# Patient Record
Sex: MALE | Race: WHITE | NOT HISPANIC OR LATINO
[De-identification: names, ages, dates, MRNs, and addresses within clinical notes are randomized per-mention and may not be internally consistent; named-entity substitution may affect disease eponyms.]

---

## 2017-08-10 PROBLEM — Z00.00 ENCOUNTER FOR PREVENTIVE HEALTH EXAMINATION: Status: ACTIVE | Noted: 2017-08-10

## 2017-08-14 ENCOUNTER — APPOINTMENT (OUTPATIENT)
Dept: CT IMAGING | Facility: CLINIC | Age: 82
End: 2017-08-14

## 2017-08-23 ENCOUNTER — APPOINTMENT (OUTPATIENT)
Dept: SURGICAL ONCOLOGY | Facility: CLINIC | Age: 82
End: 2017-08-23
Payer: MEDICARE

## 2017-08-23 VITALS
BODY MASS INDEX: 21.92 KG/M2 | SYSTOLIC BLOOD PRESSURE: 139 MMHG | DIASTOLIC BLOOD PRESSURE: 85 MMHG | HEART RATE: 98 BPM | TEMPERATURE: 98 F | WEIGHT: 180 LBS | RESPIRATION RATE: 13 BRPM | OXYGEN SATURATION: 97 % | HEIGHT: 76 IN

## 2017-08-23 DIAGNOSIS — Z78.9 OTHER SPECIFIED HEALTH STATUS: ICD-10-CM

## 2017-08-23 DIAGNOSIS — Z80.7 FAMILY HISTORY OF OTHER MALIGNANT NEOPLASMS OF LYMPHOID, HEMATOPOIETIC AND RELATED TISSUES: ICD-10-CM

## 2017-08-23 DIAGNOSIS — K76.9 LIVER DISEASE, UNSPECIFIED: ICD-10-CM

## 2017-08-23 DIAGNOSIS — Z86.79 PERSONAL HISTORY OF OTHER DISEASES OF THE CIRCULATORY SYSTEM: ICD-10-CM

## 2017-08-23 DIAGNOSIS — R59.0 LOCALIZED ENLARGED LYMPH NODES: ICD-10-CM

## 2017-08-23 DIAGNOSIS — Z87.19 PERSONAL HISTORY OF OTHER DISEASES OF THE DIGESTIVE SYSTEM: ICD-10-CM

## 2017-08-23 DIAGNOSIS — R16.1 SPLENOMEGALY, NOT ELSEWHERE CLASSIFIED: ICD-10-CM

## 2017-08-23 PROCEDURE — 99205 OFFICE O/P NEW HI 60 MIN: CPT

## 2017-08-23 RX ORDER — PANTOPRAZOLE SODIUM 40 MG/1
40 GRANULE, DELAYED RELEASE ORAL
Refills: 0 | Status: ACTIVE | COMMUNITY

## 2017-08-23 RX ORDER — ASCORBIC ACID 500 MG
500-400 TABLET,CHEWABLE ORAL
Refills: 0 | Status: ACTIVE | COMMUNITY

## 2017-08-23 RX ORDER — PNV NO.95/FERROUS FUM/FOLIC AC 28MG-0.8MG
TABLET ORAL
Refills: 0 | Status: ACTIVE | COMMUNITY

## 2017-08-30 ENCOUNTER — OUTPATIENT (OUTPATIENT)
Dept: OUTPATIENT SERVICES | Facility: HOSPITAL | Age: 82
LOS: 1 days | End: 2017-08-30
Payer: MEDICARE

## 2017-08-30 VITALS
HEIGHT: 76.5 IN | RESPIRATION RATE: 16 BRPM | SYSTOLIC BLOOD PRESSURE: 110 MMHG | HEART RATE: 80 BPM | TEMPERATURE: 98 F | WEIGHT: 177.03 LBS | DIASTOLIC BLOOD PRESSURE: 70 MMHG

## 2017-08-30 DIAGNOSIS — Z98.890 OTHER SPECIFIED POSTPROCEDURAL STATES: Chronic | ICD-10-CM

## 2017-08-30 DIAGNOSIS — Z90.79 ACQUIRED ABSENCE OF OTHER GENITAL ORGAN(S): Chronic | ICD-10-CM

## 2017-08-30 DIAGNOSIS — D41.4 NEOPLASM OF UNCERTAIN BEHAVIOR OF BLADDER: Chronic | ICD-10-CM

## 2017-08-30 DIAGNOSIS — R59.0 LOCALIZED ENLARGED LYMPH NODES: ICD-10-CM

## 2017-08-30 DIAGNOSIS — Z90.49 ACQUIRED ABSENCE OF OTHER SPECIFIED PARTS OF DIGESTIVE TRACT: Chronic | ICD-10-CM

## 2017-08-30 LAB
ALBUMIN SERPL ELPH-MCNC: 3.5 G/DL — SIGNIFICANT CHANGE UP (ref 3.3–5)
ALP SERPL-CCNC: 350 U/L — HIGH (ref 40–120)
ALT FLD-CCNC: 129 U/L — HIGH (ref 4–41)
APTT BLD: 27.8 SEC — SIGNIFICANT CHANGE UP (ref 27.5–37.4)
AST SERPL-CCNC: 118 U/L — HIGH (ref 4–40)
BILIRUB SERPL-MCNC: 5.6 MG/DL — HIGH (ref 0.2–1.2)
BLD GP AB SCN SERPL QL: NEGATIVE — SIGNIFICANT CHANGE UP
BUN SERPL-MCNC: 25 MG/DL — HIGH (ref 7–23)
CALCIUM SERPL-MCNC: 10.6 MG/DL — HIGH (ref 8.4–10.5)
CHLORIDE SERPL-SCNC: 97 MMOL/L — LOW (ref 98–107)
CO2 SERPL-SCNC: 25 MMOL/L — SIGNIFICANT CHANGE UP (ref 22–31)
CREAT SERPL-MCNC: 1.28 MG/DL — SIGNIFICANT CHANGE UP (ref 0.5–1.3)
GLUCOSE SERPL-MCNC: 103 MG/DL — HIGH (ref 70–99)
INR BLD: 1.21 — HIGH (ref 0.88–1.17)
POTASSIUM SERPL-MCNC: 4.6 MMOL/L — SIGNIFICANT CHANGE UP (ref 3.5–5.3)
POTASSIUM SERPL-SCNC: 4.6 MMOL/L — SIGNIFICANT CHANGE UP (ref 3.5–5.3)
PROT SERPL-MCNC: 6.2 G/DL — SIGNIFICANT CHANGE UP (ref 6–8.3)
PROTHROM AB SERPL-ACNC: 13.6 SEC — HIGH (ref 9.8–13.1)
RH IG SCN BLD-IMP: POSITIVE — SIGNIFICANT CHANGE UP
SODIUM SERPL-SCNC: 136 MMOL/L — SIGNIFICANT CHANGE UP (ref 135–145)

## 2017-08-30 PROCEDURE — 93010 ELECTROCARDIOGRAM REPORT: CPT

## 2017-08-30 NOTE — H&P PST ADULT - OTHER CARE PROVIDERS
Cardio - Dr. Jay Ochoa [phone] - Dr. Summer Little [phone], Oncologist - Avery Miller [phone] Cardio - Dr. Jay Ochoa [phone] GI - Dr. Summer Little [phone], Oncologist - Avery Miller [phone]

## 2017-08-30 NOTE — H&P PST ADULT - HISTORY OF PRESENT ILLNESS
84 year old male with c/o epigastric pain, had endoscopy which was negative, had CT scan which showed abnormality. Pt had biopsy which is suspicious for malignancy. Pt presents today for presurgical evaluation for ... 84 year old male with c/o epigastric pain, had endoscopy which was negative, had CT scan which revealed mass and enlarged lymph nodes. Pt had biopsy which is suspicious for malignancy. Pt presents today for presurgical evaluation for Robotic Resection of Periportal Mass scheduled on 9/1/17.

## 2017-08-30 NOTE — H&P PST ADULT - CARDIOVASCULAR COMMENTS
pt states his abdominal pain is worse when laying flat so pt has been sleeping in the chair causing increasing LE swelling

## 2017-08-30 NOTE — H&P PST ADULT - PSH
Bladder polyp  s/p TURBT  H/O inguinal hernia repair    History of cholecystectomy  1987  S/P TURP (transurethral resection of prostate)

## 2017-08-30 NOTE — H&P PST ADULT - NEGATIVE ENMT SYMPTOMS
no hearing difficulty/no tinnitus/no sinus symptoms/no ear pain/no vertigo/no dysphagia/no throat pain

## 2017-08-30 NOTE — H&P PST ADULT - PROBLEM SELECTOR PLAN 1
Robotic Resection of Periportal Mass scheduled on 9/1/17.  Pre-op instructions provided. Pt verbalized understanding.   Pt to take own medication for GI ppx.  Chlorhexidine wash provided and instructions given.   Pt states he already went for cardiac clearance. Requested copy.

## 2017-08-30 NOTE — H&P PST ADULT - NSANTHOSAYNRD_GEN_A_CORE
No. ANUSHA screening performed.  STOP BANG Legend: 0-2 = LOW Risk; 3-4 = INTERMEDIATE Risk; 5-8 = HIGH Risk

## 2017-08-31 ENCOUNTER — INPATIENT (INPATIENT)
Facility: HOSPITAL | Age: 82
LOS: 8 days | Discharge: HOME | DRG: 840 | End: 2017-09-09
Attending: INTERNAL MEDICINE | Admitting: INTERNAL MEDICINE
Payer: MEDICARE

## 2017-08-31 VITALS
WEIGHT: 175.05 LBS | RESPIRATION RATE: 976 BRPM | HEIGHT: 76 IN | DIASTOLIC BLOOD PRESSURE: 67 MMHG | SYSTOLIC BLOOD PRESSURE: 117 MMHG | OXYGEN SATURATION: 96 % | HEART RATE: 109 BPM

## 2017-08-31 DIAGNOSIS — Z90.79 ACQUIRED ABSENCE OF OTHER GENITAL ORGAN(S): Chronic | ICD-10-CM

## 2017-08-31 DIAGNOSIS — D41.4 NEOPLASM OF UNCERTAIN BEHAVIOR OF BLADDER: Chronic | ICD-10-CM

## 2017-08-31 DIAGNOSIS — G89.29 OTHER CHRONIC PAIN: ICD-10-CM

## 2017-08-31 DIAGNOSIS — R60.0 LOCALIZED EDEMA: ICD-10-CM

## 2017-08-31 DIAGNOSIS — K59.00 CONSTIPATION, UNSPECIFIED: ICD-10-CM

## 2017-08-31 DIAGNOSIS — Z98.890 OTHER SPECIFIED POSTPROCEDURAL STATES: Chronic | ICD-10-CM

## 2017-08-31 DIAGNOSIS — Z90.49 ACQUIRED ABSENCE OF OTHER SPECIFIED PARTS OF DIGESTIVE TRACT: Chronic | ICD-10-CM

## 2017-08-31 DIAGNOSIS — K22.70 BARRETT'S ESOPHAGUS WITHOUT DYSPLASIA: ICD-10-CM

## 2017-08-31 DIAGNOSIS — C85.90 NON-HODGKIN LYMPHOMA, UNSPECIFIED, UNSPECIFIED SITE: ICD-10-CM

## 2017-08-31 DIAGNOSIS — K83.1 OBSTRUCTION OF BILE DUCT: ICD-10-CM

## 2017-08-31 DIAGNOSIS — R79.89 OTHER SPECIFIED ABNORMAL FINDINGS OF BLOOD CHEMISTRY: ICD-10-CM

## 2017-08-31 LAB
ALBUMIN SERPL ELPH-MCNC: 3.3 G/DL — SIGNIFICANT CHANGE UP (ref 3.3–5)
ALP SERPL-CCNC: 387 U/L — HIGH (ref 40–120)
ALT FLD-CCNC: 150 U/L RC — HIGH (ref 10–45)
ANION GAP SERPL CALC-SCNC: 13 MMOL/L — SIGNIFICANT CHANGE UP (ref 5–17)
APTT BLD: 27.3 SEC — LOW (ref 27.5–37.4)
AST SERPL-CCNC: 122 U/L — HIGH (ref 10–40)
BASOPHILS # BLD AUTO: 0.1 K/UL — SIGNIFICANT CHANGE UP (ref 0–0.2)
BASOPHILS NFR BLD AUTO: 0.7 % — SIGNIFICANT CHANGE UP (ref 0–2)
BILIRUB SERPL-MCNC: 5.8 MG/DL — HIGH (ref 0.2–1.2)
BLD GP AB SCN SERPL QL: NEGATIVE — SIGNIFICANT CHANGE UP
BUN SERPL-MCNC: 29 MG/DL — HIGH (ref 7–23)
CALCIUM SERPL-MCNC: 10.4 MG/DL — SIGNIFICANT CHANGE UP (ref 8.4–10.5)
CHLORIDE SERPL-SCNC: 98 MMOL/L — SIGNIFICANT CHANGE UP (ref 96–108)
CO2 SERPL-SCNC: 25 MMOL/L — SIGNIFICANT CHANGE UP (ref 22–31)
CREAT SERPL-MCNC: 1.4 MG/DL — HIGH (ref 0.5–1.3)
EOSINOPHIL # BLD AUTO: 0.2 K/UL — SIGNIFICANT CHANGE UP (ref 0–0.5)
EOSINOPHIL NFR BLD AUTO: 2 % — SIGNIFICANT CHANGE UP (ref 0–6)
GLUCOSE SERPL-MCNC: 103 MG/DL — HIGH (ref 70–99)
HCT VFR BLD CALC: 39.6 % — SIGNIFICANT CHANGE UP (ref 39–50)
HGB BLD-MCNC: 13.2 G/DL — SIGNIFICANT CHANGE UP (ref 13–17)
INR BLD: 1.25 RATIO — HIGH (ref 0.88–1.16)
LYMPHOCYTES # BLD AUTO: 1.7 K/UL — SIGNIFICANT CHANGE UP (ref 1–3.3)
LYMPHOCYTES # BLD AUTO: 19.4 % — SIGNIFICANT CHANGE UP (ref 13–44)
MCHC RBC-ENTMCNC: 33.3 GM/DL — SIGNIFICANT CHANGE UP (ref 32–36)
MCHC RBC-ENTMCNC: 33.9 PG — SIGNIFICANT CHANGE UP (ref 27–34)
MCV RBC AUTO: 102 FL — HIGH (ref 80–100)
MONOCYTES # BLD AUTO: 2 K/UL — HIGH (ref 0–0.9)
MONOCYTES NFR BLD AUTO: 22.4 % — HIGH (ref 2–14)
NEUTROPHILS # BLD AUTO: 5 K/UL — SIGNIFICANT CHANGE UP (ref 1.8–7.4)
NEUTROPHILS NFR BLD AUTO: 55.4 % — SIGNIFICANT CHANGE UP (ref 43–77)
PLATELET # BLD AUTO: 145 K/UL — LOW (ref 150–400)
POTASSIUM SERPL-MCNC: 4.8 MMOL/L — SIGNIFICANT CHANGE UP (ref 3.5–5.3)
POTASSIUM SERPL-SCNC: 4.8 MMOL/L — SIGNIFICANT CHANGE UP (ref 3.5–5.3)
PROT SERPL-MCNC: 5.9 G/DL — LOW (ref 6–8.3)
PROTHROM AB SERPL-ACNC: 13.6 SEC — HIGH (ref 9.8–12.7)
RBC # BLD: 3.89 M/UL — LOW (ref 4.2–5.8)
RBC # FLD: 12.3 % — SIGNIFICANT CHANGE UP (ref 10.3–14.5)
RH IG SCN BLD-IMP: POSITIVE — SIGNIFICANT CHANGE UP
SODIUM SERPL-SCNC: 136 MMOL/L — SIGNIFICANT CHANGE UP (ref 135–145)
WBC # BLD: 9 K/UL — SIGNIFICANT CHANGE UP (ref 3.8–10.5)
WBC # FLD AUTO: 9 K/UL — SIGNIFICANT CHANGE UP (ref 3.8–10.5)

## 2017-08-31 PROCEDURE — 99285 EMERGENCY DEPT VISIT HI MDM: CPT | Mod: 25,GC

## 2017-08-31 PROCEDURE — 71010: CPT | Mod: 26

## 2017-08-31 PROCEDURE — 93010 ELECTROCARDIOGRAM REPORT: CPT

## 2017-08-31 RX ORDER — TEMAZEPAM 15 MG/1
1 CAPSULE ORAL
Qty: 0 | Refills: 0 | COMMUNITY

## 2017-08-31 RX ORDER — PANTOPRAZOLE SODIUM 20 MG/1
40 TABLET, DELAYED RELEASE ORAL
Qty: 0 | Refills: 0 | Status: DISCONTINUED | OUTPATIENT
Start: 2017-08-31 | End: 2017-09-09

## 2017-08-31 RX ORDER — HEPARIN SODIUM 5000 [USP'U]/ML
5000 INJECTION INTRAVENOUS; SUBCUTANEOUS EVERY 8 HOURS
Qty: 0 | Refills: 0 | Status: DISCONTINUED | OUTPATIENT
Start: 2017-08-31 | End: 2017-09-09

## 2017-08-31 RX ORDER — PREGABALIN 225 MG/1
1 CAPSULE ORAL
Qty: 0 | Refills: 0 | COMMUNITY

## 2017-08-31 RX ORDER — GLUCOSAMINE HCL/CHONDROITIN SU 500-400 MG
1 CAPSULE ORAL
Qty: 0 | Refills: 0 | COMMUNITY

## 2017-08-31 RX ORDER — DIAZEPAM 5 MG
2 TABLET ORAL THREE TIMES A DAY
Qty: 0 | Refills: 0 | Status: DISCONTINUED | OUTPATIENT
Start: 2017-08-31 | End: 2017-09-05

## 2017-08-31 RX ORDER — SODIUM CHLORIDE 9 MG/ML
1000 INJECTION INTRAMUSCULAR; INTRAVENOUS; SUBCUTANEOUS
Qty: 0 | Refills: 0 | Status: DISCONTINUED | OUTPATIENT
Start: 2017-08-31 | End: 2017-09-02

## 2017-08-31 RX ADMIN — HEPARIN SODIUM 5000 UNIT(S): 5000 INJECTION INTRAVENOUS; SUBCUTANEOUS at 22:28

## 2017-08-31 RX ADMIN — SODIUM CHLORIDE 150 MILLILITER(S): 9 INJECTION INTRAMUSCULAR; INTRAVENOUS; SUBCUTANEOUS at 16:45

## 2017-08-31 RX ADMIN — Medication 2 MILLIGRAM(S): at 22:28

## 2017-08-31 NOTE — ED PROVIDER NOTE - PMH
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

## 2017-08-31 NOTE — H&P ADULT - ASSESSMENT
84m hx as above presenting with outpatient labs demonstrating lab evidence of cholestatic and hepatocellular liver injury

## 2017-08-31 NOTE — H&P ADULT - NSHPLABSRESULTS_GEN_ALL_CORE
.  LABS:                         13.2   9.0   )-----------( 145      ( 31 Aug 2017 16:33 )             39.6     08-31    136  |  98  |  29<H>  ----------------------------<  103<H>  4.8   |  25  |  1.40<H>    Ca    10.4      31 Aug 2017 16:33    TPro  5.9<L>  /  Alb  3.3  /  TBili  5.8<H>  /  DBili  x   /  AST  122<H>  /  ALT  150<H>  /  AlkPhos  387<H>  08-31    PT/INR - ( 31 Aug 2017 16:33 )   PT: 13.6 sec;   INR: 1.25 ratio         PTT - ( 31 Aug 2017 16:33 )  PTT:27.3 sec      CXR personally reviewed: clear lung fields with bilateral flattened, elevated hemidiaphragms, L > R  EKG personally reviewed: NSR with frequent APCs

## 2017-08-31 NOTE — H&P ADULT - PROBLEM SELECTOR PLAN 5
Likely multifactorial 2/2 lymphedema from lymphoma and likely direct organ compression of venous drainage/IVC  - no role for diuretics in this clinical scenario- recommend leg elevation as tolerated  - treatment of underlying disease process

## 2017-08-31 NOTE — H&P ADULT - HISTORY OF PRESENT ILLNESS
84m presenting for admission following preop labs disclosing elevated transaminases and hyperbilirubinemia. 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 Found today to have abnormal labs on preop evaluation, so sent to ED for evaluation. No jaundice/icterus noted. Pt states he has had epigastric pain, discomfort and heaviness for months, not alleviated by any particular position, poor sleep due to abdominal discomfort, chronic back pain, and worsening bilateral leg edema. Reports no fevers, chills, cough, SOB, chest pain, nausea or vomiting. Does have constipation, last BM 8/22/17. Reports he takes mainly ibuprofen for pain, has tried opioids in the past but those have worsened his constipation, and has had muscle relaxants many years ago.    In ED: afebrile, 109, 117/67, 18, 98RA. Started on NS @ 150cc/hr, Dr. Wray consulted. 84m presenting for admission following preop labs disclosing elevated transaminases and hyperbilirubinemia. 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 Found today to have abnormal labs on preop evaluation, so sent to ED for evaluation. No jaundice/icterus noted. Pt states he has had epigastric pain, discomfort and heaviness for months, not alleviated by any particular position, poor sleep due to abdominal discomfort, chronic back pain, and worsening bilateral leg edema. +Nausea without vomiting, has poor PO intake. Reports no fevers, chills, cough, SOB, chest pain, nausea or vomiting. Does have constipation, last BM 8/22/17. Reports he takes mainly ibuprofen for pain, has tried opioids in the past but those have worsened his constipation, and has had muscle relaxants many years ago with some success.    In ED: afebrile, 109, 117/67, 18, 98RA. Started on NS @ 150cc/hr, Dr. Wray consulted.

## 2017-08-31 NOTE — ED PROVIDER NOTE - ATTENDING CONTRIBUTION TO CARE
Dr. FAUSTO Matt:  I have independently evaluated the patient and have documented in the appropriate sections above.  I agree with the exam and plan as noted above.

## 2017-08-31 NOTE — H&P ADULT - PROBLEM SELECTOR PLAN 2
Likely B cell lymphoma, however excisional biopsy needed for further cytogenetic and chemosensitivity testing  - f/u with OP H/O Dr. Miller in AM. Call in house H/O in AM for planning of possible LN biopsy

## 2017-08-31 NOTE — ED PROVIDER NOTE - PHYSICAL EXAMINATION
Gen: well appearing, of stated age, no acute distress; Head: NC, AT; ENT: MMM, no uvular deviation; Neck: supple with full ROM; Chest: CTAB, no retractions, rate normal, appears to breath comfortable; Heart: tachy regular S1S2 No JVD No peripheral edema b/l pulses 2+ in arms and legs; Abd: Soft non-tender, no rebound or guarding, no masses, no patel sign, no mcburney tenderness, no CVAT; Back: No spinal deformity; Ext: Moving all 4 limbs without obvious impairment to ROM, no obvious weakness; Neuro: fluid speech, no focal deficits, oriented to person, place, situation; Psych: No anxiety, depression or pressured speech noted; Skin: no utricaria, no diffuse rash. -ncohen

## 2017-08-31 NOTE — ED ADULT NURSE NOTE - OBJECTIVE STATEMENT
Male84 years old been having epigastric pain since July came in today for abnormal labs. Pt is s/p Endoscopy July 14 and Ct scan of abdomen Aug 11. He is schedule for biopsy of lymph nodes tomorrow and yesterday he had blood works done. Today he was called to go  ED for high bilirubin .With nausea and vomiting associated with decrease appetite to eat. With constipation for a month and swelling of both lower legs. Abdomen distended but non tender on palpation. Denies fever, chills, chest pain and sob. denies urinary symptoms. Labs obtained. Comfort care rendered. Wife at bedside. Male84 years old been having epigastric pain since July came in today for abnormal labs. Pt is s/p Endoscopy July 14 and Ct scan of abdomen Aug 11 which shows mass at the liver, spleen, pancreas and lymp nodes He is schedule for repeat needle biopsy of lymph nodes tomorrow and yesterday he had blood works done. Today he was called to go  ED for high bilirubin .With nausea and vomiting associated with decrease appetite to eat. With constipation for a month and swelling of both lower legs. Abdomen distended but non tender on palpation. Denies fever, chills, chest pain and sob. denies urinary symptoms. Labs obtained. Comfort care rendered. Wife at bedside.

## 2017-08-31 NOTE — ED PROVIDER NOTE - NS ED ROS FT
ROS: As noted in HPI, otherwise below --  Constitutional symptoms: Negative  Eyes: Negative  Ears, Nose, Mouth, Throat: Negative  Cardiovascular: Negative  Respiratory: Negative  Gastrointestinal: +  Genitourinary: Negative  Musculoskeletal: Negative  Integumentary: Negative  Neurological: Negative  Psychiatric: Negative  Endocrine: Negative  Allergic/Immunologic: Negative

## 2017-08-31 NOTE — H&P ADULT - PROBLEM SELECTOR PLAN 3
Likely 2/2 intestinal compression and obstruction by enlarged liver and spleen.   - Treat underlying neoplastic disease process  - avoid bulk forming laxatives, would use stimulant laxatives at this time- senna, colace. May add lactulose and enemas as needed.

## 2017-08-31 NOTE — H&P ADULT - PROBLEM SELECTOR PLAN 1
Predominantly cholestatic picture with hyperbilirubinemia and elevated ALP, with a component of mild hepatocellular injury as well  - continue trending LFTs  - RUQ US  - Dr. Wray to see patient in AM for possible biliary stenting

## 2017-08-31 NOTE — H&P ADULT - PROBLEM SELECTOR PLAN 4
Abdomina discomfort likely due to lymphoma. Musculoskeletal pain likely due to poor posturing as an attempt to alleviate abdominal discomfort. Pt not amenable to opioids at this time, and ibuprofen has not been of much help. Tylenol not wise in setting of liver disease.   - Will start with muscle relaxants. Abdomina discomfort likely due to lymphoma. Musculoskeletal pain likely due to poor posturing as an attempt to alleviate abdominal discomfort. Pt not amenable to opioids at this time, and ibuprofen has not been of much help. Tylenol not wise in setting of liver disease.   - Will start with muscle relaxants. Favor valium over flexeril due to less anticholinergic side effects, and for added benefit of anxiolysis. Given liver injury, however, would start with low dose, with low threshold to discontinue if transaminitis worsens.  - Will start Valium 2mg po TID. Will discontinue patient's home Temazepam.

## 2017-08-31 NOTE — ED PROVIDER NOTE - OBJECTIVE STATEMENT
84 year old male multiple ln eus recently showing likely lymphoma, pt sent in by dr mccall for biliary stent. 84 year old male multiple ln eus recently showing likely lymphoma, pt sent in by dr mccall for biliary stent.    The patient presents with epigastric pain since July. Endoscopy at that time was normal, but CT scan on 8/11/17 was concerning for masses in the liver, pancreas, spleen, and lymph nodes. He underwent needle biopsy which yielded insufficient tissue. He was scheduled for repeat lymph node biopsy on 9/1. However, pre-procedure labs on 8/30 revealed elevated bilirubin (5.6), alk phos (350), and slightly elevated AST (118) and ALT (129). His oncologist referred him to [facility] to be admitted and to undergo biliary stenting. Today he also complains of constant epigastric pain, lack of appetite, weight loss, and constipation for several weeks (last BM 8/2217 after Fleet enema). His GI physician is Dr. Mccall, surgical oncologist is Dr. Harris, and oncologist Dr. Avery Miller.

## 2017-09-01 DIAGNOSIS — Z29.9 ENCOUNTER FOR PROPHYLACTIC MEASURES, UNSPECIFIED: ICD-10-CM

## 2017-09-01 LAB
ALBUMIN SERPL ELPH-MCNC: 3 G/DL — LOW (ref 3.3–5)
ALP SERPL-CCNC: 393 U/L — HIGH (ref 40–120)
ALT FLD-CCNC: 144 U/L — HIGH (ref 10–45)
ANION GAP SERPL CALC-SCNC: 11 MMOL/L — SIGNIFICANT CHANGE UP (ref 5–17)
AST SERPL-CCNC: 123 U/L — HIGH (ref 10–40)
BASOPHILS # BLD AUTO: 0 K/UL — SIGNIFICANT CHANGE UP (ref 0–0.2)
BASOPHILS NFR BLD AUTO: 0 % — SIGNIFICANT CHANGE UP (ref 0–2)
BILIRUB SERPL-MCNC: 8.9 MG/DL — HIGH (ref 0.2–1.2)
BUN SERPL-MCNC: 29 MG/DL — HIGH (ref 7–23)
CALCIUM SERPL-MCNC: 10.6 MG/DL — HIGH (ref 8.4–10.5)
CHLORIDE SERPL-SCNC: 102 MMOL/L — SIGNIFICANT CHANGE UP (ref 96–108)
CO2 SERPL-SCNC: 23 MMOL/L — SIGNIFICANT CHANGE UP (ref 22–31)
CREAT SERPL-MCNC: 1.28 MG/DL — SIGNIFICANT CHANGE UP (ref 0.5–1.3)
EOSINOPHIL # BLD AUTO: 0.07 K/UL — SIGNIFICANT CHANGE UP (ref 0–0.5)
EOSINOPHIL NFR BLD AUTO: 0.9 % — SIGNIFICANT CHANGE UP (ref 0–6)
GLUCOSE SERPL-MCNC: 95 MG/DL — SIGNIFICANT CHANGE UP (ref 70–99)
HCT VFR BLD CALC: 37.6 % — LOW (ref 39–50)
HGB BLD-MCNC: 12.6 G/DL — LOW (ref 13–17)
INR BLD: 1.26 RATIO — HIGH (ref 0.88–1.16)
LYMPHOCYTES # BLD AUTO: 0.29 K/UL — LOW (ref 1–3.3)
LYMPHOCYTES # BLD AUTO: 3.5 % — LOW (ref 13–44)
MCHC RBC-ENTMCNC: 32.6 PG — SIGNIFICANT CHANGE UP (ref 27–34)
MCHC RBC-ENTMCNC: 33.5 GM/DL — SIGNIFICANT CHANGE UP (ref 32–36)
MCV RBC AUTO: 97.2 FL — SIGNIFICANT CHANGE UP (ref 80–100)
MONOCYTES # BLD AUTO: 1.67 K/UL — HIGH (ref 0–0.9)
MONOCYTES NFR BLD AUTO: 20 % — HIGH (ref 2–14)
NEUTROPHILS # BLD AUTO: 6.3 K/UL — SIGNIFICANT CHANGE UP (ref 1.8–7.4)
NEUTROPHILS NFR BLD AUTO: 74.7 % — SIGNIFICANT CHANGE UP (ref 43–77)
PLATELET # BLD AUTO: 146 K/UL — LOW (ref 150–400)
POTASSIUM SERPL-MCNC: 5.2 MMOL/L — SIGNIFICANT CHANGE UP (ref 3.5–5.3)
POTASSIUM SERPL-MCNC: 5.5 MMOL/L — HIGH (ref 3.5–5.3)
POTASSIUM SERPL-SCNC: 5.2 MMOL/L — SIGNIFICANT CHANGE UP (ref 3.5–5.3)
POTASSIUM SERPL-SCNC: 5.5 MMOL/L — HIGH (ref 3.5–5.3)
PROT SERPL-MCNC: 5.5 G/DL — LOW (ref 6–8.3)
PROTHROM AB SERPL-ACNC: 14.3 SEC — HIGH (ref 10–13.1)
RBC # BLD: 3.87 M/UL — LOW (ref 4.2–5.8)
RBC # FLD: 14.5 % — SIGNIFICANT CHANGE UP (ref 10.3–14.5)
SODIUM SERPL-SCNC: 136 MMOL/L — SIGNIFICANT CHANGE UP (ref 135–145)
WBC # BLD: 8.33 K/UL — SIGNIFICANT CHANGE UP (ref 3.8–10.5)
WBC # FLD AUTO: 8.33 K/UL — SIGNIFICANT CHANGE UP (ref 3.8–10.5)

## 2017-09-01 PROCEDURE — 76705 ECHO EXAM OF ABDOMEN: CPT | Mod: 26

## 2017-09-01 RX ORDER — TEMAZEPAM 15 MG/1
15 CAPSULE ORAL AT BEDTIME
Qty: 0 | Refills: 0 | Status: DISCONTINUED | OUTPATIENT
Start: 2017-09-01 | End: 2017-09-02

## 2017-09-01 RX ORDER — METHOCARBAMOL 500 MG/1
1500 TABLET, FILM COATED ORAL EVERY 6 HOURS
Qty: 0 | Refills: 0 | Status: DISCONTINUED | OUTPATIENT
Start: 2017-09-01 | End: 2017-09-01

## 2017-09-01 RX ADMIN — TEMAZEPAM 15 MILLIGRAM(S): 15 CAPSULE ORAL at 23:59

## 2017-09-01 RX ADMIN — PANTOPRAZOLE SODIUM 40 MILLIGRAM(S): 20 TABLET, DELAYED RELEASE ORAL at 06:01

## 2017-09-01 RX ADMIN — HEPARIN SODIUM 5000 UNIT(S): 5000 INJECTION INTRAVENOUS; SUBCUTANEOUS at 06:01

## 2017-09-01 NOTE — CONSULT NOTE ADULT - ASSESSMENT
Pt is 84M scheduled for robotic excision of kalyan-portal mass today at [facility] but admitted yesterday to [facility] for biliary obstruction likely secondary to periportal mass.  Undergoing ERCP with GI for stenting.  Pt otherwise stable per primary team and GI.    -Dr. Michele Harris aware, Dr. Wray of GI will update Dr. Harris with results of ERCP  -will follow hospital course and update Dr. Harris for rescheduling of case when pt cleared  -case discussed with Dr. Harris  [id number]

## 2017-09-01 NOTE — CONSULT NOTE ADULT - PROBLEM SELECTOR RECOMMENDATION 9
Elevated LFTs secondary to lymphoma  - GI evaluation to determine if there is any obstruction which is amenable to stenting

## 2017-09-01 NOTE — PROGRESS NOTE ADULT - SUBJECTIVE AND OBJECTIVE BOX
Pre-Endoscopy Evaluation      Referring Physician:  Ej Quiroz MD                               Procedure: ERCP    Indication for Procedure: Biliary obstruction    Pertinent History:  84 year old male PMH below  presenting with Transaminitis and hyperbilirubinemia. 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 1000 milliLiter(s) (150 mL/Hr) IV Continuous <Continuous>  pantoprazole    Tablet 40 milliGRAM(s) Oral before breakfast  heparin  Injectable 5000 Unit(s) SubCutaneous every 8 hours    MEDICATIONS  (PRN):  diazepam    Tablet 2 milliGRAM(s) Oral three times a day PRN muscle spasm pain  temazepam 15 milliGRAM(s) Oral at bedtime PRN Insomnia      Smoking: [ ] yes  [x] no    AICD/PPM: [ ] yes   [x] no    Pertinent lab data:                        12.6   8.33  )-----------( 146      ( 01 Sep 2017 09:21 )             37.6     09-01    x   |  x   |  x   ----------------------------<  x   5.2   |  x   |  x     Ca    10.6<H>      01 Sep 2017 09:04    TPro  5.5<L>  /  Alb  3.0<L>  /  TBili  8.9<H>  /  DBili  x   /  AST  123<H>  /  ALT  144<H>  /  AlkPhos  393<H>  09-01    PT/INR - ( 01 Sep 2017 09:21 )   PT: 14.3 sec;   INR: 1.26 ratio         PTT - ( 31 Aug 2017 16:33 )  PTT:27.3 sec        Physical Examination:  Daily Height in cm: 193.04 (31 Aug 2017 15:30)    Daily   Vital Signs Last 24 Hrs  T(C): 36.4 (01 Sep 2017 11:19), Max: 37.1 (31 Aug 2017 18:37)  T(F): 97.6 (01 Sep 2017 11:19), Max: 98.8 (31 Aug 2017 18:37)  HR: 95 (01 Sep 2017 11:19) (93 - 109)  BP: 127/68 (01 Sep 2017 11:19) (117/67 - 154/82)  BP(mean): 92 (31 Aug 2017 18:37) (92 - 92)  RR: 18 (01 Sep 2017 11:19) (16 - 976)  SpO2: 93% (01 Sep 2017 11:19) (93% - 100%)        Constitutional: NAD    HEENT: PERRLA, EOMI,       Neck:  No JVD    Respiratory: CTAB/L    Cardiovascular: S1 and S2    Gastrointestinal: BS+, soft, NT/ND    Extremities: No peripheral edema    Comments:    ASA Class: I [ ]  II [ ]  III [ ]  IV [x]    The patient is a suitable candidate for the planned procedure unless box checked [ ]  No, explain: Pre-Endoscopy Evaluation      Referring Physician:  Ej Quiroz MD                               Procedure: ERCP    Indication for Procedure: Biliary obstruction    Pertinent History:  84 year old male PMH below  presenting with Transaminitis and hyperbilirubinemia. 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 1000 milliLiter(s) (150 mL/Hr) IV Continuous <Continuous>  pantoprazole    Tablet 40 milliGRAM(s) Oral before breakfast  heparin  Injectable 5000 Unit(s) SubCutaneous every 8 hours    MEDICATIONS  (PRN):  diazepam    Tablet 2 milliGRAM(s) Oral three times a day PRN muscle spasm pain  temazepam 15 milliGRAM(s) Oral at bedtime PRN Insomnia      Smoking: [ ] yes  [x] no    AICD/PPM: [ ] yes   [x] no    Pertinent lab data:                        12.6   8.33  )-----------( 146      ( 01 Sep 2017 09:21 )             37.6     09-01    x   |  x   |  x   ----------------------------<  x   5.2   |  x   |  x     Ca    10.6<H>      01 Sep 2017 09:04    TPro  5.5<L>  /  Alb  3.0<L>  /  TBili  8.9<H>  /  DBili  x   /  AST  123<H>  /  ALT  144<H>  /  AlkPhos  393<H>  09-01    PT/INR - ( 01 Sep 2017 09:21 )   PT: 14.3 sec;   INR: 1.26 ratio         PTT - ( 31 Aug 2017 16:33 )  PTT:27.3 sec        Physical Examination:  Daily Height in cm: 193.04 (31 Aug 2017 15:30)    Daily   Vital Signs Last 24 Hrs  T(C): 36.4 (01 Sep 2017 11:19), Max: 37.1 (31 Aug 2017 18:37)  T(F): 97.6 (01 Sep 2017 11:19), Max: 98.8 (31 Aug 2017 18:37)  HR: 95 (01 Sep 2017 11:19) (93 - 109)  BP: 127/68 (01 Sep 2017 11:19) (117/67 - 154/82)  BP(mean): 92 (31 Aug 2017 18:37) (92 - 92)  RR: 18 (01 Sep 2017 11:19) (16 - 976)  SpO2: 93% (01 Sep 2017 11:19) (93% - 100%)        Constitutional: NAD    HEENT: PERRLA, EOMI,       Neck:  No JVD    Respiratory: CTAB/L    Cardiovascular: S1 and S2    Gastrointestinal: BS+, soft, NT/ND    Extremities: No peripheral edema    Comments:    ASA Class: I [ ]  II [ ]  III [ ]  IV [x]    The patient is a suitable candidate for the planned procedure unless box checked [ ]  No, explain:

## 2017-09-01 NOTE — CONSULT NOTE ADULT - SUBJECTIVE AND OBJECTIVE BOX
[facility] Hematology/Oncology - Tahmina Miller / Dandre / González - [phone]  Primary Outpatient Hematologist/Oncologist: Dr. Noah Miller    Chief Complaint:  Elevated Bilirubin    HPI:  84 year old man with history of b-cell lymphoma admitted with hyperbilirubinemia. Patient was in his normal state ofh health when he experienced pain in his back whenever her laid down and he has persistent epigastric pain. Patient was seen GI who performed a EGD which was negative. Imaging scans were performed which showed dominant periportal and peripancreatis mass with splenomegaly and hepatic/splenic lesions. Retroperitoneal lesions were also present. EUS was performed on 8/17/2017 which confirmed diagnosis of b-cell lymphoma. More tissue was needed for further testing. PET/CT on 8/29/2017 showed markedly hypermetabolic patricia masses involving the periportal region and contiguous retroperitoneum bilaterally. Patient had a BM biopsy scheduled for 8/31/2017. Found to have a Bilirubin of 5.3 and sent to the hospital for evaluation.     ROS:  General:  (-)Pain, (+)Decrease appeite, (-)Fevers, (-)Chills, (+Weight Loss  Eyes: (-)Blurry Vision, (-)Double Vision, (-)Vision Loss  ENT: (-)Sinus Congestion, (-)Decreased Hearing, (-)Nosebleeds, (-)Sore Throat  Cardiac: (-)Chest Pain, (-)Palpitations, (-)Shortness of breathe on exertion  Respiratory: (-)Cough, (-)hemoptysis, (-)Shortness of breathe  GI: (-)Nausea, (-)Vomiting, (-)Diarrhea, (-)Constipation, (-)Melena, (-)BRBPR  : (-)Hematuria, (-)Dysuria, Polyuria  MSK: (-)Back pain, (-)Joint pain, (-)Stiffness  Dermatology: (-)Rash, (-)Itching  Neurology:  (-)Numbness, (-)Tingling, (-)Difficulty Walking, (-)Tremors, (+)Weakness  Psych: (-)Anxiety, (-)Depression, (-)Memory Loss  Hematology:  (-)Easy Bruising, (-)Easy Bleeding, (-)Night Sweats.     PAST MEDICAL & SURGICAL HISTORY:  Romero esophagus  Intestinal infection: 2012  Bladder polyp  H/O inguinal hernia repair  S/P TURP (transurethral resection of prostate)  Bladder polyp: s/p TURBT  History of cholecystectomy: 1987    Social History  Tobacco: Denies current use  Alcohol: Denies current use  Drugs:  Denies current use    FAMILY HISTORY:  Non-contibutory    MEDICATIONS  (STANDING):  sodium chloride 0.9%. 1000 milliLiter(s) (150 mL/Hr) IV Continuous <Continuous>  pantoprazole    Tablet 40 milliGRAM(s) Oral before breakfast  heparin  Injectable 5000 Unit(s) SubCutaneous every 8 hours    MEDICATIONS  (PRN):  diazepam    Tablet 2 milliGRAM(s) Oral three times a day PRN muscle spasm pain    Allergies  latex (Rash; Blisters)  penicillins (Swelling)  sulfa drugs (Unknown)    Vital Signs Last 24 Hrs  T(C): 36.4 (01 Sep 2017 05:56), Max: 37.1 (31 Aug 2017 18:37)  T(F): 97.5 (01 Sep 2017 05:56), Max: 98.8 (31 Aug 2017 18:37)  HR: 102 (01 Sep 2017 05:56) (93 - 109)  BP: 147/83 (01 Sep 2017 05:56) (117/67 - 154/82)  BP(mean): 92 (31 Aug 2017 18:37) (92 - 92)  RR: 18 (01 Sep 2017 05:56) (16 - 976)  SpO2: 94% (01 Sep 2017 05:56) (94% - 100%)    Physical Exam:  General: AAO x 3. NAD. Reclining in chair.  HEENT: clear oropharynx, anicteric sclera, pink conjunctivae, EOMi. PERRLA  Cardiac: S1, S2 present. No audible murmurs. RRR  Lungs: CTA B/L.   Abdomen: Soft, Non-Tender, Non-Distended. Palpable splenomegaly  Extremities: 2+ pulses. No edema  Skin: No Rashes. No petechiae  Neuro: No focal motor or sensory deficits.     Labs:  CBC Full  -  ( 31 Aug 2017 16:33 )  WBC Count : 9.0 K/uL  Hemoglobin : 13.2 g/dL  Hematocrit : 39.6 %  Platelet Count - Automated : 145 K/uL  Mean Cell Volume : 102.0 fl  Mean Cell Hemoglobin : 33.9 pg  Mean Cell Hemoglobin Concentration : 33.3 gm/dL  Auto Neutrophil # : 5.0 K/uL  Auto Lymphocyte # : 1.7 K/uL  Auto Monocyte # : 2.0 K/uL  Auto Eosinophil # : 0.2 K/uL  Auto Basophil # : 0.1 K/uL  Auto Neutrophil % : 55.4 %  Auto Lymphocyte % : 19.4 %  Auto Monocyte % : 22.4 %  Auto Eosinophil % : 2.0 %  Auto Basophil % : 0.7 %    08-31    136  |  98  |  29<H>  ----------------------------<  103<H>  4.8   |  25  |  1.40<H>  Ca    10.4      31 Aug 2017 16:33  TPro  5.9<L>  /  Alb  3.3  /  TBili  5.8<H>  /  DBili  x   /  AST  122<H>  /  ALT  150<H>  /  AlkPhos  387<H>  08-31    PT/INR - ( 31 Aug 2017 16:33 )   PT: 13.6 sec;   INR: 1.25 ratio    PTT - ( 31 Aug 2017 16:33 )  PTT:27.3 sec

## 2017-09-01 NOTE — CONSULT NOTE ADULT - SUBJECTIVE AND OBJECTIVE BOX
Pt currently in endoscopy.  Information gathered from inpatient and outpatient charts, speaking to primary team and GI.  Pt is 84M with newly diagnosed B-cell lymphoma.  He was scheduled to have a robotic resection of a periportal mass for a more definitive tissue diagnosis with Dr. Harris today at [facility].  However, yesterday, his pre-op labs showed elevated Tbili and LFTs.  He was sent to [facility] for further work-up.  RUQ ultrasound showed CBD dilation.  GI was consulted and pt currently in endoscopy for ERCP and possible stenting.      PMHx: Romero esophagus  Intestinal infection  Bladder polyp    PSHx: H/O inguinal hernia repair  S/P TURP (transurethral resection of prostate)  Bladder polyp  History of cholecystectomy    Medications (inpatient): sodium chloride 0.9%. 1000 milliLiter(s) IV Continuous <Continuous>  pantoprazole    Tablet 40 milliGRAM(s) Oral before breakfast  heparin  Injectable 5000 Unit(s) SubCutaneous every 8 hours    Medications (PRN):diazepam    Tablet 2 milliGRAM(s) Oral three times a day PRN  temazepam 15 milliGRAM(s) Oral at bedtime PRN    Allergies: latex (Rash; Blisters)  penicillins (Swelling)  sulfa drugs (Unknown)  (Intolerances: )  Social Hx:     Physical Exam  T(C): 36.6 (09-01-17 @ 14:05)  HR: 99 (09-01-17 @ 14:05) (93 - 109)  BP: 132/77 (09-01-17 @ 14:05) (117/67 - 154/82)  RR: 18 (09-01-17 @ 14:05) (16 - 976)  SpO2: 95% (09-01-17 @ 14:05) (93% - 100%)  Wt(kg): --  Tmax: T(C): , Max: 37.1 (08-31-17 @ 18:37)  Wt(kg): --    08-31-17  -  09-01-17  --------------------------------------------------------  IN:    sodium chloride 0.9%.: 1800 mL  Total IN: 1800 mL    OUT:    Voided: 250 mL  Total OUT: 250 mL    Total NET: 1550 mL      09-01-17  -  09-01-17  --------------------------------------------------------  IN:  Total IN: 0 mL    OUT:    Voided: 200 mL  Total OUT: 200 mL    Total NET: -200 mL        Physical Exam deferred at the moment due to pt undergoing ERCP.     Labs:                        12.6   8.33  )-----------( 146      ( 01 Sep 2017 09:21 )             37.6     PT/INR - ( 01 Sep 2017 09:21 )   PT: 14.3 sec;   INR: 1.26 ratio         PTT - ( 31 Aug 2017 16:33 )  PTT:27.3 sec  09-01    x   |  x   |  x   ----------------------------<  x   5.2   |  x   |  x     Ca    10.6<H>      01 Sep 2017 09:04    TPro  5.5<L>  /  Alb  3.0<L>  /  TBili  8.9<H>  /  DBili  x   /  AST  123<H>  /  ALT  144<H>  /  AlkPhos  393<H>  09-01            Imaging and other studies:

## 2017-09-01 NOTE — CONSULT NOTE ADULT - PROBLEM SELECTOR RECOMMENDATION 2
B-Cell Lymphoma. Inadequate tissue for subtyping and for cytogenetics  - Needs repeat biopsy to obtain more tissue so that treatment can be targeted  - Was to get biopsy by Dr. Harris today before hospitalization. Please have Dr. Harris see the patient. Consider biopsy after resolution of hyperbilirubinemia while patient is hospitalized.   - Will follow. Dr. Miller to cover the weekend    Mike Claudio MD  [facility] - A Division of [facility]   [address] [place] [address] [address]  [place] [address]  (T) [phone]  (F) [phone]

## 2017-09-01 NOTE — PROGRESS NOTE ADULT - SUBJECTIVE AND OBJECTIVE BOX
NPO for ERCP this AM    Vital Signs Last 24 Hrs  T(C): 36.4 (01 Sep 2017 11:19), Max: 37.1 (31 Aug 2017 18:37)  T(F): 97.6 (01 Sep 2017 11:19), Max: 98.8 (31 Aug 2017 18:37)  HR: 95 (01 Sep 2017 11:19) (93 - 109)  BP: 127/68 (01 Sep 2017 11:19) (117/67 - 154/82)  BP(mean): 92 (31 Aug 2017 18:37) (92 - 92)  RR: 18 (01 Sep 2017 11:19) (16 - 976)  SpO2: 93% (01 Sep 2017 11:19) (93% - 100%)    GENERAL: NAD, AAOx3 HEAD:  Atraumatic, Normocephalic EYES: EOMI MOUTH/THROAT: no swelling, no erythema, no lesions, no exudates NECK: Supple, No JVD, No LAD CHEST/LUNG: CTA b/l HEART: Regular rate and rhythm; No murmurs, rubs, or gallops ABDOMEN: Soft, Nontender, Nondistended; Bowel sounds present, (+)splenomegaly EXTREMITIES:  2+ Peripheral Pulses; No clubbing, cyanosis, or edema SKIN: No rashes or lesions; No jaundice NEURO: nonfocal CN/motor/sensory/reflexes  LABS:                      12.6  8.33  )-----------( 146      ( 01 Sep 2017 09:21 )            37.6   09-01  136  |  102  |  29<H> ----------------------------<  95 5.5<H>   |  23  |  1.28  Ca    10.6<H>      01 Sep 2017 09:04  TPro  5.5<L>  /  Alb  3.0<L>  /  TBili  8.9<H>  /  DBili  x   /  AST  123<H>  /  ALT  144<H>  /  AlkPhos  393<H>  09-01  PT/INR - ( 01 Sep 2017 09:21 )   PT: 14.3 sec;   INR: 1.26 ratio      PTT - ( 31 Aug 2017 16:33 )  PTT:27.3 sec CAPILLARY BLOOD GLUCOSE NPO for ERCP this AM    Vital Signs Last 24 Hrs  T(C): 36.4 (01 Sep 2017 11:19), Max: 37.1 (31 Aug 2017 18:37)  T(F): 97.6 (01 Sep 2017 11:19), Max: 98.8 (31 Aug 2017 18:37)  HR: 95 (01 Sep 2017 11:19) (93 - 109)  BP: 127/68 (01 Sep 2017 11:19) (117/67 - 154/82)  BP(mean): 92 (31 Aug 2017 18:37) (92 - 92)  RR: 18 (01 Sep 2017 11:19) (16 - 976)  SpO2: 93% (01 Sep 2017 11:19) (93% - 100%)    GENERAL: NAD, AAOx3 HEAD:  Atraumatic, Normocephalic EYES: EOMI, (+)icterus MOUTH/THROAT: no swelling, no erythema, no lesions, no exudates NECK: Supple, No JVD, No LAD CHEST/LUNG: CTA b/l HEART: Regular rate and rhythm; No murmurs, rubs, or gallops ABDOMEN: Soft, Nontender, Nondistended; Bowel sounds present, (+)splenomegaly EXTREMITIES:  2+ Peripheral Pulses; No clubbing, cyanosis, or edema SKIN: No rashes or lesions; (+) jaundice NEURO: nonfocal CN/motor/sensory/reflexes  LABS:                      12.6  8.33  )-----------( 146      ( 01 Sep 2017 09:21 )            37.6   09-01  136  |  102  |  29<H> ----------------------------<  95 5.5<H>   |  23  |  1.28  Ca    10.6<H>      01 Sep 2017 09:04  TPro  5.5<L>  /  Alb  3.0<L>  /  TBili  8.9<H>  /  DBili  x   /  AST  123<H>  /  ALT  144<H>  /  AlkPhos  393<H>  09-01  PT/INR - ( 01 Sep 2017 09:21 )   PT: 14.3 sec;   INR: 1.26 ratio      PTT - ( 31 Aug 2017 16:33 )  PTT:27.3 sec CAPILLARY BLOOD GLUCOSE

## 2017-09-02 DIAGNOSIS — K83.1 OBSTRUCTION OF BILE DUCT: ICD-10-CM

## 2017-09-02 DIAGNOSIS — C85.93 NON-HODGKIN LYMPHOMA, UNSPECIFIED, INTRA-ABDOMINAL LYMPH NODES: ICD-10-CM

## 2017-09-02 LAB
ALBUMIN SERPL ELPH-MCNC: 2.7 G/DL — LOW (ref 3.3–5)
ALP SERPL-CCNC: 350 U/L — HIGH (ref 40–120)
ALT FLD-CCNC: 127 U/L — HIGH (ref 10–45)
AMYLASE P1 CFR SERPL: 58 U/L — SIGNIFICANT CHANGE UP (ref 25–125)
ANION GAP SERPL CALC-SCNC: 9 MMOL/L — SIGNIFICANT CHANGE UP (ref 5–17)
AST SERPL-CCNC: 91 U/L — HIGH (ref 10–40)
BASOPHILS # BLD AUTO: 0.15 K/UL — SIGNIFICANT CHANGE UP (ref 0–0.2)
BASOPHILS NFR BLD AUTO: 1.8 % — SIGNIFICANT CHANGE UP (ref 0–2)
BILIRUB DIRECT SERPL-MCNC: 1.8 MG/DL — HIGH (ref 0–0.2)
BILIRUB INDIRECT FLD-MCNC: 2.6 MG/DL — HIGH (ref 0.2–1)
BILIRUB SERPL-MCNC: 4.4 MG/DL — HIGH (ref 0.2–1.2)
BUN SERPL-MCNC: 26 MG/DL — HIGH (ref 7–23)
CALCIUM SERPL-MCNC: 10.2 MG/DL — SIGNIFICANT CHANGE UP (ref 8.4–10.5)
CHLORIDE SERPL-SCNC: 104 MMOL/L — SIGNIFICANT CHANGE UP (ref 96–108)
CO2 SERPL-SCNC: 22 MMOL/L — SIGNIFICANT CHANGE UP (ref 22–31)
CREAT SERPL-MCNC: 1.18 MG/DL — SIGNIFICANT CHANGE UP (ref 0.5–1.3)
EOSINOPHIL # BLD AUTO: 0 K/UL — SIGNIFICANT CHANGE UP (ref 0–0.5)
EOSINOPHIL NFR BLD AUTO: 0 % — SIGNIFICANT CHANGE UP (ref 0–6)
GLUCOSE SERPL-MCNC: 88 MG/DL — SIGNIFICANT CHANGE UP (ref 70–99)
HCT VFR BLD CALC: 34.8 % — LOW (ref 39–50)
HGB BLD-MCNC: 11.3 G/DL — LOW (ref 13–17)
LIDOCAIN IGE QN: 32 U/L — SIGNIFICANT CHANGE UP (ref 7–60)
LYMPHOCYTES # BLD AUTO: 0.29 K/UL — LOW (ref 1–3.3)
LYMPHOCYTES # BLD AUTO: 3.5 % — LOW (ref 13–44)
MCHC RBC-ENTMCNC: 31.3 PG — SIGNIFICANT CHANGE UP (ref 27–34)
MCHC RBC-ENTMCNC: 32.5 GM/DL — SIGNIFICANT CHANGE UP (ref 32–36)
MCV RBC AUTO: 96.4 FL — SIGNIFICANT CHANGE UP (ref 80–100)
MONOCYTES # BLD AUTO: 1.1 K/UL — HIGH (ref 0–0.9)
MONOCYTES NFR BLD AUTO: 13.2 % — SIGNIFICANT CHANGE UP (ref 2–14)
NEUTROPHILS # BLD AUTO: 6.62 K/UL — SIGNIFICANT CHANGE UP (ref 1.8–7.4)
NEUTROPHILS NFR BLD AUTO: 77.9 % — HIGH (ref 43–77)
PLATELET # BLD AUTO: 145 K/UL — LOW (ref 150–400)
POTASSIUM SERPL-MCNC: 5.8 MMOL/L — HIGH (ref 3.5–5.3)
POTASSIUM SERPL-SCNC: 5.8 MMOL/L — HIGH (ref 3.5–5.3)
PROT SERPL-MCNC: 5.4 G/DL — LOW (ref 6–8.3)
RBC # BLD: 3.61 M/UL — LOW (ref 4.2–5.8)
RBC # FLD: 14.5 % — SIGNIFICANT CHANGE UP (ref 10.3–14.5)
SODIUM SERPL-SCNC: 135 MMOL/L — SIGNIFICANT CHANGE UP (ref 135–145)
WBC # BLD: 8.3 K/UL — SIGNIFICANT CHANGE UP (ref 3.8–10.5)
WBC # FLD AUTO: 8.3 K/UL — SIGNIFICANT CHANGE UP (ref 3.8–10.5)

## 2017-09-02 PROCEDURE — 99232 SBSQ HOSP IP/OBS MODERATE 35: CPT | Mod: 24

## 2017-09-02 RX ORDER — TEMAZEPAM 15 MG/1
15 CAPSULE ORAL
Qty: 0 | Refills: 0 | Status: DISCONTINUED | OUTPATIENT
Start: 2017-09-02 | End: 2017-09-04

## 2017-09-02 RX ORDER — SODIUM CHLORIDE 9 MG/ML
1000 INJECTION INTRAMUSCULAR; INTRAVENOUS; SUBCUTANEOUS
Qty: 0 | Refills: 0 | Status: COMPLETED | OUTPATIENT
Start: 2017-09-02 | End: 2017-09-04

## 2017-09-02 RX ORDER — POLYETHYLENE GLYCOL 3350 17 G/17G
17 POWDER, FOR SOLUTION ORAL DAILY
Qty: 0 | Refills: 0 | Status: DISCONTINUED | OUTPATIENT
Start: 2017-09-02 | End: 2017-09-08

## 2017-09-02 RX ADMIN — HEPARIN SODIUM 5000 UNIT(S): 5000 INJECTION INTRAVENOUS; SUBCUTANEOUS at 14:12

## 2017-09-02 RX ADMIN — HEPARIN SODIUM 5000 UNIT(S): 5000 INJECTION INTRAVENOUS; SUBCUTANEOUS at 22:30

## 2017-09-02 RX ADMIN — SODIUM CHLORIDE 75 MILLILITER(S): 9 INJECTION INTRAMUSCULAR; INTRAVENOUS; SUBCUTANEOUS at 12:28

## 2017-09-02 RX ADMIN — HEPARIN SODIUM 5000 UNIT(S): 5000 INJECTION INTRAVENOUS; SUBCUTANEOUS at 00:00

## 2017-09-02 RX ADMIN — PANTOPRAZOLE SODIUM 40 MILLIGRAM(S): 20 TABLET, DELAYED RELEASE ORAL at 05:12

## 2017-09-02 RX ADMIN — HEPARIN SODIUM 5000 UNIT(S): 5000 INJECTION INTRAVENOUS; SUBCUTANEOUS at 05:12

## 2017-09-02 RX ADMIN — TEMAZEPAM 15 MILLIGRAM(S): 15 CAPSULE ORAL at 22:31

## 2017-09-02 NOTE — PROGRESS NOTE ADULT - SUBJECTIVE AND OBJECTIVE BOX
s/p biliary stent for distal CBD stricture with nl cholangiogram results  feels well    Vital Signs Last 24 Hrs  T(C): 36.8 (02 Sep 2017 10:47), Max: 36.8 (02 Sep 2017 10:47)  T(F): 98.2 (02 Sep 2017 10:47), Max: 98.2 (02 Sep 2017 10:47)  HR: 92 (02 Sep 2017 10:47) (92 - 99)  BP: 127/74 (02 Sep 2017 10:47) (123/71 - 151/73)  BP(mean): --  RR: 18 (02 Sep 2017 10:47) (18 - 18)  SpO2: 97% (02 Sep 2017 10:47) (95% - 97%)    GENERAL: NAD, AAOx3  HEAD:  Atraumatic, Normocephalic  EYES: EOMI (+)icterus  MOUTH/THROAT: dry buccal mucosa  NECK: Supple, No JVD, No LAD  CHEST/LUNG: CTA b/l  HEART: Regular rate and rhythm; No murmurs, rubs, or gallops  ABDOMEN: Soft, Nontender, Nondistended; Bowel sounds present, (+)splenomegaly  EXTREMITIES:  2+ Peripheral Pulses; No edema  SKIN: No rashes or lesions; (+) jaundice  NEURO: nonfocal CN/motor/sensory/reflexes    LABS:                        11.3   8.30  )-----------( 145      ( 02 Sep 2017 09:32 )             34.8     09-01    x   |  x   |  x   ----------------------------<  x   5.2   |  x   |  x     Ca    10.6<H>      01 Sep 2017 09:04    TPro  5.5<L>  /  Alb  3.0<L>  /  TBili  8.9<H>  /  DBili  x   /  AST  123<H>  /  ALT  144<H>  /  AlkPhos  393<H>  09-01    PT/INR - ( 01 Sep 2017 09:21 )   PT: 14.3 sec;   INR: 1.26 ratio         PTT - ( 31 Aug 2017 16:33 )  PTT:27.3 sec  CAPILLARY BLOOD GLUCOSE

## 2017-09-02 NOTE — PROGRESS NOTE ADULT - SUBJECTIVE AND OBJECTIVE BOX
INTERVAL HPI/OVERNIGHT EVENTS:    SUBJECTIVE: The patient has no new complaints. Denies abdominal pain, nausea, vomiting. Pt had large bowel movement today. Tolerating liquid diet. Afebrile.     MEDICATIONS  (STANDING):  pantoprazole    Tablet 40 milliGRAM(s) Oral before breakfast  heparin  Injectable 5000 Unit(s) SubCutaneous every 8 hours  temazepam 15 milliGRAM(s) Oral <User Schedule>  sodium chloride 0.9%. 1000 milliLiter(s) (75 mL/Hr) IV Continuous <Continuous>    MEDICATIONS  (PRN):  diazepam    Tablet 2 milliGRAM(s) Oral three times a day PRN muscle spasm pain      OBJECTIVE:  Vital Signs Last 24 Hrs  T(C): 36.9 (02 Sep 2017 13:50), Max: 36.9 (02 Sep 2017 13:50)  T(F): 98.4 (02 Sep 2017 13:50), Max: 98.4 (02 Sep 2017 13:50)  HR: 98 (02 Sep 2017 13:50) (92 - 99)  BP: 122/74 (02 Sep 2017 13:50) (122/74 - 151/73)  BP(mean): --  RR: 18 (02 Sep 2017 13:50) (18 - 18)  SpO2: 99% (02 Sep 2017 13:50) (95% - 99%)    PHYSICAL EXAM:  Constitutional: NAD, well-developed  Respiratory: CTA and P  Cardiovascular: S1 and S2, RRR, no M  Gastrointestinal: BS+, soft, NT/ND, neg HSM,  Extremities: No peripheral edema, neg clubing, cyanosis    LABS:                        11.3   8.30  )-----------( 145      ( 02 Sep 2017 09:32 )             34.8     09-01    x   |  x   |  x   ----------------------------<  x   5.2   |  x   |  x     Ca    10.6<H>      01 Sep 2017 09:04    TPro  5.5<L>  /  Alb  3.0<L>  /  TBili  8.9<H>  /  DBili  x   /  AST  123<H>  /  ALT  144<H>  /  AlkPhos  393<H>  09-01    PT/INR - ( 01 Sep 2017 09:21 )   PT: 14.3 sec;   INR: 1.26 ratio         PTT - ( 31 Aug 2017 16:33 )  PTT:27.3 sec    LIVER FUNCTIONS - ( 01 Sep 2017 09:04 )  Alb: 3.0 g/dL / Pro: 5.5 g/dL / ALK PHOS: 393 U/L / ALT: 144 U/L / AST: 123 U/L / GGT: x             RADIOLOGY & ADDITIONAL TESTS:

## 2017-09-02 NOTE — PROGRESS NOTE ADULT - SUBJECTIVE AND OBJECTIVE BOX
[facility] Hematology/Oncology - Tahmina Miller / Dandre / González - [phone]  Primary Outpatient Hematologist/Oncologist: Dr. Noah Miller    Chief Complaint:  Elevated Bilirubin    HPI:  84 year old man with history of b-cell lymphoma admitted with hyperbilirubinemia. Patient was in his normal state ofh health when he experienced pain in his back whenever her laid down and he has persistent epigastric pain. Patient was seen GI who performed a EGD which was negative. Imaging scans were performed which showed dominant periportal and peripancreatis mass with splenomegaly and hepatic/splenic lesions. Retroperitoneal lesions were also present. EUS was performed on 8/17/2017 which confirmed diagnosis of b-cell lymphoma. More tissue was needed for further testing. PET/CT on 8/29/2017 showed markedly hypermetabolic patricia masses involving the periportal region and contiguous retroperitoneum bilaterally. Patient had a BM biopsy scheduled for 8/31/2017. Found to have a Bilirubin of 5.3 and sent to the hospital for evaluation. S/P stent (9/1/2017).    ROS:  General:  (-)Pain, (+)Decrease appeite, (-)Fevers, (-)Chills, (+Weight Loss  Eyes: (-)Blurry Vision, (-)Double Vision, (-)Vision Loss  ENT: (-)Sinus Congestion, (-)Decreased Hearing, (-)Nosebleeds, (-)Sore Throat  Cardiac: (-)Chest Pain, (-)Palpitations, (-)Shortness of breathe on exertion  Respiratory: (-)Cough, (-)hemoptysis, (-)Shortness of breathe  GI: (-)Nausea, (-)Vomiting, (-)Diarrhea, (-)Constipation, (-)Melena, (-)BRBPR  : (-)Hematuria, (-)Dysuria, Polyuria  MSK: (-)Back pain, (-)Joint pain, (-)Stiffness  Dermatology: (-)Rash, (-)Itching  Neurology:  (-)Numbness, (-)Tingling, (-)Difficulty Walking, (-)Tremors, (+)Weakness  Psych: (-)Anxiety, (-)Depression, (-)Memory Loss  Hematology:  (-)Easy Bruising, (-)Easy Bleeding, (-)Night Sweats.     PAST MEDICAL & SURGICAL HISTORY:  Romero esophagus  Intestinal infection: 2012  Bladder polyp  H/O inguinal hernia repair  S/P TURP (transurethral resection of prostate)  Bladder polyp: s/p TURBT  History of cholecystectomy: 1987    Social History  Tobacco: Denies current use  Alcohol: Denies current use  Drugs:  Denies current use    FAMILY HISTORY:  Non-contibutory    MEDICATIONS  (STANDING):  sodium chloride 0.9%. 1000 milliLiter(s) (150 mL/Hr) IV Continuous <Continuous>  pantoprazole    Tablet 40 milliGRAM(s) Oral before breakfast  heparin  Injectable 5000 Unit(s) SubCutaneous every 8 hours    MEDICATIONS  (PRN):  diazepam    Tablet 2 milliGRAM(s) Oral three times a day PRN muscle spasm pain    Allergies  latex (Rash; Blisters)  penicillins (Swelling)  sulfa drugs (Unknown)    Vital Signs Last 24 Hrs  T(C): 36.3 (02 Sep 2017 06:27), Max: 36.7 (01 Sep 2017 20:53)  T(F): 97.4 (02 Sep 2017 06:27), Max: 98 (01 Sep 2017 20:53)  HR: 94 (02 Sep 2017 06:27) (94 - 99)  BP: 151/73 (02 Sep 2017 06:27) (123/71 - 151/73)  BP(mean): --  RR: 18 (02 Sep 2017 06:27) (18 - 18)  SpO2: 95% (02 Sep 2017 06:27) (93% - 97%)    Physical Exam:  General: AAO x 3. NAD. Reclining in chair.  HEENT: clear oropharynx, anicteric sclera, pink conjunctivae, EOMi. PERRLA  Cardiac: S1, S2 present. No audible murmurs. RRR  Lungs: CTA B/L.   Abdomen: Soft, Non-Tender, Non-Distended. Palpable splenomegaly  Extremities: 2+ pulses. No edema  Skin: No Rashes. No petechiae  Neuro: No focal motor or sensory deficits.                               12.6   8.33  )-----------( 146      ( 01 Sep 2017 09:21 )             37.6       09-01    x   |  x   |  x   ----------------------------<  x   5.2   |  x   |  x     Ca    10.6<H>      01 Sep 2017 09:04    TPro  5.5<L>  /  Alb  3.0<L>  /  TBili  8.9<H>  /  DBili  x   /  AST  123<H>  /  ALT  144<H>  /  AlkPhos  393<H>  09-01

## 2017-09-03 LAB
ALBUMIN SERPL ELPH-MCNC: 2.9 G/DL — LOW (ref 3.3–5)
ALP SERPL-CCNC: 319 U/L — HIGH (ref 40–120)
ALT FLD-CCNC: 100 U/L — HIGH (ref 10–45)
ANION GAP SERPL CALC-SCNC: 11 MMOL/L — SIGNIFICANT CHANGE UP (ref 5–17)
AST SERPL-CCNC: 58 U/L — HIGH (ref 10–40)
BILIRUB SERPL-MCNC: 3.4 MG/DL — HIGH (ref 0.2–1.2)
BUN SERPL-MCNC: 25 MG/DL — HIGH (ref 7–23)
CALCIUM SERPL-MCNC: 10.2 MG/DL — SIGNIFICANT CHANGE UP (ref 8.4–10.5)
CHLORIDE SERPL-SCNC: 102 MMOL/L — SIGNIFICANT CHANGE UP (ref 96–108)
CO2 SERPL-SCNC: 23 MMOL/L — SIGNIFICANT CHANGE UP (ref 22–31)
CREAT SERPL-MCNC: 1.11 MG/DL — SIGNIFICANT CHANGE UP (ref 0.5–1.3)
GLUCOSE SERPL-MCNC: 120 MG/DL — HIGH (ref 70–99)
HCT VFR BLD CALC: 38.2 % — LOW (ref 39–50)
HGB BLD-MCNC: 12.6 G/DL — LOW (ref 13–17)
MCHC RBC-ENTMCNC: 31.9 PG — SIGNIFICANT CHANGE UP (ref 27–34)
MCHC RBC-ENTMCNC: 33 GM/DL — SIGNIFICANT CHANGE UP (ref 32–36)
MCV RBC AUTO: 96.7 FL — SIGNIFICANT CHANGE UP (ref 80–100)
PLATELET # BLD AUTO: 136 K/UL — LOW (ref 150–400)
POTASSIUM SERPL-MCNC: 5.1 MMOL/L — SIGNIFICANT CHANGE UP (ref 3.5–5.3)
POTASSIUM SERPL-SCNC: 5.1 MMOL/L — SIGNIFICANT CHANGE UP (ref 3.5–5.3)
PROT SERPL-MCNC: 5.6 G/DL — LOW (ref 6–8.3)
RBC # BLD: 3.95 M/UL — LOW (ref 4.2–5.8)
RBC # FLD: 14.2 % — SIGNIFICANT CHANGE UP (ref 10.3–14.5)
SODIUM SERPL-SCNC: 136 MMOL/L — SIGNIFICANT CHANGE UP (ref 135–145)
WBC # BLD: 8.7 K/UL — SIGNIFICANT CHANGE UP (ref 3.8–10.5)
WBC # FLD AUTO: 8.7 K/UL — SIGNIFICANT CHANGE UP (ref 3.8–10.5)

## 2017-09-03 RX ADMIN — PANTOPRAZOLE SODIUM 40 MILLIGRAM(S): 20 TABLET, DELAYED RELEASE ORAL at 06:19

## 2017-09-03 RX ADMIN — HEPARIN SODIUM 5000 UNIT(S): 5000 INJECTION INTRAVENOUS; SUBCUTANEOUS at 23:46

## 2017-09-03 RX ADMIN — HEPARIN SODIUM 5000 UNIT(S): 5000 INJECTION INTRAVENOUS; SUBCUTANEOUS at 06:19

## 2017-09-03 RX ADMIN — SODIUM CHLORIDE 75 MILLILITER(S): 9 INJECTION INTRAMUSCULAR; INTRAVENOUS; SUBCUTANEOUS at 23:46

## 2017-09-03 RX ADMIN — HEPARIN SODIUM 5000 UNIT(S): 5000 INJECTION INTRAVENOUS; SUBCUTANEOUS at 13:30

## 2017-09-03 RX ADMIN — TEMAZEPAM 15 MILLIGRAM(S): 15 CAPSULE ORAL at 23:45

## 2017-09-03 NOTE — PROGRESS NOTE ADULT - SUBJECTIVE AND OBJECTIVE BOX
Other than not sleeping last night, feels about the same    Vital Signs Last 24 Hrs  T(C): 36.6 (03 Sep 2017 10:27), Max: 36.9 (02 Sep 2017 13:50)  T(F): 97.8 (03 Sep 2017 10:27), Max: 98.5 (03 Sep 2017 06:31)  HR: 60 (03 Sep 2017 10:27) (60 - 99)  BP: 104/73 (03 Sep 2017 10:27) (104/73 - 147/79)  BP(mean): --  RR: 18 (03 Sep 2017 10:27) (18 - 18)  SpO2: 93% (03 Sep 2017 10:27) (93% - 99%)    GENERAL: NAD, AAOx3  HEAD:  Atraumatic, Normocephalic  EYES: EOMI (-)icterus  MOUTH/THROAT: dry buccal mucosa  NECK: Supple, No JVD, No LAD  CHEST/LUNG: CTA b/l  HEART: Regular rate and rhythm; No murmurs, rubs, or gallops  ABDOMEN: Soft, Nontender, Nondistended; Bowel sounds present, (+)splenomegaly  EXTREMITIES:  2+ Peripheral Pulses; No edema  SKIN: No rashes or lesions; (-) jaundice  NEURO: nonfocal CN/motor/sensory/reflexes    LABS:                        12.6   8.70  )-----------( 136      ( 03 Sep 2017 09:03 )             38.2     09-03    136  |  102  |  25<H>  ----------------------------<  120<H>  5.1   |  23  |  1.11    Ca    10.2      03 Sep 2017 08:59    TPro  5.6<L>  /  Alb  2.9<L>  /  TBili  3.4<H>  /  DBili  x   /  AST  58<H>  /  ALT  100<H>  /  AlkPhos  319<H>  09-03      CAPILLARY BLOOD GLUCOSE

## 2017-09-03 NOTE — PROGRESS NOTE ADULT - SUBJECTIVE AND OBJECTIVE BOX
INTERVAL HPI/OVERNIGHT EVENTS:    SUBJECTIVE: The patient has no new complaints. Tolerating full liquid diet. Abdominal pain has resolved. No nausea or vomiting. Afebrile.     MEDICATIONS  (STANDING):  pantoprazole    Tablet 40 milliGRAM(s) Oral before breakfast  heparin  Injectable 5000 Unit(s) SubCutaneous every 8 hours  temazepam 15 milliGRAM(s) Oral <User Schedule>  sodium chloride 0.9%. 1000 milliLiter(s) (75 mL/Hr) IV Continuous <Continuous>  polyethylene glycol 3350 17 Gram(s) Oral daily    MEDICATIONS  (PRN):  diazepam    Tablet 2 milliGRAM(s) Oral three times a day PRN muscle spasm pain        OBJECTIVE:  Vital Signs Last 24 Hrs  T(C): 36.7 (03 Sep 2017 14:34), Max: 36.9 (03 Sep 2017 06:31)  T(F): 98 (03 Sep 2017 14:34), Max: 98.5 (03 Sep 2017 06:31)  HR: 71 (03 Sep 2017 14:34) (60 - 99)  BP: 114/72 (03 Sep 2017 14:34) (104/73 - 147/79)  BP(mean): --  RR: 18 (03 Sep 2017 14:34) (18 - 18)  SpO2: 94% (03 Sep 2017 14:34) (93% - 94%)    PHYSICAL EXAM:  Constitutional: NAD, well-developed  Respiratory: CTA and P  Cardiovascular: S1 and S2, RRR, no M  Gastrointestinal: BS+, soft, NT/ND, neg HSM,  Extremities: No peripheral edema, neg clubbing, cyanosis    LABS:                        12.6   8.70  )-----------( 136      ( 03 Sep 2017 09:03 )             38.2     09-03    136  |  102  |  25<H>  ----------------------------<  120<H>  5.1   |  23  |  1.11    Ca    10.2      03 Sep 2017 08:59    TPro  5.6<L>  /  Alb  2.9<L>  /  TBili  3.4<H>  /  DBili  x   /  AST  58<H>  /  ALT  100<H>  /  AlkPhos  319<H>  09-03        LIVER FUNCTIONS - ( 03 Sep 2017 08:59 )  Alb: 2.9 g/dL / Pro: 5.6 g/dL / ALK PHOS: 319 U/L / ALT: 100 U/L / AST: 58 U/L / GGT: x             RADIOLOGY & ADDITIONAL TESTS:

## 2017-09-04 LAB
ALBUMIN SERPL ELPH-MCNC: 2.6 G/DL — LOW (ref 3.3–5)
ALP SERPL-CCNC: 264 U/L — HIGH (ref 40–120)
ALT FLD-CCNC: 89 U/L — HIGH (ref 10–45)
ANION GAP SERPL CALC-SCNC: 11 MMOL/L — SIGNIFICANT CHANGE UP (ref 5–17)
APPEARANCE UR: ABNORMAL
AST SERPL-CCNC: 65 U/L — HIGH (ref 10–40)
BILIRUB SERPL-MCNC: 2.5 MG/DL — HIGH (ref 0.2–1.2)
BILIRUB UR-MCNC: ABNORMAL
BUN SERPL-MCNC: 23 MG/DL — SIGNIFICANT CHANGE UP (ref 7–23)
CALCIUM SERPL-MCNC: 10 MG/DL — SIGNIFICANT CHANGE UP (ref 8.4–10.5)
CHLORIDE SERPL-SCNC: 104 MMOL/L — SIGNIFICANT CHANGE UP (ref 96–108)
CO2 SERPL-SCNC: 21 MMOL/L — LOW (ref 22–31)
COLOR SPEC: ABNORMAL
CREAT SERPL-MCNC: 1.08 MG/DL — SIGNIFICANT CHANGE UP (ref 0.5–1.3)
DIFF PNL FLD: NEGATIVE — SIGNIFICANT CHANGE UP
GLUCOSE SERPL-MCNC: 120 MG/DL — HIGH (ref 70–99)
GLUCOSE UR QL: NEGATIVE MG/DL — SIGNIFICANT CHANGE UP
HCT VFR BLD CALC: 36.5 % — LOW (ref 39–50)
HGB BLD-MCNC: 12.1 G/DL — LOW (ref 13–17)
KETONES UR-MCNC: NEGATIVE — SIGNIFICANT CHANGE UP
LEUKOCYTE ESTERASE UR-ACNC: NEGATIVE — SIGNIFICANT CHANGE UP
MCHC RBC-ENTMCNC: 31.9 PG — SIGNIFICANT CHANGE UP (ref 27–34)
MCHC RBC-ENTMCNC: 33.2 GM/DL — SIGNIFICANT CHANGE UP (ref 32–36)
MCV RBC AUTO: 96.3 FL — SIGNIFICANT CHANGE UP (ref 80–100)
NITRITE UR-MCNC: NEGATIVE — SIGNIFICANT CHANGE UP
PH UR: 5.5 — SIGNIFICANT CHANGE UP (ref 5–8)
PLATELET # BLD AUTO: 148 K/UL — LOW (ref 150–400)
POTASSIUM SERPL-MCNC: 4.6 MMOL/L — SIGNIFICANT CHANGE UP (ref 3.5–5.3)
POTASSIUM SERPL-SCNC: 4.6 MMOL/L — SIGNIFICANT CHANGE UP (ref 3.5–5.3)
PROT SERPL-MCNC: 5.3 G/DL — LOW (ref 6–8.3)
PROT UR-MCNC: 30 MG/DL
RBC # BLD: 3.79 M/UL — LOW (ref 4.2–5.8)
RBC # FLD: 14.2 % — SIGNIFICANT CHANGE UP (ref 10.3–14.5)
SODIUM SERPL-SCNC: 136 MMOL/L — SIGNIFICANT CHANGE UP (ref 135–145)
SP GR SPEC: 1.03 — HIGH (ref 1.01–1.02)
UROBILINOGEN FLD QL: 1 MG/DL — SIGNIFICANT CHANGE UP
WBC # BLD: 7.49 K/UL — SIGNIFICANT CHANGE UP (ref 3.8–10.5)
WBC # FLD AUTO: 7.49 K/UL — SIGNIFICANT CHANGE UP (ref 3.8–10.5)

## 2017-09-04 PROCEDURE — 99232 SBSQ HOSP IP/OBS MODERATE 35: CPT | Mod: 24

## 2017-09-04 RX ADMIN — HEPARIN SODIUM 5000 UNIT(S): 5000 INJECTION INTRAVENOUS; SUBCUTANEOUS at 06:08

## 2017-09-04 RX ADMIN — PANTOPRAZOLE SODIUM 40 MILLIGRAM(S): 20 TABLET, DELAYED RELEASE ORAL at 06:07

## 2017-09-04 RX ADMIN — HEPARIN SODIUM 5000 UNIT(S): 5000 INJECTION INTRAVENOUS; SUBCUTANEOUS at 12:47

## 2017-09-04 RX ADMIN — HEPARIN SODIUM 5000 UNIT(S): 5000 INJECTION INTRAVENOUS; SUBCUTANEOUS at 22:41

## 2017-09-04 NOTE — PROGRESS NOTE ADULT - SUBJECTIVE AND OBJECTIVE BOX
Surg Onc    Jody PO  TB decreased to 2.5    abd soft    A/P     S/P ERCP-stent  Will speak w Dr. Harris re re-scheduling LN bx  Mgmt as per GI and medicine

## 2017-09-04 NOTE — PROGRESS NOTE ADULT - SUBJECTIVE AND OBJECTIVE BOX
Reports some visual hallucations from temazepam, resolved    Vital Signs Last 24 Hrs  T(C): 36.4 (04 Sep 2017 09:33), Max: 36.8 (03 Sep 2017 20:48)  T(F): 97.5 (04 Sep 2017 09:33), Max: 98.3 (03 Sep 2017 23:51)  HR: 94 (04 Sep 2017 09:33) (70 - 95)  BP: 116/71 (04 Sep 2017 09:33) (114/72 - 146/72)  BP(mean): --  RR: 18 (04 Sep 2017 09:33) (18 - 18)  SpO2: 94% (04 Sep 2017 09:33) (94% - 96%)    GENERAL: NAD, AAOx3  HEAD:  Atraumatic, Normocephalic  EYES: EOMI (-)icterus  MOUTH/THROAT: dry buccal mucosa  NECK: Supple, No JVD, No LAD  CHEST/LUNG: CTA b/l  HEART: Regular rate and rhythm; No murmurs, rubs, or gallops  ABDOMEN: Soft, Nontender, Nondistended; Bowel sounds present, (+)splenomegaly  EXTREMITIES:  2+ Peripheral Pulses; No edema  SKIN: No rashes or lesions; (-) jaundice  NEURO: nonfocal CN/motor/sensory/reflexes      LABS:                        12.1   7.49  )-----------( 148      ( 04 Sep 2017 08:36 )             36.5     09-03    136  |  102  |  25<H>  ----------------------------<  120<H>  5.1   |  23  |  1.11    Ca    10.2      03 Sep 2017 08:59    TPro  5.6<L>  /  Alb  2.9<L>  /  TBili  3.4<H>  /  DBili  x   /  AST  58<H>  /  ALT  100<H>  /  AlkPhos  319<H>  09-03      CAPILLARY BLOOD GLUCOSE

## 2017-09-05 DIAGNOSIS — R33.9 RETENTION OF URINE, UNSPECIFIED: ICD-10-CM

## 2017-09-05 LAB
ANION GAP SERPL CALC-SCNC: 12 MMOL/L — SIGNIFICANT CHANGE UP (ref 5–17)
BUN SERPL-MCNC: 22 MG/DL — SIGNIFICANT CHANGE UP (ref 7–23)
CALCIUM SERPL-MCNC: 10.4 MG/DL — SIGNIFICANT CHANGE UP (ref 8.4–10.5)
CHLORIDE SERPL-SCNC: 101 MMOL/L — SIGNIFICANT CHANGE UP (ref 96–108)
CO2 SERPL-SCNC: 21 MMOL/L — LOW (ref 22–31)
CREAT SERPL-MCNC: 1.01 MG/DL — SIGNIFICANT CHANGE UP (ref 0.5–1.3)
GLUCOSE SERPL-MCNC: 110 MG/DL — HIGH (ref 70–99)
HCT VFR BLD CALC: 35.3 % — LOW (ref 39–50)
HGB BLD-MCNC: 11.5 G/DL — LOW (ref 13–17)
INR BLD: 1.23 RATIO — HIGH (ref 0.88–1.16)
MCHC RBC-ENTMCNC: 31.1 PG — SIGNIFICANT CHANGE UP (ref 27–34)
MCHC RBC-ENTMCNC: 32.6 GM/DL — SIGNIFICANT CHANGE UP (ref 32–36)
MCV RBC AUTO: 95.4 FL — SIGNIFICANT CHANGE UP (ref 80–100)
PLATELET # BLD AUTO: 150 K/UL — SIGNIFICANT CHANGE UP (ref 150–400)
POTASSIUM SERPL-MCNC: 4.9 MMOL/L — SIGNIFICANT CHANGE UP (ref 3.5–5.3)
POTASSIUM SERPL-SCNC: 4.9 MMOL/L — SIGNIFICANT CHANGE UP (ref 3.5–5.3)
PROTHROM AB SERPL-ACNC: 13.9 SEC — HIGH (ref 10–13.1)
RBC # BLD: 3.7 M/UL — LOW (ref 4.2–5.8)
RBC # FLD: 14.2 % — SIGNIFICANT CHANGE UP (ref 10.3–14.5)
SODIUM SERPL-SCNC: 134 MMOL/L — LOW (ref 135–145)
WBC # BLD: 8.36 K/UL — SIGNIFICANT CHANGE UP (ref 3.8–10.5)
WBC # FLD AUTO: 8.36 K/UL — SIGNIFICANT CHANGE UP (ref 3.8–10.5)

## 2017-09-05 RX ADMIN — PANTOPRAZOLE SODIUM 40 MILLIGRAM(S): 20 TABLET, DELAYED RELEASE ORAL at 05:03

## 2017-09-05 RX ADMIN — HEPARIN SODIUM 5000 UNIT(S): 5000 INJECTION INTRAVENOUS; SUBCUTANEOUS at 15:50

## 2017-09-05 RX ADMIN — HEPARIN SODIUM 5000 UNIT(S): 5000 INJECTION INTRAVENOUS; SUBCUTANEOUS at 21:13

## 2017-09-05 RX ADMIN — SODIUM CHLORIDE 75 MILLILITER(S): 9 INJECTION INTRAMUSCULAR; INTRAVENOUS; SUBCUTANEOUS at 05:02

## 2017-09-05 RX ADMIN — Medication 2 MILLIGRAM(S): at 02:44

## 2017-09-05 RX ADMIN — HEPARIN SODIUM 5000 UNIT(S): 5000 INJECTION INTRAVENOUS; SUBCUTANEOUS at 05:03

## 2017-09-05 NOTE — PROVIDER CONTACT NOTE (OTHER) - ACTION/TREATMENT ORDERED:
Med NP aware. Order for indwelling Askew to be placed, urology consult to be requested. Will continue to monitor

## 2017-09-05 NOTE — PROVIDER CONTACT NOTE (OTHER) - ASSESSMENT
Pt has issues w/ urinating later in day yesterday and overnight. Straight cathed twice, last straight cath approx 07:00, reports urge to urine but unable to urinate, bladder scan reveals >500, >600mLs. Pt has issues w/ urinating later in day yesterday and overnight. Straight cathed twice, last straight cath approx 07:00, reports urge to urinate but unable, bladder scan reveals >500, >600mLs

## 2017-09-05 NOTE — PROGRESS NOTE ADULT - SUBJECTIVE AND OBJECTIVE BOX
[facility] Hematology/Oncology - Tahmina Miller / Dandre / González - [phone]  Primary Outpatient Hematologist/Oncologist: Dr. Noah Miller    Subjective  Patient seen this morning. Feeling well. No complaints.    HPI:  84 year old man with history of b-cell lymphoma admitted with hyperbilirubinemia. Patient was in his normal state ofh health when he experienced pain in his back whenever her laid down and he has persistent epigastric pain. Patient was seen GI who performed a EGD which was negative. Imaging scans were performed which showed dominant periportal and peripancreatis mass with splenomegaly and hepatic/splenic lesions. Retroperitoneal lesions were also present. EUS was performed on 8/17/2017 which confirmed diagnosis of b-cell lymphoma. More tissue was needed for further testing. PET/CT on 8/29/2017 showed markedly hypermetabolic patricia masses involving the periportal region and contiguous retroperitoneum bilaterally. Patient had a BM biopsy scheduled for 8/31/2017. Found to have a Bilirubin of 5.3 and sent to the hospital for evaluation.     ROS:  General:  (-)Pain, (+)Decrease appetite, (-)Fevers, (-)Chills, (+)Weight Loss  Eyes: (-)Blurry Vision, (-)Double Vision, (-)Vision Loss  ENT: (-)Sinus Congestion, (-)Decreased Hearing, (-)Nosebleeds, (-)Sore Throat  Cardiac: (-)Chest Pain, (-)Palpitations, (-)Shortness of breathe on exertion  Respiratory: (-)Cough, (-)hemoptysis, (-)Shortness of breathe  GI: (-)Nausea, (-)Vomiting, (-)Diarrhea, (-)Constipation, (-)Melena, (-)BRBPR  : (-)Hematuria, (-)Dysuria, Polyuria  MSK: (-)Back pain, (-)Joint pain, (-)Stiffness  Dermatology: (-)Rash, (-)Itching  Neurology:  (-)Numbness, (-)Tingling, (-)Difficulty Walking, (-)Tremors, (+)Weakness  Psych: (-)Anxiety, (-)Depression, (-)Memory Loss  Hematology:  (-)Easy Bruising, (-)Easy Bleeding, (-)Night Sweats.     Objective  Vital Signs Last 24 Hrs  T(C): 36.8 (05 Sep 2017 06:22), Max: 36.8 (04 Sep 2017 21:34)  T(F): 98.3 (05 Sep 2017 06:22), Max: 98.3 (05 Sep 2017 06:22)  HR: 105 (05 Sep 2017 06:22) (90 - 105)  BP: 145/68 (05 Sep 2017 06:22) (103/65 - 153/84)  RR: 18 (05 Sep 2017 06:22) (18 - 18)  SpO2: 95% (05 Sep 2017 06:22) (93% - 100%)    Physical Exam:  General: AAO x 3. NAD. Sitting at bedside. Comfortable.  HEENT: clear oropharynx, anicteric sclera, pink conjunctivae, EOMi. PERRLA  Cardiac: S1, S2 present. No audible murmurs. RRR  Lungs: CTA B/L.   Abdomen: Soft, Non-Tender, Non-Distended. Palpable splenomegaly  Extremities: 2+ pulses. No edema  Skin: No Rashes. No petechiae  Neuro: No focal motor or sensory deficits.     Labs:                        12.1   7.49  )-----------( 148      ( 04 Sep 2017 08:36 )             36.5     09-04    136  |  104  |  23  ----------------------------<  120<H>  4.6   |  21<L>  |  1.08    Ca    10.0      04 Sep 2017 09:49  TPro  5.3<L>  /  Alb  2.6<L>  /  TBili  2.5<H>  /  DBili  x   /  AST  65<H>  /  ALT  89<H>  /  AlkPhos  264<H>  09-04

## 2017-09-05 NOTE — PHYSICAL THERAPY INITIAL EVALUATION ADULT - PERTINENT HX OF CURRENT PROBLEM, REHAB EVAL
PAtient with hx B-cell lymphoma adm with abnl LFT's Hosp Course: s/p ERCP with biliary stenting for obstruction; awaiting RTOR for intra-abdominal LN biopsy;

## 2017-09-05 NOTE — PROGRESS NOTE ADULT - SUBJECTIVE AND OBJECTIVE BOX
retaining urine this am; was straight cathed last night for same issue    Vital Signs Last 24 Hrs  T(C): 36.4 (05 Sep 2017 09:19), Max: 36.8 (04 Sep 2017 21:34)  T(F): 97.6 (05 Sep 2017 09:19), Max: 98.3 (05 Sep 2017 06:22)  HR: 90 (05 Sep 2017 09:19) (90 - 105)  BP: 140/77 (05 Sep 2017 09:19) (103/65 - 153/84)  BP(mean): --  RR: 18 (05 Sep 2017 09:19) (18 - 18)  SpO2: 95% (05 Sep 2017 09:19) (93% - 100%)    GENERAL: NAD, AAOx3  HEAD:  Atraumatic, Normocephalic  EYES: EOMI (-)icterus  MOUTH/THROAT: dry buccal mucosa  NECK: Supple, No JVD, No LAD  CHEST/LUNG: CTA b/l  HEART: Regular rate and rhythm; No murmurs, rubs, or gallops  ABDOMEN: Soft, Nontender, Nondistended; Bowel sounds present, (+)splenomegaly  EXTREMITIES:  2+ Peripheral Pulses; No edema  SKIN: on underside of proximal shaft, has small nonerythematous nontender area of swelling; scrotum appears nl  NEURO: nonfocal CN/motor/sensory/reflexes    LABS:                        11.5   8.36  )-----------( 150      ( 05 Sep 2017 08:43 )             35.3     09-05    134<L>  |  101  |  22  ----------------------------<  110<H>  4.9   |  21<L>  |  1.01    Ca    10.4      05 Sep 2017 08:38    TPro  5.3<L>  /  Alb  2.6<L>  /  TBili  2.5<H>  /  DBili  x   /  AST  65<H>  /  ALT  89<H>  /  AlkPhos  264<H>  09-04    PT/INR - ( 05 Sep 2017 08:43 )   PT: 13.9 sec;   INR: 1.23 ratio           CAPILLARY BLOOD GLUCOSE            Urinalysis Basic - ( 04 Sep 2017 21:04 )    Color: Bell / Appearance: x / S.030 / pH: x  Gluc: x / Ketone: Negative  / Bili: Small / Urobili: 1.0 mg/dL   Blood: x / Protein: 30 mg/dL / Nitrite: Negative   Leuk Esterase: Negative / RBC: 12 /HPF / WBC 2 /HPF   Sq Epi: x / Non Sq Epi: 1 /HPF / Bacteria: Negative

## 2017-09-05 NOTE — PROVIDER CONTACT NOTE (OTHER) - BACKGROUND
8/31- elevated LFTs, epigatric pain, nausea (outpt CT scan revealed liver mass)  9/1- abd US reveals liver lesion, mass, dilated CBD / ERCP for bile duct stricture, balloon sweep w/ stent placement.

## 2017-09-05 NOTE — PROVIDER CONTACT NOTE (OTHER) - REASON
Pt reports urge to urine but unable to urinate, bladder scan reveals >500, >600mLs Pt reports urge to urinate but unable, bladder scan reveals >500, >600mLs

## 2017-09-05 NOTE — PROVIDER CONTACT NOTE (OTHER) - RECOMMENDATIONS
Med NP notified. Indwelling tavarez placement? Urology consult (pt requesting urologist Shayne Kilgore)?

## 2017-09-05 NOTE — PROVIDER CONTACT NOTE (OTHER) - SITUATION
Pt has issues w/ urinating later in day yesterday and overnight. Straight cathed twice, last straight cath approx 07:00, reports urge to urine but unable to urinate, bladder scan reveals >500, >600mLs Pt has issues w/ urinating later in day yesterday and overnight. Straight cathed twice, last straight cath approx 07:00, reports urge to urinate but unable, bladder scan reveals >500, >600mLs

## 2017-09-06 LAB
ALBUMIN SERPL ELPH-MCNC: 2.6 G/DL — LOW (ref 3.3–5)
ALP SERPL-CCNC: 213 U/L — HIGH (ref 40–120)
ALT FLD-CCNC: 72 U/L — HIGH (ref 10–45)
ANION GAP SERPL CALC-SCNC: 11 MMOL/L — SIGNIFICANT CHANGE UP (ref 5–17)
AST SERPL-CCNC: 56 U/L — HIGH (ref 10–40)
BASOPHILS # BLD AUTO: 0.01 K/UL — SIGNIFICANT CHANGE UP (ref 0–0.2)
BASOPHILS NFR BLD AUTO: 0.1 % — SIGNIFICANT CHANGE UP (ref 0–2)
BILIRUB DIRECT SERPL-MCNC: 0.6 MG/DL — HIGH (ref 0–0.2)
BILIRUB INDIRECT FLD-MCNC: 1.2 MG/DL — HIGH (ref 0.2–1)
BILIRUB SERPL-MCNC: 1.8 MG/DL — HIGH (ref 0.2–1.2)
BUN SERPL-MCNC: 21 MG/DL — SIGNIFICANT CHANGE UP (ref 7–23)
CALCIUM SERPL-MCNC: 10 MG/DL — SIGNIFICANT CHANGE UP (ref 8.4–10.5)
CHLORIDE SERPL-SCNC: 100 MMOL/L — SIGNIFICANT CHANGE UP (ref 96–108)
CO2 SERPL-SCNC: 23 MMOL/L — SIGNIFICANT CHANGE UP (ref 22–31)
CREAT SERPL-MCNC: 1.12 MG/DL — SIGNIFICANT CHANGE UP (ref 0.5–1.3)
EOSINOPHIL # BLD AUTO: 0.13 K/UL — SIGNIFICANT CHANGE UP (ref 0–0.5)
EOSINOPHIL NFR BLD AUTO: 1.4 % — SIGNIFICANT CHANGE UP (ref 0–6)
GLUCOSE SERPL-MCNC: 106 MG/DL — HIGH (ref 70–99)
HCT VFR BLD CALC: 36.2 % — LOW (ref 39–50)
HGB BLD-MCNC: 12.1 G/DL — LOW (ref 13–17)
IMM GRANULOCYTES NFR BLD AUTO: 0.3 % — SIGNIFICANT CHANGE UP (ref 0–1.5)
INR BLD: 1.27 RATIO — HIGH (ref 0.88–1.16)
LYMPHOCYTES # BLD AUTO: 1.24 K/UL — SIGNIFICANT CHANGE UP (ref 1–3.3)
LYMPHOCYTES # BLD AUTO: 13.7 % — SIGNIFICANT CHANGE UP (ref 13–44)
MAGNESIUM SERPL-MCNC: 1.7 MG/DL — SIGNIFICANT CHANGE UP (ref 1.6–2.6)
MCHC RBC-ENTMCNC: 32.2 PG — SIGNIFICANT CHANGE UP (ref 27–34)
MCHC RBC-ENTMCNC: 33.4 GM/DL — SIGNIFICANT CHANGE UP (ref 32–36)
MCV RBC AUTO: 96.3 FL — SIGNIFICANT CHANGE UP (ref 80–100)
MONOCYTES # BLD AUTO: 2.3 K/UL — HIGH (ref 0–0.9)
MONOCYTES NFR BLD AUTO: 25.4 % — HIGH (ref 2–14)
NEUTROPHILS # BLD AUTO: 5.36 K/UL — SIGNIFICANT CHANGE UP (ref 1.8–7.4)
NEUTROPHILS NFR BLD AUTO: 59.1 % — SIGNIFICANT CHANGE UP (ref 43–77)
PHOSPHATE SERPL-MCNC: 2.6 MG/DL — SIGNIFICANT CHANGE UP (ref 2.5–4.5)
PLATELET # BLD AUTO: 152 K/UL — SIGNIFICANT CHANGE UP (ref 150–400)
POTASSIUM SERPL-MCNC: 4.9 MMOL/L — SIGNIFICANT CHANGE UP (ref 3.5–5.3)
POTASSIUM SERPL-SCNC: 4.9 MMOL/L — SIGNIFICANT CHANGE UP (ref 3.5–5.3)
PROT SERPL-MCNC: 5.7 G/DL — LOW (ref 6–8.3)
PROTHROM AB SERPL-ACNC: 14.4 SEC — HIGH (ref 10–13.1)
RBC # BLD: 3.76 M/UL — LOW (ref 4.2–5.8)
RBC # FLD: 14.6 % — HIGH (ref 10.3–14.5)
SODIUM SERPL-SCNC: 134 MMOL/L — LOW (ref 135–145)
WBC # BLD: 9.07 K/UL — SIGNIFICANT CHANGE UP (ref 3.8–10.5)
WBC # FLD AUTO: 9.07 K/UL — SIGNIFICANT CHANGE UP (ref 3.8–10.5)

## 2017-09-06 RX ADMIN — HEPARIN SODIUM 5000 UNIT(S): 5000 INJECTION INTRAVENOUS; SUBCUTANEOUS at 13:19

## 2017-09-06 RX ADMIN — HEPARIN SODIUM 5000 UNIT(S): 5000 INJECTION INTRAVENOUS; SUBCUTANEOUS at 21:58

## 2017-09-06 RX ADMIN — PANTOPRAZOLE SODIUM 40 MILLIGRAM(S): 20 TABLET, DELAYED RELEASE ORAL at 05:02

## 2017-09-06 RX ADMIN — HEPARIN SODIUM 5000 UNIT(S): 5000 INJECTION INTRAVENOUS; SUBCUTANEOUS at 05:02

## 2017-09-06 RX ADMIN — POLYETHYLENE GLYCOL 3350 17 GRAM(S): 17 POWDER, FOR SOLUTION ORAL at 13:19

## 2017-09-06 NOTE — PROGRESS NOTE ADULT - SUBJECTIVE AND OBJECTIVE BOX
No acute events; now has tavarez catheter for urinary retention    Vital Signs Last 24 Hrs  T(C): 36.7 (06 Sep 2017 13:25), Max: 36.9 (06 Sep 2017 01:02)  T(F): 98 (06 Sep 2017 13:25), Max: 98.5 (06 Sep 2017 01:02)  HR: 99 (06 Sep 2017 13:25) (91 - 100)  BP: 113/74 (06 Sep 2017 13:25) (113/74 - 129/74)  BP(mean): --  RR: 18 (06 Sep 2017 13:25) (18 - 18)  SpO2: 94% (06 Sep 2017 13:25) (93% - 96%)    GENERAL: NAD, AAOx3  HEAD:  Atraumatic, Normocephalic  EYES: EOMI (-)icterus  MOUTH/THROAT: dry buccal mucosa  NECK: Supple, No JVD, No LAD  CHEST/LUNG: CTA b/l  HEART: Regular rate and rhythm; No murmurs, rubs, or gallops  ABDOMEN: Soft, Nontender, Nondistended; Bowel sounds present, (+)splenomegaly  EXTREMITIES:  2+ Peripheral Pulses; No edema; (+)tavarez  SKIN: on underside of proximal shaft, has small nonerythematous nontender area of swelling; scrotum appears nl  NEURO: nonfocal CN/motor/sensory/reflexes    LABS:                        12.1   9.07  )-----------( 152      ( 06 Sep 2017 08:41 )             36.2     09-06    134<L>  |  100  |  21  ----------------------------<  106<H>  4.9   |  23  |  1.12    Ca    10.0      06 Sep 2017 09:02  Phos  2.6     09-06  Mg     1.7     09-06    TPro  5.7<L>  /  Alb  2.6<L>  /  TBili  1.8<H>  /  DBili  0.6<H>  /  AST  56<H>  /  ALT  72<H>  /  AlkPhos  213<H>  09-06    PT/INR - ( 06 Sep 2017 08:41 )   PT: 14.4 sec;   INR: 1.27 ratio           CAPILLARY BLOOD GLUCOSE            Urinalysis Basic - ( 04 Sep 2017 21:04 )    Color: Bell / Appearance: x / S.030 / pH: x  Gluc: x / Ketone: Negative  / Bili: Small / Urobili: 1.0 mg/dL   Blood: x / Protein: 30 mg/dL / Nitrite: Negative   Leuk Esterase: Negative / RBC: 12 /HPF / WBC 2 /HPF   Sq Epi: x / Non Sq Epi: 1 /HPF / Bacteria: Negative

## 2017-09-07 RX ADMIN — PANTOPRAZOLE SODIUM 40 MILLIGRAM(S): 20 TABLET, DELAYED RELEASE ORAL at 05:17

## 2017-09-07 RX ADMIN — HEPARIN SODIUM 5000 UNIT(S): 5000 INJECTION INTRAVENOUS; SUBCUTANEOUS at 21:30

## 2017-09-07 RX ADMIN — POLYETHYLENE GLYCOL 3350 17 GRAM(S): 17 POWDER, FOR SOLUTION ORAL at 12:23

## 2017-09-07 RX ADMIN — HEPARIN SODIUM 5000 UNIT(S): 5000 INJECTION INTRAVENOUS; SUBCUTANEOUS at 12:23

## 2017-09-07 RX ADMIN — HEPARIN SODIUM 5000 UNIT(S): 5000 INJECTION INTRAVENOUS; SUBCUTANEOUS at 05:17

## 2017-09-07 NOTE — CONSULT NOTE ADULT - SUBJECTIVE AND OBJECTIVE BOX
Patient is a 84y old  Male who presents with a chief complaint of Abnormal preop labs (31 Aug 2017 18:37)      HPI: tavarez for retention bladder scan 600cc after valium, no prior gu histroy,is for LN bx, no bother with tavarez, no hematuria or dysuria, cr nl, abdominal sono unremarkable kidneys  84m presenting for admission following preop labs disclosing elevated transaminases and hyperbilirubinemia. 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 Found today to have abnormal labs on preop evaluation, so sent to ED for evaluation. No jaundice/icterus noted. Pt states he has had epigastric pain, discomfort and heaviness for months, not alleviated by any particular position, poor sleep due to abdominal discomfort, chronic back pain, and worsening bilateral leg edema. +Nausea without vomiting, has poor PO intake. Reports no fevers, chills, cough, SOB, chest pain, nausea or vomiting. Does have constipation, last BM 8/22/17. Reports he takes mainly ibuprofen for pain, has tried opioids in the past but those have worsened his constipation, and has had muscle relaxants many years ago with some success.    In ED: afebrile, 109, 117/67, 18, 98RA. Started on NS @ 150cc/hr, Dr. Wray consulted. (31 Aug 2017 18:37)      PAST MEDICAL & SURGICAL HISTORY:  Romero esophagus  Intestinal infection: 2012  Bladder polyp  H/O inguinal hernia repair  S/P TURP (transurethral resection of prostate)  Bladder polyp: s/p TURBT  History of cholecystectomy: 1987      REVIEW OF SYSTEMS:    CONSTITUTIONAL: No weakness, fevers or chills  HEENT: No visual changes;  No vertigo or throat pain   ENDO: No sweating, no palpitations  NECK: No pain or stiffness  MUSCULOSKELETAL: No back pain, no joint pain  RESPIRATORY: No cough, wheezing, hemoptysis; No shortness of breath  CARDIOVASCULAR: No chest pain or palpitations  GASTROINTESTINAL: No abdominal or epigastric pain. No nausea, vomiting, or hematemesis; No diarrhea or constipation. No melena or hematochezia.  NEUROLOGICAL: No numbness or weakness  PSYCH: No depression, no mood changes  SKIN: No itching, burning, rashes, or lesions   All other review of systems is negative unless indicated above.    MEDICATIONS  (STANDING):  pantoprazole    Tablet 40 milliGRAM(s) Oral before breakfast  heparin  Injectable 5000 Unit(s) SubCutaneous every 8 hours  polyethylene glycol 3350 17 Gram(s) Oral daily    MEDICATIONS  (PRN):  diazepam    Tablet 2 milliGRAM(s) Oral three times a day PRN muscle spasm pain      Allergies    latex (Rash; Blisters)  penicillins (Swelling)  sulfa drugs (Unknown)    Intolerances        SOCIAL HISTORY: No illicit drug use    FAMILY HISTORY:      Vital Signs Last 24 Hrs  T(C): 36.8 (07 Sep 2017 05:09), Max: 36.8 (07 Sep 2017 05:09)  T(F): 98.2 (07 Sep 2017 05:09), Max: 98.2 (07 Sep 2017 05:09)  HR: 100 (07 Sep 2017 05:09) (94 - 101)  BP: 116/68 (07 Sep 2017 05:09) (109/66 - 126/73)  BP(mean): --  RR: 18 (07 Sep 2017 05:09) (18 - 18)  SpO2: 95% (07 Sep 2017 05:09) (92% - 95%)    PHYSICAL EXAM:    Constitutional: NAD, well-developed  HEENT: ARIANA, EOMI, Normal Hearing, MMM  Neck: No JVD  Back: No CVA tenderness  Respiratory: No accessory respiratory muscle use  Abd: Soft, NT/ND  : tavarez clear:   Extremities: No calf tenderness  Neurological: A/O x 3, no focal deficits  Psychiatric: Normal mood, normal affect  Skin: No rashes    I&O's Summary    06 Sep 2017 07:01  -  07 Sep 2017 07:00  --------------------------------------------------------  IN: 580 mL / OUT: 600 mL / NET: -20 mL        LABS:                        12.1   9.07  )-----------( 152      ( 06 Sep 2017 08:41 )             36.2     09-06    134<L>  |  100  |  21  ----------------------------<  106<H>  4.9   |  23  |  1.12    Ca    10.0      06 Sep 2017 09:02  Phos  2.6     09-06  Mg     1.7     09-06    TPro  5.7<L>  /  Alb  2.6<L>  /  TBili  1.8<H>  /  DBili  0.6<H>  /  AST  56<H>  /  ALT  72<H>  /  AlkPhos  213<H>  09-06    PT/INR - ( 06 Sep 2017 08:41 )   PT: 14.4 sec;   INR: 1.27 ratio             Urine Culture:     RADIOLOGY & ADDITIONAL STUDIES:

## 2017-09-07 NOTE — CONSULT NOTE ADULT - ASSESSMENT
a:  tavarez for retention bladder scan 600cc after valium, no prior gu histroy,is for LN bx, no bother with tavarez, no hematuria or dysuria, cr nl, abdominal sono unremarkable kidneys    P keep tavarez  add flomax  trend cr  medical management   for LN biopsy  outpatient  management Dr SUSAN Anderson

## 2017-09-08 VITALS
DIASTOLIC BLOOD PRESSURE: 64 MMHG | RESPIRATION RATE: 18 BRPM | SYSTOLIC BLOOD PRESSURE: 131 MMHG | HEART RATE: 97 BPM | TEMPERATURE: 97 F | OXYGEN SATURATION: 95 %

## 2017-09-08 LAB
ALBUMIN SERPL ELPH-MCNC: 2.9 G/DL — LOW (ref 3.3–5)
ALP SERPL-CCNC: 189 U/L — HIGH (ref 40–120)
ALT FLD-CCNC: 61 U/L — HIGH (ref 10–45)
ANION GAP SERPL CALC-SCNC: 14 MMOL/L — SIGNIFICANT CHANGE UP (ref 5–17)
APTT BLD: 27.6 SEC — SIGNIFICANT CHANGE UP (ref 27.5–37.4)
AST SERPL-CCNC: 59 U/L — HIGH (ref 10–40)
BASOPHILS # BLD AUTO: 0.1 K/UL — SIGNIFICANT CHANGE UP (ref 0–0.2)
BASOPHILS NFR BLD AUTO: 0.9 % — SIGNIFICANT CHANGE UP (ref 0–2)
BILIRUB DIRECT SERPL-MCNC: 0.6 MG/DL — HIGH (ref 0–0.2)
BILIRUB INDIRECT FLD-MCNC: 1.2 MG/DL — HIGH (ref 0.2–1)
BILIRUB SERPL-MCNC: 1.8 MG/DL — HIGH (ref 0.2–1.2)
BUN SERPL-MCNC: 23 MG/DL — SIGNIFICANT CHANGE UP (ref 7–23)
CALCIUM SERPL-MCNC: 10.5 MG/DL — SIGNIFICANT CHANGE UP (ref 8.4–10.5)
CHLORIDE SERPL-SCNC: 99 MMOL/L — SIGNIFICANT CHANGE UP (ref 96–108)
CO2 SERPL-SCNC: 22 MMOL/L — SIGNIFICANT CHANGE UP (ref 22–31)
CREAT SERPL-MCNC: 1.34 MG/DL — HIGH (ref 0.5–1.3)
EOSINOPHIL # BLD AUTO: 0 K/UL — SIGNIFICANT CHANGE UP (ref 0–0.5)
EOSINOPHIL NFR BLD AUTO: 0 % — SIGNIFICANT CHANGE UP (ref 0–6)
GLUCOSE SERPL-MCNC: 112 MG/DL — HIGH (ref 70–99)
HCT VFR BLD CALC: 37.9 % — LOW (ref 39–50)
HGB BLD-MCNC: 12.7 G/DL — LOW (ref 13–17)
INR BLD: 1.26 RATIO — HIGH (ref 0.88–1.16)
LYMPHOCYTES # BLD AUTO: 0.48 K/UL — LOW (ref 1–3.3)
LYMPHOCYTES # BLD AUTO: 4.4 % — LOW (ref 13–44)
MCHC RBC-ENTMCNC: 32.6 PG — SIGNIFICANT CHANGE UP (ref 27–34)
MCHC RBC-ENTMCNC: 33.5 GM/DL — SIGNIFICANT CHANGE UP (ref 32–36)
MCV RBC AUTO: 97.2 FL — SIGNIFICANT CHANGE UP (ref 80–100)
MONOCYTES # BLD AUTO: 1.52 K/UL — HIGH (ref 0–0.9)
MONOCYTES NFR BLD AUTO: 14 % — SIGNIFICANT CHANGE UP (ref 2–14)
NEUTROPHILS # BLD AUTO: 8.77 K/UL — HIGH (ref 1.8–7.4)
NEUTROPHILS NFR BLD AUTO: 80.7 % — HIGH (ref 43–77)
PLATELET # BLD AUTO: 170 K/UL — SIGNIFICANT CHANGE UP (ref 150–400)
POTASSIUM SERPL-MCNC: 5.7 MMOL/L — HIGH (ref 3.5–5.3)
POTASSIUM SERPL-SCNC: 5.7 MMOL/L — HIGH (ref 3.5–5.3)
PROT SERPL-MCNC: 5.9 G/DL — LOW (ref 6–8.3)
PROTHROM AB SERPL-ACNC: 14.3 SEC — HIGH (ref 10–13.1)
RBC # BLD: 3.9 M/UL — LOW (ref 4.2–5.8)
RBC # FLD: 15.2 % — HIGH (ref 10.3–14.5)
SODIUM SERPL-SCNC: 135 MMOL/L — SIGNIFICANT CHANGE UP (ref 135–145)
WBC # BLD: 10.87 K/UL — HIGH (ref 3.8–10.5)
WBC # FLD AUTO: 10.87 K/UL — HIGH (ref 3.8–10.5)

## 2017-09-08 RX ORDER — POLYETHYLENE GLYCOL 3350 17 G/17G
17 POWDER, FOR SOLUTION ORAL
Qty: 0 | Refills: 0 | Status: DISCONTINUED | OUTPATIENT
Start: 2017-09-08 | End: 2017-09-09

## 2017-09-08 RX ORDER — SODIUM CHLORIDE 9 MG/ML
1000 INJECTION INTRAMUSCULAR; INTRAVENOUS; SUBCUTANEOUS
Qty: 0 | Refills: 0 | Status: DISCONTINUED | OUTPATIENT
Start: 2017-09-08 | End: 2017-09-09

## 2017-09-08 RX ADMIN — HEPARIN SODIUM 5000 UNIT(S): 5000 INJECTION INTRAVENOUS; SUBCUTANEOUS at 14:18

## 2017-09-08 RX ADMIN — HEPARIN SODIUM 5000 UNIT(S): 5000 INJECTION INTRAVENOUS; SUBCUTANEOUS at 05:17

## 2017-09-08 RX ADMIN — SODIUM CHLORIDE 50 MILLILITER(S): 9 INJECTION INTRAMUSCULAR; INTRAVENOUS; SUBCUTANEOUS at 12:29

## 2017-09-08 RX ADMIN — POLYETHYLENE GLYCOL 3350 17 GRAM(S): 17 POWDER, FOR SOLUTION ORAL at 17:13

## 2017-09-08 RX ADMIN — HEPARIN SODIUM 5000 UNIT(S): 5000 INJECTION INTRAVENOUS; SUBCUTANEOUS at 21:00

## 2017-09-08 NOTE — PROGRESS NOTE ADULT - ASSESSMENT
84m hx as above presenting with outpatient labs demonstrating lab evidence of cholestatic and hepatocellular liver injury
84-yo male hx as above presenting with outpatient labs demonstrating lab evidence of cholestatic and hepatocellular liver injury, s/p stent for CBD stricture.
84-yo male hx as above presenting with outpatient labs demonstrating lab evidence of cholestatic and hepatocellular liver injury, s/p stent for CBD stricture.
84m hx as above presenting with outpatient labs demonstrating lab evidence of cholestatic and hepatocellular liver injury
83 y/o M with B-cell lymphoma who presents with jaundice and evidence of biliary obstruction for peripancreatic lymphadenopathy. Pt underwent ERCP with biliary stent placement on 9/1/17 by Dr. Medina. No evidence of post ERCP pancreatitis.   - Awaiting LFTs from today.   - Continue to monitor LFTs  - Full liquid diet today. May advance to low fat diet tomorrow.   - miralax 17 grams PO daily for constipation   - Plan for excisional lymph node biopsy with Dr. Michele Harris potentially on Tuesday.
84 year old man with B-Cell Lymphoma admitted with worsening hyperbilirubinemia. Patient s/p biliary stent with improving bilirubinemia.
84 year old man with B-Cell Lymphoma admitted with worsening hyperbilirubinemia. Patient s/p biliary stent with improving bilirubinemia. BMBx shows involvement with low grade lymphoma. Awaiting LN biopsy
84m hx as above presenting with outpatient labs demonstrating lab evidence of cholestatic and hepatocellular liver injury
85 y/o M with B-cell lymphoma who presents with jaundice and evidence of biliary obstruction for peripancreatic lymphadenopathy. Pt underwent ERCP with biliary stent placement on 9/1/17 by Dr. Medina. No evidence of post ERCP pancreatitis. LFTs are improving.     - Continue to monitor LFTs  - May advance to low fat diet today  - miralax 17 grams PO daily for constipation   - Plan for excisional lymph node biopsy with Dr. Michele Harris tentatively on Tuesday.

## 2017-09-08 NOTE — PROGRESS NOTE ADULT - PROBLEM SELECTOR PROBLEM 3
Constipation
Urinary retention with incomplete bladder emptying
Urinary retention with incomplete bladder emptying
Constipation

## 2017-09-08 NOTE — PROGRESS NOTE ADULT - PROBLEM SELECTOR PROBLEM 4
Chronic pain
Constipation
Constipation
Chronic pain

## 2017-09-08 NOTE — PROGRESS NOTE ADULT - PROBLEM SELECTOR PROBLEM 2
Lymphoma
Biliary obstruction
Non-Hodgkin lymphoma of intra-abdominal lymph nodes, unspecified non-Hodgkin lymphoma type

## 2017-09-08 NOTE — PROGRESS NOTE ADULT - PROBLEM SELECTOR PLAN 5
Continue PPI.
Cont Valium 2mg po TID.
Cont Valium 2mg po TID.   Holding off robaxin 2' biliary obstruction.  Holding off flexeril 2' LFTs and anticholinergic side effects
Continue PPI.
Likely multifactorial 2/2 lymphedema from lymphoma and likely direct organ compression of venous drainage/IVC. no role for diuretics in this clinical scenario - recommend leg elevation as tolerated
No active issues at present. Continue PPI.
No active issues at present. Continue PPI.
Likely multifactorial 2/2 lymphedema from lymphoma and likely direct organ compression of venous drainage/IVC. no role for diuretics in this clinical scenario - recommend leg elevation as tolerated

## 2017-09-08 NOTE — PROGRESS NOTE ADULT - PROBLEM SELECTOR PLAN 2
Dr. Harris will attempt to scheduled inpt surgical bx this week  f/u output BMBx results
Dr. Harris will attempt to scheduled inpt surgical bx next week
OP fu Dr. Miller in AM  pending LN biopsy, plan for transfer to [facility]
Will contact Dr. Harris's office to reschedule intrabd LN biopsy as inpt next week
await date for LN biopsy  f/u output BMBx results  appreciate surgery
await date for LN biopsy; possibly it may be done at [facility] Monday if no OR slots available at [facility] this week  f/u output BMBx results  appreciate surgery
tentative Tuesday for LN biopsy  f/u output BMBx results
Monitor Bili. LN biopsy for definitive diagnosis when Bili drops. Await bone marrow result as well.
OP fu Dr. Miller in AM  pending LN biopsy, f/u surgery
Total Bili dropping. Discussed with patient. Reassured.
s/p BMBx in the office. Inadequate tissue on original biopsy  - BMBx shows involvement with low grade lymphoma.  - Awaiting repeat LN biopsy by Dr. Harris. Scheduled to be on Monday at [facility]  - Pending transfer today. Can follow up in the office 1 week after biopsy    Mike Claudio MD  [facility] - [facility] Division of [facility]   [address] [place] [id number]  (T) [phone]  (F) [phone]
s/p BMBx in the office. Inadequate tissue on original biopsy  - Awaiting results of BMBx  - Awaiting repeat LN biopsy by Dr. Harris  - Will follow    Mike Claudio MD  [facility] - A Division of [facility]   [address] [place] [address]  (T) [phone]  (F) [phone]

## 2017-09-08 NOTE — PROGRESS NOTE ADULT - PROBLEM SELECTOR PROBLEM 1
Abnormal LFTs
Biliary obstruction
Non-Hodgkin lymphoma of intra-abdominal lymph nodes, unspecified non-Hodgkin lymphoma type

## 2017-09-08 NOTE — PROGRESS NOTE ADULT - PROVIDER SPECIALTY LIST ADULT
Gastroenterology
Heme/Onc
Internal Medicine
Surgery
Internal Medicine

## 2017-09-08 NOTE — PROGRESS NOTE ADULT - PROBLEM SELECTOR PROBLEM 6
Need for prophylactic measure
Romero esophagus

## 2017-09-08 NOTE — PROGRESS NOTE ADULT - PROBLEM SELECTOR PLAN 3
Likely 2/2 intestinal compression and obstruction by enlarged liver and spleen.   - Treat underlying neoplastic disease process  - avoid bulk forming laxatives, would use stimulant laxatives at this time- senna, colace. May add lactulose and enemas as needed.
Likely 2/2 intestinal compression and obstruction by enlarged liver and spleen  avoid bulk forming laxatives
Likely 2/2 intestinal compression and obstruction by enlarged liver and spleen.   - Treat underlying neoplastic disease process  - avoid bulk forming laxatives, would use stimulant laxatives at this time- senna, colace. May add lactulose and enemas as needed.
appreciate urology  watchful monitoring of undershaft blister-like swelling  voiding trial before discharge
appreciate urology  watchful monitoring of undershaft blister-like swelling  will need tavarez hopefully for just a few days
Likely 2/2 intestinal compression and obstruction by enlarged liver and spleen  avoid bulk forming laxatives, would use stimulant laxatives at this time - senna, colace. May add lactulose and enemas as needed.

## 2017-09-08 NOTE — PROGRESS NOTE ADULT - SUBJECTIVE AND OBJECTIVE BOX
[facility]/On[facility] - Tahmina Miller / Dandre / González - [phone]  Primary Outpatient Hematologist/Oncologist: Dr. Noah Miller    Subjective  Patient seen this morning. Feeling well. Reports having urinary urgency at night. No dysuria.    HPI:  84 year old man with history of b-cell lymphoma admitted with hyperbilirubinemia. Patient was in his normal state ofh health when he experienced pain in his back whenever her laid down and he has persistent epigastric pain. Patient was seen GI who performed a EGD which was negative. Imaging scans were performed which showed dominant periportal and peripancreatis mass with splenomegaly and hepatic/splenic lesions. Retroperitoneal lesions were also present. EUS was performed on 8/17/2017 which confirmed diagnosis of b-cell lymphoma. More tissue was needed for further testing. PET/CT on 8/29/2017 showed markedly hypermetabolic patricia masses involving the periportal region and contiguous retroperitoneum bilaterally. Patient had a BM biopsy scheduled for 8/31/2017. Found to have a Bilirubin of 5.3 and sent to the hospital for evaluation.     ROS:  General:  (-)Pain, (+)Decrease appetite, (-)Fevers, (-)Chills, (+)Weight Loss  Eyes: (-)Blurry Vision, (-)Double Vision, (-)Vision Loss  ENT: (-)Sinus Congestion, (-)Decreased Hearing, (-)Nosebleeds, (-)Sore Throat  Cardiac: (-)Chest Pain, (-)Palpitations, (-)Shortness of breathe on exertion  Respiratory: (-)Cough, (-)hemoptysis, (-)Shortness of breathe  GI: (-)Nausea, (-)Vomiting, (-)Diarrhea, (-)Constipation, (-)Melena, (-)BRBPR  : (-)Hematuria, (-)Dysuria, Polyuria  MSK: (-)Back pain, (-)Joint pain, (-)Stiffness  Dermatology: (-)Rash, (-)Itching  Neurology:  (-)Numbness, (-)Tingling, (-)Difficulty Walking, (-)Tremors, (+)Weakness  Psych: (-)Anxiety, (-)Depression, (-)Memory Loss  Hematology:  (-)Easy Bruising, (-)Easy Bleeding, (-)Night Sweats.     Objective  ICU Vital Signs Last 24 Hrs  T(C): 36.7 (08 Sep 2017 09:31), Max: 36.7 (07 Sep 2017 21:40)  T(F): 98 (08 Sep 2017 09:31), Max: 98.1 (07 Sep 2017 21:40)  HR: 83 (08 Sep 2017 09:31) (64 - 115)  BP: 129/69 (08 Sep 2017 09:31) (107/66 - 134/63)  RR: 18 (08 Sep 2017 09:31) (18 - 18)  SpO2: 92% (08 Sep 2017 09:31) (92% - 95%)      Physical Exam:  General: AAO x 3. NAD. Sitting in bed. Comfortable.  HEENT: clear oropharynx, anicteric sclera, pink conjunctivae, EOMi. PERRLA  Cardiac: S1, S2 present. No audible murmurs. RRR  Lungs: CTA B/L.   Abdomen: Soft, Non-Tender, Non-Distended. Palpable splenomegaly  Extremities: 2+ pulses. No edema  Skin: No Rashes. No petechiae  Neuro: No focal motor or sensory deficits.     Labs:  Complete Blood Count + Automated Diff (09.06.17 @ 08:41)    WBC Count: 9.07 K/uL    RBC Count: 3.76 M/uL    Hemoglobin: 12.1 g/dL    Hematocrit: 36.2 %    Mean Cell Volume: 96.3 fl    Mean Cell Hemoglobin: 32.2 pg    Mean Cell Hemoglobin Conc: 33.4 gm/dL    Red Cell Distrib Width: 14.6 %    Platelet Count - Automated: 152 K/uL    Auto Neutrophil #: 5.36 K/uL    Auto Lymphocyte #: 1.24 K/uL    Auto Monocyte #: 2.30 K/uL    Auto Eosinophil #: 0.13 K/uL    Auto Basophil #: 0.01 K/uL

## 2017-09-08 NOTE — PROGRESS NOTE ADULT - PROBLEM SELECTOR PLAN 4
Cont Valium 2mg po TID.   Holding off robaxin 2' biliary obstruction.  Holding off flexeril 2' LFTs and anticholinergic side effects
Abdominal discomfort likely due to lymphoma. Valium 2mg po TID. Will discontinue patient's home Temazepam.
Cont Valium 2mg po TID.   Holding off robaxin 2' biliary obstruction.  Holding off flexeril 2' LFTs and anticholinergic side effects
Likely 2/2 intestinal compression and obstruction by enlarged liver and spleen.   - Treat underlying neoplastic disease process  - avoid bulk forming laxatives, would use stimulant laxatives at this time- senna, colace. May add lactulose and enemas as needed.
Likely 2/2 intestinal compression and obstruction by enlarged liver and spleen.   - Treat underlying neoplastic disease process  - avoid bulk forming laxatives, would use stimulant laxatives at this time- senna, colace. May add lactulose and enemas as needed.
Abdominal discomfort likely due to lymphoma. Valium 2mg po TID. Will discontinue patient's home Temazepam.

## 2017-09-08 NOTE — PROGRESS NOTE ADULT - SUBJECTIVE AND OBJECTIVE BOX
Patient is a 84y old  Male who presents with a chief complaint of Abnormal preop labs (31 Aug 2017 18:37)      SUBJECTIVE / OVERNIGHT EVENTS:    Patient seen and examined. co constipation. co fatigue. denies cp sob. co feeling he has to stand while urinating.      Vital Signs Last 24 Hrs  T(C): 36.7 (08 Sep 2017 09:31), Max: 36.7 (07 Sep 2017 21:40)  T(F): 98 (08 Sep 2017 09:31), Max: 98.1 (07 Sep 2017 21:40)  HR: 83 (08 Sep 2017 09:31) (64 - 115)  BP: 129/69 (08 Sep 2017 09:31) (107/66 - 134/63)  BP(mean): --  RR: 18 (08 Sep 2017 09:31) (18 - 18)  SpO2: 92% (08 Sep 2017 09:31) (92% - 95%)  I&O's Summary    07 Sep 2017 07:01  -  08 Sep 2017 07:00  --------------------------------------------------------  IN: 620 mL / OUT: 650 mL / NET: -30 mL    08 Sep 2017 07:01  -  08 Sep 2017 11:46  --------------------------------------------------------  IN: 240 mL / OUT: 125 mL / NET: 115 mL        PE:  GENERAL: NAD, AAOx3, frail  HEAD:  Atraumatic, Normocephalic  EYES: EOMI, PERRLA, conjunctiva and sclera clear  NECK: Supple, No JVD  CHEST/LUNG: CTABL, No wheeze  HEART: Regular rate and rhythm; + murmur  ABDOMEN: Soft, Nontender, Nondistended; Bowel sounds present  EXTREMITIES:  2+ LE edema  SKIN: No rashes or lesions  NEURO: No focal deficits    LABS:                        12.7   10.87 )-----------( 170      ( 08 Sep 2017 08:49 )             37.9     09-08    135  |  99  |  23  ----------------------------<  112<H>  5.7<H>   |  22  |  1.34<H>    Ca    10.5      08 Sep 2017 08:51    TPro  5.9<L>  /  Alb  2.9<L>  /  TBili  1.8<H>  /  DBili  0.6<H>  /  AST  59<H>  /  ALT  61<H>  /  AlkPhos  189<H>  09-08    PT/INR - ( 08 Sep 2017 08:51 )   PT: 14.3 sec;   INR: 1.26 ratio         PTT - ( 08 Sep 2017 08:51 )  PTT:27.6 sec  CAPILLARY BLOOD GLUCOSE                RADIOLOGY & ADDITIONAL TESTS:    Imaging Personally Reviewed:  [x] YES  [ ] NO    Consultant(s) Notes Reviewed:  [x] YES  [ ] NO    MEDICATIONS  (STANDING):  pantoprazole    Tablet 40 milliGRAM(s) Oral before breakfast  heparin  Injectable 5000 Unit(s) SubCutaneous every 8 hours  polyethylene glycol 3350 17 Gram(s) Oral daily    MEDICATIONS  (PRN):      Care Discussed with Consultants/Other Providers [x] YES  [ ] NO    HEALTH ISSUES - PROBLEM Dx:  Urinary retention with incomplete bladder emptying: Urinary retention with incomplete bladder emptying  Non-Hodgkin lymphoma of intra-abdominal lymph nodes, unspecified non-Hodgkin lymphoma type: Non-Hodgkin lymphoma of intra-abdominal lymph nodes, unspecified non-Hodgkin lymphoma type  Biliary obstruction: Biliary obstruction  Need for prophylactic measure: Need for prophylactic measure  Romero esophagus: Romero esophagus  Pedal edema: Pedal edema  Chronic pain: Chronic pain  Constipation: Constipation  Lymphoma: Lymphoma  Abnormal LFTs: Abnormal LFTs

## 2017-09-08 NOTE — PROGRESS NOTE ADULT - PROBLEM SELECTOR PROBLEM 5
Romero esophagus
Chronic pain
Chronic pain
Pedal edema
Romero esophagus
Pedal edema

## 2017-09-08 NOTE — PROGRESS NOTE ADULT - PROBLEM SELECTOR PLAN 6
SC heparin
Continue PPI.
Continue PPI.
No active issues at present. Continue PPI.    plan of care dw patient and wife and NP.
SC heparin
SC heparin
SC heparin      d/julián temazepam for now
No active issues at present. Continue PPI.

## 2017-09-08 NOTE — PROGRESS NOTE ADULT - PROBLEM SELECTOR PLAN 1
s/p ERCP 9/1 with plastic stent for distal CBD stricture with nl cholangiogram afterwards  appreciate GI  monitor daily hepatic functional panel
LFTs downtrending  K hemolyzed, repeat  start IVF NS 50cc/hr for 6 hrs for MARIA C
NPO for ERCP today with possible biliary stenting
s/p ERCP 9/1 with plastic stent for distal CBD stricture with nl cholangiogram afterwards  appreciate GI  T bili trending downward
s/p ERCP 9/1 with plastic stent for distal CBD stricture with nl cholangiogram afterwards  appreciate GI  monitor daily hepatic functional panel
s/p ERCP 9/1 with plastic stent for distal CBD stricture with nl cholangiogram afterwards  appreciate GI  monitor hepatic function panel
s/p ERCP 9/1 with plastic stent for distal CBD stricture with nl cholangiogram afterwards  appreciate GI  t bili trending downward
Await further drop in Bili for biopsy.
Stent in place. Monitor Bili.
continue trending LFTs  RUQ US
s/p biliary stent on 9/1/2017 by Dr. eMdina  - Improvement in LFTs with drain  - Continue to monitor
s/p biliary stent on 9/1/2017 by Dr. Medina  - Improvement in LFTs with drain  - Continue to monitor

## 2017-09-09 ENCOUNTER — INPATIENT (INPATIENT)
Facility: HOSPITAL | Age: 82
LOS: 5 days | Discharge: NURSING, REHAB OR LONG-TERM CARE | DRG: 823 | End: 2017-09-15
Attending: SPECIALIST | Admitting: SPECIALIST
Payer: MEDICARE

## 2017-09-09 VITALS
DIASTOLIC BLOOD PRESSURE: 67 MMHG | HEART RATE: 107 BPM | TEMPERATURE: 98 F | SYSTOLIC BLOOD PRESSURE: 120 MMHG | OXYGEN SATURATION: 97 % | RESPIRATION RATE: 19 BRPM

## 2017-09-09 DIAGNOSIS — Z90.49 ACQUIRED ABSENCE OF OTHER SPECIFIED PARTS OF DIGESTIVE TRACT: Chronic | ICD-10-CM

## 2017-09-09 DIAGNOSIS — Z98.890 OTHER SPECIFIED POSTPROCEDURAL STATES: Chronic | ICD-10-CM

## 2017-09-09 DIAGNOSIS — Z90.79 ACQUIRED ABSENCE OF OTHER GENITAL ORGAN(S): Chronic | ICD-10-CM

## 2017-09-09 DIAGNOSIS — D41.4 NEOPLASM OF UNCERTAIN BEHAVIOR OF BLADDER: Chronic | ICD-10-CM

## 2017-09-09 DIAGNOSIS — H05.9 UNSPECIFIED DISORDER OF ORBIT: ICD-10-CM

## 2017-09-09 LAB
ANION GAP SERPL CALC-SCNC: 12 MMOL/L — SIGNIFICANT CHANGE UP (ref 5–17)
BUN SERPL-MCNC: 28 MG/DL — HIGH (ref 7–23)
CALCIUM SERPL-MCNC: 10.9 MG/DL — HIGH (ref 8.4–10.5)
CHLORIDE SERPL-SCNC: 99 MMOL/L — SIGNIFICANT CHANGE UP (ref 96–108)
CO2 SERPL-SCNC: 23 MMOL/L — SIGNIFICANT CHANGE UP (ref 22–31)
CREAT SERPL-MCNC: 1.27 MG/DL — SIGNIFICANT CHANGE UP (ref 0.5–1.3)
GLUCOSE SERPL-MCNC: 106 MG/DL — HIGH (ref 70–99)
HCT VFR BLD CALC: 35.5 % — LOW (ref 39–50)
HGB BLD-MCNC: 11.9 G/DL — LOW (ref 13–17)
MCHC RBC-ENTMCNC: 32.6 PG — SIGNIFICANT CHANGE UP (ref 27–34)
MCHC RBC-ENTMCNC: 33.5 GM/DL — SIGNIFICANT CHANGE UP (ref 32–36)
MCV RBC AUTO: 97.3 FL — SIGNIFICANT CHANGE UP (ref 80–100)
PLATELET # BLD AUTO: 180 K/UL — SIGNIFICANT CHANGE UP (ref 150–400)
POTASSIUM SERPL-MCNC: 4.9 MMOL/L — SIGNIFICANT CHANGE UP (ref 3.5–5.3)
POTASSIUM SERPL-SCNC: 4.9 MMOL/L — SIGNIFICANT CHANGE UP (ref 3.5–5.3)
RBC # BLD: 3.65 M/UL — LOW (ref 4.2–5.8)
RBC # FLD: 15.3 % — HIGH (ref 10.3–14.5)
SODIUM SERPL-SCNC: 134 MMOL/L — LOW (ref 135–145)
WBC # BLD: 11.66 K/UL — HIGH (ref 3.8–10.5)
WBC # FLD AUTO: 11.66 K/UL — HIGH (ref 3.8–10.5)

## 2017-09-09 PROCEDURE — 99232 SBSQ HOSP IP/OBS MODERATE 35: CPT | Mod: 24

## 2017-09-09 RX ORDER — DOCUSATE SODIUM 100 MG
100 CAPSULE ORAL DAILY
Qty: 0 | Refills: 0 | Status: DISCONTINUED | OUTPATIENT
Start: 2017-09-09 | End: 2017-09-11

## 2017-09-09 RX ORDER — INFLUENZA VIRUS VACCINE 15; 15; 15; 15 UG/.5ML; UG/.5ML; UG/.5ML; UG/.5ML
0.5 SUSPENSION INTRAMUSCULAR ONCE
Qty: 0 | Refills: 0 | Status: DISCONTINUED | OUTPATIENT
Start: 2017-09-09 | End: 2017-09-15

## 2017-09-09 RX ORDER — SENNA PLUS 8.6 MG/1
1 TABLET ORAL DAILY
Qty: 0 | Refills: 0 | Status: DISCONTINUED | OUTPATIENT
Start: 2017-09-09 | End: 2017-09-11

## 2017-09-09 RX ORDER — POLYETHYLENE GLYCOL 3350 17 G/17G
17 POWDER, FOR SOLUTION ORAL
Qty: 0 | Refills: 0 | COMMUNITY
Start: 2017-09-09

## 2017-09-09 RX ORDER — PANTOPRAZOLE SODIUM 20 MG/1
40 TABLET, DELAYED RELEASE ORAL DAILY
Qty: 0 | Refills: 0 | Status: DISCONTINUED | OUTPATIENT
Start: 2017-09-09 | End: 2017-09-11

## 2017-09-09 RX ORDER — HEPARIN SODIUM 5000 [USP'U]/ML
5000 INJECTION INTRAVENOUS; SUBCUTANEOUS EVERY 8 HOURS
Qty: 0 | Refills: 0 | Status: DISCONTINUED | OUTPATIENT
Start: 2017-09-09 | End: 2017-09-11

## 2017-09-09 RX ADMIN — Medication 100 MILLIGRAM(S): at 18:38

## 2017-09-09 RX ADMIN — SENNA PLUS 1 TABLET(S): 8.6 TABLET ORAL at 18:38

## 2017-09-09 RX ADMIN — PANTOPRAZOLE SODIUM 40 MILLIGRAM(S): 20 TABLET, DELAYED RELEASE ORAL at 18:38

## 2017-09-09 RX ADMIN — HEPARIN SODIUM 5000 UNIT(S): 5000 INJECTION INTRAVENOUS; SUBCUTANEOUS at 21:21

## 2017-09-09 NOTE — DISCHARGE NOTE ADULT - MEDICATION SUMMARY - MEDICATIONS TO STOP TAKING
I will STOP taking the medications listed below when I get home from the hospital:    Vitamin B12 2500 mcg sublingual tablet  -- 1 tab(s) under tongue once a day    Chondroitin-Glucosamine oral tablet  -- 1 tab(s) by mouth once a day - last dose 8/30/17    red yeast rice 600 mg oral capsule  -- 1000 microgram(s) by mouth once a day - last dose 8/30/17    temazepam 15 mg oral capsule  -- 1 cap(s) by mouth once a day (at bedtime), As Needed

## 2017-09-09 NOTE — DISCHARGE NOTE ADULT - CARE PROVIDER_API CALL
Edu Wray), Gastroenterology; Internal Medicine  [address]  [address]  [place] [address]  Phone: [phone]  Fax: [phone]    Michele Harris), ColonRectal Surgery; Surgery  [address] [place] [address]  Phone: [phone]  Fax: [phone]    Mike Claudio), Internal Medicine  [address] [facility]  [address]  [place] [address]  Phone: [phone]  Fax: [phone]

## 2017-09-09 NOTE — H&P ADULT - ASSESSMENT
84yM with periportal mass, transferred from [facility] to [facility] for lymph node biopsy 9/11/17.   -monitor labs  -pre-op renny 9/10 for OR 9/11  -regular diet  -senna/colace for constipation  -dvt ppx  -encourage oob and ISS  -will discuss plan with attending

## 2017-09-09 NOTE — DISCHARGE NOTE ADULT - MEDICATION SUMMARY - MEDICATIONS TO TAKE
I will START or STAY ON the medications listed below when I get home from the hospital:    polyethylene glycol 3350 oral powder for reconstitution  -- 17 gram(s) by mouth 2 times a day  -- Indication: For Constipation    Protonix 40 mg oral delayed release tablet  -- 1 tab(s) by mouth once a day  -- Indication: For Romero esophagus

## 2017-09-09 NOTE — DISCHARGE NOTE ADULT - PATIENT PORTAL LINK FT
“You can access the [facility] Patient Portal, offered by [facility], by registering with the following website: [email or web address]”

## 2017-09-09 NOTE — H&P ADULT - NSHPPHYSICALEXAM_GEN_ALL_CORE
general- thin male, laying comfortably in bed, NAD, no jaundice.  Neurological: AAOx3, no loss of sensation.   Neck: supple  Pulmonary: CTAB/L  Cardiovascular: s1/s2, RRR  Gastrointestinal: soft, NTND, no tympany, +bowel sounds,   Genitourinary: +tavarez with linda urine  Extremities: 3+ pitting edema b/l LE, no calf TTP or erythema b/l, +palpable DP pulses.   Skin: skin is warm and dry, no rashes.  Musculoskeletal: Moving all extremities.

## 2017-09-09 NOTE — DISCHARGE NOTE ADULT - CARE PROVIDERS DIRECT ADDRESSES
,DirectAddress_Unknown,[email or web address],DirectAddress_Unknown

## 2017-09-09 NOTE — DOWNTIME INTERRUPTION NOTE - WHICH MANUAL FORMS INITIATED?
See paper records for clinical information entered during the [facility] downtime. Discharge prior to relaunch of [facility].

## 2017-09-09 NOTE — CHART NOTE - NSCHARTNOTEFT_GEN_A_CORE
Patient is a 84y old  Male who presents with a chief complaint of Abnormal preop labs (09 Sep 2017 12:05)    Called to facilitate transfer to [facility] by medical attending Dr Alfredo.  Patient seen and examined. No complaints at this time.  Spoke to Attending Dr Alfredo and patient deemed medically cleared and stable for Transfer to [facility] today for planned LN biopsy with Dr Michele Harris.      Vital Signs Last 24 Hrs  T(C): 36.3 (08 Sep 2017 21:27), Max: 36.6 (08 Sep 2017 16:44)  T(F): 97.3 (08 Sep 2017 21:27), Max: 97.9 (08 Sep 2017 16:44)  HR: 97 (08 Sep 2017 21:27) (74 - 97)  BP: 131/64 (08 Sep 2017 21:27) (115/78 - 131/64)  BP(mean): --  RR: 18 (08 Sep 2017 21:27) (18 - 18)  SpO2: 95% (08 Sep 2017 21:27) (94% - 95%)      Labs:                          11.9   11.66 )-----------( 180      ( 09 Sep 2017 10:07 )             35.5     09-09    134<L>  |  99  |  28<H>  ----------------------------<  106<H>  4.9   |  23  |  1.27    Ca    10.9<H>      09 Sep 2017 10:07    TPro  5.9<L>  /  Alb  2.9<L>  /  TBili  1.8<H>  /  DBili  0.6<H>  /  AST  59<H>  /  ALT  61<H>  /  AlkPhos  189<H>  09-08            Radiology:    Physical Exam:  General: WN/WD NAD  Neurology: A&Ox3, nonfocal, AREVALO x 4  Head:  Normocephalic, atraumatic  Respiratory: CTA B/L  CV: RRR, S1S2, no murmur  Abdominal: Soft, NT, ND no palpable mass  MSK: No edema, + peripheral pulses, FROM all 4 extremity    Discharge Note and Plan:  1. Transfer to [facility] for LN Biopsy on Monday   2. Follow up with gastroenterology  in 1 week   3. Follow up with Oncologist Dr Miller in 1 week  4, Follow up with your primary Medical Doctor and call office immediately upon discharge to schedule an appointment.   5. You may return for any worsening or return of your symptoms.     Spoke to Attending Dr Alfredo and patient deemed medically cleared and stable for Transfer to [facility] today for planned LN biopsy with Dr Michele Harris.  Medications, reconciliation, labs, and test reviewed and revised with attending and patient cleared to discharge and transfer today.     Jacobo Melton [facility]   Dept of Medicine   [id number] Patient is a 84y old  Male who presents with a chief complaint of Abnormal preop labs (09 Sep 2017 12:05)    Called to facilitate transfer to [facility] by medical attending Dr Alfredo.  Patient seen and examined. No complaints at this time.  Spoke to Attending Dr Alfredo and patient deemed medically cleared and stable for Transfer to [facility] today for planned LN biopsy with Dr Michele Harris.      Vital Signs Last 24 Hrs  T(C): 36.3 (08 Sep 2017 21:27), Max: 36.6 (08 Sep 2017 16:44)  T(F): 97.3 (08 Sep 2017 21:27), Max: 97.9 (08 Sep 2017 16:44)  HR: 97 (08 Sep 2017 21:27) (74 - 97)  BP: 131/64 (08 Sep 2017 21:27) (115/78 - 131/64)  BP(mean): --  RR: 18 (08 Sep 2017 21:27) (18 - 18)  SpO2: 95% (08 Sep 2017 21:27) (94% - 95%)      Labs:                          11.9   11.66 )-----------( 180      ( 09 Sep 2017 10:07 )             35.5     09-09    134<L>  |  99  |  28<H>  ----------------------------<  106<H>  4.9   |  23  |  1.27    Ca    10.9<H>      09 Sep 2017 10:07    TPro  5.9<L>  /  Alb  2.9<L>  /  TBili  1.8<H>  /  DBili  0.6<H>  /  AST  59<H>  /  ALT  61<H>  /  AlkPhos  189<H>  09-08            Radiology:    Physical Exam:  General: WN/WD NAD  Neurology: A&Ox3, nonfocal, AREVALO x 4  Head:  Normocephalic, atraumatic  Respiratory: CTA B/L  CV: RRR, S1S2, murmur  Abdominal: Soft, NT, ND no palpable mass  MSK: + edema, + peripheral pulses, FROM all 4 extremity    Discharge Note and Plan:  1. Transfer to [facility] for LN Biopsy on Monday   2. Follow up with gastroenterology  in 1 week   3. Follow up with Oncologist Dr Miller in 1 week  4, Follow up with your primary Medical Doctor and call office immediately upon discharge to schedule an appointment.   5. You may return for any worsening or return of your symptoms.     Spoke to Attending Dr Alfredo and patient deemed medically cleared and stable for Transfer to [facility] today for planned LN biopsy with Dr Michele Harris.  Medications, reconciliation, labs, and test reviewed and revised with attending and patient cleared to discharge and transfer today.     Jacobo Melton [facility]   Dept of Medicine   [id number]

## 2017-09-09 NOTE — DISCHARGE NOTE ADULT - ADDITIONAL INSTRUCTIONS
1. Transfer to [facility] for LN Biopsy on Monday   2. Follow up with gastroenterology  in 1 week   3. Follow up with Oncologist Dr Bai in 1 week  4, Follow up with your primary Medical Doctor and call office immediately upon discharge to schedule an appointment.   5. You may return for any worsening or return of your symptoms. 1. Transfer to [facility] for LN Biopsy on Monday   2. Follow up with gastroenterology  in 1 week   3. Follow up with Oncologist Dr Miller in 1 week  4, Follow up with your primary Medical Doctor and call office immediately upon discharge to schedule an appointment.   5. You may return for any worsening or return of your symptoms.

## 2017-09-09 NOTE — H&P ADULT - HISTORY OF PRESENT ILLNESS
84yM with no significant PMHx, presents to [facility] as a direct admit from [facility]. Per pt, he started to have abdominal epigastric pain in July. He had an endoscopy in August which was negative. Further studies included a CT of the abdomen which showed lesions in his spleen, pancreas, liver. 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  Pt was scheduled to have a robotic resection of a periportal mass for a more definitive tissue diagnosis and lymph node biopsy on 9/1/17, however his outpatient pre-surgical testing results showed an elevated tbili and LFT's operation was pushed back. He went for ERCP and stenting on 9/1. Tbili and LFT's have been downtrending, pt was transferred to [facility] for lymph node biopsy on 9/11/17. Pt currently admits he has not had an appetite, with constipation (last BM 9/2), also has LE edema that started in July. He currently denies n/v/d, fevers/chills, abd pain, chest pain, sob.

## 2017-09-09 NOTE — DISCHARGE NOTE ADULT - HOSPITAL COURSE
MD to complete 84yom PMHx Romero’s esophagus, masses in liver, pancreas, sleen and lymph nodes, admitted for jaundice. outpatient labs demonstrating lab evidence of cholestatic and hepatocellular liver injury. s/p ERCP 9/1 with plastic stent for distal CBD stricture with nl cholangiogram afterwards and downtrending T bili. sp tavarez for urinary retention.  Transfer to [place] for lymph node biopsy. Patient and wife in agreement with plan of care.

## 2017-09-09 NOTE — DISCHARGE NOTE ADULT - CARE PLAN
Principal Discharge DX:	Abnormal LFTs  Goal:	improvement and stabilization of your Liver Function and to have LN biospy at [facility]  Instructions for follow-up, activity and diet:	Patient is being Transferred to [facility] for LN node Biopsy today per patients request.  Secondary Diagnosis:	Lymphoma  Instructions for follow-up, activity and diet:	Follow up with Oncologist  Secondary Diagnosis:	Constipation, unspecified constipation type  Instructions for follow-up, activity and diet:	Continue miralax as needed and follow up with gastroent  Secondary Diagnosis:	Chronic pain due to neoplasm

## 2017-09-10 PROBLEM — K22.70 BARRETT'S ESOPHAGUS WITHOUT DYSPLASIA: Chronic | Status: ACTIVE | Noted: 2017-08-31

## 2017-09-10 LAB
ALBUMIN SERPL ELPH-MCNC: 2.9 G/DL — LOW (ref 3.3–5.2)
ALP SERPL-CCNC: 162 U/L — HIGH (ref 40–120)
ALT FLD-CCNC: 45 U/L — HIGH
ANION GAP SERPL CALC-SCNC: 12 MMOL/L — SIGNIFICANT CHANGE UP (ref 5–17)
APPEARANCE UR: ABNORMAL
AST SERPL-CCNC: 39 U/L — SIGNIFICANT CHANGE UP
BACTERIA # UR AUTO: ABNORMAL
BILIRUB SERPL-MCNC: 1.9 MG/DL — SIGNIFICANT CHANGE UP (ref 0.4–2)
BILIRUB UR-MCNC: ABNORMAL
BLD GP AB SCN SERPL QL: SIGNIFICANT CHANGE UP
BUN SERPL-MCNC: 31 MG/DL — HIGH (ref 8–20)
CALCIUM SERPL-MCNC: 10.6 MG/DL — HIGH (ref 8.6–10.2)
CHLORIDE SERPL-SCNC: 100 MMOL/L — SIGNIFICANT CHANGE UP (ref 98–107)
CO2 SERPL-SCNC: 24 MMOL/L — SIGNIFICANT CHANGE UP (ref 22–29)
COLOR SPEC: YELLOW — SIGNIFICANT CHANGE UP
CREAT SERPL-MCNC: 1.23 MG/DL — SIGNIFICANT CHANGE UP (ref 0.5–1.3)
DIFF PNL FLD: ABNORMAL
GLUCOSE SERPL-MCNC: 110 MG/DL — SIGNIFICANT CHANGE UP (ref 70–115)
GLUCOSE UR QL: NEGATIVE MG/DL — SIGNIFICANT CHANGE UP
HCT VFR BLD CALC: 36.4 % — LOW (ref 42–52)
HGB BLD-MCNC: 12.3 G/DL — LOW (ref 14–18)
KETONES UR-MCNC: NEGATIVE — SIGNIFICANT CHANGE UP
LEUKOCYTE ESTERASE UR-ACNC: ABNORMAL
LIDOCAIN IGE QN: 21 U/L — LOW (ref 22–51)
MAGNESIUM SERPL-MCNC: 1.9 MG/DL — SIGNIFICANT CHANGE UP (ref 1.6–2.6)
MCHC RBC-ENTMCNC: 32.7 PG — HIGH (ref 27–31)
MCHC RBC-ENTMCNC: 33.8 G/DL — SIGNIFICANT CHANGE UP (ref 32–36)
MCV RBC AUTO: 96.8 FL — HIGH (ref 80–94)
NITRITE UR-MCNC: POSITIVE
PH UR: 5 — SIGNIFICANT CHANGE UP (ref 5–8)
PHOSPHATE SERPL-MCNC: 3.1 MG/DL — SIGNIFICANT CHANGE UP (ref 2.4–4.7)
PLATELET # BLD AUTO: 185 K/UL — SIGNIFICANT CHANGE UP (ref 150–400)
POTASSIUM SERPL-MCNC: 4.8 MMOL/L — SIGNIFICANT CHANGE UP (ref 3.5–5.3)
POTASSIUM SERPL-SCNC: 4.8 MMOL/L — SIGNIFICANT CHANGE UP (ref 3.5–5.3)
PROT SERPL-MCNC: 5.3 G/DL — LOW (ref 6.6–8.7)
PROT UR-MCNC: 100 MG/DL
RBC # BLD: 3.76 M/UL — LOW (ref 4.6–6.2)
RBC # FLD: 15.4 % — SIGNIFICANT CHANGE UP (ref 11–15.6)
RBC CASTS # UR COMP ASSIST: >50 /HPF (ref 0–4)
SODIUM SERPL-SCNC: 136 MMOL/L — SIGNIFICANT CHANGE UP (ref 135–145)
SP GR SPEC: 1.02 — SIGNIFICANT CHANGE UP (ref 1.01–1.02)
TYPE + AB SCN PNL BLD: SIGNIFICANT CHANGE UP
UROBILINOGEN FLD QL: 1 MG/DL
WBC # BLD: 10.5 K/UL — SIGNIFICANT CHANGE UP (ref 4.8–10.8)
WBC # FLD AUTO: 10.5 K/UL — SIGNIFICANT CHANGE UP (ref 4.8–10.8)
WBC UR QL: ABNORMAL

## 2017-09-10 PROCEDURE — 93010 ELECTROCARDIOGRAM REPORT: CPT

## 2017-09-10 PROCEDURE — 71010: CPT | Mod: 26

## 2017-09-10 RX ORDER — CIPROFLOXACIN LACTATE 400MG/40ML
VIAL (ML) INTRAVENOUS
Qty: 0 | Refills: 0 | Status: DISCONTINUED | OUTPATIENT
Start: 2017-09-10 | End: 2017-09-11

## 2017-09-10 RX ORDER — CIPROFLOXACIN LACTATE 400MG/40ML
400 VIAL (ML) INTRAVENOUS ONCE
Qty: 0 | Refills: 0 | Status: COMPLETED | OUTPATIENT
Start: 2017-09-10 | End: 2017-09-10

## 2017-09-10 RX ORDER — SODIUM CHLORIDE 9 MG/ML
1000 INJECTION, SOLUTION INTRAVENOUS
Qty: 0 | Refills: 0 | Status: DISCONTINUED | OUTPATIENT
Start: 2017-09-10 | End: 2017-09-11

## 2017-09-10 RX ORDER — CIPROFLOXACIN LACTATE 400MG/40ML
400 VIAL (ML) INTRAVENOUS EVERY 12 HOURS
Qty: 0 | Refills: 0 | Status: DISCONTINUED | OUTPATIENT
Start: 2017-09-11 | End: 2017-09-11

## 2017-09-10 RX ORDER — SALIVA SUBSTITUTE COMB NO.11 351 MG
1 POWDER IN PACKET (EA) MUCOUS MEMBRANE THREE TIMES A DAY
Qty: 0 | Refills: 0 | Status: DISCONTINUED | OUTPATIENT
Start: 2017-09-10 | End: 2017-09-11

## 2017-09-10 RX ADMIN — HEPARIN SODIUM 5000 UNIT(S): 5000 INJECTION INTRAVENOUS; SUBCUTANEOUS at 16:02

## 2017-09-10 RX ADMIN — Medication 200 MILLIGRAM(S): at 22:04

## 2017-09-10 RX ADMIN — HEPARIN SODIUM 5000 UNIT(S): 5000 INJECTION INTRAVENOUS; SUBCUTANEOUS at 05:39

## 2017-09-10 RX ADMIN — HEPARIN SODIUM 5000 UNIT(S): 5000 INJECTION INTRAVENOUS; SUBCUTANEOUS at 22:35

## 2017-09-10 RX ADMIN — Medication 100 MILLIGRAM(S): at 16:02

## 2017-09-10 RX ADMIN — SODIUM CHLORIDE 75 MILLILITER(S): 9 INJECTION, SOLUTION INTRAVENOUS at 09:04

## 2017-09-10 RX ADMIN — SENNA PLUS 1 TABLET(S): 8.6 TABLET ORAL at 16:02

## 2017-09-10 NOTE — PROGRESS NOTE ADULT - SUBJECTIVE AND OBJECTIVE BOX
INTERVAL HPI/OVERNIGHT EVENTS: No events over night.  Patient comfortable and aware of his planned surgery for tomorrow     SUBJECTIVE:  Flatus: [x ] YES [ ] NO             Bowel Movement: [x ] YES [ ] NO  Pain Control Adequate: [x ] YES [ ] NO  Nausea: [ ] YES [x ] NO            Vomiting: [ ] YES [x ] NO  Diarrhea: [ ] YES [x ] NO         Constipation: [ ] YES [x ] NO     Chest Pain: [ ] YES [x ] NO    SOB:  [ ] YES [x ] NO    MEDICATIONS  (STANDING):  docusate sodium 100 milliGRAM(s) Oral daily  senna 1 Tablet(s) Oral daily  heparin  Injectable 5000 Unit(s) SubCutaneous every 8 hours  pantoprazole   Suspension 40 milliGRAM(s) Oral daily  influenza   Vaccine 0.5 milliLiter(s) IntraMuscular once  lactated ringers. 1000 milliLiter(s) (75 mL/Hr) IV Continuous <Continuous>    MEDICATIONS  (PRN):  saliva substitute (BIOTENE MOISTURIZING MOUTH SPRAY) 1 [place](s) Topical three times a day PRN Mouth Care      Vital Signs Last 24 Hrs  T(C): 36.4 (10 Sep 2017 07:46), Max: 36.6 (09 Sep 2017 15:48)  T(F): 97.6 (10 Sep 2017 07:46), Max: 97.9 (09 Sep 2017 15:48)  HR: 84 (10 Sep 2017 07:46) (83 - 107)  BP: 135/75 (10 Sep 2017 07:46) (120/67 - 135/75)  BP(mean): --  RR: 18 (10 Sep 2017 07:46) (18 - 19)  SpO2: 96% (10 Sep 2017 07:46) (94% - 97%)    PHYSICAL EXAM:  general- thin male, laying comfortably in bed, NAD, no jaundice.  	Neurological: AAOx3, no loss of sensation.   	Neck: supple  	Pulmonary: CTAB/L  	Cardiovascular: s1/s2, RRR  	Gastrointestinal: soft, NTND, no tympany, +bowel sounds,   	Genitourinary: +tavarez with linda urine  	Extremities: 3+ pitting edema b/l LE, no calf TTP or erythema b/l, +palpable DP pulses.   	Skin: skin is warm and dry, no rashes.  Musculoskeletal: Moving all extremities.    I&O's Detail    09 Sep 2017 07:01  -  10 Sep 2017 07:00  --------------------------------------------------------  IN:  Total IN: 0 mL    OUT:    Indwelling Catheter - Urethral: 450 mL  Total OUT: 450 mL    Total NET: -450 mL          LABS:                        12.3   10.5  )-----------( 185      ( 10 Sep 2017 06:16 )             36.4     09-10    136  |  100  |  31.0<H>  ----------------------------<  110  4.8   |  24.0  |  1.23    Ca    10.6<H>      10 Sep 2017 06:16  Phos  3.1     09-10  Mg     1.9     09-10    TPro  5.3<L>  /  Alb  2.9<L>  /  TBili  1.9  /  DBili  x   /  AST  39  /  ALT  45<H>  /  AlkPhos  162<H>  09-10          RADIOLOGY & ADDITIONAL STUDIES:

## 2017-09-10 NOTE — PROGRESS NOTE ADULT - SUBJECTIVE AND OBJECTIVE BOX
84 year old male transferred from [facility] to [facility] for planned robotic resection of periportal mass scheduled for 9/11/17.   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        Vital Signs Last 24 Hrs  T(C): 36.4 (10 Sep 2017 07:46), Max: 36.6 (09 Sep 2017 15:48)  T(F): 97.6 (10 Sep 2017 07:46), Max: 97.9 (09 Sep 2017 15:48)  HR: 84 (10 Sep 2017 07:46) (83 - 107)  BP: 135/75 (10 Sep 2017 07:46) (120/67 - 135/75)  BP(mean): --  RR: 18 (10 Sep 2017 07:46) (18 - 19)  SpO2: 96% (10 Sep 2017 07:46) (94% - 97%)      SUBJECTIVE: Pt seen  SOB:  [ ] YES [x ] NO  Dyspnea: [ ]YES [x ]NO  Chest Discomfort: [ ] YES [x ] NO    Nausea: [ ] YES [x ] NO           Vomiting: [ ] YES [x ] NO  Flatus: [x ] YES [ ] NO             Bowel Movement: [ ] YES [x ] NO  Diarrhea: [ ] YES [x ] NO         Void: [x ]YES [ ]No tavarez catheter, c/o fullness/urgency  Constipation: [ ] YES [x ] NO     Pain (0-10):              Pain Control Adequate: [x ] YES [ ] NO  Tavarez: yes  NGT: none    PHYSICAL EXAM:  Patient resting comfortably in bed.  Wife at bedside.    Eyes:  PERRL, EOMI, Conjunctiva clear.  ENMT:  WNL  Neck:  Supple.  Respiratory:  crackles at bases, no wheezing, no rhonchi. room air  Cardiovascular:  S1, S2, irregular rhythm  Gastrointestinal: Abdomen soft, distended, + BS, non tenderness  Wound: none  Genitourinary:  tavarez  Rectal:  n/a  Extremities:  No edema, no calf tenderrness,  Neurological: AxAxO3      I&O's Summary    09 Sep 2017 07:01  -  10 Sep 2017 07:00  --------------------------------------------------------  IN: 0 mL / OUT: 450 mL / NET: -450 mL    10 Sep 2017 07:01  -  10 Sep 2017 12:37  --------------------------------------------------------  IN: 450 mL / OUT: 0 mL / NET: 450 mL      I&O's Detail    09 Sep 2017 07:01  -  10 Sep 2017 07:00  --------------------------------------------------------  IN:  Total IN: 0 mL    OUT:    Indwelling Catheter - Urethral: 450 mL  Total OUT: 450 mL    Total NET: -450 mL      10 Sep 2017 07:01  -  10 Sep 2017 12:37  --------------------------------------------------------  IN:    lactated ringers.: 150 mL    Oral Fluid: 300 mL  Total IN: 450 mL    OUT:  Total OUT: 0 mL    Total NET: 450 mL      MEDICATIONS  (STANDING):  docusate sodium 100 milliGRAM(s) Oral daily  senna 1 Tablet(s) Oral daily  heparin  Injectable 5000 Unit(s) SubCutaneous every 8 hours  pantoprazole   Suspension 40 milliGRAM(s) Oral daily  influenza   Vaccine 0.5 milliLiter(s) IntraMuscular once  lactated ringers. 1000 milliLiter(s) (75 mL/Hr) IV Continuous <Continuous>    MEDICATIONS  (PRN):  saliva substitute (BIOTENE MOISTURIZING MOUTH SPRAY) 1 [place](s) Topical three times a day PRN Mouth Care      LABS:                        12.3   10.5  )-----------( 185      ( 10 Sep 2017 06:16 )             36.4     09-10    136  |  100  |  31.0<H>  ----------------------------<  110  4.8   |  24.0  |  1.23    Ca    10.6<H>      10 Sep 2017 06:16  Phos  3.1     09-10  Mg     1.9     09-10    TPro  5.3<L>  /  Alb  2.9<L>  /  TBili  1.9  /  DBili  x   /  AST  39  /  ALT  45<H>  /  AlkPhos  162<H>  09-10      84 year old male scheduled for Robotic resection of periportal mass on 9/11/17.      Plan:  - Incentive spirometry  - OOB--> ambulate   - regular diet then NPO after midnight  - IVF   - UA/U Cx.    - Pre-op EKG  - Medical Clearance

## 2017-09-10 NOTE — CONSULT NOTE ADULT - SUBJECTIVE AND OBJECTIVE BOX
HPI:  84yM with no significant PMHx, presents to [facility] as a direct admit from [facility]. Per pt, he started to have abdominal epigastric pain in July. He had an endoscopy in August which was negative. Further studies included a CT of the abdomen which showed lesions in his spleen, pancreas, liver. 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  Pt was scheduled to have a robotic resection of a periportal mass for a more definitive tissue diagnosis and lymph node biopsy on 9/1/17, however his outpatient pre-surgical testing results showed an elevated tbili and LFT's operation was pushed back. He went for ERCP and stenting on 9/1. Tbili and LFT's have been downtrending, pt was transferred to [facility] for lymph node biopsy on 9/11/17. Pt currently admits he has not had an appetite, with constipation (last BM 9/2), also has LE edema that started in July. He currently denies n/v/d, fevers/chills, abd pain, chest pain, sob. (09 Sep 2017 15:27)    Meds    Protonix 40 mg oral delayed release tablet: Last Dose Taken:  , 1 tab(s) orally once a day  · 	Vitamin B12 2500 mcg sublingual tablet: Last Dose Taken:  , 1 tab(s) sublingual once a day  · 	Chondroitin-Glucosamine oral tablet: Last Dose Taken:  , 1 tab(s) orally once a day - last dose 8/30/17  · 	red yeast rice 600 mg oral capsule: Last Dose Taken:  , 1000 microgram(s) orally once a day - last dose 8/30/17  · 	temazepam 15 mg oral capsule: Last Dose Taken:  , 1 cap(s) orally once a day (at bedtime), As Needed    PAST MEDICAL & SURGICAL HISTORY:  Romero esophagus  Intestinal infection: 2012  Bladder polyp  H/O inguinal hernia repair  S/P TURP (transurethral resection of prostate)  Bladder polyp: s/p TURBT  History of cholecystectomy: 1987  CBD Stent      docusate sodium 100 milliGRAM(s) Oral daily  senna 1 Tablet(s) Oral daily  heparin  Injectable 5000 Unit(s) SubCutaneous every 8 hours  pantoprazole   Suspension 40 milliGRAM(s) Oral daily  influenza   Vaccine 0.5 milliLiter(s) IntraMuscular once  lactated ringers. 1000 milliLiter(s) IV Continuous <Continuous>  saliva substitute (BIOTENE MOISTURIZING MOUTH SPRAY) 1 [place](s) Topical three times a day PRN    MEDICATIONS  (STANDING):  docusate sodium 100 milliGRAM(s) Oral daily  senna 1 Tablet(s) Oral daily  heparin  Injectable 5000 Unit(s) SubCutaneous every 8 hours  pantoprazole   Suspension 40 milliGRAM(s) Oral daily  influenza   Vaccine 0.5 milliLiter(s) IntraMuscular once  lactated ringers. 1000 milliLiter(s) (75 mL/Hr) IV Continuous <Continuous>    MEDICATIONS  (PRN):  saliva substitute (BIOTENE MOISTURIZING MOUTH SPRAY) 1 [place](s) Topical three times a day PRN Mouth Care      Allergies    latex (Rash; Blisters)  penicillins (Swelling)  sulfa drugs (Unknown)    SOCIAL HISTORY:  No S/D/IVDU    FAMILY HISTORY:  Myleoma  Kidney failure      LABS:                        12.3   10.5  )-----------( 185      ( 10 Sep 2017 06:16 )             36.4     09-10    136  |  100  |  31.0<H>  ----------------------------<  110  4.8   |  24.0  |  1.23    Ca    10.6<H>      10 Sep 2017 06:16  Phos  3.1     09-10  Mg     1.9     09-10    TPro  5.3<L>  /  Alb  2.9<L>  /  TBili  1.9  /  DBili  x   /  AST  39  /  ALT  45<H>  /  AlkPhos  162<H>  09-10    ROS  - Headache  - Neck Stiffness  - Chest Pain  - SOB  + Mild Abd pain  - Pelvic Pain  - Leg Pain  + Bowel changes  + Urinary Retention  + Anorexia    Vital Signs Last 24 Hrs  T(C): 36.4 (10 Sep 2017 15:53), Max: 36.6 (09 Sep 2017 19:35)  T(F): 97.5 (10 Sep 2017 15:53), Max: 97.8 (09 Sep 2017 19:35)  HR: 115 (10 Sep 2017 15:53) (83 - 115)  BP: 126/76 (10 Sep 2017 15:53) (124/67 - 135/75)  RR: 18 (10 Sep 2017 15:53) (18 - 18)  SpO2: 96% (10 Sep 2017 15:53) (94% - 96%)    EKG - SR Occ PAC RBBB       HEENT: PEARLA  Neck: Supple  Cardio: S1 S2 No SE Murmur Occ Irregular  Pulm: CTA No Rales or Ronchi  Abd: Soft NT Mild Distension BS+  Rectal - refused  Ext: No DCT  Skin: No Rash +1 Edema  Neuro: Awake Pleasant    Lymphoma - Medically cleared for Surgery in am Pt and wife accept increase risks secondary to comorbidities  Obstructive Jaundice - Stent resolved  Anorexia - Marinol trial post op  Obstructive uropathy - Flomax before tavarez removal post op  Depression - will monitor

## 2017-09-11 ENCOUNTER — RESULT REVIEW (OUTPATIENT)
Age: 82
End: 2017-09-11

## 2017-09-11 ENCOUNTER — APPOINTMENT (OUTPATIENT)
Dept: SURGICAL ONCOLOGY | Facility: HOSPITAL | Age: 82
End: 2017-09-11

## 2017-09-11 DIAGNOSIS — R33.9 RETENTION OF URINE, UNSPECIFIED: ICD-10-CM

## 2017-09-11 LAB
ABO RH CONFIRMATION: SIGNIFICANT CHANGE UP
ALBUMIN SERPL ELPH-MCNC: 2.5 G/DL — LOW (ref 3.3–5.2)
ALP SERPL-CCNC: 152 U/L — HIGH (ref 40–120)
ALT FLD-CCNC: 37 U/L — SIGNIFICANT CHANGE UP
ANION GAP SERPL CALC-SCNC: 11 MMOL/L — SIGNIFICANT CHANGE UP (ref 5–17)
APTT BLD: 28.5 SEC — SIGNIFICANT CHANGE UP (ref 27.5–37.4)
APTT BLD: 33.9 SEC — SIGNIFICANT CHANGE UP (ref 27.5–37.4)
AST SERPL-CCNC: 37 U/L — SIGNIFICANT CHANGE UP
BILIRUB SERPL-MCNC: 1.7 MG/DL — SIGNIFICANT CHANGE UP (ref 0.4–2)
BUN SERPL-MCNC: 31 MG/DL — HIGH (ref 8–20)
CALCIUM SERPL-MCNC: 10.3 MG/DL — HIGH (ref 8.6–10.2)
CHLORIDE SERPL-SCNC: 100 MMOL/L — SIGNIFICANT CHANGE UP (ref 98–107)
CO2 SERPL-SCNC: 25 MMOL/L — SIGNIFICANT CHANGE UP (ref 22–29)
CREAT SERPL-MCNC: 1.23 MG/DL — SIGNIFICANT CHANGE UP (ref 0.5–1.3)
GLUCOSE SERPL-MCNC: 112 MG/DL — SIGNIFICANT CHANGE UP (ref 70–115)
HCT VFR BLD CALC: 30.8 % — LOW (ref 42–52)
HCT VFR BLD CALC: 32.7 % — LOW (ref 42–52)
HCT VFR BLD CALC: 36.1 % — LOW (ref 42–52)
HGB BLD-MCNC: 10.2 G/DL — LOW (ref 14–18)
HGB BLD-MCNC: 10.7 G/DL — LOW (ref 14–18)
HGB BLD-MCNC: 12.2 G/DL — LOW (ref 14–18)
INR BLD: 1.3 RATIO — HIGH (ref 0.88–1.16)
INR BLD: 1.53 RATIO — HIGH (ref 0.88–1.16)
MAGNESIUM SERPL-MCNC: 1.8 MG/DL — SIGNIFICANT CHANGE UP (ref 1.6–2.6)
MCHC RBC-ENTMCNC: 32.4 PG — HIGH (ref 27–31)
MCHC RBC-ENTMCNC: 32.6 PG — HIGH (ref 27–31)
MCHC RBC-ENTMCNC: 32.7 G/DL — SIGNIFICANT CHANGE UP (ref 32–36)
MCHC RBC-ENTMCNC: 32.9 PG — HIGH (ref 27–31)
MCHC RBC-ENTMCNC: 33.1 G/DL — SIGNIFICANT CHANGE UP (ref 32–36)
MCHC RBC-ENTMCNC: 33.8 G/DL — SIGNIFICANT CHANGE UP (ref 32–36)
MCV RBC AUTO: 97.3 FL — HIGH (ref 80–94)
MCV RBC AUTO: 98.4 FL — HIGH (ref 80–94)
MCV RBC AUTO: 99.1 FL — HIGH (ref 80–94)
PHOSPHATE SERPL-MCNC: 2.5 MG/DL — SIGNIFICANT CHANGE UP (ref 2.4–4.7)
PLATELET # BLD AUTO: 135 K/UL — LOW (ref 150–400)
PLATELET # BLD AUTO: 146 K/UL — LOW (ref 150–400)
PLATELET # BLD AUTO: 182 K/UL — SIGNIFICANT CHANGE UP (ref 150–400)
POTASSIUM SERPL-MCNC: 4.6 MMOL/L — SIGNIFICANT CHANGE UP (ref 3.5–5.3)
POTASSIUM SERPL-SCNC: 4.6 MMOL/L — SIGNIFICANT CHANGE UP (ref 3.5–5.3)
PROT SERPL-MCNC: 5.2 G/DL — LOW (ref 6.6–8.7)
PROTHROM AB SERPL-ACNC: 14.4 SEC — HIGH (ref 9.8–12.7)
PROTHROM AB SERPL-ACNC: 17 SEC — HIGH (ref 9.8–12.7)
RBC # BLD: 3.13 M/UL — LOW (ref 4.6–6.2)
RBC # BLD: 3.3 M/UL — LOW (ref 4.6–6.2)
RBC # BLD: 3.71 M/UL — LOW (ref 4.6–6.2)
RBC # FLD: 15 % — SIGNIFICANT CHANGE UP (ref 11–15.6)
RBC # FLD: 15.2 % — SIGNIFICANT CHANGE UP (ref 11–15.6)
RBC # FLD: 15.4 % — SIGNIFICANT CHANGE UP (ref 11–15.6)
SODIUM SERPL-SCNC: 136 MMOL/L — SIGNIFICANT CHANGE UP (ref 135–145)
WBC # BLD: 10.1 K/UL — SIGNIFICANT CHANGE UP (ref 4.8–10.8)
WBC # BLD: 12 K/UL — HIGH (ref 4.8–10.8)
WBC # BLD: 8.3 K/UL — SIGNIFICANT CHANGE UP (ref 4.8–10.8)
WBC # FLD AUTO: 10.1 K/UL — SIGNIFICANT CHANGE UP (ref 4.8–10.8)
WBC # FLD AUTO: 12 K/UL — HIGH (ref 4.8–10.8)
WBC # FLD AUTO: 8.3 K/UL — SIGNIFICANT CHANGE UP (ref 4.8–10.8)

## 2017-09-11 PROCEDURE — 88321 CONSLTJ&REPRT SLD PREP ELSWR: CPT

## 2017-09-11 PROCEDURE — 88365 INSITU HYBRIDIZATION (FISH): CPT | Mod: 26,59

## 2017-09-11 PROCEDURE — 88333 PATH CONSLTJ SURG CYTO XM 1: CPT | Mod: 26

## 2017-09-11 PROCEDURE — 49203: CPT

## 2017-09-11 PROCEDURE — 88342 IMHCHEM/IMCYTCHM 1ST ANTB: CPT | Mod: 26,59

## 2017-09-11 PROCEDURE — 88364 INSITU HYBRIDIZATION (FISH): CPT | Mod: 26

## 2017-09-11 PROCEDURE — 88305 TISSUE EXAM BY PATHOLOGIST: CPT | Mod: 26,59

## 2017-09-11 PROCEDURE — 88341 IMHCHEM/IMCYTCHM EA ADD ANTB: CPT | Mod: 26

## 2017-09-11 RX ORDER — PANTOPRAZOLE SODIUM 20 MG/1
40 TABLET, DELAYED RELEASE ORAL
Qty: 0 | Refills: 0 | Status: DISCONTINUED | OUTPATIENT
Start: 2017-09-11 | End: 2017-09-15

## 2017-09-11 RX ORDER — FENTANYL CITRATE 50 UG/ML
50 INJECTION INTRAVENOUS
Qty: 0 | Refills: 0 | Status: DISCONTINUED | OUTPATIENT
Start: 2017-09-11 | End: 2017-09-11

## 2017-09-11 RX ORDER — SODIUM CHLORIDE 9 MG/ML
1000 INJECTION, SOLUTION INTRAVENOUS
Qty: 0 | Refills: 0 | Status: DISCONTINUED | OUTPATIENT
Start: 2017-09-11 | End: 2017-09-13

## 2017-09-11 RX ORDER — ONDANSETRON 8 MG/1
4 TABLET, FILM COATED ORAL ONCE
Qty: 0 | Refills: 0 | Status: DISCONTINUED | OUTPATIENT
Start: 2017-09-11 | End: 2017-09-11

## 2017-09-11 RX ORDER — DOCUSATE SODIUM 100 MG
100 CAPSULE ORAL DAILY
Qty: 0 | Refills: 0 | Status: DISCONTINUED | OUTPATIENT
Start: 2017-09-11 | End: 2017-09-15

## 2017-09-11 RX ORDER — OXYCODONE HYDROCHLORIDE 5 MG/1
5 TABLET ORAL EVERY 4 HOURS
Qty: 0 | Refills: 0 | Status: DISCONTINUED | OUTPATIENT
Start: 2017-09-11 | End: 2017-09-15

## 2017-09-11 RX ORDER — ACETAMINOPHEN 500 MG
650 TABLET ORAL EVERY 6 HOURS
Qty: 0 | Refills: 0 | Status: DISCONTINUED | OUTPATIENT
Start: 2017-09-11 | End: 2017-09-15

## 2017-09-11 RX ORDER — CIPROFLOXACIN LACTATE 400MG/40ML
500 VIAL (ML) INTRAVENOUS EVERY 12 HOURS
Qty: 0 | Refills: 0 | Status: DISCONTINUED | OUTPATIENT
Start: 2017-09-11 | End: 2017-09-15

## 2017-09-11 RX ORDER — SENNA PLUS 8.6 MG/1
1 TABLET ORAL DAILY
Qty: 0 | Refills: 0 | Status: DISCONTINUED | OUTPATIENT
Start: 2017-09-11 | End: 2017-09-15

## 2017-09-11 RX ORDER — HEPARIN SODIUM 5000 [USP'U]/ML
5000 INJECTION INTRAVENOUS; SUBCUTANEOUS EVERY 8 HOURS
Qty: 0 | Refills: 0 | Status: DISCONTINUED | OUTPATIENT
Start: 2017-09-11 | End: 2017-09-15

## 2017-09-11 RX ORDER — SODIUM CHLORIDE 9 MG/ML
1000 INJECTION, SOLUTION INTRAVENOUS
Qty: 0 | Refills: 0 | Status: DISCONTINUED | OUTPATIENT
Start: 2017-09-11 | End: 2017-09-11

## 2017-09-11 RX ORDER — SALIVA SUBSTITUTE COMB NO.11 351 MG
1 POWDER IN PACKET (EA) MUCOUS MEMBRANE THREE TIMES A DAY
Qty: 0 | Refills: 0 | Status: DISCONTINUED | OUTPATIENT
Start: 2017-09-11 | End: 2017-09-15

## 2017-09-11 RX ADMIN — Medication 500 MILLIGRAM(S): at 23:28

## 2017-09-11 RX ADMIN — Medication 200 MILLIGRAM(S): at 11:11

## 2017-09-11 RX ADMIN — HEPARIN SODIUM 5000 UNIT(S): 5000 INJECTION INTRAVENOUS; SUBCUTANEOUS at 22:56

## 2017-09-11 RX ADMIN — HEPARIN SODIUM 5000 UNIT(S): 5000 INJECTION INTRAVENOUS; SUBCUTANEOUS at 05:30

## 2017-09-11 NOTE — BRIEF OPERATIVE NOTE - OPERATION/FINDINGS
upon entry to abdomen, bloody ascites identified (4000 ml); fluid sent for cytopathology and creatinine.

## 2017-09-11 NOTE — CONSULT NOTE ADULT - SUBJECTIVE AND OBJECTIVE BOX
HPI:  84yM with no significant PMHx, presents to [facility] as a direct admit from [facility]. Per pt, he started to have abdominal epigastric pain in July. He had an endoscopy in August which was negative. Further studies included a CT of the abdomen which showed lesions in his spleen, pancreas, liver. 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  Pt was scheduled to have a robotic resection of a periportal mass for a more definitive tissue diagnosis and lymph node biopsy on 17, however his outpatient pre-surgical testing results showed an elevated tbili and LFT's operation was pushed back. He went for ERCP and stenting on . Tbili and LFT's have been downtrending, pt was transferred to [facility] for lymph node biopsy on 17. Pt currently admits he has not had an appetite, with constipation (last BM ), also has LE edema that started in July. He currently denies n/v/d, fevers/chills, abd pain, chest pain, sob. (09 Sep 2017 15:27)      Patient had a tavarez placed at sn outside institution.  It was removed while he was under anesthesia in the OR and it could not be replced.  I was called for consultation.        PAST MEDICAL & SURGICAL HISTORY:  Rmoero esophagus  Intestinal infection:   Bladder polyp  H/O inguinal hernia repair  S/P TURP (transurethral resection of prostate)  Bladder polyp: s/p TURBT  History of cholecystectomy:       REVIEW OF SYSTEMS:    Reviewed h and p ros  MEDICATIONS  (STANDING):  influenza   Vaccine 0.5 milliLiter(s) IntraMuscular once    MEDICATIONS  (PRN):      Allergies    latex (Rash; Blisters)  penicillins (Swelling)  sulfa drugs (Unknown)    Intolerances        SOCIAL HISTORY:    FAMILY HISTORY:      Vital Signs Last 24 Hrs  T(C): 36.7 (11 Sep 2017 08:54), Max: 36.7 (10 Sep 2017 23:25)  T(F): 98 (11 Sep 2017 08:54), Max: 98 (10 Sep 2017 23:25)  HR: 67 (11 Sep 2017 08:54) (67 - 104)  BP: 139/79 (11 Sep 2017 08:54) (114/71 - 139/79)  BP(mean): --  RR: 18 (11 Sep 2017 08:54) (18 - 19)  SpO2: 95% (11 Sep 2017 08:54) (94% - 95%)    PHYSICAL EXAM:     abd- soft, nt, nd, bs+    Mild blood per the urethral meatus   LABS:                        10.2   8.3   )-----------( 135      ( 11 Sep 2017 14:41 )             30.8         136  |  100  |  31.0<H>  ----------------------------<  112  4.6   |  25.0  |  1.23    Ca    10.3<H>      11 Sep 2017 07:41  Phos  2.5       Mg     1.8         TPro  5.2<L>  /  Alb  2.5<L>  /  TBili  1.7  /  DBili  x   /  AST  37  /  ALT  37  /  AlkPhos  152<H>      PT/INR - ( 11 Sep 2017 07:41 )   PT: 14.4 sec;   INR: 1.30 ratio         PTT - ( 11 Sep 2017 07:41 )  PTT:33.9 sec  Urinalysis Basic - ( 10 Sep 2017 18:34 )    Color: Yellow / Appearance: x / S.025 / pH: x  Gluc: x / Ketone: Negative  / Bili: Small / Urobili: 1 mg/dL   Blood: x / Protein: 100 mg/dL / Nitrite: Positive   Leuk Esterase: Moderate / RBC: >50 /HPF / WBC 26-50   Sq Epi: x / Non Sq Epi: x / Bacteria: Few    Did flexible cystoscopy and was able to get a wire into the bladder.    Placed an 18french silicone catheter over a wire        RADIOLOGY & ADDITIONAL STUDIES:

## 2017-09-11 NOTE — BRIEF OPERATIVE NOTE - PROCEDURE
Resection of mass of abdomen  09/11/2017  Resection of periportal mass  Active  ADONNATI  Robotic assisted procedure  09/11/2017    Active  ADONNATI Resection of mass of abdomen  09/11/2017  Resection of periportal mass  Active  Diana Edouard

## 2017-09-11 NOTE — CONSULT NOTE ADULT - PROBLEM SELECTOR RECOMMENDATION 9
Continue tavarez  Expect mild hematuria and blood around the tavarez  Should have the tavarez removed by his primary urologist in the outpatient setting  will sign off tomorrow

## 2017-09-11 NOTE — PROGRESS NOTE ADULT - SUBJECTIVE AND OBJECTIVE BOX
HPI:  84yM with no significant PMHx, presents to [facility] as a direct admit from [facility]. Per pt, he started to have abdominal epigastric pain in July. He had an endoscopy in August which was negative. Further studies included a CT of the abdomen which showed lesions in his spleen, pancreas, liver. 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  Pt was scheduled to have a robotic resection of a periportal mass for a more definitive tissue diagnosis and lymph node biopsy on 17, however his outpatient pre-surgical testing results showed an elevated tbili and LFT's operation was pushed back. He went for ERCP and stenting on . Tbili and LFT's have been downtrending, pt was transferred to [facility] for lymph node biopsy on 17. Pt currently admits he has not had an appetite, with constipation (last BM ), also has LE edema that started in July. He currently denies n/v/d, fevers/chills, abd pain, chest pain, sob. (09 Sep 2017 15:27)     Allergies    latex (Rash; Blisters)  penicillins (Swelling)  sulfa drugs (Unknown)    Intolerances      Romero esophagus  Intestinal infection  Bladder polyp    MEDICATIONS  (STANDING):  influenza   Vaccine 0.5 milliLiter(s) IntraMuscular once  lactated ringers. 1000 milliLiter(s) (125 mL/Hr) IV Continuous <Continuous>  heparin  Injectable 5000 Unit(s) SubCutaneous every 8 hours  ciprofloxacin     Tablet 500 milliGRAM(s) Oral every 12 hours  pantoprazole    Tablet 40 milliGRAM(s) Oral before breakfast  docusate sodium 100 milliGRAM(s) Oral daily  senna 1 Tablet(s) Oral daily    MEDICATIONS  (PRN):  fentaNYL    Injectable 50 MICROGram(s) IV Push every 5 minutes PRN Severe Pain  ondansetron Injectable 4 milliGRAM(s) IV Push once PRN Nausea and/or Vomiting  saliva substitute (BIOTENE MOISTURIZING MOUTH SPRAY) 1 [place](s) Topical three times a day PRN Mouth Care                           10.7   12.0  )-----------( 146      ( 11 Sep 2017 17:55 )             32.7     09-11    136  |  100  |  31.0<H>  ----------------------------<  112  4.6   |  25.0  |  1.23    Ca    10.3<H>      11 Sep 2017 07:41  Phos  2.5       Mg     1.8         TPro  5.2<L>  /  Alb  2.5<L>  /  TBili  1.7  /  DBili  x   /  AST  37  /  ALT  37  /  AlkPhos  152<H>      PT/INR - ( 11 Sep 2017 17:55 )   PT: 17.0 sec;   INR: 1.53 ratio         PTT - ( 11 Sep 2017 17:55 )  PTT:28.5 sec  Urinalysis Basic - ( 10 Sep 2017 18:34 )    Color: Yellow / Appearance: x / S.025 / pH: x  Gluc: x / Ketone: Negative  / Bili: Small / Urobili: 1 mg/dL   Blood: x / Protein: 100 mg/dL / Nitrite: Positive   Leuk Esterase: Moderate / RBC: >50 /HPF / WBC 26-50   Sq Epi: x / Non Sq Epi: x / Bacteria: Few    ;  Vital Signs Last 24 Hrs  T(C): 36.9 (11 Sep 2017 16:45), Max: 36.9 (11 Sep 2017 16:45)  T(F): 98.4 (11 Sep 2017 16:45), Max: 98.4 (11 Sep 2017 16:45)  HR: 96 (11 Sep 2017 18:30) (67 - 104)  BP: 107/58 (11 Sep 2017 18:30) (107/58 - 141/61)  BP(mean): --  RR: 15 (11 Sep 2017 18:30) (12 - 25)  SpO2: 100% (11 Sep 2017 18:30) (94% - 100%)  CAPILLARY BLOOD GLUCOSE      09-10 @ 07:  -   @ 07:00  --------------------------------------------------------  IN: 2525 mL / OUT: 425 mL / NET: 2100 mL    No CP, SOB, N/V waking up from anesthesia    HEENT: PEARLA  Neck: Supple  Cardio: S1 S2 No SE Murmur Occ Irregular  Pulm: CTA No Rales or Ronchi  Abd: Soft NT Mild Distension BS Dec Incisions clean  Rectal - refused  Ext: No DCT  Skin: No Rash +1 Edema  Neuro: Awake Pleasant    Lymphoma - Post Op Pain control  Obstructive Jaundice - Stent resolved  Anorexia - Marinol trial post op  Obstructive uropathy - Flomax before tavarez removal post op, Urology following  Depression - will monitor HPI:  84yM with no significant PMHx, presents to [facility] as a direct admit from [facility]. Per pt, he started to have abdominal epigastric pain in July. He had an endoscopy in August which was negative. Further studies included a CT of the abdomen which showed lesions in his spleen, pancreas, liver. 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  Pt was scheduled to have a robotic resection of a periportal mass for a more definitive tissue diagnosis and lymph node biopsy on 17, however his outpatient pre-surgical testing results showed an elevated tbili and LFT's operation was pushed back. He went for ERCP and stenting on . Tbili and LFT's have been downtrending, pt was transferred to [facility] for lymph node biopsy on 17. Pt currently admits he has not had an appetite, with constipation (last BM ), also has LE edema that started in July. He currently denies n/v/d, fevers/chills, abd pain, chest pain, sob. (09 Sep 2017 15:27)     Allergies    latex (Rash; Blisters)  penicillins (Swelling)  sulfa drugs (Unknown)    Intolerances      Romero esophagus  Intestinal infection  Bladder polyp    MEDICATIONS  (STANDING):  influenza   Vaccine 0.5 milliLiter(s) IntraMuscular once  lactated ringers. 1000 milliLiter(s) (125 mL/Hr) IV Continuous <Continuous>  heparin  Injectable 5000 Unit(s) SubCutaneous every 8 hours  ciprofloxacin     Tablet 500 milliGRAM(s) Oral every 12 hours  pantoprazole    Tablet 40 milliGRAM(s) Oral before breakfast  docusate sodium 100 milliGRAM(s) Oral daily  senna 1 Tablet(s) Oral daily    MEDICATIONS  (PRN):  fentaNYL    Injectable 50 MICROGram(s) IV Push every 5 minutes PRN Severe Pain  ondansetron Injectable 4 milliGRAM(s) IV Push once PRN Nausea and/or Vomiting  saliva substitute (BIOTENE MOISTURIZING MOUTH SPRAY) 1 [place](s) Topical three times a day PRN Mouth Care                           10.7   12.0  )-----------( 146      ( 11 Sep 2017 17:55 )             32.7     09-11    136  |  100  |  31.0<H>  ----------------------------<  112  4.6   |  25.0  |  1.23    Ca    10.3<H>      11 Sep 2017 07:41  Phos  2.5       Mg     1.8         TPro  5.2<L>  /  Alb  2.5<L>  /  TBili  1.7  /  DBili  x   /  AST  37  /  ALT  37  /  AlkPhos  152<H>      PT/INR - ( 11 Sep 2017 17:55 )   PT: 17.0 sec;   INR: 1.53 ratio         PTT - ( 11 Sep 2017 17:55 )  PTT:28.5 sec  Urinalysis Basic - ( 10 Sep 2017 18:34 )    Color: Yellow / Appearance: x / S.025 / pH: x  Gluc: x / Ketone: Negative  / Bili: Small / Urobili: 1 mg/dL   Blood: x / Protein: 100 mg/dL / Nitrite: Positive   Leuk Esterase: Moderate / RBC: >50 /HPF / WBC 26-50   Sq Epi: x / Non Sq Epi: x / Bacteria: Few    ;  Vital Signs Last 24 Hrs  T(C): 36.9 (11 Sep 2017 16:45), Max: 36.9 (11 Sep 2017 16:45)  T(F): 98.4 (11 Sep 2017 16:45), Max: 98.4 (11 Sep 2017 16:45)  HR: 96 (11 Sep 2017 18:30) (67 - 104)  BP: 107/58 (11 Sep 2017 18:30) (107/58 - 141/61)  BP(mean): --  RR: 15 (11 Sep 2017 18:30) (12 - 25)  SpO2: 100% (11 Sep 2017 18:30) (94% - 100%)  CAPILLARY BLOOD GLUCOSE      09-10 @ 07:  -   @ 07:00  --------------------------------------------------------  IN: 2525 mL / OUT: 425 mL / NET: 2100 mL    No CP, SOB, N/V waking up from anesthesia    HEENT: PEARLA  Neck: Supple  Cardio: S1 S2 No SE Murmur Occ Irregular  Pulm: CTA No Rales or Ronchi  Abd: Soft NT Mild Distension BS Dec Incisions clean  Rectal - refused  Ext: No DCT  Skin: No Rash +1 Edema  Neuro: Awake Pleasant    Lymphoma - Post Op Pain control  Obstructive Jaundice - Stent resolved  Anorexia - Marinol trial post op  Obstructive uropathy - Flomax before tavarez removal post op, Urology following  Depression - will monitor  PAC's with hypotension - will add monitior for 24 hours

## 2017-09-11 NOTE — PROGRESS NOTE ADULT - SUBJECTIVE AND OBJECTIVE BOX
Post-op Check    Subjective:  Pt offers no acute complaints at this time. Pain well controlled on current regiment.  Tolerating CLD. Denies chest pain and SOB.     STATUS POST:  RA periportal mass lymph node biopsy    POST OPERATIVE DAY #: 0    MEDICATIONS  (STANDING):  influenza   Vaccine 0.5 milliLiter(s) IntraMuscular once  heparin  Injectable 5000 Unit(s) SubCutaneous every 8 hours  ciprofloxacin     Tablet 500 milliGRAM(s) Oral every 12 hours  pantoprazole    Tablet 40 milliGRAM(s) Oral before breakfast  docusate sodium 100 milliGRAM(s) Oral daily  senna 1 Tablet(s) Oral daily    MEDICATIONS  (PRN):  saliva substitute (BIOTENE MOISTURIZING MOUTH SPRAY) 1 [place](s) Topical three times a day PRN Mouth Care      Vital Signs Last 24 Hrs  T(C): 36.9 (11 Sep 2017 19:47), Max: 36.9 (11 Sep 2017 16:45)  T(F): 98.4 (11 Sep 2017 19:47), Max: 98.4 (11 Sep 2017 16:45)  HR: 98 (11 Sep 2017 19:47) (67 - 100)  BP: 109/53 (11 Sep 2017 19:47) (107/58 - 141/61)  RR: 20 (11 Sep 2017 19:47) (12 - 25)  SpO2: 100% (11 Sep 2017 19:47) (95% - 100%)    Physical Exam:    Constitutional: NAD, somnolent from anesthesia  HEENT: PERRL, EOMI  Neck: No JVD, FROM without pain  Respiratory: no accessory muscle use  Abdomen:  Soft, NT/ND, 5 dressings in place, 1 with serosanguinous staining.   Neurological: A&O x 3; without gross deficit

## 2017-09-12 ENCOUNTER — TRANSCRIPTION ENCOUNTER (OUTPATIENT)
Age: 82
End: 2017-09-12

## 2017-09-12 DIAGNOSIS — R19.00 INTRA-ABDOMINAL AND PELVIC SWELLING, MASS AND LUMP, UNSPECIFIED SITE: ICD-10-CM

## 2017-09-12 LAB
ANION GAP SERPL CALC-SCNC: 11 MMOL/L — SIGNIFICANT CHANGE UP (ref 5–17)
ANION GAP SERPL CALC-SCNC: 12 MMOL/L — SIGNIFICANT CHANGE UP (ref 5–17)
ANISOCYTOSIS BLD QL: SLIGHT — SIGNIFICANT CHANGE UP
BUN SERPL-MCNC: 31 MG/DL — HIGH (ref 8–20)
BUN SERPL-MCNC: 32 MG/DL — HIGH (ref 8–20)
BURR CELLS BLD QL SMEAR: PRESENT — SIGNIFICANT CHANGE UP
CALCIUM SERPL-MCNC: 10 MG/DL — SIGNIFICANT CHANGE UP (ref 8.6–10.2)
CALCIUM SERPL-MCNC: 10 MG/DL — SIGNIFICANT CHANGE UP (ref 8.6–10.2)
CHLORIDE SERPL-SCNC: 101 MMOL/L — SIGNIFICANT CHANGE UP (ref 98–107)
CHLORIDE SERPL-SCNC: 101 MMOL/L — SIGNIFICANT CHANGE UP (ref 98–107)
CO2 SERPL-SCNC: 23 MMOL/L — SIGNIFICANT CHANGE UP (ref 22–29)
CO2 SERPL-SCNC: 23 MMOL/L — SIGNIFICANT CHANGE UP (ref 22–29)
CREAT FLD-MCNC: 0.99 MG/DL — SIGNIFICANT CHANGE UP
CREAT SERPL-MCNC: 1.38 MG/DL — HIGH (ref 0.5–1.3)
CREAT SERPL-MCNC: 1.45 MG/DL — HIGH (ref 0.5–1.3)
GIANT PLATELETS BLD QL SMEAR: PRESENT — SIGNIFICANT CHANGE UP
GLUCOSE SERPL-MCNC: 110 MG/DL — SIGNIFICANT CHANGE UP (ref 70–115)
GLUCOSE SERPL-MCNC: 127 MG/DL — HIGH (ref 70–115)
HCT VFR BLD CALC: 36.1 % — LOW (ref 42–52)
HGB BLD-MCNC: 12.2 G/DL — LOW (ref 14–18)
HYPOCHROMIA BLD QL: SLIGHT — SIGNIFICANT CHANGE UP
LYMPHOCYTES # BLD AUTO: 7 % — LOW (ref 20–55)
MACROCYTES BLD QL: SLIGHT — SIGNIFICANT CHANGE UP
MCHC RBC-ENTMCNC: 33.4 PG — HIGH (ref 27–31)
MCHC RBC-ENTMCNC: 33.8 G/DL — SIGNIFICANT CHANGE UP (ref 32–36)
MCV RBC AUTO: 98.9 FL — HIGH (ref 80–94)
MONOCYTES NFR BLD AUTO: 4 % — SIGNIFICANT CHANGE UP (ref 3–10)
NEUTROPHILS NFR BLD AUTO: 86 % — HIGH (ref 37–73)
NEUTS BAND # BLD: 2 % — SIGNIFICANT CHANGE UP (ref 0–8)
OVALOCYTES BLD QL SMEAR: SLIGHT — SIGNIFICANT CHANGE UP
PLAT MORPH BLD: SIGNIFICANT CHANGE UP
PLATELET # BLD AUTO: 167 K/UL — SIGNIFICANT CHANGE UP (ref 150–400)
POIKILOCYTOSIS BLD QL AUTO: SLIGHT — SIGNIFICANT CHANGE UP
POLYCHROMASIA BLD QL SMEAR: SLIGHT — SIGNIFICANT CHANGE UP
POTASSIUM SERPL-MCNC: 5.3 MMOL/L — SIGNIFICANT CHANGE UP (ref 3.5–5.3)
POTASSIUM SERPL-MCNC: 5.4 MMOL/L — HIGH (ref 3.5–5.3)
POTASSIUM SERPL-SCNC: 5.3 MMOL/L — SIGNIFICANT CHANGE UP (ref 3.5–5.3)
POTASSIUM SERPL-SCNC: 5.4 MMOL/L — HIGH (ref 3.5–5.3)
RBC # BLD: 3.65 M/UL — LOW (ref 4.6–6.2)
RBC # FLD: 15.5 % — SIGNIFICANT CHANGE UP (ref 11–15.6)
RBC BLD AUTO: ABNORMAL
SODIUM SERPL-SCNC: 135 MMOL/L — SIGNIFICANT CHANGE UP (ref 135–145)
SODIUM SERPL-SCNC: 136 MMOL/L — SIGNIFICANT CHANGE UP (ref 135–145)
SPECIMEN SOURCE FLD: SIGNIFICANT CHANGE UP
VARIANT LYMPHS # BLD: 1 % — SIGNIFICANT CHANGE UP (ref 0–6)
WBC # BLD: 12 K/UL — HIGH (ref 4.8–10.8)
WBC # FLD AUTO: 12 K/UL — HIGH (ref 4.8–10.8)

## 2017-09-12 PROCEDURE — 88189 FLOWCYTOMETRY/READ 16 & >: CPT

## 2017-09-12 RX ADMIN — Medication 500 MILLIGRAM(S): at 23:32

## 2017-09-12 RX ADMIN — HEPARIN SODIUM 5000 UNIT(S): 5000 INJECTION INTRAVENOUS; SUBCUTANEOUS at 06:47

## 2017-09-12 RX ADMIN — Medication 100 MILLIGRAM(S): at 12:46

## 2017-09-12 RX ADMIN — Medication 650 MILLIGRAM(S): at 12:46

## 2017-09-12 RX ADMIN — HEPARIN SODIUM 5000 UNIT(S): 5000 INJECTION INTRAVENOUS; SUBCUTANEOUS at 13:32

## 2017-09-12 RX ADMIN — HEPARIN SODIUM 5000 UNIT(S): 5000 INJECTION INTRAVENOUS; SUBCUTANEOUS at 21:53

## 2017-09-12 RX ADMIN — Medication 650 MILLIGRAM(S): at 06:51

## 2017-09-12 RX ADMIN — PANTOPRAZOLE SODIUM 40 MILLIGRAM(S): 20 TABLET, DELAYED RELEASE ORAL at 06:48

## 2017-09-12 RX ADMIN — SENNA PLUS 1 TABLET(S): 8.6 TABLET ORAL at 21:52

## 2017-09-12 RX ADMIN — Medication 500 MILLIGRAM(S): at 12:46

## 2017-09-12 RX ADMIN — Medication 1 SPRAY(S): at 00:39

## 2017-09-12 RX ADMIN — Medication 650 MILLIGRAM(S): at 13:16

## 2017-09-12 RX ADMIN — Medication 1 SPRAY(S): at 21:52

## 2017-09-12 RX ADMIN — Medication 650 MILLIGRAM(S): at 06:49

## 2017-09-12 NOTE — PROGRESS NOTE ADULT - SUBJECTIVE AND OBJECTIVE BOX
INTERVAL HPI/OVERNIGHT EVENTS:    MEDICATIONS  (STANDING):  influenza   Vaccine 0.5 milliLiter(s) IntraMuscular once  heparin  Injectable 5000 Unit(s) SubCutaneous every 8 hours  ciprofloxacin     Tablet 500 milliGRAM(s) Oral every 12 hours  pantoprazole    Tablet 40 milliGRAM(s) Oral before breakfast  docusate sodium 100 milliGRAM(s) Oral daily  senna 1 Tablet(s) Oral daily  lactated ringers. 1000 milliLiter(s) (75 mL/Hr) IV Continuous <Continuous>    MEDICATIONS  (PRN):  saliva substitute (BIOTENE MOISTURIZING MOUTH SPRAY) 1 [place](s) Topical three times a day PRN Mouth Care  acetaminophen    Suspension. 650 milliGRAM(s) Oral every 6 hours PRN Moderate Pain (4 - 6)  oxyCODONE    IR 5 milliGRAM(s) Oral every 4 hours PRN Severe Pain (7 - 10)      Allergies    latex (Rash; Blisters)  penicillins (Swelling)  sulfa drugs (Unknown)    Intolerances        Vital Signs Last 24 Hrs  T(C): 36.8 (12 Sep 2017 09:00), Max: 37 (12 Sep 2017 05:08)  T(F): 98.2 (12 Sep 2017 09:00), Max: 98.6 (12 Sep 2017 05:08)  HR: 104 (12 Sep 2017 09:00) (95 - 104)  BP: 107/69 (12 Sep 2017 09:00) (99/61 - 141/61)  BP(mean): --  RR: 18 (12 Sep 2017 09:00) (12 - 25)  SpO2: 97% (12 Sep 2017 09:00) (95% - 100%)     ON PE:  General: alert and awake  Abdomen: soft ND/NT BS+  : urine clear [place] intact    LABS:                        12.2   12.0  )-----------( 167      ( 12 Sep 2017 06:03 )             36.1     09-12    136  |  101  |  31.0<H>  ----------------------------<  110  5.4<H>   |  23.0  |  1.45<H>    Ca    10.0      12 Sep 2017 06:03  Phos  2.5     09-11  Mg     1.8     09-11    TPro  5.2<L>  /  Alb  2.5<L>  /  TBili  1.7  /  DBili  x   /  AST  37  /  ALT  37  /  AlkPhos  152<H>      PT/INR - ( 11 Sep 2017 17:55 )   PT: 17.0 sec;   INR: 1.53 ratio         PTT - ( 11 Sep 2017 17:55 )  PTT:28.5 sec  Urinalysis Basic - ( 10 Sep 2017 18:34 )    Color: Yellow / Appearance: x / S.025 / pH: x  Gluc: x / Ketone: Negative  / Bili: Small / Urobili: 1 mg/dL   Blood: x / Protein: 100 mg/dL / Nitrite: Positive   Leuk Esterase: Moderate / RBC: >50 /HPF / WBC 26-50   Sq Epi: x / Non Sq Epi: x / Bacteria: Few        RADIOLOGY & ADDITIONAL TESTS:

## 2017-09-12 NOTE — PROGRESS NOTE ADULT - SUBJECTIVE AND OBJECTIVE BOX
SURGICAL PA NOTE:     STATUS POST:  Robotic periportal LN bx    POST OPERATIVE DAY #: 1    Vital Signs Last 24 Hrs  T(C): 37 (12 Sep 2017 05:08), Max: 37 (12 Sep 2017 05:08)  T(F): 98.6 (12 Sep 2017 05:08), Max: 98.6 (12 Sep 2017 05:08)  HR: 104 (12 Sep 2017 05:08) (67 - 104)  BP: 99/61 (12 Sep 2017 05:08) (99/61 - 141/61)  BP(mean): --  RR: 19 (12 Sep 2017 05:08) (12 - 25)  SpO2: 95% (12 Sep 2017 05:08) (95% - 100%)    HPI:  84yM with no significant PMHx, presents to [facility] as a direct admit from [facility]. Per pt, he started to have abdominal epigastric pain in July. He had an endoscopy in August which was negative. Further studies included a CT of the abdomen which showed lesions in his spleen, pancreas, liver. 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  Pt was scheduled to have a robotic resection of a periportal mass for a more definitive tissue diagnosis and lymph node biopsy on 17, however his outpatient pre-surgical testing results showed an elevated tbili and LFT's operation was pushed back. He went for ERCP and stenting on . Tbili and LFT's have been downtrending, pt was transferred to [facility] for lymph node biopsy on 17. Pt currently admits he has not had an appetite, with constipation (last BM ), also has LE edema that started in July. He currently denies n/v/d, fevers/chills, abd pain, chest pain, sob. (09 Sep 2017 15:27)      Disorder of orbit  No h/o HF  Family history of multiple myeloma  Handoff  MEWS Score  Romero esophagus  Intestinal infection  Bladder polyp  Lymphadenopathy  Lymphadenopathy  Urinary retention  Robotic assisted procedure  Resection of mass of abdomen  H/O inguinal hernia repair  S/P TURP (transurethral resection of prostate)  Bladder polyp  History of cholecystectomy  UNSPECIFIED DISORDER OF ORBIT      SUBJECTIVE: Pt seen lying supine with HOB up, pt c/o left eye discomfort - sens to light, feels scratchy    Diet: clears    Activity: OOB    Fevers: [ ]Yes [ x]NO  Chills: [ ] Yes [ x] NO  SOB:  [ ] YES [x ] NO  Dyspnea: [ ]YES [ x]NO  Chest Discomfort: [ ] YES [ x] NO    Nausea: [ ] YES [ x] NO           Vomiting: [ ] YES [x ] NO  Flatus: [ ] YES [x ] NO             Bowel Movement: [ ] YES [x ] NO  Diarrhea: [ ] YES [x ] NO         Void: [ ]YES [ ]No  Constipation: [ ] YES [ ] NO       Pain (0-10):   3           Pain Control Adequate: [ ] YES [ ] NO    [place]: indwelling    NGT:      I&O's Detail    11 Sep 2017 07:  -  12 Sep 2017 07:00  --------------------------------------------------------  IN:  Total IN: 0 mL    OUT:    Indwelling Catheter - Urethral: 300 mL  Total OUT: 300 mL    Total NET: -300 mL        I&O's Summary    11 Sep 2017 07:  -  12 Sep 2017 07:00  --------------------------------------------------------  IN: 0 mL / OUT: 300 mL / NET: -300 mL      I&O's Detail    11 Sep 2017 07:  -  12 Sep 2017 07:00  --------------------------------------------------------  IN:  Total IN: 0 mL    OUT:    Indwelling Catheter - Urethral: 300 mL  Total OUT: 300 mL    Total NET: -300 mL          MEDICATIONS  (STANDING):  influenza   Vaccine 0.5 milliLiter(s) IntraMuscular once  heparin  Injectable 5000 Unit(s) SubCutaneous every 8 hours  ciprofloxacin     Tablet 500 milliGRAM(s) Oral every 12 hours  pantoprazole    Tablet 40 milliGRAM(s) Oral before breakfast  docusate sodium 100 milliGRAM(s) Oral daily  senna 1 Tablet(s) Oral daily  lactated ringers. 1000 milliLiter(s) (75 mL/Hr) IV Continuous <Continuous>    MEDICATIONS  (PRN):  saliva substitute (BIOTENE MOISTURIZING MOUTH SPRAY) 1 [place](s) Topical three times a day PRN Mouth Care  acetaminophen    Suspension. 650 milliGRAM(s) Oral every 6 hours PRN Moderate Pain (4 - 6)  oxyCODONE    IR 5 milliGRAM(s) Oral every 4 hours PRN Severe Pain (7 - 10)      LABS:                        12.2   12.0  )-----------( 167      ( 12 Sep 2017 06:03 )             36.1     09-12    136  |  101  |  31.0<H>  ----------------------------<  110  5.4<H>   |  23.0  |  1.45<H>    Ca    10.0      12 Sep 2017 06:03  Phos  2.5       Mg     1.8         TPro  5.2<L>  /  Alb  2.5<L>  /  TBili  1.7  /  DBili  x   /  AST  37  /  ALT  37  /  AlkPhos  152<H>      PT/INR - ( 11 Sep 2017 17:55 )   PT: 17.0 sec;   INR: 1.53 ratio         PTT - ( 11 Sep 2017 17:55 )  PTT:28.5 sec  Urinalysis Basic - ( 10 Sep 2017 18:34 )    Color: Yellow / Appearance: x / S.025 / pH: x  Gluc: x / Ketone: Negative  / Bili: Small / Urobili: 1 mg/dL   Blood: x / Protein: 100 mg/dL / Nitrite: Positive   Leuk Esterase: Moderate / RBC: >50 /HPF / WBC 26-50   Sq Epi: x / Non Sq Epi: x / Bacteria: Few          RADIOLOGY & ADDITIONAL STUDIES:

## 2017-09-12 NOTE — PROGRESS NOTE ADULT - EYES
detailed exam some exuative crust over left eyelashes, sclear non injected/PERRL/EOMI/conjunctiva clear

## 2017-09-12 NOTE — PROGRESS NOTE ADULT - PROBLEM SELECTOR PLAN 1
warm soaks to left eye, keep tavarez indwelling - difficulty tavarez placement in OR, + prob UTI, cont Cipro po, d/c planning for today, follow up with Urology MD as outpt, plan per Dr Harris

## 2017-09-12 NOTE — PROGRESS NOTE ADULT - SUBJECTIVE AND OBJECTIVE BOX
HPI:  84yM with no significant PMHx, presents to [facility] as a direct admit from [facility]. Per pt, he started to have abdominal epigastric pain in July. He had an endoscopy in August which was negative. Further studies included a CT of the abdomen which showed lesions in his spleen, pancreas, liver. 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  Pt was scheduled to have a robotic resection of a periportal mass for a more definitive tissue diagnosis and lymph node biopsy on 9/1/17, however his outpatient pre-surgical testing results showed an elevated tbili and LFT's operation was pushed back. He went for ERCP and stenting on 9/1. Tbili and LFT's have been downtrending, pt was transferred to [facility] for lymph node biopsy on 9/11/17. Pt currently admits he has not had an appetite, with constipation (last BM 9/2), also has LE edema that started in July. He currently denies n/v/d, fevers/chills, abd pain, chest pain, sob. (09 Sep 2017 15:27)     Allergies    latex (Rash; Blisters)  penicillins (Swelling)  sulfa drugs (Unknown)    Intolerances      Romero esophagus  Intestinal infection  Bladder polyp    MEDICATIONS  (STANDING):  influenza   Vaccine 0.5 milliLiter(s) IntraMuscular once  heparin  Injectable 5000 Unit(s) SubCutaneous every 8 hours  ciprofloxacin     Tablet 500 milliGRAM(s) Oral every 12 hours  pantoprazole    Tablet 40 milliGRAM(s) Oral before breakfast  docusate sodium 100 milliGRAM(s) Oral daily  senna 1 Tablet(s) Oral daily  lactated ringers. 1000 milliLiter(s) (75 mL/Hr) IV Continuous <Continuous>    MEDICATIONS  (PRN):  saliva substitute (BIOTENE MOISTURIZING MOUTH SPRAY) 1 [place](s) Topical three times a day PRN Mouth Care  acetaminophen    Suspension. 650 milliGRAM(s) Oral every 6 hours PRN Moderate Pain (4 - 6)  oxyCODONE    IR 5 milliGRAM(s) Oral every 4 hours PRN Severe Pain (7 - 10)                           12.2   12.0  )-----------( 167      ( 12 Sep 2017 06:03 )             36.1     09-12    135  |  101  |  32.0<H>  ----------------------------<  127<H>  5.3   |  23.0  |  1.38<H>    Ca    10.0      12 Sep 2017 13:50  Phos  2.5     09-11  Mg     1.8     09-11    TPro  5.2<L>  /  Alb  2.5<L>  /  TBili  1.7  /  DBili  x   /  AST  37  /  ALT  37  /  AlkPhos  152<H>  09-11    PT/INR - ( 11 Sep 2017 17:55 )   PT: 17.0 sec;   INR: 1.53 ratio         PTT - ( 11 Sep 2017 17:55 )  PTT:28.5 sec  ;  Vital Signs Last 24 Hrs  T(C): 36.9 (12 Sep 2017 19:15), Max: 37.2 (12 Sep 2017 16:13)  T(F): 98.4 (12 Sep 2017 19:15), Max: 98.9 (12 Sep 2017 16:13)  HR: 98 (12 Sep 2017 19:15) (98 - 104)  BP: 118/67 (12 Sep 2017 19:15) (99/61 - 120/70)  BP(mean): --  RR: 20 (12 Sep 2017 19:15) (18 - 20)  SpO2: 95% (12 Sep 2017 19:15) (94% - 97%)  CAPILLARY BLOOD GLUCOSE      09-11 @ 07:01  -  09-12 @ 07:00  --------------------------------------------------------  IN: 0 mL / OUT: 300 mL / NET: -300 mL    09-12 @ 07:01  -  09-12 @ 20:41  --------------------------------------------------------  IN: 0 mL / OUT: 500 mL / NET: -500 mL      Patient feeling better No CP, No SOB, No N/V mild abd pain  HEENT: PEARLA  Neck: Supple  Cardio: S1 S2 No SE Murmur Occ Irregular  Pulm: CTA No Rales or Ronchi  Abd: Soft NT Mild Distension BS Dec Incisions clean  Rectal - refused  Ext: No DCT  Skin: No Rash +1 Edema  Neuro: Awake Pleasant    Lymphoma - Post Op Pain control  Obstructive Jaundice - Stent resolved  Anorexia - Marinol trial post op  Obstructive uropathy - Flomax before tavarez removal post op, Urology following  Depression - will monitor  PAC's with hypotension - will add monitior for 24 hours

## 2017-09-13 RX ORDER — FINASTERIDE 5 MG/1
5 TABLET, FILM COATED ORAL DAILY
Qty: 0 | Refills: 0 | Status: DISCONTINUED | OUTPATIENT
Start: 2017-09-13 | End: 2017-09-15

## 2017-09-13 RX ORDER — SODIUM CHLORIDE 9 MG/ML
1000 INJECTION INTRAMUSCULAR; INTRAVENOUS; SUBCUTANEOUS
Qty: 0 | Refills: 0 | Status: DISCONTINUED | OUTPATIENT
Start: 2017-09-13 | End: 2017-09-14

## 2017-09-13 RX ORDER — TAMSULOSIN HYDROCHLORIDE 0.4 MG/1
0.4 CAPSULE ORAL AT BEDTIME
Qty: 0 | Refills: 0 | Status: DISCONTINUED | OUTPATIENT
Start: 2017-09-13 | End: 2017-09-13

## 2017-09-13 RX ORDER — DOXAZOSIN MESYLATE 4 MG
2 TABLET ORAL AT BEDTIME
Qty: 0 | Refills: 0 | Status: DISCONTINUED | OUTPATIENT
Start: 2017-09-13 | End: 2017-09-15

## 2017-09-13 RX ADMIN — HEPARIN SODIUM 5000 UNIT(S): 5000 INJECTION INTRAVENOUS; SUBCUTANEOUS at 12:50

## 2017-09-13 RX ADMIN — HEPARIN SODIUM 5000 UNIT(S): 5000 INJECTION INTRAVENOUS; SUBCUTANEOUS at 05:39

## 2017-09-13 RX ADMIN — SODIUM CHLORIDE 30 MILLILITER(S): 9 INJECTION INTRAMUSCULAR; INTRAVENOUS; SUBCUTANEOUS at 21:32

## 2017-09-13 RX ADMIN — Medication 500 MILLIGRAM(S): at 21:32

## 2017-09-13 RX ADMIN — PANTOPRAZOLE SODIUM 40 MILLIGRAM(S): 20 TABLET, DELAYED RELEASE ORAL at 05:39

## 2017-09-13 RX ADMIN — SENNA PLUS 1 TABLET(S): 8.6 TABLET ORAL at 11:27

## 2017-09-13 RX ADMIN — Medication 100 MILLIGRAM(S): at 11:26

## 2017-09-13 RX ADMIN — FINASTERIDE 5 MILLIGRAM(S): 5 TABLET, FILM COATED ORAL at 18:05

## 2017-09-13 RX ADMIN — SODIUM CHLORIDE 75 MILLILITER(S): 9 INJECTION, SOLUTION INTRAVENOUS at 00:12

## 2017-09-13 RX ADMIN — HEPARIN SODIUM 5000 UNIT(S): 5000 INJECTION INTRAVENOUS; SUBCUTANEOUS at 21:32

## 2017-09-13 RX ADMIN — Medication 500 MILLIGRAM(S): at 11:27

## 2017-09-13 RX ADMIN — Medication 2 MILLIGRAM(S): at 21:32

## 2017-09-13 NOTE — PROGRESS NOTE ADULT - SUBJECTIVE AND OBJECTIVE BOX
INTERVAL HPI/OVERNIGHT EVENTS: Patient reports intermittent leakage of urine around tavarez.      MEDICATIONS  (STANDING):  influenza   Vaccine 0.5 milliLiter(s) IntraMuscular once  heparin  Injectable 5000 Unit(s) SubCutaneous every 8 hours  ciprofloxacin     Tablet 500 milliGRAM(s) Oral every 12 hours  pantoprazole    Tablet 40 milliGRAM(s) Oral before breakfast  docusate sodium 100 milliGRAM(s) Oral daily  senna 1 Tablet(s) Oral daily  lactated ringers. 1000 milliLiter(s) (75 mL/Hr) IV Continuous <Continuous>    MEDICATIONS  (PRN):  saliva substitute (BIOTENE MOISTURIZING MOUTH SPRAY) 1 [place](s) Topical three times a day PRN Mouth Care  acetaminophen    Suspension. 650 milliGRAM(s) Oral every 6 hours PRN Moderate Pain (4 - 6)  oxyCODONE    IR 5 milliGRAM(s) Oral every 4 hours PRN Severe Pain (7 - 10)      Allergies    latex (Rash; Blisters)  penicillins (Swelling)  sulfa drugs (Unknown)    Intolerances        Vital Signs Last 24 Hrs  T(C): 36.8 (13 Sep 2017 08:36), Max: 37.2 (12 Sep 2017 16:13)  T(F): 98.2 (13 Sep 2017 08:36), Max: 98.9 (12 Sep 2017 16:13)  HR: 84 (13 Sep 2017 08:36) (84 - 104)  BP: 120/71 (13 Sep 2017 08:36) (107/69 - 120/71)  BP(mean): --  RR: 18 (13 Sep 2017 08:36) (18 - 20)  SpO2: 96% (13 Sep 2017 08:36) (94% - 97%)    ROS:  as per HPI     ON PE:  General: Well developed; well nourished; in no acute distress  Head: Normocephalic; atraumatic  Respiratory: No tachypnea  Gastrointestinal: Soft non-tender non-distended;  Genitourinary: No costovertebral angle tenderness.  Urinary bladder is clinically not distended  Tavarez catheter draining clear  Extremities: Normal range of motion, No edema  Neurological: Alert and oriented x4  Skin: Warm and dry. No acute rash  Psychiatric: Cooperative and appropriate      LABS:                        12.2   12.0  )-----------( 167      ( 12 Sep 2017 06:03 )             36.1     09-12    135  |  101  |  32.0<H>  ----------------------------<  127<H>  5.3   |  23.0  |  1.38<H>    Ca    10.0      12 Sep 2017 13:50      PT/INR - ( 11 Sep 2017 17:55 )   PT: 17.0 sec;   INR: 1.53 ratio         PTT - ( 11 Sep 2017 17:55 )  PTT:28.5 sec      RADIOLOGY & ADDITIONAL TESTS:

## 2017-09-13 NOTE — PROGRESS NOTE ADULT - PROBLEM SELECTOR PLAN 1
Maintain tavarez catheter as there was some difficulty reinserting tavarez.  Start flomax.  TOV likely as outpatient with Dr. Saavedra.

## 2017-09-13 NOTE — PROGRESS NOTE ADULT - SUBJECTIVE AND OBJECTIVE BOX
INTERVAL HPI/OVERNIGHT EVENTS/SUBJECTIVE:    ICU Vital Signs Last 24 Hrs  T(C): 36.8 (13 Sep 2017 08:36), Max: 37.2 (12 Sep 2017 16:13)  T(F): 98.2 (13 Sep 2017 08:36), Max: 98.9 (12 Sep 2017 16:13)  HR: 84 (13 Sep 2017 08:36) (84 - 101)  BP: 120/71 (13 Sep 2017 08:36) (117/59 - 120/71)  BP(mean): --  ABP: --  ABP(mean): --  RR: 18 (13 Sep 2017 08:36) (18 - 20)  SpO2: 96% (13 Sep 2017 08:36) (94% - 96%)      I&O's Detail    12 Sep 2017 07:01  -  13 Sep 2017 07:00  --------------------------------------------------------  IN:  Total IN: 0 mL    OUT:    Indwelling Catheter - Urethral: 900 mL  Total OUT: 900 mL    Total NET: -900 mL                MEDICATIONS  (STANDING):  influenza   Vaccine 0.5 milliLiter(s) IntraMuscular once  heparin  Injectable 5000 Unit(s) SubCutaneous every 8 hours  ciprofloxacin     Tablet 500 milliGRAM(s) Oral every 12 hours  pantoprazole    Tablet 40 milliGRAM(s) Oral before breakfast  docusate sodium 100 milliGRAM(s) Oral daily  senna 1 Tablet(s) Oral daily  lactated ringers. 1000 milliLiter(s) (75 mL/Hr) IV Continuous <Continuous>  doxazosin 2 milliGRAM(s) Oral at bedtime  finasteride 5 milliGRAM(s) Oral daily    MEDICATIONS  (PRN):  saliva substitute (BIOTENE MOISTURIZING MOUTH SPRAY) 1 [place](s) Topical three times a day PRN Mouth Care  acetaminophen    Suspension. 650 milliGRAM(s) Oral every 6 hours PRN Moderate Pain (4 - 6)  oxyCODONE    IR 5 milliGRAM(s) Oral every 4 hours PRN Severe Pain (7 - 10)      NUTRITION/IVF:     CENTRAL LINE:  LOCATION:   DATE INSERTED:  CVP:  SCVO2:    BELCHER:   DATE INSERTED:    A-LINE:    LOCATION:   DATE INSERTED:   SVV:  CO/CI:     CHEST TUBE:  LOCATION:  DATE INSERTED: OUTPUT/24 HRS:  SUCTION/WATER SEAL:     NG/OG TUBE:  DATE INSERTED:  OUTPUT/24 HRS:    MISC:     PHYSICAL EXAM:    Gen:    Eyes:    Neurological:    ENMT:    Neck:    Pulmonary:    Cardiovascular:    Gastrointestinal:    Genitourinary:    Back:    Extremities:    Skin:    Musculoskeletal:          LABS:  CBC Full  -  ( 12 Sep 2017 06:03 )  WBC Count : 12.0 K/uL  Hemoglobin : 12.2 g/dL  Hematocrit : 36.1 %  Platelet Count - Automated : 167 K/uL  Mean Cell Volume : 98.9 fl  Mean Cell Hemoglobin : 33.4 pg  Mean Cell Hemoglobin Concentration : 33.8 g/dL  Auto Neutrophil # : x  Auto Lymphocyte # : x  Auto Monocyte # : x  Auto Eosinophil # : x  Auto Basophil # : x  Auto Neutrophil % : 86.0 %  Auto Lymphocyte % : 7.0 %  Auto Monocyte % : 4.0 %  Auto Eosinophil % : x  Auto Basophil % : x    09-12    135  |  101  |  32.0<H>  ----------------------------<  127<H>  5.3   |  23.0  |  1.38<H>    Ca    10.0      12 Sep 2017 13:50      PT/INR - ( 11 Sep 2017 17:55 )   PT: 17.0 sec;   INR: 1.53 ratio         PTT - ( 11 Sep 2017 17:55 )  PTT:28.5 sec    RECENT CULTURES:            CAPILLARY BLOOD GLUCOSE      RADIOLOGY & ADDITIONAL STUDIES:    ASSESSMENT/PLAN:  84yMale presenting with:    Neuro:    HEENT:    CV:    Pulm:    GI/Nutrition:    /Renal:    ID:    Lines/Tubes:    Endo:    Skin:    Proph:    Dispo:      CRITICAL CARE TIME SPENT: INTERVAL HPI/OVERNIGHT EVENTS/SUBJECTIVE: s/p Robotic periportal LN bx doing well. No issues overnight. Tavarez in place with good urine output. Passing gas, no BM. OOB to chair. Pain well controlled.     Vital Signs Last 24 Hrs  T(C): 36.8 (13 Sep 2017 08:36), Max: 37.2 (12 Sep 2017 16:13)  T(F): 98.2 (13 Sep 2017 08:36), Max: 98.9 (12 Sep 2017 16:13)  HR: 84 (13 Sep 2017 08:36) (84 - 101)  BP: 120/71 (13 Sep 2017 08:36) (117/59 - 120/71)  BP(mean): --  ABP: --  ABP(mean): --  RR: 18 (13 Sep 2017 08:36) (18 - 20)  SpO2: 96% (13 Sep 2017 08:36) (94% - 96%)      I&O's Detail    12 Sep 2017 07:01  -  13 Sep 2017 07:00  --------------------------------------------------------  IN:  Total IN: 0 mL    OUT:    Indwelling Catheter - Urethral: 900 mL  Total OUT: 900 mL    Total NET: -900 mL                MEDICATIONS  (STANDING):  influenza   Vaccine 0.5 milliLiter(s) IntraMuscular once  heparin  Injectable 5000 Unit(s) SubCutaneous every 8 hours  ciprofloxacin     Tablet 500 milliGRAM(s) Oral every 12 hours  pantoprazole    Tablet 40 milliGRAM(s) Oral before breakfast  docusate sodium 100 milliGRAM(s) Oral daily  senna 1 Tablet(s) Oral daily  lactated ringers. 1000 milliLiter(s) (75 mL/Hr) IV Continuous <Continuous>  doxazosin 2 milliGRAM(s) Oral at bedtime  finasteride 5 milliGRAM(s) Oral daily    MEDICATIONS  (PRN):  saliva substitute (BIOTENE MOISTURIZING MOUTH SPRAY) 1 [place](s) Topical three times a day PRN Mouth Care  acetaminophen    Suspension. 650 milliGRAM(s) Oral every 6 hours PRN Moderate Pain (4 - 6)  oxyCODONE    IR 5 milliGRAM(s) Oral every 4 hours PRN Severe Pain (7 - 10)      NUTRITION/IVF: NPO/ LR @ 75     TAVAREZ:   yes     PHYSICAL EXAM:    Gen: alert awake NAD     Eyes: PERRLA     Neurological: mentating makes needs known     Pulmonary: CTA bilat     Cardiovascular: NSR     Gastrointestinal: dressing c/d/i, soft min distended  nttp     Genitourinary: tavarez       Extremities: calf soft supple NT bilat           LABS:  CBC Full  -  ( 12 Sep 2017 06:03 )  WBC Count : 12.0 K/uL  Hemoglobin : 12.2 g/dL  Hematocrit : 36.1 %  Platelet Count - Automated : 167 K/uL  Mean Cell Volume : 98.9 fl  Mean Cell Hemoglobin : 33.4 pg  Mean Cell Hemoglobin Concentration : 33.8 g/dL  Auto Neutrophil # : x  Auto Lymphocyte # : x  Auto Monocyte # : x  Auto Eosinophil # : x  Auto Basophil # : x  Auto Neutrophil % : 86.0 %  Auto Lymphocyte % : 7.0 %  Auto Monocyte % : 4.0 %  Auto Eosinophil % : x  Auto Basophil % : x    09-12    135  |  101  |  32.0<H>  ----------------------------<  127<H>  5.3   |  23.0  |  1.38<H>    Ca    10.0      12 Sep 2017 13:50      PT/INR - ( 11 Sep 2017 17:55 )   PT: 17.0 sec;   INR: 1.53 ratio         PTT - ( 11 Sep 2017 17:55 )  PTT:28.5 sec    RECENT CULTURES:            CAPILLARY BLOOD GLUCOSE      RADIOLOGY & ADDITIONAL STUDIES:    ASSESSMENT/PLAN:  84yMale s/p Robotic periportal LN bx pod# 2 doing well   ADAT, will start clears   pain control   OOB   maintain tavarez per urology   dvt ppx INTERVAL HPI/OVERNIGHT EVENTS/SUBJECTIVE: s/p Robotic periportal LN bx doing well. No issues overnight. Tavarez in place with good urine output. Passing gas, no BM. OOB to chair. Pain well controlled.     Vital Signs Last 24 Hrs  T(C): 36.8 (13 Sep 2017 08:36), Max: 37.2 (12 Sep 2017 16:13)  T(F): 98.2 (13 Sep 2017 08:36), Max: 98.9 (12 Sep 2017 16:13)  HR: 84 (13 Sep 2017 08:36) (84 - 101)  BP: 120/71 (13 Sep 2017 08:36) (117/59 - 120/71)  BP(mean): --  ABP: --  ABP(mean): --  RR: 18 (13 Sep 2017 08:36) (18 - 20)  SpO2: 96% (13 Sep 2017 08:36) (94% - 96%)      I&O's Detail    12 Sep 2017 07:01  -  13 Sep 2017 07:00  --------------------------------------------------------  IN:  Total IN: 0 mL    OUT:    Indwelling Catheter - Urethral: 900 mL  Total OUT: 900 mL    Total NET: -900 mL                MEDICATIONS  (STANDING):  influenza   Vaccine 0.5 milliLiter(s) IntraMuscular once  heparin  Injectable 5000 Unit(s) SubCutaneous every 8 hours  ciprofloxacin     Tablet 500 milliGRAM(s) Oral every 12 hours  pantoprazole    Tablet 40 milliGRAM(s) Oral before breakfast  docusate sodium 100 milliGRAM(s) Oral daily  senna 1 Tablet(s) Oral daily  lactated ringers. 1000 milliLiter(s) (75 mL/Hr) IV Continuous <Continuous>  doxazosin 2 milliGRAM(s) Oral at bedtime  finasteride 5 milliGRAM(s) Oral daily    MEDICATIONS  (PRN):  saliva substitute (BIOTENE MOISTURIZING MOUTH SPRAY) 1 [place](s) Topical three times a day PRN Mouth Care  acetaminophen    Suspension. 650 milliGRAM(s) Oral every 6 hours PRN Moderate Pain (4 - 6)  oxyCODONE    IR 5 milliGRAM(s) Oral every 4 hours PRN Severe Pain (7 - 10)      NUTRITION/IVF: NPO/ LR @ 75     TAVAREZ:   yes     PHYSICAL EXAM:    Gen: alert awake NAD     Eyes: PERRLA     Neurological: mentating makes needs known     Pulmonary: CTA bilat     Cardiovascular: NSR     Gastrointestinal: dressing c/d/i, soft min distended  nttp     Genitourinary: tavarez       Extremities: calf soft supple NT bilat           LABS:  CBC Full  -  ( 12 Sep 2017 06:03 )  WBC Count : 12.0 K/uL  Hemoglobin : 12.2 g/dL  Hematocrit : 36.1 %  Platelet Count - Automated : 167 K/uL  Mean Cell Volume : 98.9 fl  Mean Cell Hemoglobin : 33.4 pg  Mean Cell Hemoglobin Concentration : 33.8 g/dL  Auto Neutrophil # : x  Auto Lymphocyte # : x  Auto Monocyte # : x  Auto Eosinophil # : x  Auto Basophil # : x  Auto Neutrophil % : 86.0 %  Auto Lymphocyte % : 7.0 %  Auto Monocyte % : 4.0 %  Auto Eosinophil % : x  Auto Basophil % : x    09-12    135  |  101  |  32.0<H>  ----------------------------<  127<H>  5.3   |  23.0  |  1.38<H>    Ca    10.0      12 Sep 2017 13:50      PT/INR - ( 11 Sep 2017 17:55 )   PT: 17.0 sec;   INR: 1.53 ratio         PTT - ( 11 Sep 2017 17:55 )  PTT:28.5 sec    RECENT CULTURES:            CAPILLARY BLOOD GLUCOSE      RADIOLOGY & ADDITIONAL STUDIES:    ASSESSMENT/PLAN:  84yMale s/p Robotic periportal LN bx pod# 2 doing well   ADAT, will start fulls  pain control   OOB   maintain tavarez per urology   dvt ppx

## 2017-09-13 NOTE — PROGRESS NOTE ADULT - SUBJECTIVE AND OBJECTIVE BOX
HPI:  84yM with no significant PMHx, presents to [facility] as a direct admit from [facility]. Per pt, he started to have abdominal epigastric pain in July. He had an endoscopy in August which was negative. Further studies included a CT of the abdomen which showed lesions in his spleen, pancreas, liver. 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  Pt was scheduled to have a robotic resection of a periportal mass for a more definitive tissue diagnosis and lymph node biopsy on 9/1/17, however his outpatient pre-surgical testing results showed an elevated tbili and LFT's operation was pushed back. He went for ERCP and stenting on 9/1. Tbili and LFT's have been downtrending, pt was transferred to [facility] for lymph node biopsy on 9/11/17. Pt currently admits he has not had an appetite, with constipation (last BM 9/2), also has LE edema that started in July. He currently denies n/v/d, fevers/chills, abd pain, chest pain, sob. (09 Sep 2017 15:27)     Allergies    latex (Rash; Blisters)  penicillins (Swelling)  sulfa drugs (Unknown)    Intolerances      Romero esophagus  Intestinal infection  Bladder polyp    MEDICATIONS  (STANDING):  influenza   Vaccine 0.5 milliLiter(s) IntraMuscular once  heparin  Injectable 5000 Unit(s) SubCutaneous every 8 hours  ciprofloxacin     Tablet 500 milliGRAM(s) Oral every 12 hours  pantoprazole    Tablet 40 milliGRAM(s) Oral before breakfast  docusate sodium 100 milliGRAM(s) Oral daily  senna 1 Tablet(s) Oral daily  doxazosin 2 milliGRAM(s) Oral at bedtime  finasteride 5 milliGRAM(s) Oral daily  sodium chloride 0.9%. 1000 milliLiter(s) (30 mL/Hr) IV Continuous <Continuous>    MEDICATIONS  (PRN):  saliva substitute (BIOTENE MOISTURIZING MOUTH SPRAY) 1 [place](s) Topical three times a day PRN Mouth Care  acetaminophen    Suspension. 650 milliGRAM(s) Oral every 6 hours PRN Moderate Pain (4 - 6)  oxyCODONE    IR 5 milliGRAM(s) Oral every 4 hours PRN Severe Pain (7 - 10)                           12.2   12.0  )-----------( 167      ( 12 Sep 2017 06:03 )             36.1     09-12    135  |  101  |  32.0<H>  ----------------------------<  127<H>  5.3   |  23.0  |  1.38<H>    Ca    10.0      12 Sep 2017 13:50        ;  Vital Signs Last 24 Hrs  T(C): 36.9 (13 Sep 2017 16:22), Max: 36.9 (13 Sep 2017 16:22)  T(F): 98.4 (13 Sep 2017 16:22), Max: 98.4 (13 Sep 2017 16:22)  HR: 87 (13 Sep 2017 16:22) (84 - 101)  BP: 124/63 (13 Sep 2017 16:22) (117/59 - 124/63)  BP(mean): --  RR: 18 (13 Sep 2017 16:22) (18 - 20)  SpO2: 97% (13 Sep 2017 16:22) (94% - 97%)      09-12 @ 07:01  -  09-13 @ 07:00  --------------------------------------------------------  IN: 0 mL / OUT: 900 mL / NET: -900 mL      Patient feeling better No CP, No SOB, No N/V mild abd pain    HEENT: PEARLA  Neck: Supple  Cardio: S1 S2 No SE Murmur Occ Irregular  Pulm: CTA No Rales or Ronchi  Abd: Soft NT Mild Distension BS + Incisions clean  Rectal - refused  Ext: No DCT  Skin: No Rash +1 Edema  Neuro: Awake Pleasant    Lymphoma - Post Op Pain control  Obstructive Jaundice - Stent resolved  Anorexia - possible Marinol trial post op  Obstructive uropathy - Flomax before tavarez removal post op, Urology following  Depression - will monitor  PAC's with hypotension - resolved HPI:  84yM with no significant PMHx, presents to [facility] as a direct admit from [facility]. Per pt, he started to have abdominal epigastric pain in July. He had an endoscopy in August which was negative. Further studies included a CT of the abdomen which showed lesions in his spleen, pancreas, liver. 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  Pt was scheduled to have a robotic resection of a periportal mass for a more definitive tissue diagnosis and lymph node biopsy on 9/1/17, however his outpatient pre-surgical testing results showed an elevated tbili and LFT's operation was pushed back. He went for ERCP and stenting on 9/1. Tbili and LFT's have been downtrending, pt was transferred to [facility] for lymph node biopsy on 9/11/17. Pt currently admits he has not had an appetite, with constipation (last BM 9/2), also has LE edema that started in July. He currently denies n/v/d, fevers/chills, abd pain, chest pain, sob. (09 Sep 2017 15:27)     Allergies    latex (Rash; Blisters)  penicillins (Swelling)  sulfa drugs (Unknown)    Intolerances      Romero esophagus  Intestinal infection  Bladder polyp    MEDICATIONS  (STANDING):  influenza   Vaccine 0.5 milliLiter(s) IntraMuscular once  heparin  Injectable 5000 Unit(s) SubCutaneous every 8 hours  ciprofloxacin     Tablet 500 milliGRAM(s) Oral every 12 hours  pantoprazole    Tablet 40 milliGRAM(s) Oral before breakfast  docusate sodium 100 milliGRAM(s) Oral daily  senna 1 Tablet(s) Oral daily  doxazosin 2 milliGRAM(s) Oral at bedtime  finasteride 5 milliGRAM(s) Oral daily  sodium chloride 0.9%. 1000 milliLiter(s) (30 mL/Hr) IV Continuous <Continuous>    MEDICATIONS  (PRN):  saliva substitute (BIOTENE MOISTURIZING MOUTH SPRAY) 1 [place](s) Topical three times a day PRN Mouth Care  acetaminophen    Suspension. 650 milliGRAM(s) Oral every 6 hours PRN Moderate Pain (4 - 6)  oxyCODONE    IR 5 milliGRAM(s) Oral every 4 hours PRN Severe Pain (7 - 10)                           12.2   12.0  )-----------( 167      ( 12 Sep 2017 06:03 )             36.1     09-12    135  |  101  |  32.0<H>  ----------------------------<  127<H>  5.3   |  23.0  |  1.38<H>    Ca    10.0      12 Sep 2017 13:50        ;  Vital Signs Last 24 Hrs  T(C): 36.9 (13 Sep 2017 16:22), Max: 36.9 (13 Sep 2017 16:22)  T(F): 98.4 (13 Sep 2017 16:22), Max: 98.4 (13 Sep 2017 16:22)  HR: 87 (13 Sep 2017 16:22) (84 - 101)  BP: 124/63 (13 Sep 2017 16:22) (117/59 - 124/63)  BP(mean): --  RR: 18 (13 Sep 2017 16:22) (18 - 20)  SpO2: 97% (13 Sep 2017 16:22) (94% - 97%)      09-12 @ 07:01  -  09-13 @ 07:00  --------------------------------------------------------  IN: 0 mL / OUT: 900 mL / NET: -900 mL      Patient feeling better No CP, No SOB, No N/V mild abd pain    HEENT: PEARLA  Neck: Supple  Cardio: S1 S2 No SE Murmur Occ Irregular  Pulm: CTA No Rales or Ronchi  Abd: Soft NT Mild Distension BS + Incisions clean  Rectal - refused  Ext: No DCT  Skin: No Rash +1 Edema  Neuro: Awake Pleasant    Lymphoma - Post Op Pain control  Obstructive Jaundice - Stent resolved  Anorexia - possible Marinol trial post op  Obstructive uropathy - Urology following, cardura and proscar   Depression - will monitor  PAC's with hypotension - resolved

## 2017-09-14 LAB
ANION GAP SERPL CALC-SCNC: 10 MMOL/L — SIGNIFICANT CHANGE UP (ref 5–17)
BUN SERPL-MCNC: 35 MG/DL — HIGH (ref 8–20)
CALCIUM SERPL-MCNC: 10.2 MG/DL — SIGNIFICANT CHANGE UP (ref 8.6–10.2)
CHLORIDE SERPL-SCNC: 99 MMOL/L — SIGNIFICANT CHANGE UP (ref 98–107)
CO2 SERPL-SCNC: 22 MMOL/L — SIGNIFICANT CHANGE UP (ref 22–29)
CREAT SERPL-MCNC: 1.36 MG/DL — HIGH (ref 0.5–1.3)
CULTURE RESULTS: SIGNIFICANT CHANGE UP
GLUCOSE SERPL-MCNC: 107 MG/DL — SIGNIFICANT CHANGE UP (ref 70–115)
HCT VFR BLD CALC: 32.5 % — LOW (ref 42–52)
HGB BLD-MCNC: 11 G/DL — LOW (ref 14–18)
MAGNESIUM SERPL-MCNC: 1.9 MG/DL — SIGNIFICANT CHANGE UP (ref 1.6–2.6)
MCHC RBC-ENTMCNC: 33.1 PG — HIGH (ref 27–31)
MCHC RBC-ENTMCNC: 33.8 G/DL — SIGNIFICANT CHANGE UP (ref 32–36)
MCV RBC AUTO: 97.9 FL — HIGH (ref 80–94)
OBSTETRICS AND GYNECOLOGY HOSPITAL CONSULT NOTE: SIGNIFICANT CHANGE UP
PHOSPHATE SERPL-MCNC: 2.6 MG/DL — SIGNIFICANT CHANGE UP (ref 2.4–4.7)
PLATELET # BLD AUTO: 137 K/UL — LOW (ref 150–400)
POTASSIUM SERPL-MCNC: 5.1 MMOL/L — SIGNIFICANT CHANGE UP (ref 3.5–5.3)
POTASSIUM SERPL-SCNC: 5.1 MMOL/L — SIGNIFICANT CHANGE UP (ref 3.5–5.3)
RBC # BLD: 3.32 M/UL — LOW (ref 4.6–6.2)
RBC # FLD: 15.3 % — SIGNIFICANT CHANGE UP (ref 11–15.6)
SODIUM SERPL-SCNC: 131 MMOL/L — LOW (ref 135–145)
SPECIMEN SOURCE: SIGNIFICANT CHANGE UP
TM INTERPRETATION: SIGNIFICANT CHANGE UP
WBC # BLD: 9 K/UL — SIGNIFICANT CHANGE UP (ref 4.8–10.8)
WBC # FLD AUTO: 9 K/UL — SIGNIFICANT CHANGE UP (ref 4.8–10.8)

## 2017-09-14 PROCEDURE — 99222 1ST HOSP IP/OBS MODERATE 55: CPT

## 2017-09-14 RX ADMIN — Medication 2 MILLIGRAM(S): at 21:27

## 2017-09-14 RX ADMIN — HEPARIN SODIUM 5000 UNIT(S): 5000 INJECTION INTRAVENOUS; SUBCUTANEOUS at 21:27

## 2017-09-14 RX ADMIN — Medication 100 MILLIGRAM(S): at 16:41

## 2017-09-14 RX ADMIN — SENNA PLUS 1 TABLET(S): 8.6 TABLET ORAL at 16:44

## 2017-09-14 RX ADMIN — FINASTERIDE 5 MILLIGRAM(S): 5 TABLET, FILM COATED ORAL at 16:42

## 2017-09-14 RX ADMIN — Medication 500 MILLIGRAM(S): at 21:27

## 2017-09-14 RX ADMIN — Medication 500 MILLIGRAM(S): at 08:43

## 2017-09-14 RX ADMIN — HEPARIN SODIUM 5000 UNIT(S): 5000 INJECTION INTRAVENOUS; SUBCUTANEOUS at 13:00

## 2017-09-14 RX ADMIN — HEPARIN SODIUM 5000 UNIT(S): 5000 INJECTION INTRAVENOUS; SUBCUTANEOUS at 05:33

## 2017-09-14 RX ADMIN — PANTOPRAZOLE SODIUM 40 MILLIGRAM(S): 20 TABLET, DELAYED RELEASE ORAL at 08:43

## 2017-09-14 NOTE — PHYSICAL THERAPY INITIAL EVALUATION ADULT - PERTINENT HX OF CURRENT PROBLEM, REHAB EVAL
84y Male no significant PMHx, presents to [facility] as a direct admit from [facility] 2*2 abdominal epigastric pain. Negative endoscopy 8/17, CT of abdomen shows lesions on spleen pancreas and liver, Pt is s/p ERCP and bile duct stenting on 9/1 and robotic periportal mass lymph node biopsy on 9/11, diagnosed with lymphoma

## 2017-09-14 NOTE — PROGRESS NOTE ADULT - PROBLEM SELECTOR PLAN 1
1.  continue doxazosin  2.  continue tavarez  3.  trial of void in the outpatient setting at the patient's primary urologist office  4.  will sign off

## 2017-09-14 NOTE — PROGRESS NOTE ADULT - SUBJECTIVE AND OBJECTIVE BOX
INTERVAL HPI/OVERNIGHT EVENTS/SUBJECTIVE:  	84y Male s/p robotic periportal mass lymph node biopsy POD 3. Patient states that he tried to get out of bed yesterday, felt weak, and fell onto his left buttock, denies any pain at this time. Denies hitting his head, LOC, lightheadedness, and dizziness. Patient report that his tavarez leaked two times last night. Patient states that he does not feel safe going home due to fall risk and is requesting PT at this time. He is tolerating full liquid diet. Admits to flatus but has not had a BM. Denies CP, SOB, f/c, n/v/d.     ICU Vital Signs Last 24 Hrs  T(C): 36.6 (14 Sep 2017 04:39), Max: 36.9 (13 Sep 2017 16:22)  T(F): 97.8 (14 Sep 2017 04:39), Max: 98.4 (13 Sep 2017 16:22)  HR: 104 (14 Sep 2017 04:39) (84 - 104)  BP: 98/57 (14 Sep 2017 04:39) (98/57 - 138/68)  BP(mean): --  ABP: --  ABP(mean): --  RR: 17 (14 Sep 2017 04:39) (17 - 18)  SpO2: 92% (14 Sep 2017 04:39) (92% - 97%)      MEDICATIONS  (STANDING):  influenza   Vaccine 0.5 milliLiter(s) IntraMuscular once  heparin  Injectable 5000 Unit(s) SubCutaneous every 8 hours  ciprofloxacin     Tablet 500 milliGRAM(s) Oral every 12 hours  pantoprazole    Tablet 40 milliGRAM(s) Oral before breakfast  docusate sodium 100 milliGRAM(s) Oral daily  senna 1 Tablet(s) Oral daily  doxazosin 2 milliGRAM(s) Oral at bedtime  finasteride 5 milliGRAM(s) Oral daily  sodium chloride 0.9%. 1000 milliLiter(s) (30 mL/Hr) IV Continuous <Continuous>    MEDICATIONS  (PRN):  saliva substitute (BIOTENE MOISTURIZING MOUTH SPRAY) 1 [place](s) Topical three times a day PRN Mouth Care  acetaminophen    Suspension. 650 milliGRAM(s) Oral every 6 hours PRN Moderate Pain (4 - 6)  oxyCODONE    IR 5 milliGRAM(s) Oral every 4 hours PRN Severe Pain (7 - 10)      MISC:     PHYSICAL EXAM:  Gen: Well appearing, in NAD  Neurological: A&Ox 3, no gross motor or sensory deficits  Neck: No JVD  Pulmonary: CTA b/l, no wheezing, no rales, no rhonchi  Cardiovascular: S1 S2, RRR. no murmurs  Gastrointestinal: Soft, non-distended, non-tender to palpation, 5 dressing sites in place c/d/i  Extremities: +1 lower extremity pitting edema b/l  Skin: warm, dry, no rashes or lesions noted  Musculoskeletal: Full ROM and strength in all extremities    LABS:  CBC Full  -  ( 14 Sep 2017 05:44 )  WBC Count : 9.0 K/uL  Hemoglobin : 11.0 g/dL  Hematocrit : 32.5 %  Platelet Count - Automated : 137 K/uL  Mean Cell Volume : 97.9 fl  Mean Cell Hemoglobin : 33.1 pg  Mean Cell Hemoglobin Concentration : 33.8 g/dL  Auto Neutrophil # : x  Auto Lymphocyte # : x  Auto Monocyte # : x  Auto Eosinophil # : x  Auto Basophil # : x  Auto Neutrophil % : x  Auto Lymphocyte % : x  Auto Monocyte % : x  Auto Eosinophil % : x  Auto Basophil % : x    09-14    131<L>  |  99  |  35.0<H>  ----------------------------<  107  5.1   |  22.0  |  1.36<H>    Ca    10.2      14 Sep 2017 05:44  Phos  2.6     09-14  Mg     1.9     09-14      ASSESSMENT/PLAN:  84yMale s/p robotic periportal mass lymph node biopsy POD 3. No acute concerns at this time.   -Monitor vitals  -Incentive spirometry, dvt ppx  -Diet: FLD, Monitor bowel function. Possibly advance to solids.  -PT consult  -Possible d/c home today with indwelling tavarez, as per urology,  and Cipro x 10 days for UTI  -pain control prn  -will discuss plan with attending

## 2017-09-14 NOTE — PROGRESS NOTE ADULT - SUBJECTIVE AND OBJECTIVE BOX
INTERVAL HPI/OVERNIGHT EVENTS:  Feels well with tavarez draining.  Occasional leakage around the tavarez.    MEDICATIONS  (STANDING):  influenza   Vaccine 0.5 milliLiter(s) IntraMuscular once  heparin  Injectable 5000 Unit(s) SubCutaneous every 8 hours  ciprofloxacin     Tablet 500 milliGRAM(s) Oral every 12 hours  pantoprazole    Tablet 40 milliGRAM(s) Oral before breakfast  docusate sodium 100 milliGRAM(s) Oral daily  senna 1 Tablet(s) Oral daily  doxazosin 2 milliGRAM(s) Oral at bedtime  finasteride 5 milliGRAM(s) Oral daily  sodium chloride 0.9%. 1000 milliLiter(s) (30 mL/Hr) IV Continuous <Continuous>    MEDICATIONS  (PRN):  saliva substitute (BIOTENE MOISTURIZING MOUTH SPRAY) 1 [place](s) Topical three times a day PRN Mouth Care  acetaminophen    Suspension. 650 milliGRAM(s) Oral every 6 hours PRN Moderate Pain (4 - 6)  oxyCODONE    IR 5 milliGRAM(s) Oral every 4 hours PRN Severe Pain (7 - 10)      Allergies    latex (Rash; Blisters)  penicillins (Swelling)  sulfa drugs (Unknown)    Intolerances        Vital Signs Last 24 Hrs  T(C): 36.6 (14 Sep 2017 09:07), Max: 36.9 (13 Sep 2017 16:22)  T(F): 97.8 (14 Sep 2017 09:07), Max: 98.4 (13 Sep 2017 16:22)  HR: 95 (14 Sep 2017 09:07) (87 - 104)  BP: 114/68 (14 Sep 2017 09:07) (98/57 - 138/68)  BP(mean): --  RR: 18 (14 Sep 2017 09:07) (17 - 18)  SpO2: 92% (14 Sep 2017 09:07) (92% - 97%)     ON PE:  General: alert and awake  Abdomen: soft, nt, nd, bs+    Urine draining clear yellow urine       LABS:                        11.0   9.0   )-----------( 137      ( 14 Sep 2017 05:44 )             32.5     09-14    131<L>  |  99  |  35.0<H>  ----------------------------<  107  5.1   |  22.0  |  1.36<H>    Ca    10.2      14 Sep 2017 05:44  Phos  2.6     09-14  Mg     1.9     09-14            RADIOLOGY & ADDITIONAL TESTS:

## 2017-09-14 NOTE — PROGRESS NOTE ADULT - SUBJECTIVE AND OBJECTIVE BOX
HPI:  84yM with no significant PMHx, presents to [facility] as a direct admit from [facility]. Per pt, he started to have abdominal epigastric pain in July. He had an endoscopy in August which was negative. Further studies included a CT of the abdomen which showed lesions in his spleen, pancreas, liver. 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  Pt was scheduled to have a robotic resection of a periportal mass for a more definitive tissue diagnosis and lymph node biopsy on 9/1/17, however his outpatient pre-surgical testing results showed an elevated tbili and LFT's operation was pushed back. He went for ERCP and stenting on 9/1. Tbili and LFT's have been downtrending, pt was transferred to [facility] for lymph node biopsy on 9/11/17. Pt currently admits he has not had an appetite, with constipation (last BM 9/2), also has LE edema that started in July. He currently denies n/v/d, fevers/chills, abd pain, chest pain, sob. (09 Sep 2017 15:27)     Allergies    latex (Rash; Blisters)  penicillins (Swelling)  sulfa drugs (Unknown)    Intolerances      Romero esophagus  Intestinal infection  Bladder polyp    MEDICATIONS  (STANDING):  influenza   Vaccine 0.5 milliLiter(s) IntraMuscular once  heparin  Injectable 5000 Unit(s) SubCutaneous every 8 hours  ciprofloxacin     Tablet 500 milliGRAM(s) Oral every 12 hours  pantoprazole    Tablet 40 milliGRAM(s) Oral before breakfast  docusate sodium 100 milliGRAM(s) Oral daily  senna 1 Tablet(s) Oral daily  doxazosin 2 milliGRAM(s) Oral at bedtime  finasteride 5 milliGRAM(s) Oral daily    MEDICATIONS  (PRN):  saliva substitute (BIOTENE MOISTURIZING MOUTH SPRAY) 1 [place](s) Topical three times a day PRN Mouth Care  acetaminophen    Suspension. 650 milliGRAM(s) Oral every 6 hours PRN Moderate Pain (4 - 6)  oxyCODONE    IR 5 milliGRAM(s) Oral every 4 hours PRN Severe Pain (7 - 10)                           11.0   9.0   )-----------( 137      ( 14 Sep 2017 05:44 )             32.5     09-14    131<L>  |  99  |  35.0<H>  ----------------------------<  107  5.1   |  22.0  |  1.36<H>    Ca    10.2      14 Sep 2017 05:44  Phos  2.6     09-14  Mg     1.9     09-14        ;  Vital Signs Last 24 Hrs  T(C): 36.6 (14 Sep 2017 16:00), Max: 36.6 (13 Sep 2017 23:28)  T(F): 97.9 (14 Sep 2017 16:00), Max: 97.9 (14 Sep 2017 16:00)  HR: 97 (14 Sep 2017 16:00) (94 - 104)  BP: 118/63 (14 Sep 2017 16:00) (98/57 - 138/68)  BP(mean): --  RR: 20 (14 Sep 2017 16:00) (17 - 20)  SpO2: 93% (14 Sep 2017 16:00) (92% - 93%)  CAPILLARY BLOOD GLUCOSE      09-13 @ 07:01  -  09-14 @ 07:00  --------------------------------------------------------  IN: 330 mL / OUT: 350 mL / NET: -20 mL    09-14 @ 07:01  -  09-14 @ 19:46  --------------------------------------------------------  IN: 0 mL / OUT: 200 mL / NET: -200 mL      Patient feeling better No CP, No SOB, No N/V min abd pain +BM    HEENT: PEARLA  Neck: Supple  Cardio: S1 S2 No SE Murmur Occ Irregular  Pulm: CTA No Rales or Ronchi  Abd: Soft NT Mild Distension BS + Incisions clean  Rectal - refused  Ext: No DCT  Skin: No Rash +1 Edema  Neuro: Awake Pleasant    Lymphoma - Post Op Pain control  Obstructive Jaundice - Stent resolved  Anorexia - possible Marinol trial post op  Obstructive uropathy - Urology following, cardura and proscar   Depression - will monitor  PAC's with hypotension - resolved

## 2017-09-14 NOTE — CONSULT NOTE ADULT - SUBJECTIVE AND OBJECTIVE BOX
Rehab evaluation for a 84y old  Male who was admitted periportal mass, lymph node biopsy.       HPI:  84yM with no significant PMHx, presents to [facility] as a direct admit from [facility]. Per pt, he started to have abdominal epigastric pain in July. He had an endoscopy in August which was negative. Further studies included a CT of the abdomen which showed lesions in his spleen, pancreas, liver. 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  Pt was scheduled to have a robotic resection of a periportal mass for a more definitive tissue diagnosis and lymph node biopsy on 9/1/17, however his outpatient pre-surgical testing results showed an elevated tbili and LFT's operation was pushed back. He went for ERCP and stenting on 9/1. Tbili and LFT's have been downtrending, pt was transferred to [facility] for lymph node biopsy on 9/11/17. Underwent robotic resection of abdomen mass, periportal mass/biopsy 9/11. Findings of bloody ascites per operative note. Intraop seen by urology for difficult tavarez placement. Rehab evaluation requested for further recommendations as patient with weakness due to current medical status      PCP:     PAST MEDICAL & SURGICAL HISTORY:  Romero esophagus  Intestinal infection: 2012  Bladder polyp  H/O inguinal hernia repair  S/P TURP (transurethral resection of prostate)  Bladder polyp: s/p TURBT  History of cholecystectomy: 1987  FAMILY HISTORY:    SOCIAL HISTORY:  TOBACCO: denies history  ALCOHOL: denies abuse  IVDA: denies history    FUNCTIONAL, ENVIRONMENTAL HISTORY:  LIVES WITH: spouse  STAIRS TO ENTER: 2 steps. No rails  STAIRS INSIDE: none  FUNCTIONAL HISTORY: independent with ambulation and ADLs    Allergies    latex (Rash; Blisters)  penicillins (Swelling)  sulfa drugs (Unknown)    Intolerances        MEDICATIONS  (STANDING):  influenza   Vaccine 0.5 milliLiter(s) IntraMuscular once  heparin  Injectable 5000 Unit(s) SubCutaneous every 8 hours  ciprofloxacin     Tablet 500 milliGRAM(s) Oral every 12 hours  pantoprazole    Tablet 40 milliGRAM(s) Oral before breakfast  docusate sodium 100 milliGRAM(s) Oral daily  senna 1 Tablet(s) Oral daily  doxazosin 2 milliGRAM(s) Oral at bedtime  finasteride 5 milliGRAM(s) Oral daily  sodium chloride 0.9%. 1000 milliLiter(s) (30 mL/Hr) IV Continuous <Continuous>    MEDICATIONS  (PRN):  saliva substitute (BIOTENE MOISTURIZING MOUTH SPRAY) 1 [place](s) Topical three times a day PRN Mouth Care  acetaminophen    Suspension. 650 milliGRAM(s) Oral every 6 hours PRN Moderate Pain (4 - 6)  oxyCODONE    IR 5 milliGRAM(s) Oral every 4 hours PRN Severe Pain (7 - 10)      REVIEW OF SYSTEMS:    CONSTITUTIONAL: No fever, weight loss, or fatigue  EYES: No eye pain, visual disturbances, or discharge  RESPIRATORY: No cough,   CARDIOVASCULAR: No chest pain,   GASTROINTESTINAL: No abdominal or epigastric pain.  GENITOURINARY: No dysuria,   NEUROLOGICAL: No headaches,  SKIN: No itching, burning, rashes, or lesions   MUSCULOSKELETAL: No joint pain or swelling; No muscle, back, or extremity pain  PSYCHIATRIC: No depression, anxiety, mood swings, or difficulty sleeping    Vital Signs Last 24 Hrs  T(C): 36.6 (14 Sep 2017 09:07), Max: 36.9 (13 Sep 2017 16:22)  T(F): 97.8 (14 Sep 2017 09:07), Max: 98.4 (13 Sep 2017 16:22)  HR: 95 (14 Sep 2017 09:07) (87 - 104)  BP: 114/68 (14 Sep 2017 09:07) (98/57 - 138/68)  BP(mean): --  RR: 18 (14 Sep 2017 09:07) (17 - 18)  SpO2: 92% (14 Sep 2017 09:07) (92% - 97%)    PHYSICAL EXAM:    GENERAL: NAD.   HEAD:  Atraumatic, Normocephalic  EYES: EOMI, PERRLA, conjunctiva and sclera clear  HEART: Regular rate and rhythm; No murmurs, rubs, or gallops  CHEST/LUNG: Clear to auscultation bilaterally; No rales, rhonchi, wheezing, or rubs  ABDOMEN: Soft, Nontender, Nondistended; Bowel sounds present  EXTREMITIES:  2+ Peripheral Pulses.     LABS:                        11.0   9.0   )-----------( 137      ( 14 Sep 2017 05:44 )             32.5     09-14    131<L>  |  99  |  35.0<H>  ----------------------------<  107  5.1   |  22.0  |  1.36<H>    Ca    10.2      14 Sep 2017 05:44  Phos  2.6     09-14  Mg     1.9     09-14    RADIOLOGY & ADDITIONAL STUDIES: Rehab evaluation for a 84y old  Male who was admitted periportal mass, lymph node biopsy. Spouse present at bedside      HPI:  84yM with no significant PMHx, presents to [facility] as a direct admit from [facility]. Per pt, he started to have abdominal epigastric pain in July. He had an endoscopy in August which was negative. Further studies included a CT of the abdomen which showed lesions in his spleen, pancreas, liver. 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  Pt was scheduled to have a robotic resection of a periportal mass for a more definitive tissue diagnosis and lymph node biopsy on 9/1/17, however his outpatient pre-surgical testing results showed an elevated tbili and LFT's operation was pushed back. He went for ERCP and stenting on 9/1. Pt was transferred to [facility] for lymph node biopsy on 9/11/17. Underwent robotic resection of abdomen mass, periportal mass/biopsy 9/11. Findings of bloody ascites per operative note. Intraop seen by urology for difficult tavarez placement. Biopsy suggestive of high grade B cell lymphoma. Patient's Primary oncology at [place].  Rehab evaluation requested for further recommendations as patient with weakness due to current medical status. Patient states poor appetite and constipation. Per wife patient had a fall yesterday when nursing staff was attempting ambulation and patient felt weak      PCP: does not have one    PAST MEDICAL & SURGICAL HISTORY:  Romero esophagus  Intestinal infection: 2012  Bladder polyp  H/O inguinal hernia repair  S/P TURP (transurethral resection of prostate)  Bladder polyp  History of cholecystectomy: 1987  FAMILY HISTORY:    SOCIAL HISTORY:  TOBACCO: denies history  ALCOHOL: denies use  IVDA: denies history    FUNCTIONAL, ENVIRONMENTAL HISTORY:  LIVES WITH: spouse  STAIRS TO ENTER: 2 steps. No rails  STAIRS INSIDE: none  FUNCTIONAL HISTORY: independent with ambulation and ADLs, but states that he was unable to lay down flat in bed since July and slept in a recliner.     Allergies    latex (Rash; Blisters)  penicillins (Swelling)  sulfa drugs (Unknown)    Intolerances        MEDICATIONS  (STANDING):  influenza   Vaccine 0.5 milliLiter(s) IntraMuscular once  heparin  Injectable 5000 Unit(s) SubCutaneous every 8 hours  ciprofloxacin     Tablet 500 milliGRAM(s) Oral every 12 hours  pantoprazole    Tablet 40 milliGRAM(s) Oral before breakfast  docusate sodium 100 milliGRAM(s) Oral daily  senna 1 Tablet(s) Oral daily  doxazosin 2 milliGRAM(s) Oral at bedtime  finasteride 5 milliGRAM(s) Oral daily  sodium chloride 0.9%. 1000 milliLiter(s) (30 mL/Hr) IV Continuous <Continuous>    MEDICATIONS  (PRN):  saliva substitute (BIOTENE MOISTURIZING MOUTH SPRAY) 1 [place](s) Topical three times a day PRN Mouth Care  acetaminophen    Suspension. 650 milliGRAM(s) Oral every 6 hours PRN Moderate Pain (4 - 6)  oxyCODONE    IR 5 milliGRAM(s) Oral every 4 hours PRN Severe Pain (7 - 10)      REVIEW OF SYSTEMS:    CONSTITUTIONAL: + fatigue  EYES: No eye pain,  RESPIRATORY: No cough,   CARDIOVASCULAR: No chest pain,   GASTROINTESTINAL: No abdominal pain. + poor appetite + constipation  GENITOURINARY: +tavarez  NEUROLOGICAL: No headaches,  SKIN: No itching,   MUSCULOSKELETAL: No joint pain   PSYCHIATRIC:trouble sleeping    Vital Signs Last 24 Hrs  T(C): 36.6 (14 Sep 2017 09:07), Max: 36.9 (13 Sep 2017 16:22)  T(F): 97.8 (14 Sep 2017 09:07), Max: 98.4 (13 Sep 2017 16:22)  HR: 95 (14 Sep 2017 09:07) (87 - 104)  BP: 114/68 (14 Sep 2017 09:07) (98/57 - 138/68)  BP(mean): --  RR: 18 (14 Sep 2017 09:07) (17 - 18)  SpO2: 92% (14 Sep 2017 09:07) (92% - 97%)    PHYSICAL EXAM:    GENERAL: NAD. appears tired and fell asleep atleast twice during my exam  HEART: S1S2+  CHEST/LUNG: Clear to auscultation bilaterally;   ABDOMEN: Soft, mildly distended. BS hypoactive, multiple areas of dressing +  EXTREMITIES:  LE extensive edema pitting+ Motor 4/5 UE and LE extremities Sensation is grossly intact to light touch.     LABS:                        11.0   9.0   )-----------( 137      ( 14 Sep 2017 05:44 )             32.5     09-14    131<L>  |  99  |  35.0<H>  ----------------------------<  107  5.1   |  22.0  |  1.36<H>    Ca    10.2      14 Sep 2017 05:44  Phos  2.6     09-14  Mg     1.9     09-14    < from: Xray Chest 1 View AP/PA. (09.10.17 @ 09:50) >  EXAM:  CHEST SINGLE VIEW FRONTAL                          PROCEDURE DATE:  09/10/2017          INTERPRETATION:  Chest radiograph (one view)     CPT 86603    CLINICAL INFORMATION:  Patient is unable to communicate.  Pre-op.    TECHNIQUE:  Single frontal view of the chest was obtained.    FINDINGS:  Prior study dated 8/31/2017 was available for review.    The lungs demonstrate left basilar atelectasis. Small left pleural   effusion is noted. The right lung field appears clear of infiltrate or   effusion. The heart is difficult to evaluate.           IMPRESSION: Left basilar atelectasis. Small left pleural effusion noted.      < end of copied text >      A/P: 84 year old male with recent diagnosis of lymphoma- ( B cell lymphoma as per pathology )  admitted to [facility] from [facility] for surgical procedure. - underwent robotic lymph node biopsy.   Patient with debility from on going medical course. Limited tolerance to bedside therapy based on evaluation and patient report.   At this time appears more appropriate for [facility]. However wife requesting AR. D/w wife that I will fu progress with PT in am  Thank you

## 2017-09-14 NOTE — PHYSICAL THERAPY INITIAL EVALUATION ADULT - ADDITIONAL COMMENTS
Pt lives in a private home with his wife. 2 steps to enter without handrails, no steps inside. Pt was independent PTA without assist device. Pt does not own DME.

## 2017-09-14 NOTE — PHYSICAL THERAPY INITIAL EVALUATION ADULT - CRITERIA FOR SKILLED THERAPEUTIC INTERVENTIONS
rehab potential/anticipated equipment needs at discharge/anticipated discharge recommendation/impairments found/[facility] vs Acute rehab

## 2017-09-15 ENCOUNTER — TRANSCRIPTION ENCOUNTER (OUTPATIENT)
Age: 82
End: 2017-09-15

## 2017-09-15 VITALS
TEMPERATURE: 98 F | RESPIRATION RATE: 19 BRPM | OXYGEN SATURATION: 97 % | HEART RATE: 65 BPM | SYSTOLIC BLOOD PRESSURE: 113 MMHG | DIASTOLIC BLOOD PRESSURE: 70 MMHG

## 2017-09-15 LAB
ANION GAP SERPL CALC-SCNC: 11 MMOL/L — SIGNIFICANT CHANGE UP (ref 5–17)
BUN SERPL-MCNC: 38 MG/DL — HIGH (ref 8–20)
CALCIUM SERPL-MCNC: 10.6 MG/DL — HIGH (ref 8.6–10.2)
CHLORIDE SERPL-SCNC: 99 MMOL/L — SIGNIFICANT CHANGE UP (ref 98–107)
CO2 SERPL-SCNC: 22 MMOL/L — SIGNIFICANT CHANGE UP (ref 22–29)
CREAT SERPL-MCNC: 1.37 MG/DL — HIGH (ref 0.5–1.3)
GLUCOSE SERPL-MCNC: 113 MG/DL — SIGNIFICANT CHANGE UP (ref 70–115)
HCT VFR BLD CALC: 34.6 % — LOW (ref 42–52)
HGB BLD-MCNC: 11.5 G/DL — LOW (ref 14–18)
MAGNESIUM SERPL-MCNC: 1.9 MG/DL — SIGNIFICANT CHANGE UP (ref 1.6–2.6)
MCHC RBC-ENTMCNC: 32.9 PG — HIGH (ref 27–31)
MCHC RBC-ENTMCNC: 33.2 G/DL — SIGNIFICANT CHANGE UP (ref 32–36)
MCV RBC AUTO: 98.9 FL — HIGH (ref 80–94)
NT-PROBNP SERPL-SCNC: 335 PG/ML — HIGH (ref 0–300)
PHOSPHATE SERPL-MCNC: 2.4 MG/DL — SIGNIFICANT CHANGE UP (ref 2.4–4.7)
PLATELET # BLD AUTO: 153 K/UL — SIGNIFICANT CHANGE UP (ref 150–400)
POTASSIUM SERPL-MCNC: 5 MMOL/L — SIGNIFICANT CHANGE UP (ref 3.5–5.3)
POTASSIUM SERPL-SCNC: 5 MMOL/L — SIGNIFICANT CHANGE UP (ref 3.5–5.3)
RBC # BLD: 3.5 M/UL — LOW (ref 4.6–6.2)
RBC # FLD: 15.1 % — SIGNIFICANT CHANGE UP (ref 11–15.6)
SODIUM SERPL-SCNC: 132 MMOL/L — LOW (ref 135–145)
WBC # BLD: 7.6 K/UL — SIGNIFICANT CHANGE UP (ref 4.8–10.8)
WBC # FLD AUTO: 7.6 K/UL — SIGNIFICANT CHANGE UP (ref 4.8–10.8)

## 2017-09-15 PROCEDURE — 71010: CPT | Mod: 26

## 2017-09-15 PROCEDURE — 99232 SBSQ HOSP IP/OBS MODERATE 35: CPT

## 2017-09-15 RX ORDER — CIPROFLOXACIN LACTATE 400MG/40ML
1 VIAL (ML) INTRAVENOUS
Qty: 0 | Refills: 0 | COMMUNITY
Start: 2017-09-15

## 2017-09-15 RX ORDER — ACETAMINOPHEN 500 MG
20.31 TABLET ORAL
Qty: 0 | Refills: 0 | COMMUNITY
Start: 2017-09-15

## 2017-09-15 RX ORDER — DOCUSATE SODIUM 100 MG
2 CAPSULE ORAL
Qty: 0 | Refills: 0 | COMMUNITY
Start: 2017-09-15

## 2017-09-15 RX ORDER — FINASTERIDE 5 MG/1
1 TABLET, FILM COATED ORAL
Qty: 0 | Refills: 0 | COMMUNITY
Start: 2017-09-15

## 2017-09-15 RX ORDER — LACTOBACILLUS ACIDOPHILUS 100MM CELL
1 CAPSULE ORAL
Qty: 0 | Refills: 0 | COMMUNITY
Start: 2017-09-15 | End: 2017-09-22

## 2017-09-15 RX ORDER — DOXAZOSIN MESYLATE 4 MG
1 TABLET ORAL
Qty: 0 | Refills: 0 | COMMUNITY
Start: 2017-09-15

## 2017-09-15 RX ORDER — SENNA PLUS 8.6 MG/1
1 TABLET ORAL
Qty: 0 | Refills: 0 | COMMUNITY
Start: 2017-09-15

## 2017-09-15 RX ORDER — SALIVA SUBSTITUTE COMB NO.11 351 MG
1 POWDER IN PACKET (EA) MUCOUS MEMBRANE
Qty: 0 | Refills: 0 | COMMUNITY
Start: 2017-09-15

## 2017-09-15 RX ORDER — OXYCODONE HYDROCHLORIDE 5 MG/1
1 TABLET ORAL
Qty: 0 | Refills: 0 | COMMUNITY
Start: 2017-09-15

## 2017-09-15 RX ORDER — DOCUSATE SODIUM 100 MG
1 CAPSULE ORAL
Qty: 0 | Refills: 0 | COMMUNITY
Start: 2017-09-15

## 2017-09-15 RX ADMIN — Medication 500 MILLIGRAM(S): at 09:27

## 2017-09-15 RX ADMIN — PANTOPRAZOLE SODIUM 40 MILLIGRAM(S): 20 TABLET, DELAYED RELEASE ORAL at 09:27

## 2017-09-15 RX ADMIN — HEPARIN SODIUM 5000 UNIT(S): 5000 INJECTION INTRAVENOUS; SUBCUTANEOUS at 12:19

## 2017-09-15 RX ADMIN — SENNA PLUS 1 TABLET(S): 8.6 TABLET ORAL at 12:19

## 2017-09-15 RX ADMIN — Medication 100 MILLIGRAM(S): at 12:19

## 2017-09-15 RX ADMIN — FINASTERIDE 5 MILLIGRAM(S): 5 TABLET, FILM COATED ORAL at 12:19

## 2017-09-15 RX ADMIN — HEPARIN SODIUM 5000 UNIT(S): 5000 INJECTION INTRAVENOUS; SUBCUTANEOUS at 05:51

## 2017-09-15 NOTE — DISCHARGE NOTE ADULT - MEDICATION SUMMARY - MEDICATIONS TO TAKE
I will START or STAY ON the medications listed below when I get home from the hospital:    finasteride 5 mg oral tablet  -- 1 tab(s) by mouth once a day  -- Indication: For home med    oxyCODONE 5 mg oral tablet  -- 1 tab(s) by mouth every 4 hours, As needed, Severe Pain (7 - 10)  -- Indication: For pain    acetaminophen 160 mg/5 mL oral suspension  -- 20.31 milliliter(s) by mouth every 6 hours, As needed, Moderate Pain (4 - 6)  -- Indication: For home med    doxazosin 2 mg oral tablet  -- 1 tab(s) by mouth once a day (at bedtime)  -- Indication: For prostate    senna oral tablet  -- 1 tab(s) by mouth once a day  -- Indication: For stool softener    docusate sodium 100 mg oral capsule  -- 1 cap(s) by mouth once a day  -- Indication: For stool softener    polyethylene glycol 3350 oral powder for reconstitution  -- 17 gram(s) by mouth 2 times a day  -- Indication: For stool softener    saliva substitutes oral spray  -- 1 spray(s) by mouth 3 times a day, As Needed  -- Indication: For saliva    Protonix 40 mg oral delayed release tablet  -- 1 tab(s) by mouth once a day  -- Indication: For home med    ciprofloxacin 500 mg oral tablet  -- 1 tab(s) by mouth every 12 hours  -- Indication: For UTI

## 2017-09-15 NOTE — CHART NOTE - NSCHARTNOTEFT_GEN_A_CORE
Upon Nutritional Assessment by the Registered Dietitian your patient was determined to meet criteria / has evidence of the following diagnosis/diagnoses:          [ ]  Mild Protein Calorie Malnutrition        [ ]  Moderate Protein Calorie Malnutrition        [x] Severe Protein Calorie Malnutrition        [ ] Unspecified Protein Calorie Malnutrition        [ ] Underweight / BMI <19        [ ] Morbid Obesity / BMI > 40      Findings as based on:  •  Comprehensive nutrition assessment and consultation  •  Calorie counts (nutrient intake analysis)  •  Food acceptance and intake status from observations by staff  •  Follow up  •  Patient education  •  Intervention secondary to interdisciplinary rounds  •   concerns      Treatment:    The following diet has been recommended:    start Ensure Enlive 1 can BID       PROVIDER Section:     By signing this assessment you are acknowledging and agree with the diagnosis/diagnoses assigned by the Registered Dietitian    Comments:

## 2017-09-15 NOTE — DISCHARGE NOTE ADULT - CARE PLAN
Principal Discharge DX:	Intraabdominal mass  Goal:	resume diet,  Instructions for follow-up, activity and diet:	follow up with Dr Harris

## 2017-09-15 NOTE — PROGRESS NOTE ADULT - SUBJECTIVE AND OBJECTIVE BOX
SURGICAL PA NOTE:     STATUS POST:      Diagnosis:    Pre-Op Diagnosis:  Lymphadenopathy  09/11/2017    Active  Diana Edouard. Lymphadenopathy  09/11/2017    Active  Diana Edouard       Post-Op Dx:  Lymphadenopathy  09/11/2017    Active  Diana Edouard. Lymphadenopathy  09/11/2017    Active  Diana Edouard      Procedure:    Procedure:  Resection of mass of abdomen  09/11/2017  Resection of periportal mass  Active  Diana Edouard. Resection of mass of abdomen  09/11/2017  Resection of periportal mass  Active  PILY  Robotic assisted procedure  09/11/2017    Active  PILY         Operative Findings:  · Operative Findings	upon entry to abdomen, bloody ascites identified (4000 ml); fluid sent for cytopathology and creatinine.	    Specimens/Blood Loss/IV/Output/Protocol/VTE:    Specimens/Blood Loss/IV/Output/Protocol/VTE:  · Specimens	1) periportal lymph node sent for fresh	  · Estimated Blood Loss	150 milliLiter(s)	      POST OPERATIVE DAY #: 4    Vital Signs Last 24 Hrs  T(C): 36.7 (15 Sep 2017 04:52), Max: 36.7 (14 Sep 2017 19:49)  T(F): 98 (15 Sep 2017 04:52), Max: 98.1 (14 Sep 2017 19:49)  HR: 102 (15 Sep 2017 04:52) (95 - 111)  BP: 113/59 (15 Sep 2017 04:52) (106/58 - 124/70)  BP(mean): --  RR: 17 (15 Sep 2017 04:52) (17 - 20)  SpO2: 94% (15 Sep 2017 04:52) (92% - 94%)    HPI:  84yM with no significant PMHx, presents to [facility] as a [facility] admit [facility]. Per pt, he started to have abdominal epigastric pain in July. He had an endoscopy in August which was negative. Further studies included a CT of the abdomen which showed lesions in his spleen, pancreas, liver. 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  Pt was scheduled to have a robotic resection of a periportal mass for a more definitive tissue diagnosis and lymph node biopsy on 9/1/17, however his outpatient pre-surgical testing results showed an elevated tbili and LFT's operation was pushed back. He went for ERCP and stenting on 9/1. Tbili and LFT's have been downtrending, pt was transferred to [facility] for lymph node biopsy on 9/11/17. Pt currently admits he has not had an appetite, with constipation (last BM 9/2), also has LE edema that started in July. He currently denies n/v/d, fevers/chills, abd pain, chest pain, sob. (09 Sep 2017 15:27)      Disorder of orbit  No h/o HF  Family history of multiple myeloma  Handoff  MEWS Score  Romero esophagus  Intestinal infection  Bladder polyp  Lymphadenopathy  Lymphadenopathy  Intraabdominal mass  Urinary retention  Robotic assisted procedure  Resection of mass of abdomen  H/O inguinal hernia repair  S/P TURP (transurethral resection of prostate)  Bladder polyp  History of cholecystectomy  UNSPECIFIED DISORDER OF ORBIT      SUBJECTIVE: Pt seen lying supine with HOB, c/o dry mouth from nasal O2, pt fell on buttocks yest, - evaluated - no injuries    Diet: regular    Activity:     Fevers: [ ]Yes [ x]NO  Chills: [ ] Yes [x ] NO  SOB:  [ ] YES [ x] NO - has episodes of O2 desat overnight while sleeping  Dyspnea: [ ]YES [ ]NO  Chest Discomfort: [ ] YES [ x] NO    Nausea: [ ] YES [x ] NO           Vomiting: [ ] YES [x ] NO  Flatus: [ ] YES [ x] NO             Bowel Movement: [ ] YES [x ] NO  Diarrhea: [ ] YES [x ] NO         Void: [ ]YES [ ]No  Constipation: [ ] YES [ ] NO       Pain (0-10):     2         Pain Control Adequate: [ ] YES [ ] NO    Askew: indwelling    NGT:      I&O's Detail    14 Sep 2017 07:01  -  15 Sep 2017 07:00  --------------------------------------------------------  IN:  Total IN: 0 mL    OUT:    Indwelling Catheter - Urethral: 500 mL  Total OUT: 500 mL    Total NET: -500 mL        I&O's Summary    14 Sep 2017 07:01  -  15 Sep 2017 07:00  --------------------------------------------------------  IN: 0 mL / OUT: 500 mL / NET: -500 mL      I&O's Detail    14 Sep 2017 07:01  -  15 Sep 2017 07:00  --------------------------------------------------------  IN:  Total IN: 0 mL    OUT:    Indwelling Catheter - Urethral: 500 mL  Total OUT: 500 mL    Total NET: -500 mL          MEDICATIONS  (STANDING):  influenza   Vaccine 0.5 milliLiter(s) IntraMuscular once  heparin  Injectable 5000 Unit(s) SubCutaneous every 8 hours  ciprofloxacin     Tablet 500 milliGRAM(s) Oral every 12 hours  pantoprazole    Tablet 40 milliGRAM(s) Oral before breakfast  docusate sodium 100 milliGRAM(s) Oral daily  senna 1 Tablet(s) Oral daily  doxazosin 2 milliGRAM(s) Oral at bedtime  finasteride 5 milliGRAM(s) Oral daily    MEDICATIONS  (PRN):  saliva substitute (BIOTENE MOISTURIZING MOUTH SPRAY) 1 [place](s) Topical three times a day PRN Mouth Care  acetaminophen    Suspension. 650 milliGRAM(s) Oral every 6 hours PRN Moderate Pain (4 - 6)  oxyCODONE    IR 5 milliGRAM(s) Oral every 4 hours PRN Severe Pain (7 - 10)      LABS:                        11.5   7.6   )-----------( 153      ( 15 Sep 2017 06:56 )             34.6     09-15    132<L>  |  99  |  38.0<H>  ----------------------------<  113  5.0   |  22.0  |  1.37<H>    Ca    10.6<H>      15 Sep 2017 06:56  Phos  2.4     09-15  Mg     1.9     09-15              RADIOLOGY & ADDITIONAL STUDIES:

## 2017-09-15 NOTE — PROGRESS NOTE ADULT - PROVIDER SPECIALTY LIST ADULT
Family Medicine
Rehab Medicine
Surgery
Urology
Surgery

## 2017-09-15 NOTE — PROGRESS NOTE ADULT - SUBJECTIVE AND OBJECTIVE BOX
INTERVAL SUBJECTIVE & REVIEW OF SYMPTOMS: Chart reviewed. Patient states that he feels ok, and that his taste buds are coming back. Per wife at bedside did have a BM yesterday and trying to eat.    Was able to tolerate minimally during PT session and had dizziness    MEDICATIONS:  influenza   Vaccine 0.5 milliLiter(s) IntraMuscular once  heparin  Injectable 5000 Unit(s) SubCutaneous every 8 hours  ciprofloxacin     Tablet 500 milliGRAM(s) Oral every 12 hours  pantoprazole    Tablet 40 milliGRAM(s) Oral before breakfast  saliva substitute (BIOTENE MOISTURIZING MOUTH SPRAY) 1 [place](s) Topical three times a day PRN  docusate sodium 100 milliGRAM(s) Oral daily  senna 1 Tablet(s) Oral daily  acetaminophen    Suspension. 650 milliGRAM(s) Oral every 6 hours PRN  oxyCODONE    IR 5 milliGRAM(s) Oral every 4 hours PRN  doxazosin 2 milliGRAM(s) Oral at bedtime  finasteride 5 milliGRAM(s) Oral daily      REVIEW OF SYSTEMS  [   ] Constitutional WNL  [ x  ] Cardio WNL  [x   ] Resp WNL  [ x  ] GI WNL  [   ]  -tavarez  [   ] Heme WNL  [   ] Endo WNL  [   ] Skin WNL  [   ] MSK WNL  [ x  ] Neuro WNL  [  x ] Cognitive WNL  [ x  ] Psych WNL    VITAL SIGNS  Vital Signs Last 24 Hrs  T(C): 36.6 (15 Sep 2017 09:20), Max: 36.7 (14 Sep 2017 19:49)  T(F): 97.8 (15 Sep 2017 09:20), Max: 98.1 (14 Sep 2017 19:49)  HR: 101 (15 Sep 2017 09:20) (97 - 111)  BP: 125/75 (15 Sep 2017 09:20) (106/58 - 125/75)  BP(mean): --  RR: 18 (15 Sep 2017 09:20) (17 - 20)  SpO2: 95% (15 Sep 2017 09:20) (92% - 95%)    PHYSICAL EXAM  General: NAD, appears tired. seated in chair  Cardio: S1S2  Resp: clear  Abdomen: soft,  Extrem: LE bilateral edema    Functional Exam: sit to stand with min assist, gait 5' and had dizziness    RECENT LABS:                        11.5   7.6   )-----------( 153      ( 15 Sep 2017 06:56 )             34.6     09-15    132<L>  |  99  |  38.0<H>  ----------------------------<  113  5.0   |  22.0  |  1.37<H>    Ca    10.6<H>      15 Sep 2017 06:56  Phos  2.4     09-15  Mg     1.9     09-15      A/P:    84 year old male with recent diagnosis of lymphoma transferred to [facility] for robotic abdominal mass biopsy.  Patient with debility from on going medical issues and poor tolerance to bedside therapy and is more appropriate to subacute rehab program.  d/w wife at bedside who agrees with plan.  Thank you.

## 2017-09-15 NOTE — PROGRESS NOTE ADULT - PROBLEM SELECTOR PLAN 1
cont reg diet, cont biotene spray PO prn, cont O2 but pt now refusing to wear O2, chk CXR, chk pulse ox, plan per Dr Harris, d/c planning cont reg diet, cont biotene spray PO prn, cont O2 but pt now refusing to wear O2, chk CXR, chk pulse ox, cont PT, cont tvaarez, cont Cipro, plan per Dr Harris, d/c planning

## 2017-09-15 NOTE — DISCHARGE NOTE ADULT - CARE PROVIDER_API CALL
Michele Harris), ColonRectal Surgery; Surgery  [address] [place] [address]  Phone: [phone]  Fax: [phone]

## 2017-09-15 NOTE — PROGRESS NOTE ADULT - SUBJECTIVE AND OBJECTIVE BOX
HPI:  84yM with no significant PMHx, presents to [facility] as a direct admit from [facility]. Per pt, he started to have abdominal epigastric pain in July. He had an endoscopy in August which was negative. Further studies included a CT of the abdomen which showed lesions in his spleen, pancreas, liver. 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  Pt was scheduled to have a robotic resection of a periportal mass for a more definitive tissue diagnosis and lymph node biopsy on 9/1/17, however his outpatient pre-surgical testing results showed an elevated tbili and LFT's operation was pushed back. He went for ERCP and stenting on 9/1. Tbili and LFT's have been downtrending, pt was transferred to [facility] for lymph node biopsy on 9/11/17. Pt currently admits he has not had an appetite, with constipation (last BM 9/2), also has LE edema that started in July. He currently denies n/v/d, fevers/chills, abd pain, chest pain, sob. (09 Sep 2017 15:27)     Allergies    latex (Rash; Blisters)  penicillins (Swelling)  sulfa drugs (Unknown)    Intolerances      Romero esophagus  Intestinal infection  Bladder polyp    MEDICATIONS  (STANDING):  influenza   Vaccine 0.5 milliLiter(s) IntraMuscular once  heparin  Injectable 5000 Unit(s) SubCutaneous every 8 hours  ciprofloxacin     Tablet 500 milliGRAM(s) Oral every 12 hours  pantoprazole    Tablet 40 milliGRAM(s) Oral before breakfast  docusate sodium 100 milliGRAM(s) Oral daily  senna 1 Tablet(s) Oral daily  doxazosin 2 milliGRAM(s) Oral at bedtime  finasteride 5 milliGRAM(s) Oral daily    MEDICATIONS  (PRN):  saliva substitute (BIOTENE MOISTURIZING MOUTH SPRAY) 1 Somers(s) Topical three times a day PRN Mouth Care  acetaminophen    Suspension. 650 milliGRAM(s) Oral every 6 hours PRN Moderate Pain (4 - 6)  oxyCODONE    IR 5 milliGRAM(s) Oral every 4 hours PRN Severe Pain (7 - 10)                           11.5   7.6   )-----------( 153      ( 15 Sep 2017 06:56 )             34.6     09-15    132<L>  |  99  |  38.0<H>  ----------------------------<  113  5.0   |  22.0  |  1.37<H>    Ca    10.6<H>      15 Sep 2017 06:56  Phos  2.4     09-15  Mg     1.9     09-15        ;  Vital Signs Last 24 Hrs  T(C): 36.7 (15 Sep 2017 16:25), Max: 36.7 (14 Sep 2017 19:49)  T(F): 98 (15 Sep 2017 16:25), Max: 98.1 (14 Sep 2017 19:49)  HR: 65 (15 Sep 2017 16:25) (65 - 111)  BP: 113/70 (15 Sep 2017 16:25) (106/58 - 125/75)  BP(mean): --  RR: 19 (15 Sep 2017 16:25) (17 - 19)  SpO2: 97% (15 Sep 2017 16:25) (92% - 97%)  CAPILLARY BLOOD GLUCOSE      09-14 @ 07:01  -  09-15 @ 07:00  --------------------------------------------------------  IN: 0 mL / OUT: 500 mL / NET: -500 mL    09-15 @ 07:01  -  09-15 @ 17:50  --------------------------------------------------------  IN: 480 mL / OUT: 0 mL / NET: 480 mL      CXR- Atelectasis no change in effusion      Patient feeling better No CP, No N/V min abd pain, On ambulation pt had mild SOb no worse then before admission. Long discussion with wife about delaying discharge until tomorrow  both patient and wife refuse to saty they want to be discharged to [facility], risks of SOB, Resp failure and death understood.      HEENT: PEARLA  Neck: Supple  Cardio: S1 S2 No SE Murmur Occ Irregular  Pulm: CTA No Rales or Ronchi  Abd: Soft NT Mild Distension BS + Incisions clean  Rectal - refused  Ext: No DCT  Skin: No Rash +1 Edema  Neuro: Awake Pleasant    Lymphoma - Post Op Pain control  Obstructive Jaundice - Stent resolved  Anorexia - possible Marinol trial post op  Obstructive uropathy - Urology following, cardura and proscar   Depression - will monitor  PAC's  Mild Exertional SOB - continue to follow on CARMELO, if effusion worsens may consider thoracocentesis to r/o malignant effusion.

## 2017-09-15 NOTE — DISCHARGE NOTE ADULT - PATIENT PORTAL LINK FT
“You can access the [facility] Patient Portal, offered by [facility], by registering with the following website: [email or web address]”

## 2017-09-15 NOTE — DISCHARGE NOTE ADULT - HOSPITAL COURSE
83 yo male, found to have periportal abd mass, underwent robotic periportal LN bx by Dr Harris on 9/11/17.    Pt now POD#4: mauro po ok, amb only short distance with PT assistance, has indwelling tavarez for difficult tavarez insertion in OR and with UTI - now on Cipro, no c/o abd pain, c/o feeling weak    Pt seen and examined by Dr bazzi - approved discharge to [facility] today.    Pt to follow up with Dr Harris as outpatient for biopsy results and further treatment.

## 2017-09-15 NOTE — DISCHARGE NOTE ADULT - NS AS ACTIVITY OBS
Showering allowed/Walking-Indoors allowed/No Heavy lifting/straining/Do not make important decisions

## 2017-09-15 NOTE — DIETITIAN INITIAL EVALUATION ADULT. - OTHER INFO
Pt recently dx'd with lymphoma, POD #4 s/p abdominal mass resection with 4L ascites removed. Pt noted with 2+ B/L LE edema. Wife reports poor po intake of meals provided and food brought in from home. Pt occasionally will drink Boost 1-2x daily. Agreeable to Ensure at this time.

## 2017-09-15 NOTE — PROGRESS NOTE ADULT - ASSESSMENT
84M s/p RA periportal mass lymph node biopsy POD#0  Neuro: Pain control as prescribed  Cardio: Monitor Vital signs  Pulm: Incentive spirometry and deep breathing  GI: Diet: CLD; Monitor bowel function; Bowel regimen   : tavarez out in the AM, start flomax as per medicine  MS: Encourage OOB and ambulation  ID: Cipro  DVT ppx: Subq Hep  Possible D/C tomorrow
84M with metastatic lymphoma and periportal mass  1. NPO tonight  2. Medical clearance ordered  3. EKG and CXR  4. AM labs  5. T+S
Stable urologically
Urinary retention
stable
stable

## 2017-09-20 ENCOUNTER — INPATIENT (INPATIENT)
Facility: HOSPITAL | Age: 82
LOS: 17 days | DRG: 871 | End: 2017-10-08
Attending: STUDENT IN AN ORGANIZED HEALTH CARE EDUCATION/TRAINING PROGRAM | Admitting: INTERNAL MEDICINE
Payer: MEDICARE

## 2017-09-20 VITALS
TEMPERATURE: 98 F | HEART RATE: 103 BPM | RESPIRATION RATE: 14 BRPM | DIASTOLIC BLOOD PRESSURE: 78 MMHG | OXYGEN SATURATION: 100 % | SYSTOLIC BLOOD PRESSURE: 109 MMHG

## 2017-09-20 DIAGNOSIS — J18.9 PNEUMONIA, UNSPECIFIED ORGANISM: ICD-10-CM

## 2017-09-20 DIAGNOSIS — D41.4 NEOPLASM OF UNCERTAIN BEHAVIOR OF BLADDER: Chronic | ICD-10-CM

## 2017-09-20 DIAGNOSIS — Z90.49 ACQUIRED ABSENCE OF OTHER SPECIFIED PARTS OF DIGESTIVE TRACT: Chronic | ICD-10-CM

## 2017-09-20 DIAGNOSIS — R53.81 OTHER MALAISE: ICD-10-CM

## 2017-09-20 DIAGNOSIS — A41.9 SEPSIS, UNSPECIFIED ORGANISM: ICD-10-CM

## 2017-09-20 DIAGNOSIS — J15.9 UNSPECIFIED BACTERIAL PNEUMONIA: ICD-10-CM

## 2017-09-20 DIAGNOSIS — C83.33 DIFFUSE LARGE B-CELL LYMPHOMA, INTRA-ABDOMINAL LYMPH NODES: ICD-10-CM

## 2017-09-20 DIAGNOSIS — Z71.89 OTHER SPECIFIED COUNSELING: ICD-10-CM

## 2017-09-20 DIAGNOSIS — R94.31 ABNORMAL ELECTROCARDIOGRAM [ECG] [EKG]: ICD-10-CM

## 2017-09-20 DIAGNOSIS — Z90.79 ACQUIRED ABSENCE OF OTHER GENITAL ORGAN(S): Chronic | ICD-10-CM

## 2017-09-20 DIAGNOSIS — N17.9 ACUTE KIDNEY FAILURE, UNSPECIFIED: ICD-10-CM

## 2017-09-20 DIAGNOSIS — M79.89 OTHER SPECIFIED SOFT TISSUE DISORDERS: ICD-10-CM

## 2017-09-20 DIAGNOSIS — Z98.890 OTHER SPECIFIED POSTPROCEDURAL STATES: Chronic | ICD-10-CM

## 2017-09-20 DIAGNOSIS — R33.9 RETENTION OF URINE, UNSPECIFIED: ICD-10-CM

## 2017-09-20 LAB
ANION GAP SERPL CALC-SCNC: 11 MMOL/L — SIGNIFICANT CHANGE UP (ref 5–17)
APPEARANCE UR: ABNORMAL
APTT BLD: 25.3 SEC — LOW (ref 27.5–37.4)
BACTERIA # UR AUTO: ABNORMAL /HPF
BASE EXCESS BLDV CALC-SCNC: -1.3 MMOL/L — SIGNIFICANT CHANGE UP (ref -2–2)
BASOPHILS # BLD AUTO: 0 K/UL — SIGNIFICANT CHANGE UP (ref 0–0.2)
BASOPHILS NFR BLD AUTO: 0.1 % — SIGNIFICANT CHANGE UP (ref 0–2)
BILIRUB UR-MCNC: NEGATIVE — SIGNIFICANT CHANGE UP
BLD GP AB SCN SERPL QL: NEGATIVE — SIGNIFICANT CHANGE UP
BUN SERPL-MCNC: 48 MG/DL — HIGH (ref 7–23)
CA-I SERPL-SCNC: 1.6 MMOL/L — CRITICAL HIGH (ref 1.12–1.3)
CALCIUM SERPL-MCNC: 11.7 MG/DL — HIGH (ref 8.4–10.5)
CHLORIDE BLDV-SCNC: 103 MMOL/L — SIGNIFICANT CHANGE UP (ref 96–108)
CHLORIDE SERPL-SCNC: 99 MMOL/L — SIGNIFICANT CHANGE UP (ref 96–108)
CK MB BLD-MCNC: 8.4 % — HIGH (ref 0–3.5)
CK MB CFR SERPL CALC: 4.1 NG/ML — SIGNIFICANT CHANGE UP (ref 0–6.7)
CK SERPL-CCNC: 49 U/L — SIGNIFICANT CHANGE UP (ref 30–200)
CO2 BLDV-SCNC: 24 MMOL/L — SIGNIFICANT CHANGE UP (ref 22–30)
CO2 SERPL-SCNC: 23 MMOL/L — SIGNIFICANT CHANGE UP (ref 22–31)
COLOR SPEC: YELLOW — SIGNIFICANT CHANGE UP
CREAT SERPL-MCNC: 1.74 MG/DL — HIGH (ref 0.5–1.3)
DIFF PNL FLD: ABNORMAL
EOSINOPHIL # BLD AUTO: 0 K/UL — SIGNIFICANT CHANGE UP (ref 0–0.5)
EOSINOPHIL NFR BLD AUTO: 0.3 % — SIGNIFICANT CHANGE UP (ref 0–6)
EPI CELLS # UR: SIGNIFICANT CHANGE UP /HPF
GAS PNL BLDV: 128 MMOL/L — LOW (ref 136–145)
GAS PNL BLDV: SIGNIFICANT CHANGE UP
GAS PNL BLDV: SIGNIFICANT CHANGE UP
GLUCOSE BLDV-MCNC: 84 MG/DL — SIGNIFICANT CHANGE UP (ref 70–99)
GLUCOSE SERPL-MCNC: 83 MG/DL — SIGNIFICANT CHANGE UP (ref 70–99)
GLUCOSE UR QL: NEGATIVE — SIGNIFICANT CHANGE UP
HCO3 BLDV-SCNC: 23 MMOL/L — SIGNIFICANT CHANGE UP (ref 21–29)
HCT VFR BLD CALC: 36.5 % — LOW (ref 39–50)
HCT VFR BLDA CALC: 36 % — LOW (ref 39–50)
HGB BLD CALC-MCNC: 11.6 G/DL — LOW (ref 13–17)
HGB BLD-MCNC: 12.7 G/DL — LOW (ref 13–17)
INR BLD: 1.42 RATIO — HIGH (ref 0.88–1.16)
KETONES UR-MCNC: NEGATIVE — SIGNIFICANT CHANGE UP
LACTATE BLDV-MCNC: 2.9 MMOL/L — HIGH (ref 0.7–2)
LEUKOCYTE ESTERASE UR-ACNC: ABNORMAL
LYMPHOCYTES # BLD AUTO: 0.8 K/UL — LOW (ref 1–3.3)
LYMPHOCYTES # BLD AUTO: 6.3 % — LOW (ref 13–44)
MAGNESIUM SERPL-MCNC: 2.3 MG/DL — SIGNIFICANT CHANGE UP (ref 1.6–2.6)
MCHC RBC-ENTMCNC: 34.8 GM/DL — SIGNIFICANT CHANGE UP (ref 32–36)
MCHC RBC-ENTMCNC: 36.2 PG — HIGH (ref 27–34)
MCV RBC AUTO: 104 FL — HIGH (ref 80–100)
MONOCYTES # BLD AUTO: 1.4 K/UL — HIGH (ref 0–0.9)
MONOCYTES NFR BLD AUTO: 10.3 % — SIGNIFICANT CHANGE UP (ref 2–14)
NEUTROPHILS # BLD AUTO: 11.2 K/UL — HIGH (ref 1.8–7.4)
NEUTROPHILS NFR BLD AUTO: 83.1 % — HIGH (ref 43–77)
NITRITE UR-MCNC: NEGATIVE — SIGNIFICANT CHANGE UP
NT-PROBNP SERPL-SCNC: 448 PG/ML — HIGH (ref 0–300)
PCO2 BLDV: 40 MMHG — SIGNIFICANT CHANGE UP (ref 35–50)
PH BLDV: 7.38 — SIGNIFICANT CHANGE UP (ref 7.35–7.45)
PH UR: 6 — SIGNIFICANT CHANGE UP (ref 5–8)
PLATELET # BLD AUTO: 182 K/UL — SIGNIFICANT CHANGE UP (ref 150–400)
PO2 BLDV: 32 MMHG — SIGNIFICANT CHANGE UP (ref 25–45)
POTASSIUM BLDV-SCNC: 5.1 MMOL/L — HIGH (ref 3.5–5)
POTASSIUM SERPL-MCNC: 5.4 MMOL/L — HIGH (ref 3.5–5.3)
POTASSIUM SERPL-SCNC: 5.4 MMOL/L — HIGH (ref 3.5–5.3)
PROT UR-MCNC: 30 MG/DL
PROTHROM AB SERPL-ACNC: 15.4 SEC — HIGH (ref 9.8–12.7)
RBC # BLD: 3.5 M/UL — LOW (ref 4.2–5.8)
RBC # FLD: 13.9 % — SIGNIFICANT CHANGE UP (ref 10.3–14.5)
RBC CASTS # UR COMP ASSIST: >50 /HPF (ref 0–2)
RH IG SCN BLD-IMP: POSITIVE — SIGNIFICANT CHANGE UP
SAO2 % BLDV: 52 % — LOW (ref 67–88)
SODIUM SERPL-SCNC: 133 MMOL/L — LOW (ref 135–145)
SP GR SPEC: 1.02 — SIGNIFICANT CHANGE UP (ref 1.01–1.02)
TROPONIN T SERPL-MCNC: <0.01 NG/ML — SIGNIFICANT CHANGE UP (ref 0–0.06)
UROBILINOGEN FLD QL: NEGATIVE — SIGNIFICANT CHANGE UP
WBC # BLD: 13.5 K/UL — HIGH (ref 3.8–10.5)
WBC # FLD AUTO: 13.5 K/UL — HIGH (ref 3.8–10.5)
WBC UR QL: >50 /HPF (ref 0–5)

## 2017-09-20 PROCEDURE — 71250 CT THORAX DX C-: CPT | Mod: 26

## 2017-09-20 PROCEDURE — 71010: CPT | Mod: 26

## 2017-09-20 PROCEDURE — 99223 1ST HOSP IP/OBS HIGH 75: CPT | Mod: GC

## 2017-09-20 PROCEDURE — 99223 1ST HOSP IP/OBS HIGH 75: CPT

## 2017-09-20 PROCEDURE — 76705 ECHO EXAM OF ABDOMEN: CPT | Mod: 26

## 2017-09-20 PROCEDURE — 99285 EMERGENCY DEPT VISIT HI MDM: CPT | Mod: GC

## 2017-09-20 PROCEDURE — 99497 ADVNCD CARE PLAN 30 MIN: CPT | Mod: 25

## 2017-09-20 PROCEDURE — 70450 CT HEAD/BRAIN W/O DYE: CPT | Mod: 26

## 2017-09-20 PROCEDURE — 78582 LUNG VENTILAT&PERFUS IMAGING: CPT | Mod: 26

## 2017-09-20 PROCEDURE — 74176 CT ABD & PELVIS W/O CONTRAST: CPT | Mod: 26

## 2017-09-20 RX ORDER — AZTREONAM 2 G
1000 VIAL (EA) INJECTION EVERY 8 HOURS
Qty: 0 | Refills: 0 | Status: DISCONTINUED | OUTPATIENT
Start: 2017-09-20 | End: 2017-09-28

## 2017-09-20 RX ORDER — FINASTERIDE 5 MG/1
5 TABLET, FILM COATED ORAL DAILY
Qty: 0 | Refills: 0 | Status: DISCONTINUED | OUTPATIENT
Start: 2017-09-20 | End: 2017-10-07

## 2017-09-20 RX ORDER — PANTOPRAZOLE SODIUM 20 MG/1
40 TABLET, DELAYED RELEASE ORAL
Qty: 0 | Refills: 0 | Status: DISCONTINUED | OUTPATIENT
Start: 2017-09-20 | End: 2017-10-07

## 2017-09-20 RX ORDER — METRONIDAZOLE 500 MG
500 TABLET ORAL ONCE
Qty: 0 | Refills: 0 | Status: COMPLETED | OUTPATIENT
Start: 2017-09-20 | End: 2017-09-20

## 2017-09-20 RX ORDER — ACETAMINOPHEN 500 MG
2 TABLET ORAL
Qty: 0 | Refills: 0 | COMMUNITY

## 2017-09-20 RX ORDER — METRONIDAZOLE 500 MG
500 TABLET ORAL EVERY 8 HOURS
Qty: 0 | Refills: 0 | Status: DISCONTINUED | OUTPATIENT
Start: 2017-09-21 | End: 2017-09-28

## 2017-09-20 RX ORDER — DOCUSATE SODIUM 100 MG
100 CAPSULE ORAL THREE TIMES A DAY
Qty: 0 | Refills: 0 | Status: DISCONTINUED | OUTPATIENT
Start: 2017-09-20 | End: 2017-09-30

## 2017-09-20 RX ORDER — SODIUM CHLORIDE 9 MG/ML
1000 INJECTION, SOLUTION INTRAVENOUS ONCE
Qty: 0 | Refills: 0 | Status: COMPLETED | OUTPATIENT
Start: 2017-09-20 | End: 2017-09-20

## 2017-09-20 RX ORDER — SALIVA SUBSTITUTE COMB NO.11 351 MG
1 POWDER IN PACKET (EA) MUCOUS MEMBRANE
Qty: 0 | Refills: 0 | COMMUNITY

## 2017-09-20 RX ORDER — POLYETHYLENE GLYCOL 3350 17 G/17G
17 POWDER, FOR SOLUTION ORAL
Qty: 0 | Refills: 0 | Status: DISCONTINUED | OUTPATIENT
Start: 2017-09-20 | End: 2017-09-30

## 2017-09-20 RX ORDER — SODIUM CHLORIDE 9 MG/ML
1000 INJECTION INTRAMUSCULAR; INTRAVENOUS; SUBCUTANEOUS
Qty: 0 | Refills: 0 | Status: DISCONTINUED | OUTPATIENT
Start: 2017-09-20 | End: 2017-09-23

## 2017-09-20 RX ORDER — AZTREONAM 2 G
VIAL (EA) INJECTION
Qty: 0 | Refills: 0 | Status: DISCONTINUED | OUTPATIENT
Start: 2017-09-20 | End: 2017-09-28

## 2017-09-20 RX ORDER — SODIUM CHLORIDE 9 MG/ML
3 INJECTION INTRAMUSCULAR; INTRAVENOUS; SUBCUTANEOUS ONCE
Qty: 0 | Refills: 0 | Status: COMPLETED | OUTPATIENT
Start: 2017-09-20 | End: 2017-09-20

## 2017-09-20 RX ORDER — INFLUENZA VIRUS VACCINE 15; 15; 15; 15 UG/.5ML; UG/.5ML; UG/.5ML; UG/.5ML
0.5 SUSPENSION INTRAMUSCULAR ONCE
Qty: 0 | Refills: 0 | Status: DISCONTINUED | OUTPATIENT
Start: 2017-09-20 | End: 2017-10-08

## 2017-09-20 RX ORDER — PANTOPRAZOLE SODIUM 20 MG/1
1 TABLET, DELAYED RELEASE ORAL
Qty: 0 | Refills: 0 | COMMUNITY

## 2017-09-20 RX ORDER — ENOXAPARIN SODIUM 100 MG/ML
80 INJECTION SUBCUTANEOUS ONCE
Qty: 0 | Refills: 0 | Status: COMPLETED | OUTPATIENT
Start: 2017-09-20 | End: 2017-09-20

## 2017-09-20 RX ORDER — AZTREONAM 2 G
1000 VIAL (EA) INJECTION ONCE
Qty: 0 | Refills: 0 | Status: COMPLETED | OUTPATIENT
Start: 2017-09-20 | End: 2017-09-20

## 2017-09-20 RX ORDER — SODIUM CHLORIDE 9 MG/ML
2000 INJECTION INTRAMUSCULAR; INTRAVENOUS; SUBCUTANEOUS ONCE
Qty: 0 | Refills: 0 | Status: COMPLETED | OUTPATIENT
Start: 2017-09-20 | End: 2017-09-20

## 2017-09-20 RX ORDER — SENNA PLUS 8.6 MG/1
2 TABLET ORAL AT BEDTIME
Qty: 0 | Refills: 0 | Status: DISCONTINUED | OUTPATIENT
Start: 2017-09-20 | End: 2017-10-07

## 2017-09-20 RX ORDER — HEPARIN SODIUM 5000 [USP'U]/ML
5000 INJECTION INTRAVENOUS; SUBCUTANEOUS EVERY 8 HOURS
Qty: 0 | Refills: 0 | Status: DISCONTINUED | OUTPATIENT
Start: 2017-09-21 | End: 2017-09-22

## 2017-09-20 RX ORDER — VANCOMYCIN HCL 1 G
1000 VIAL (EA) INTRAVENOUS ONCE
Qty: 0 | Refills: 0 | Status: COMPLETED | OUTPATIENT
Start: 2017-09-20 | End: 2017-09-20

## 2017-09-20 RX ORDER — ENOXAPARIN SODIUM 100 MG/ML
1 INJECTION SUBCUTANEOUS
Qty: 0 | Refills: 0 | COMMUNITY

## 2017-09-20 RX ORDER — METRONIDAZOLE 500 MG
TABLET ORAL
Qty: 0 | Refills: 0 | Status: DISCONTINUED | OUTPATIENT
Start: 2017-09-20 | End: 2017-09-28

## 2017-09-20 RX ORDER — ENOXAPARIN SODIUM 100 MG/ML
30 INJECTION SUBCUTANEOUS
Qty: 0 | Refills: 0 | COMMUNITY

## 2017-09-20 RX ADMIN — Medication 100 MILLIGRAM(S): at 23:16

## 2017-09-20 RX ADMIN — SODIUM CHLORIDE 75 MILLILITER(S): 9 INJECTION INTRAMUSCULAR; INTRAVENOUS; SUBCUTANEOUS at 23:16

## 2017-09-20 RX ADMIN — Medication 50 MILLIGRAM(S): at 13:56

## 2017-09-20 RX ADMIN — ENOXAPARIN SODIUM 80 MILLIGRAM(S): 100 INJECTION SUBCUTANEOUS at 23:16

## 2017-09-20 RX ADMIN — SODIUM CHLORIDE 2000 MILLILITER(S): 9 INJECTION INTRAMUSCULAR; INTRAVENOUS; SUBCUTANEOUS at 13:45

## 2017-09-20 RX ADMIN — Medication 50 MILLIGRAM(S): at 23:16

## 2017-09-20 RX ADMIN — Medication 250 MILLIGRAM(S): at 16:42

## 2017-09-20 RX ADMIN — SODIUM CHLORIDE 1000 MILLILITER(S): 9 INJECTION, SOLUTION INTRAVENOUS at 16:15

## 2017-09-20 RX ADMIN — SODIUM CHLORIDE 3 MILLILITER(S): 9 INJECTION INTRAMUSCULAR; INTRAVENOUS; SUBCUTANEOUS at 13:30

## 2017-09-20 RX ADMIN — SENNA PLUS 2 TABLET(S): 8.6 TABLET ORAL at 23:16

## 2017-09-20 NOTE — CONSULT NOTE ADULT - PROBLEM SELECTOR RECOMMENDATION 9
-currently without chest pain or anginal equivalent symptoms  -trend CE/EKG  -TTE  -heme/onc and GI clarify prognosis and candidate for antiplatelets? anticoagulation?  -please obtain records from outpatient cardiologist at [facility]    [id number] -currently without chest pain or anginal equivalent symptoms  -trend CE/EKG  -TTE  -heme/onc and GI clarify prognosis and candidate for antiplatelets? anticoagulation?  -please obtain records from outpatient cardiologist at [facility]  -at this time, no indication for emergent cardiac catheterization     [id number]

## 2017-09-20 NOTE — H&P ADULT - NSHPPHYSICALEXAM_GEN_ALL_CORE
ICU Vital Signs Last 24 Hrs  T(C): 36.5 (20 Sep 2017 21:59), Max: 36.8 (20 Sep 2017 13:16)  T(F): 97.7 (20 Sep 2017 21:59), Max: 98.3 (20 Sep 2017 13:16)  HR: 98 (20 Sep 2017 21:59) (98 - 103)  BP: 106/55 (20 Sep 2017 21:59) (106/55 - 111/69)  BP(mean): --  ABP: --  ABP(mean): --  RR: 18 (20 Sep 2017 21:59) (14 - 20)  SpO2: 94% (20 Sep 2017 21:59) (94% - 100%)      I&O's Summary      PHYSICAL EXAM:   GENERAL: Alert. No acute distress. Not obese. +cachectic.  HEAD:  Atraumatic. Normocephalic.  EYES: EOMI. PERRLA. Normal conjunctiva/sclera.  ENT: Neck supple. No JVD. dry oral mucosa. Not edentulous. No thrush.  LYMPH: Normal supraclavicular/cervical lymph nodes.   CARDIAC: RRR. S1. S2. No murmur. No rub. No distant heart sounds.  LUNG/CHEST: CTAB. BS equal bilaterally. No wheezes. No rales. No rhonchi.  ABDOMEN: Soft. +distension. No fluid wave. hypoactive bowel sounds.  BACK: No spinal tenderness. No flank tenderness.  VASCULAR: +2 b/l radial pulses. Palpable DP pulses.  EXTREMITIES:  No clubbing. No cyanosis. +2 b/l LE swelling edema. Moving all 4.  NEUROLOGY: A&Ox2. Non-focal exam. Cranial nerves intact.  PSYCH: Normal behavior. flat affect.  SKIN: Normal color. No rashes. No lesions.  Vascular Access:

## 2017-09-20 NOTE — CONSULT NOTE ADULT - ATTENDING COMMENTS
Patient interviewed, examined and chart reviewed.  Case discussed with fellow.  Agree w/ Assessment and Plan as outlined.    Agustin Ross MD [facility]  P: [phone]  Spectra:  [phone]  Office: [phone]

## 2017-09-20 NOTE — H&P ADULT - ASSESSMENT
84M c hx DLBCL not yet on chemo, biliary stenosis s/p cbd stent, mejias's esophagus, BPH c/b urinary retention s/p chronic tavarez (started 9/5/17), CKD (baseline Cr 1.2), recent discharge (9/15) pw sepsis 2/2 PNA

## 2017-09-20 NOTE — ED PROVIDER NOTE - PHYSICAL EXAMINATION
PE: CONSTITUTIONAL: Ill appearing, in moderate apparent distress. ENMT: Airway patent, nasal mucosa clear, mouth with normal mucosa. HEAD: NCAT EYES: PERRL, EOMI bilaterally CARDIAC: RRR, no m/r/g, no pedal edema RESPIRATORY: Tachypnea, bibasilar rales GI: Abdomen distended, generalized TTP but greater in the lower abdomen over the surgical sites MSK: Spine appears normal, range of motion is not limited, no muscle/joint tenderness NEURO: CNII-XII grossly intact, 5/5 strength, full sensation all extremities SKIN: + postsurgical wounds c/d/i, Skin tone normal in color, warm and dry. No evidence of rash. PE: CONSTITUTIONAL: Ill appearing, in moderate apparent distress. ENMT: Airway patent, nasal mucosa clear, mouth with normal mucosa. HEAD: NCAT EYES: PERRL, EOMI bilaterally CARDIAC: RRR, no m/r/g, 2+ BL pedal edema RESPIRATORY: Tachypnea, bibasilar rales GI: Abdomen distended, generalized TTP but greater in the lower abdomen over the surgical sites MSK: Spine appears normal, range of motion is not limited, no muscle/joint tenderness NEURO: CNII-XII grossly intact, 5/5 strength, full sensation all extremities SKIN: + postsurgical wounds c/d/i with mild serous drainage, Skin tone normal in color, warm and dry. No evidence of rash. PE: CONSTITUTIONAL: Ill appearing, in moderate apparent distress. ENMT: Airway patent, nasal mucosa clear, mouth with normal mucosa. HEAD: NCAT EYES: PERRL, EOMI bilaterally CARDIAC: RRR, no m/r/g, 2+ BL pedal edema RESPIRATORY: Tachypnea, bibasilar rales GI: Abdomen distended, generalized TTP but greater in the lower abdomen over the surgical sites MSK: Spine appears normal, range of motion is not limited, no muscle/joint tenderness NEURO: CNII-XII grossly intact, 5/5 strength, full sensation all extremities SKIN: + postsurgical wounds c/d/i with mild serous drainage, Skin tone normal in color, warm and dry. No evidence of rash.  Attending Matt: gen: ill appearing, frail. heent: atraumatic, eomi, perrla, dry mucous membranes, c: rrr +soft murmur, lungs; decreased b/l with scant rhonchi. abd: soft, surgical site intact, mildly tender to palpation, ext: LE pitting edema, neuro: awake , following commands

## 2017-09-20 NOTE — ED ADULT NURSE NOTE - OBJECTIVE STATEMENT
84 y.o. Male BIBA from [facility] for hypotension and abdominal pain. As per EMS pt has hx of b-cell lymphoma - not on chemo, abdominal biopsy on 9/11 in [place], liver stent on 9/1 - tavarez placed on 9/1. As per EMS, pt was in PT and noticed low blood pressure with systolic in 90s. Family members at bedside reports pt is more confused than usual. Pt is A&Ox1 - disoriented to place and time. Neuro intact. Abdominal dressing noted across pt's abdomen. +2 Lower extremity edema noted b/l. Denies CP, SOB, N/V/D, fever/chills. Pt is in no current distress. Comfort and safety provided. Dr. Matt at bedside for evaluation. Will continue to monitor.

## 2017-09-20 NOTE — CONSULT NOTE ADULT - SUBJECTIVE AND OBJECTIVE BOX
General Surgery Consult  Consulting surgical team: Surgical Oncology  Consulting attending: Steven  Patient seen and examined: 17 @ 17:30    HPI:  Patient is a 84y Male who recently had a robotic lymph node biopsy on  at [facility] for a periportal mass. He was sent to the ED from [facility] where he was complaining of weakness, SOB and abdominal pain. Currently he feels weakness. He has been tolerating a diet, has not had nausea or emesis and has been having bowel function. He complains of intermittant dyspnea and abdominal pain, mostly where his incisions are. He complains of leaking of clear fluid from his center incision. He denies dizziness, fevers, or sweats but states that he is 'always cold'     In ED: Pt HR 99, /65 and satting on 20% on RA    PAST MEDICAL HISTORY:  Romero esophagus  Intestinal infection  Bladder polyp      PAST SURGICAL HISTORY:  H/O inguinal hernia repair  S/P TURP (transurethral resection of prostate)  Bladder polyp  History of cholecystectomy      ALLERGIES:  latex (Rash; Blisters)  penicillins (Swelling)  sulfa drugs (Unknown)      MEDICATIONS:  aztreonam  IVPB      aztreonam  IVPB 1000 milliGRAM(s) IV Intermittent every 8 hours  enoxaparin Injectable 80 milliGRAM(s) SubCutaneous Once      VITALS & I/Os:  Vital Signs Last 24 Hrs  T(C): 36.4 (20 Sep 2017 16:37), Max: 36.8 (20 Sep 2017 13:16)  T(F): 97.5 (20 Sep 2017 16:37), Max: 98.3 (20 Sep 2017 13:16)  HR: 99 (20 Sep 2017 18:00) (99 - 103)  BP: 111/69 (20 Sep 2017 18:00) (106/65 - 111/69)  BP(mean): --  RR: 20 (20 Sep 2017 18:00) (14 - 20)  SpO2: 95% (20 Sep 2017 18:00) (95% - 100%)    I&O's Summary      PHYSICAL EXAM:  General: Appears uncomfortable  Respiratory: B/l rales, b/l breath sounds coarse  Cardiovascular: RRR  Abdominal: Softly distended, tender above incision sites however no erythema to suggest infection. Port site just to the left of midline with some serous output when palpated.  Extremities: Warm    LABS:                        12.7   13.5  )-----------( 182      ( 20 Sep 2017 13:52 )             36.5         133<L>  |  99  |  48<H>  ----------------------------<  83  5.4<H>   |  23  |  1.74<H>    Ca    11.7<H>      20 Sep 2017 16:15  Mg     2.3         TPro  5.3<L>  /  Alb  2.6<L>  /  TBili  1.5<H>  /  DBili  x   /  AST  57<H>  /  ALT  32  /  AlkPhos  116      Lactate:   @ 16:15  2.9    PT/INR - ( 20 Sep 2017 13:52 )   PT: 15.4 sec;   INR: 1.42 ratio         PTT - ( 20 Sep 2017 13:52 )  PTT:25.3 sec    CARDIAC MARKERS ( 20 Sep 2017 13:52 )  x     / <0.01 ng/mL / 49 U/L / x     / 4.1 ng/mL        Urinalysis Basic - ( 20 Sep 2017 14:37 )    Color: Yellow / Appearance: x / S.018 / pH: x  Gluc: x / Ketone: Negative  / Bili: Negative / Urobili: Negative   Blood: x / Protein: 30 mg/dL / Nitrite: Negative   Leuk Esterase: Moderate / RBC: >50 /HPF / WBC >50 /HPF   Sq Epi: x / Non Sq Epi: x / Bacteria: Moderate /HPF        IMAGING: < from: CT Abdomen and Pelvis No Cont (17 @ 15:47) >  CHEST:     LUNGS AND LARGE AIRWAYS: Debris in the trachea, otherwise patent central   airways. Left lower lobe consolidation. Right lower lobe linear   atelectasis.   PLEURA: Bilateral pleural effusion left greater than right.  VESSELS: Coronary artery calcifications. Mild atherosclerotic thoracic   aorta.  HEART: Heart size is normal. No pericardial effusion.  MEDIASTINUM AND MARILYN: No lymphadenopathy.  CHEST WALL AND LOWER NECK: Diffuse subcutaneous emphysema due to recent   abdominal surgery.    ABDOMEN AND PELVIS:    LIVER: Perihepatic fluid.   BILE DUCTS: Normal caliber. Status post CBD stent.  GALLBLADDER: Status post cholecystectomy.  SPLEEN: Splenomegaly. Small perisplenic fluid.  PANCREAS: Within normal limits.  ADRENALS: Within normal limits.  KIDNEYS/URETERS: Within normal limits.    BLADDER: Status post Askew placement  REPRODUCTIVE ORGANS: The prostate is within normal limits.    BOWEL: No bowel obstruction. Appendix not visualized.  PERITONEUM: No ascites.  VESSELS: Moderate to severe atherosclerotic vessels.  LYMPH NODES: Bulky confluent upper abdominal and retroperitoneal   adenopathy. For example, confluent right upper abdominal adenopathy   measures 11.0 x 8.5 cm. Confluent adenopathy which encases the aorta   measures 9.7 x 5.8 cm. Additional gastrohepatic and mesenteric adenopathy   is present.  ABDOMINAL WALL: Diffuse subcutaneous emphysema due to recent abdominal   surgery. Anasarca.  BONES: Degenerative changes of spine.    IMPRESSION:   Left lower lobe pneumonia.  Bulky confluent upper abdominal and retroperitoneal adenopathy as   described, consistent with lymphoma.     < end of copied text >    ASSESSMENT:  84 year old male with LLL PNA and recent robotic lymph node biopsy  - CT scan showing no intraabdominal pathology, however white count and clinical symptoms likely related to LLL PNA  - No abscess seen below port site on CT, no purulence or erythema around site  - Will follow  - D/w Dr. Steven No PGY 2 1019      PLAN:

## 2017-09-20 NOTE — H&P ADULT - PROBLEM SELECTOR PLAN 1
- speech swallow eval  - will cover for aspiration and HCAP with aztreonam and flagyl  - cont gentle IVF given b/l pleural effusion and evidence of anasarca  - blood culture

## 2017-09-20 NOTE — ED PROVIDER NOTE - PROGRESS NOTE DETAILS
Patient without chest pain or dyspnea, EKG not clearly MI, will wait for enzymes as discussed with cardiology.  - Diogo Hinson MD No post surgical complications from surgery consult perspective, still concerns for PE, patient cleared to have anticoagulation, will start prophylactically and get VQ scan / possible CT Angio if renal function improves. Spoke with Dr. Hamlin about the plan.  - Diogo Hinson MD

## 2017-09-20 NOTE — ED ADULT NURSE REASSESSMENT NOTE - NS ED NURSE REASSESS COMMENT FT1
Pt is in no current distress. Comfort and safety provided. Family at bedside.
Night shift RN received report from pt, however, I did not see this patient - Report given to [facility] and patient will go straight from Nuclear Medicine to the floor. Family informed and directions given to family go to the floor. MARCUS Olsen informed that the report was given from the dayshift report - charge RN aware.
Pt is in no current distress. Comfort and safety provided. Family at bedside. Awaiting bed assignment.
Received pt on nuclear medicine. Report accepted. Will wait until pt returns.

## 2017-09-20 NOTE — ED PROCEDURE NOTE - PROCEDURE ADDITIONAL DETAILS
POCUS: positive fast, irregular appearing mixed echogenicites adjacent to the liver, irregularies of mixed hyperechoic foci within the spleen, bilateral pleural effusions POCUS: positive fast, irregular appearing mixed echogenicites adjacent to the liver, irregularies of mixed hyperechoic foci within the spleen, bilateral pleural effusions, right liver cysts

## 2017-09-20 NOTE — H&P ADULT - PROBLEM SELECTOR PLAN 9
- pt wishes his wife to make medical decisions for him if he is unable to  - full code for now. he is unsure if he wants DNR at this time and is still deciding.  - full medical management, and he is hopeful he will receive chemo  - time spent 20 min

## 2017-09-20 NOTE — H&P ADULT - HISTORY OF PRESENT ILLNESS
84M c hx DLBCL not yet on chemo, biliary stenosis s/p cbd stent, mejias's esophagus, BPH c/b urinary retention s/p chronic tavarez (started 9/5/17), CKD (baseline Cr 1.2), recent discharge (9/15) pw hypotension at rehab.    History obtained from patient and wife.  Pt has been at [facility] rehab since hospitalization initially to [facility] with transfer to [facility] for biopsy. He has progressively been feeling weaker. Today, he was unable to stand up as he felt dizzy and weakness. Per wife, pt is also intermittently confused. At [facility], found to have SBP of 90. Pt reports intermittent coughing on eating food. He reports hoarseness and weaker voice. Reports some constipation. Denies fevers, chills, N/V, diarrhea, abd pain. Reports LLE pain and b/l LE swelling, as well as intermittent chest pain. Per charts, he was being treated for UTI at [facility] with 7 days of cipro, last dose to be 9/21.    VS: Tm 98.3, P 103, /65, R 20, 95% RA  In the ED, received lovenox 80, aztreonam, vanc, LR1L + NS 2L

## 2017-09-20 NOTE — H&P ADULT - NSHPSOCIALHISTORY_GEN_ALL_CORE
Social History:    Marital Status: ( x ) , (  ) Single, (  ) , (  ) , (  )   # of Children: 2  Lives with: (  ) alone, (  ) children, ( x ) spouse, (  ) parents, (  ) other:   Occupation:     Substance Use/Illicit Drugs: (  ) never used, (  ) other:   Tobacco Usage: ( x ) never smoked, (  ) former smoker, (  ) current smoker, and Total Pack-Years:   Last Alcohol Usage/Frequency/Amount:

## 2017-09-20 NOTE — H&P ADULT - PROBLEM SELECTOR PLAN 3
- IVF as above  - no hydro on ct scan  - low k diet  - cont tavarez for now  - trend cr - IVF as above  - no hydro on ct scan  - low k diet  - no concerning EKG findings for hyperkalemia  - cont tavarez for now  - trend cr

## 2017-09-20 NOTE — H&P ADULT - ATTENDING COMMENTS
Pt signed out to NP service. Time spent 70 minutes on total encounter. [facility] to assume care in AM.

## 2017-09-20 NOTE — H&P ADULT - PMH
Romero esophagus    Bladder polyp    Diffuse large B-cell lymphoma of intra-abdominal lymph nodes    Intestinal infection  2012

## 2017-09-20 NOTE — H&P ADULT - PROBLEM SELECTOR PLAN 4
- seen by cards, no intervention at this time  - will start asa  - no PE on VQ, will start ppx dosing

## 2017-09-20 NOTE — ED PROVIDER NOTE - MEDICAL DECISION MAKING DETAILS
Attending Shaka: 83 y/o male with multiple medical issues including recently diagnosed lymphoma s/p recent biopsy of abdominal mass presenting with weakness, drainage from biopsy site and general fatigue. upon arrival ill appearing, hypotensive and hypoxic. pt placed on supplemental oxygen.  unclear etiology of mass in the abdomen. no ct scans available on pacs. with hypotension concern for possible bleeding of mass, vs infection vs sepsis. pt pan cultured. surgery notified upon arrival. 2 large bore IV's in place, pt pan cultured and started on abx. pt with b/l LE edema. with recent surgical interention PE also on differential. blood work shows worsening MARIA C, therefore pt hydrated consider v/q scan. pt will need admission, consider ICU.

## 2017-09-20 NOTE — CONSULT NOTE ADULT - ASSESSMENT
84 year old man w/ hx hepatic/portal mass s/p stenting and multiple biopsies p/w biopsy incisional discomfort; consult for abn EKG 84 year old man w/ hx hepatic/portal mass s/p stenting and multiple biopsies p/w biopsy incisional discomfort; consult for abn EKG.  RBBB is longstanding.  Possible tht T=wave abn ore secondary to superimposed blocke APDs w/ atrial bigeminy. Acute ischemic syndrome is very unlikely and there is no indication for urgent cath.    h/o ARF and murmur.  Exam is suggestive of only mild MR

## 2017-09-20 NOTE — CONSULT NOTE ADULT - SUBJECTIVE AND OBJECTIVE BOX
Patient seen and evaluated @ [facility] er [facility] called 1358, bedside 1402, discussed w interventionist [id number]  Chief Complaint: abd pain    HPI:  84 year old man who described as healthy by his wife until start of this month found abd mass assessed as lymphoma; presents w/ abd incisional pain after liver biopsy.    Per patient, wife and daughter at bedside, developed abd pain mid July, had endoscopy later in August after clearance from cardiologist Kirstin Galindo of [facility]. On workup fount abd mass for which underwent liver stent 9/1 for obstruction. Repeat biopsy 9/11/17 and discharged to rehab from which presents today w incisional discomfort. Denies chest pain, shortness of breath, palpitations, orthopnea, pnd, LH. Has been very frail and weak since July. Has not gone home since, persistently in hospital or rehab.    PMH:   Romero esophagus  Intestinal infection  Bladder polyp    PSH:   H/O inguinal hernia repair  S/P TURP (transurethral resection of prostate)  Bladder polyp  History of cholecystectomy    Medications:   aztreonam  IVPB      aztreonam  IVPB 1000 milliGRAM(s) IV Intermittent every 8 hours    Allergies:  latex (Rash; Blisters)  penicillins (Swelling)  sulfa drugs (Unknown)    FAMILY HISTORY:    Social History:  Smoking: denies  Alcohol: denies  Drugs: denies    Review of Systems: see hpi  Constitutional: [ ] Fever [ ] Chills [ ] Fatigue [ ] Weight change   HEENT: [ ] Blurred vision [ ] Eye Pain [ ] Headache [ ] Runny nose [ ] Sore Throat   Respiratory: [ ] Cough [ ] Wheezing [ ] Shortness of breath  Cardiovascular: [ ] Chest Pain [ ] Palpitations [ ] ARDON [ ] PND [ ] Orthopnea  Gastrointestinal: [ ] Abdominal Pain [ ] Diarrhea [ ] Constipation [ ] Hemorrhoids [ ] Nausea [ ] Vomiting  Genitourinary: [ ] Nocturia [ ] Dysuria [ ] Incontinence  Extremities: [ ] Swelling [ ] Joint Pain  Neurologic: [ ] Focal deficit [ ] Paresthesias [ ] Syncope  Lymphatic: [ ] Swelling [ ] Lymphadenopathy   Skin: [ ] Rash [ ] Ecchymoses [ ] Wounds [ ] Lesions  Psychiatry: [ ] Depression [ ] Suicidal/Homicidal Ideation [ ] Anxiety [ ] Sleep Disturbances  [x ] 10 point review of systems is otherwise negative except as mentioned above            [ ]Unable to obtain    Physical Exam:  T(C): --  HR: --  BP: --  RR: --  SpO2: --  Wt(kg): --    Daily     Daily     Appearance: [x ] Normal [ x] NAD; mild jaundice, dry mucus menbranes, has picture of liver failure  Eyes: [x ] PERRL [x ] EOMI  HENT: [ ] Normal oral muscosa [ ]NC/AT  Cardiovascular: [ x] S1 [x ] S2; S4; [ x] RRR [ x] No m/r/g [ x] no JVP; LE edema 1+  abd bandage in place  Respiratory: [ x] Clear to auscultation bilaterally  Gastrointestinal: [ x] Soft, tender to palp; abd distentino  Musculoskeletal: [ x] No clubbing [ x] No joint deformity   Neurologic: [x ] Non-focal  Lymphatic: [ x] No lymphadenopathy  Psychiatry: [ x] AAOx3 [ x] Mood & affect appropriate  Skin: multiple echymotic spots extremities    Cardiovascular Diagnostic Testing:  ECG: sinus 97, RBBB, normal axis, biphasic anterior T waves - T wave changes are new from 9/10/17    Imaging:  < from: Xray Chest 1 View AP/PA. (09.15.17 @ 09:39) >  Findings:  There is now abundant soft tissue air surrounding the chest which is a   new finding since 9/10/2017. No evidence of pneumothorax. No definite   evidence of pneumomediastinum. Atelectasis noted at both lung bases. The   lungs otherwise appear clear..    Impression:  Extensive soft tissue air surrounding the thorax. This is a new finding   since 9/10/2017.    Bibasilar atelectasis..    < end of copied text >    Labs:                        12.7   13.5  )-----------( 182      ( 20 Sep 2017 13:52 )             36.5     09-20    132<L>  |  98  |  49<H>  ----------------------------<  90  5.8<H>   |  22  |  1.80<H>    Ca    12.5<H>      20 Sep 2017 13:52  Mg     2.3     09-20    TPro  5.3<L>  /  Alb  2.6<L>  /  TBili  1.5<H>  /  DBili  x   /  AST  57<H>  /  ALT  32  /  AlkPhos  116  09-20    PT/INR - ( 20 Sep 2017 13:52 )   PT: 15.4 sec;   INR: 1.42 ratio         PTT - ( 20 Sep 2017 13:52 )  PTT:25.3 sec  CARDIAC MARKERS ( 20 Sep 2017 13:52 )  x     / x     / 49 U/L / x     / x          Serum Pro-Brain Natriuretic Peptide: 335 pg/mL (09-15 @ 17:31) Patient seen and evaluated @ [facility] er [facility] called 1358, bedside 1402, discussed w interventionist 1417  Chief Complaint: abd pain    HPI:  84 year old man who described as healthy by his wife until start of this month found abd mass assessed as lymphoma; presents w/ abd incisional pain after liver biopsy.    Per patient, wife and daughter at bedside, developed abd pain mid July, had endoscopy later in August after clearance from cardiologist Kirstin Galindo of [facility]. On workup fount abd mass for which underwent liver stent 9/1 for obstruction. Repeat biopsy 9/11/17 and discharged to rehab from which presents today w incisional discomfort. Denies chest pain, shortness of breath, palpitations, orthopnea, pnd, LH. Has been very frail and weak since July. Has not gone home since, persistently in hospital or rehab.    PMH:   Childhood ARF => murmur considered insignificant  RBBB - longstanding  Several negative surveillance stress tests  Romero esophagus  Intestinal infection  Bladder polyp    PSH:   H/O inguinal hernia repair  S/P TURP (transurethral resection of prostate)  Bladder polyp  History of cholecystectomy    Medications:   aztreonam  IVPB      aztreonam  IVPB 1000 milliGRAM(s) IV Intermittent every 8 hours    Allergies:  latex (Rash; Blisters)  penicillins (Swelling)  sulfa drugs (Unknown)    FAMILY HISTORY:    Social History:  Smoking: denies  Alcohol: denies  Drugs: denies    Review of Systems: see hpi  Constitutional: [ ] Fever [ ] Chills [ +] Fatigue [+ ] Weight loss  HEENT: [ ] Blurred vision [ ] Eye Pain [ ] Headache [ ] Runny nose [ ] Sore Throat   Respiratory: [ ] Cough [ ] Wheezing [ ] Shortness of breath  Cardiovascular: [ ] Chest Pain [ ] Palpitations [ ] ARDON [ ] PND [ ] Orthopnea  Gastrointestinal: [+ ] Abdominal Pain [ ] Diarrhea [ ] Constipation [ ] Hemorrhoids [ ] Nausea [ ] Vomiting  Genitourinary: [ ] Nocturia [ ] Dysuria +incontinence  Neurologic: [ ] Focal deficit [ ] Paresthesias [ ] Syncope  Lymphatic: [ ] Swelling [ ] Lymphadenopathy   Skin: [ ] Rash [ ] Ecchymoses [ ] Wounds [ ]  Psychiatry: [+ ] Depression [ ] Suicidal/Homicidal Ideation [+ ] Anxiety [ ] Sleep Disturbances  [x ] 10 point review of systems is otherwise negative except as mentioned above            [ ]Unable to obtain    Physical Exam:  T(C): --  HR: --  BP: --  RR: --  SpO2: --  Wt(kg): --    Daily     Daily   PE:  Appearance: [x ] Normal [ x] NAD; mild jaundice, dry mucus menbranes, has picture of liver failure Cachectic  Eyes: [x ] PERRL [x ] EOMI  HENT: [ ] Normal oral muscosa [ ]NC/AT  Cardiovascular: [ x] S1 [x ] S2; S4; [ x] RRR [ x] No m/r/g [ x] no JVP; LE edema 1+  abd bandage in place  Respiratory: [ x] Clear to auscultation bilaterally  Gastrointestinal: [ x] Soft, tender to palp; abd distentino  Musculoskeletal: [ x] No clubbing [ x] No joint deformity   Neurologic: [x ] Non-focal  Lymphatic: [ x] No lymphadenopathy  Psychiatry: [ x] AAOx3 [ x] Mood & affect appropriate  Skin: multiple echymotic spots extremities    Cardiovascular Diagnostic Testing:  ECG: sinus 97.  Frequent APDS, some blocked.  RBBB, normal axis, biphasic anterior T waves - T wave changes are new from 9/10/17    Imaging:  < from: Xray Chest 1 View AP/PA. (09.15.17 @ 09:39) >  Findings:  There is now abundant soft tissue air surrounding the chest which is a   new finding since 9/10/2017. No evidence of pneumothorax. No definite   evidence of pneumomediastinum. Atelectasis noted at both lung bases. The   lungs otherwise appear clear..    Impression:  Extensive soft tissue air surrounding the thorax. This is a new finding   since 9/10/2017.    Bibasilar atelectasis..    < end of copied text >    Labs:                        12.7   13.5  )-----------( 182      ( 20 Sep 2017 13:52 )             36.5     09-20    132<L>  |  98  |  49<H>  ----------------------------<  90  5.8<H>   |  22  |  1.80<H>    Ca    12.5<H>      20 Sep 2017 13:52  Mg     2.3     09-20    TPro  5.3<L>  /  Alb  2.6<L>  /  TBili  1.5<H>  /  DBili  x   /  AST  57<H>  /  ALT  32  /  AlkPhos  116  09-20    PT/INR - ( 20 Sep 2017 13:52 )   PT: 15.4 sec;   INR: 1.42 ratio         PTT - ( 20 Sep 2017 13:52 )  PTT:25.3 sec  CARDIAC MARKERS ( 20 Sep 2017 13:52 )  x     / x     / 49 U/L / x     / x          Serum Pro-Brain Natriuretic Peptide: 335 pg/mL (09-15 @ 17:31)

## 2017-09-20 NOTE — H&P ADULT - NSHPREVIEWOFSYSTEMS_GEN_ALL_CORE
REVIEW OF SYSTEMS:  CONSTITUTIONAL: No weakness. No fevers. No chills. No rigors. +weight loss. poor appetite.  EYES: No visual changes. No eye pain.  ENT: No hearing difficulty. No vertigo. No dysphagia. No Sinusitis/rhinorrhea.  NECK: No pain. No stiffness/rigidity.  CARDIAC: No chest pain. No palpitations.  RESPIRATORY: +cough. No SOB. No hemoptysis.  GASTROINTESTINAL: No abdominal pain. No nausea. No vomiting. No hematemesis. No diarrhea. +constipation. No melena. No hematochezia.  GENITOURINARY: No dysuria. No frequency. No hesitancy. No hematuria.  NEUROLOGICAL: No numbness/tingling. No focal weakness. +incontinence. No headache.  BACK: No back pain.  EXTREMITIES: +lower extremity edema. Full ROM.  SKIN: No rashes. No itching. No other lesions.  PSYCHIATRIC: +depression. No anxiety. No SI/HI.  ALLERGIC: No lip swelling. No hives.  All other review of systems is negative unless indicated above.  Unless indicated above, unable to assess ROS because

## 2017-09-20 NOTE — H&P ADULT - PROBLEM SELECTOR PLAN 2
- VS q4h  - blood/ur cx  - treated with 6 days of cipro outpt, but may be colonization  - treat pna as above  - gentle ivf as above

## 2017-09-20 NOTE — H&P ADULT - PROBLEM SELECTOR PLAN 5
- [facility] to see pt in AM  - will need to treat pna prior to chemo  - consider starting steroids?

## 2017-09-20 NOTE — ED PROVIDER NOTE - CRITICAL CARE PROVIDED
consultation with other physicians/consult w/ pt's family directly relating to pts condition/interpretation of diagnostic studies/direct patient care (not related to procedure)/documentation/additional history taking

## 2017-09-20 NOTE — H&P ADULT - PROBLEM SELECTOR PLAN 8
- pt wishes his wife to make medical decisions for him if he is unable to  - full code for now. he is unsure if he wants DNR at this time and is still deciding.  - full medical management, and he is hopeful he will receive chemo  - time spent 20 min likely 2/2 anasarca  - will get le duplex

## 2017-09-20 NOTE — ED PROVIDER NOTE - CARE PLAN
Principal Discharge DX:	Pneumonia  Goal:	r/o PE Principal Discharge DX:	Pneumonia  Goal:	r/o PE  Secondary Diagnosis:	Abdominal mass

## 2017-09-21 DIAGNOSIS — I95.9 HYPOTENSION, UNSPECIFIED: ICD-10-CM

## 2017-09-21 DIAGNOSIS — E83.52 HYPERCALCEMIA: ICD-10-CM

## 2017-09-21 DIAGNOSIS — R21 RASH AND OTHER NONSPECIFIC SKIN ERUPTION: ICD-10-CM

## 2017-09-21 LAB
CULTURE RESULTS: SIGNIFICANT CHANGE UP
SPECIMEN SOURCE: SIGNIFICANT CHANGE UP

## 2017-09-21 RX ORDER — ALLOPURINOL 300 MG
300 TABLET ORAL DAILY
Qty: 0 | Refills: 0 | Status: DISCONTINUED | OUTPATIENT
Start: 2017-09-21 | End: 2017-10-07

## 2017-09-21 RX ORDER — IPRATROPIUM/ALBUTEROL SULFATE 18-103MCG
3 AEROSOL WITH ADAPTER (GRAM) INHALATION EVERY 6 HOURS
Qty: 0 | Refills: 0 | Status: DISCONTINUED | OUTPATIENT
Start: 2017-09-21 | End: 2017-09-25

## 2017-09-21 RX ORDER — PAMIDRONATE DISODIUM 9 MG/ML
90 INJECTION, SOLUTION INTRAVENOUS ONCE
Qty: 0 | Refills: 0 | Status: COMPLETED | OUTPATIENT
Start: 2017-09-21 | End: 2017-09-21

## 2017-09-21 RX ADMIN — Medication 300 MILLIGRAM(S): at 11:55

## 2017-09-21 RX ADMIN — SENNA PLUS 2 TABLET(S): 8.6 TABLET ORAL at 21:24

## 2017-09-21 RX ADMIN — FINASTERIDE 5 MILLIGRAM(S): 5 TABLET, FILM COATED ORAL at 11:55

## 2017-09-21 RX ADMIN — POLYETHYLENE GLYCOL 3350 17 GRAM(S): 17 POWDER, FOR SOLUTION ORAL at 05:46

## 2017-09-21 RX ADMIN — HEPARIN SODIUM 5000 UNIT(S): 5000 INJECTION INTRAVENOUS; SUBCUTANEOUS at 05:46

## 2017-09-21 RX ADMIN — Medication 3 MILLILITER(S): at 11:55

## 2017-09-21 RX ADMIN — PANTOPRAZOLE SODIUM 40 MILLIGRAM(S): 20 TABLET, DELAYED RELEASE ORAL at 06:01

## 2017-09-21 RX ADMIN — HEPARIN SODIUM 5000 UNIT(S): 5000 INJECTION INTRAVENOUS; SUBCUTANEOUS at 21:25

## 2017-09-21 RX ADMIN — PAMIDRONATE DISODIUM 65 MILLIGRAM(S): 9 INJECTION, SOLUTION INTRAVENOUS at 11:55

## 2017-09-21 RX ADMIN — Medication 100 MILLIGRAM(S): at 05:46

## 2017-09-21 RX ADMIN — Medication 100 MILLIGRAM(S): at 15:27

## 2017-09-21 RX ADMIN — POLYETHYLENE GLYCOL 3350 17 GRAM(S): 17 POWDER, FOR SOLUTION ORAL at 17:19

## 2017-09-21 RX ADMIN — Medication 100 MILLIGRAM(S): at 16:21

## 2017-09-21 RX ADMIN — Medication 50 MILLIGRAM(S): at 15:26

## 2017-09-21 RX ADMIN — Medication 60 MILLIGRAM(S): at 11:55

## 2017-09-21 RX ADMIN — Medication 100 MILLIGRAM(S): at 21:24

## 2017-09-21 RX ADMIN — Medication 3 MILLILITER(S): at 06:01

## 2017-09-21 RX ADMIN — Medication 3 MILLILITER(S): at 17:18

## 2017-09-21 RX ADMIN — HEPARIN SODIUM 5000 UNIT(S): 5000 INJECTION INTRAVENOUS; SUBCUTANEOUS at 15:26

## 2017-09-21 RX ADMIN — Medication 50 MILLIGRAM(S): at 05:46

## 2017-09-21 NOTE — SWALLOW BEDSIDE ASSESSMENT ADULT - SLP GENERAL OBSERVATIONS
Pt asleep in bed upon encounter, requiring continuos cues to maintain awake state and to accept PO trials.  Significant hypophonia noted with recent onset over past few weeks per Pt's wife, in addition to report of decreased appetite over the past few weeks.

## 2017-09-21 NOTE — CONSULT NOTE ADULT - SUBJECTIVE AND OBJECTIVE BOX
PULMONARY CONSULT  Mathew Glass MD  [phone]    Initial HPI on admission:  HPI:  84M c hx DLBCL not yet on chemo, biliary stenosis s/p cbd stent, mejias's esophagus, BPH c/b urinary retention s/p chronic tavarez (started 17), CKD (baseline Cr 1.2), recent discharge (9/15) pw hypotension at rehab.    History obtained from patient and wife.  Pt has been at [facility] rehab since hospitalization initially to [facility] with transfer to [facility] for biopsy. He has progressively been feeling weaker. Today, he was unable to stand up as he felt dizzy and weakness. Per wife, pt is also intermittently confused. At [facility], found to have SBP of 90. Pt reports intermittent coughing on eating food. He reports hoarseness and weaker voice. Reports some constipation. Denies fevers, chills, N/V, diarrhea, abd pain. Reports LLE pain and b/l LE swelling, as well as intermittent chest pain. Per charts, he was being treated for UTI at [facility] with 7 days of cipro, last dose to be .    VS: Tm 98.3, P 103, /65, R 20, 95% RA  In the ED, received lovenox 80, aztreonam, vanc, LR1L + NS 2L (20 Sep 2017 21:55)    PAST MEDICAL & SURGICAL HISTORY:  Diffuse large B-cell lymphoma of intra-abdominal lymph nodes  Mejias esophagus  Intestinal infection:   Bladder polyp  H/O inguinal hernia repair  S/P TURP (transurethral resection of prostate)  Bladder polyp: s/p TURBT  History of cholecystectomy:     Allergies    latex (Rash; Blisters)  penicillins (Swelling)  sulfa drugs (Unknown)    Intolerances      FAMILY HISTORY:  No pertinent family history in first degree relatives    REVIEW OF SYSTEMS    Review of Systems: REVIEW OF SYSTEMS:  CONSTITUTIONAL: No weakness. No fevers. No chills. No rigors. +weight loss. poor appetite.  EYES: No visual changes. No eye pain.  ENT: No hearing difficulty. No vertigo. No dysphagia. No Sinusitis/rhinorrhea.  NECK: No pain. No stiffness/rigidity.  CARDIAC: No chest pain. No palpitations.  RESPIRATORY: +cough. No SOB. No hemoptysis.  GASTROINTESTINAL: No abdominal pain. No nausea. No vomiting. No hematemesis. No diarrhea. +constipation. No melena. No hematochezia.  GENITOURINARY: No dysuria. No frequency. No hesitancy. No hematuria.  NEUROLOGICAL: No numbness/tingling. No focal weakness. +incontinence. No headache.  BACK: No back pain.  EXTREMITIES: +lower extremity edema. Full ROM.  SKIN: No rashes. No itching. No other lesions.  PSYCHIATRIC: +depression. No anxiety. No SI/HI.  ALLERGIC: No lip swelling. No hives.  All other review of systems is negative unless indicated above. Unless indicated above, unable to assess ROS because	  Social history:       Medications:  MEDICATIONS  (STANDING):  aztreonam  IVPB      aztreonam  IVPB 1000 milliGRAM(s) IV Intermittent every 8 hours  finasteride 5 milliGRAM(s) Oral daily  pantoprazole    Tablet 40 milliGRAM(s) Oral before breakfast  senna 2 Tablet(s) Oral at bedtime  polyethylene glycol 3350 17 Gram(s) Oral two times a day  docusate sodium 100 milliGRAM(s) Oral three times a day  influenza   Vaccine 0.5 milliLiter(s) IntraMuscular once  metroNIDAZOLE  IVPB 500 milliGRAM(s) IV Intermittent every 8 hours  metroNIDAZOLE  IVPB      heparin  Injectable 5000 Unit(s) SubCutaneous every 8 hours  sodium chloride 0.9%. 1000 milliLiter(s) (75 mL/Hr) IV Continuous <Continuous>  ALBUTerol/ipratropium for Nebulization 3 milliLiter(s) Nebulizer every 6 hours  allopurinol 300 milliGRAM(s) Oral daily  predniSONE   Tablet 60 milliGRAM(s) Oral daily    MEDICATIONS  (PRN):    Vital Signs Last 24 Hrs  T(C): 36.6 (21 Sep 2017 14:55), Max: 36.7 (21 Sep 2017 00:47)  T(F): 97.9 (21 Sep 2017 14:55), Max: 98 (21 Sep 2017 00:47)  HR: 93 (21 Sep 2017 14:55) (93 - 99)  BP: 99/58 (21 Sep 2017 14:55) (99/58 - 111/69)  BP(mean): --  RR: 18 (21 Sep 2017 14:55) (18 - 20)  SpO2: 96% (21 Sep 2017 14:55) (94% - 96%)          - @ 07:01  -   @ 07:00  --------------------------------------------------------  IN: 466 mL / OUT: 1000 mL / NET: -534 mL      LABS:                        12.7   13.5  )-----------( 182      ( 20 Sep 2017 13:52 )             36.5         133<L>  |  99  |  48<H>  ----------------------------<  83  5.4<H>   |  23  |  1.74<H>    Ca    11.7<H>      20 Sep 2017 16:15  Mg     2.3         TPro  5.3<L>  /  Alb  2.6<L>  /  TBili  1.5<H>  /  DBili  x   /  AST  57<H>  /  ALT  32  /  AlkPhos  116        PT/INR - ( 20 Sep 2017 13:52 )   PT: 15.4 sec;   INR: 1.42 ratio         PTT - ( 20 Sep 2017 13:52 )  PTT:25.3 sec  Urinalysis Basic - ( 20 Sep 2017 14:37 )    Color: Yellow / Appearance: x / S.018 / pH: x  Gluc: x / Ketone: Negative  / Bili: Negative / Urobili: Negative   Blood: x / Protein: 30 mg/dL / Nitrite: Negative   Leuk Esterase: Moderate / RBC: >50 /HPF / WBC >50 /HPF   Sq Epi: x / Non Sq Epi: x / Bacteria: Moderate /HPF        Serum Pro-Brain Natriuretic Peptide: 448 pg/mL (17 @ 13:52)      CULTURES:        Physical Examination:    General: Lethargic yet verbal, cachectic, NAD Oxygen sat 90% on RA    HEENT: Pupils equal, reactive to light.  Symmetric.    PULM: Diminished excursion with basilar crackles; no wheeze    CVS: Regular rate and rhythm, no murmurs, rubs, or gallops    ABD: Soft, nondistended, nontender, normoactive bowel sounds, no masses    EXT: No edema, nontender    SKIN: Warm and well perfused, no rashes noted.    NEURO: Alert, oriented, interactive, nonfocal    RADIOLOGY REVIEWED PERSONALLY  CXR:    CT chest: Tracheal debris noted, small L>R effusions with LLL consolidation c/w aspiration    TTE: NA

## 2017-09-21 NOTE — PROGRESS NOTE ADULT - SUBJECTIVE AND OBJECTIVE BOX
Patient is a 84y old  Male who presents with a chief complaint of hypotension (20 Sep 2017 21:55)      SUBJECTIVE / OVERNIGHT EVENTS:    Patient seen and examined.       Vital Signs Last 24 Hrs  T(C): 36.6 (21 Sep 2017 04:22), Max: 36.8 (20 Sep 2017 13:16)  T(F): 97.8 (21 Sep 2017 04:22), Max: 98.3 (20 Sep 2017 13:16)  HR: 99 (21 Sep 2017 04:22) (98 - 103)  BP: 110/62 (21 Sep 2017 06:44) (100/64 - 111/69)  BP(mean): --  RR: 18 (21 Sep 2017 04:22) (14 - 20)  SpO2: 95% (21 Sep 2017 04:22) (94% - 100%)  I&O's Summary    20 Sep 2017 07:01  -  21 Sep 2017 07:00  --------------------------------------------------------  IN: 466 mL / OUT: 1000 mL / NET: -534 mL        PE:  GENERAL: NAD, AAOx3  HEAD:  Atraumatic, Normocephalic  EYES: EOMI, PERRLA, conjunctiva and sclera clear  NECK: Supple, No JVD  CHEST/LUNG: CTABL, No wheeze  HEART: Regular rate and rhythm; + murmur  ABDOMEN: Soft, Nontender, Nondistended; Bowel sounds present  EXTREMITIES:  2+ Peripheral Pulses, No clubbing, cyanosis, or edema  SKIN: No rashes or lesions  NEURO: No focal deficits    LABS:                        12.7   13.5  )-----------( 182      ( 20 Sep 2017 13:52 )             36.5     09-20    133<L>  |  99  |  48<H>  ----------------------------<  83  5.4<H>   |  23  |  1.74<H>    Ca    11.7<H>      20 Sep 2017 16:15  Mg     2.3     09-20    TPro  5.3<L>  /  Alb  2.6<L>  /  TBili  1.5<H>  /  DBili  x   /  AST  57<H>  /  ALT  32  /  AlkPhos  116  09-20    PT/INR - ( 20 Sep 2017 13:52 )   PT: 15.4 sec;   INR: 1.42 ratio         PTT - ( 20 Sep 2017 13:52 )  PTT:25.3 sec  CAPILLARY BLOOD GLUCOSE        CARDIAC MARKERS ( 20 Sep 2017 13:52 )  x     / <0.01 ng/mL / 49 U/L / x     / 4.1 ng/mL      Urinalysis Basic - ( 20 Sep 2017 14:37 )    Color: Yellow / Appearance: x / S.018 / pH: x  Gluc: x / Ketone: Negative  / Bili: Negative / Urobili: Negative   Blood: x / Protein: 30 mg/dL / Nitrite: Negative   Leuk Esterase: Moderate / RBC: >50 /HPF / WBC >50 /HPF   Sq Epi: x / Non Sq Epi: x / Bacteria: Moderate /HPF        RADIOLOGY & ADDITIONAL TESTS:    Imaging Personally Reviewed:  [x] YES  [ ] NO    Consultant(s) Notes Reviewed:  [x] YES  [ ] NO    MEDICATIONS  (STANDING):  aztreonam  IVPB      aztreonam  IVPB 1000 milliGRAM(s) IV Intermittent every 8 hours  finasteride 5 milliGRAM(s) Oral daily  pantoprazole    Tablet 40 milliGRAM(s) Oral before breakfast  senna 2 Tablet(s) Oral at bedtime  polyethylene glycol 3350 17 Gram(s) Oral two times a day  docusate sodium 100 milliGRAM(s) Oral three times a day  influenza   Vaccine 0.5 milliLiter(s) IntraMuscular once  metroNIDAZOLE  IVPB 500 milliGRAM(s) IV Intermittent every 8 hours  metroNIDAZOLE  IVPB      heparin  Injectable 5000 Unit(s) SubCutaneous every 8 hours  sodium chloride 0.9%. 1000 milliLiter(s) (75 mL/Hr) IV Continuous <Continuous>  ALBUTerol/ipratropium for Nebulization 3 milliLiter(s) Nebulizer every 6 hours  pamidronate IVPB 90 milliGRAM(s) IV Intermittent once  allopurinol 300 milliGRAM(s) Oral daily  predniSONE   Tablet 60 milliGRAM(s) Oral daily    MEDICATIONS  (PRN):      Care Discussed with Consultants/Other Providers [x] YES  [ ] NO    HEALTH ISSUES - PROBLEM Dx:  Leg swelling: Leg swelling  Advanced care planning/counseling discussion: Advanced care planning/counseling discussion  Urinary retention: Urinary retention  Debility: Debility  Diffuse large B-cell lymphoma of intra-abdominal lymph nodes: Diffuse large B-cell lymphoma of intra-abdominal lymph nodes  EKG abnormality: EKG abnormality  MARIA C (acute kidney injury): MARIA C (acute kidney injury)  Sepsis, due to unspecified organism: Sepsis, due to unspecified organism  Pneumonia, bacterial: Pneumonia, bacterial  Abnormal EKG: Abnormal EKG Patient is a 84y old  Male who presents with a chief complaint of hypotension (20 Sep 2017 21:55)      SUBJECTIVE / OVERNIGHT EVENTS:    Patient seen and examined. deconditioned, frail, can barely complete sentences. denies abd pain, cp, sob.      Vital Signs Last 24 Hrs  T(C): 36.6 (21 Sep 2017 04:22), Max: 36.8 (20 Sep 2017 13:16)  T(F): 97.8 (21 Sep 2017 04:22), Max: 98.3 (20 Sep 2017 13:16)  HR: 99 (21 Sep 2017 04:22) (98 - 103)  BP: 110/62 (21 Sep 2017 06:44) (100/64 - 111/69)  BP(mean): --  RR: 18 (21 Sep 2017 04:22) (14 - 20)  SpO2: 95% (21 Sep 2017 04:22) (94% - 100%)  I&O's Summary    20 Sep 2017 07:01  -  21 Sep 2017 07:00  --------------------------------------------------------  IN: 466 mL / OUT: 1000 mL / NET: -534 mL        PE:  GENERAL: NAD, AAOx1  HEAD:  Atraumatic, Normocephalic  EYES: EOMI, PERRLA, conjunctiva and sclera clear  NECK: Supple, No JVD  CHEST/LUNG: decreased BS  HEART: Regular rate and rhythm; + murmur  ABDOMEN: Soft, Nontender, Nondistended; Bowel sounds present, multiple erythematous areas, no pus  : chronic tavarez  EXTREMITIES:  2+ Peripheral Pulses, BL LE wrapped  SKIN: No rashes or lesions  NEURO: No focal deficits    LABS:                        12.7   13.5  )-----------( 182      ( 20 Sep 2017 13:52 )             36.5     09-20    133<L>  |  99  |  48<H>  ----------------------------<  83  5.4<H>   |  23  |  1.74<H>    Ca    11.7<H>      20 Sep 2017 16:15  Mg     2.3     09-20    TPro  5.3<L>  /  Alb  2.6<L>  /  TBili  1.5<H>  /  DBili  x   /  AST  57<H>  /  ALT  32  /  AlkPhos  116      PT/INR - ( 20 Sep 2017 13:52 )   PT: 15.4 sec;   INR: 1.42 ratio         PTT - ( 20 Sep 2017 13:52 )  PTT:25.3 sec  CAPILLARY BLOOD GLUCOSE        CARDIAC MARKERS ( 20 Sep 2017 13:52 )  x     / <0.01 ng/mL / 49 U/L / x     / 4.1 ng/mL      Urinalysis Basic - ( 20 Sep 2017 14:37 )    Color: Yellow / Appearance: x / S.018 / pH: x  Gluc: x / Ketone: Negative  / Bili: Negative / Urobili: Negative   Blood: x / Protein: 30 mg/dL / Nitrite: Negative   Leuk Esterase: Moderate / RBC: >50 /HPF / WBC >50 /HPF   Sq Epi: x / Non Sq Epi: x / Bacteria: Moderate /HPF        RADIOLOGY & ADDITIONAL TESTS:    Imaging Personally Reviewed:  [x] YES  [ ] NO    Consultant(s) Notes Reviewed:  [x] YES  [ ] NO    MEDICATIONS  (STANDING):  aztreonam  IVPB      aztreonam  IVPB 1000 milliGRAM(s) IV Intermittent every 8 hours  finasteride 5 milliGRAM(s) Oral daily  pantoprazole    Tablet 40 milliGRAM(s) Oral before breakfast  senna 2 Tablet(s) Oral at bedtime  polyethylene glycol 3350 17 Gram(s) Oral two times a day  docusate sodium 100 milliGRAM(s) Oral three times a day  influenza   Vaccine 0.5 milliLiter(s) IntraMuscular once  metroNIDAZOLE  IVPB 500 milliGRAM(s) IV Intermittent every 8 hours  metroNIDAZOLE  IVPB      heparin  Injectable 5000 Unit(s) SubCutaneous every 8 hours  sodium chloride 0.9%. 1000 milliLiter(s) (75 mL/Hr) IV Continuous <Continuous>  ALBUTerol/ipratropium for Nebulization 3 milliLiter(s) Nebulizer every 6 hours  pamidronate IVPB 90 milliGRAM(s) IV Intermittent once  allopurinol 300 milliGRAM(s) Oral daily  predniSONE   Tablet 60 milliGRAM(s) Oral daily    MEDICATIONS  (PRN):      Care Discussed with Consultants/Other Providers [x] YES  [ ] NO    HEALTH ISSUES - PROBLEM Dx:  Leg swelling: Leg swelling  Advanced care planning/counseling discussion: Advanced care planning/counseling discussion  Urinary retention: Urinary retention  Debility: Debility  Diffuse large B-cell lymphoma of intra-abdominal lymph nodes: Diffuse large B-cell lymphoma of intra-abdominal lymph nodes  EKG abnormality: EKG abnormality  MARIA C (acute kidney injury): MARIA C (acute kidney injury)  Sepsis, due to unspecified organism: Sepsis, due to unspecified organism  Pneumonia, bacterial: Pneumonia, bacterial  Abnormal EKG: Abnormal EKG

## 2017-09-21 NOTE — SWALLOW BEDSIDE ASSESSMENT ADULT - SWALLOW EVAL: DIAGNOSIS
Pt presents with oropharyngeal dysphagia characterized by s/s of aspiration on the most conservative textures.  Significant hypophonia noted.  Facial grimacing noted upon swallow with delayed pharyngeal swallow suspected.

## 2017-09-21 NOTE — PROGRESS NOTE ADULT - PROBLEM SELECTOR PLAN 5
oncology consult appreciate oncology recs  hypercalcemia, pamidronate x 1, IVF  cont prednisone 60mg daily for 5 days  cont allopurinol  TTE

## 2017-09-21 NOTE — PROGRESS NOTE ADULT - PROBLEM SELECTOR PLAN 6
PT eval  very poor functional status in setting of infection PT eval, deconditioned, poor functional status

## 2017-09-21 NOTE — SWALLOW BEDSIDE ASSESSMENT ADULT - SWALLOW EVAL: PATIENT/FAMILY GOALS STATEMENT
Pt's wife reports Pt/family wishes for Pt to continue on an oral diet as she states that they would not consent to feeding tubes at this time.

## 2017-09-21 NOTE — SWALLOW BEDSIDE ASSESSMENT ADULT - SLP PERTINENT HISTORY OF CURRENT PROBLEM
84M c hx DLBCL not yet on chemo, biliary stenosis s/p cbd stent, mejias's esophagus, BPH c/b urinary retention s/p chronic tavarez (started 9/5/17), CKD (baseline Cr 1.2), recent discharge (9/15) pw hypotension at rehab.

## 2017-09-21 NOTE — CONSULT NOTE ADULT - PROBLEM SELECTOR RECOMMENDATION 3
keep covered and local care.
Newly diagnosed, Non-GC DLBCL  - Patient currently too weak for full dose chemotherapy  - Started on Prednisone 60mg PO daily. Will give 5 days course  - Started on Allopurinol 300mg PO daily  - Check Uric Acid Daily  - Can consider rasburicase is significant TLS  - Obtain Echo/MUGA to assess EF  - Will eventually need chemotherapy  - Plan to dose with Cyclophosphamide, Vincristine, and Prednisone again sometime next week. Will consent patient once he is less confused or when wife is at beside.  - Will follow    Mike Claudio MD  [facility] - [facility] Division of [facility]   [address] [place] [id number]  (T) [phone]  (F) [phone]

## 2017-09-21 NOTE — CONSULT NOTE ADULT - PROBLEM SELECTOR RECOMMENDATION 9
with leukocytosis and tachycardia-- sepsis which is likely secondary to pneumonia  now on prednisone so and this can also increase wbc   f/u blood cx   urine cx  cont antibx

## 2017-09-21 NOTE — SWALLOW BEDSIDE ASSESSMENT ADULT - SWALLOW EVAL: RECOMMENDED DIET
NPO, with non-oral nutrition, hydration, medications is recommended based upon the results of this examination; however, would suggest MD piter/di to determine the GOC with re: to provision of nutrition in this patient.

## 2017-09-21 NOTE — SWALLOW BEDSIDE ASSESSMENT ADULT - COMMENTS
9/20/17 CXR: IMPRESSION: Left pleural effusion.  Bibasilar atelectasis.  CT Chest: IMPRESSION:  Left lower lobe pneumonia.  Bulky confluent upper abdominal and retroperitoneal adenopathy as described, consistent with lymphoma.       Per H&P: History obtained from patient and wife;   Pt has been at [facility] rehab since hospitalization initially to [facility] with transfer to [facility] for biopsy. He has progressively been feeling weaker. Today, he was unable to stand up as he felt dizzy and weakness. Per wife, pt is also intermittently confused. At [facility], found to have SBP of 90. Pt reports intermittent coughing on eating food. He reports hoarseness and weaker voice. Reports some constipation. Denies fevers, chills, N/V, diarrhea, abd pain. Reports LLE pain and b/l LE swelling, as well as intermittent chest pain. Per charts, he was being treated for UTI at [facility] with 7 days of cipro, last dose to be 9/21.

## 2017-09-21 NOTE — CONSULT NOTE ADULT - PROBLEM SELECTOR RECOMMENDATION 2
recent hospitalization and rehab  agree with broad antibx for HCAP  vanc given times1  aztreonam and flagyl  aspiration precautions  urine for legionella
Secondary to malignancy. May be contributing to his confused  - Corrected Calcium is >13.5g/dl  - Pamidronate 90mg IV x 1 dose  - Continue IV fluids  - If no significant improvement, will consider calcitonin

## 2017-09-21 NOTE — CONSULT NOTE ADULT - SUBJECTIVE AND OBJECTIVE BOX
[facility] - Tahmina Miller / Dandre / González - [phone]  Primary Outpatient Hematologist/Oncologist: Dr. Noah Miller    Chief Complaint:  Confusion    HPI:  84 year old man with recent diagnosis of DLBCL, biliary stenosis s/p CBD stent, mejias's esophagus, BPH w/ urinary retention s/p chronic tavarez placement admitted with weakness and confusion. Patient reports having felt weak since admission to [facility] facility. Patient been unable to stand and at times is dizzy. No chest pain. No nausea or vomiting. Patient has been more confused than usual. No fevers or chills.      ROS:  General:  (-)Pain, (+)Decrease appetite, (-)Fevers, (-)Chills, (-)Weight Loss  Eyes: (-)Blurry Vision, (-)Double Vision, (-)Vision Loss  ENT: (-)Sinus Congestion, (-)Decreased Hearing, (-)Nosebleeds, (-)Sore Throat  Cardiac: (-)Chest Pain, (-)Palpitations, (+)Shortness of breathe on exertion  Respiratory: (-)Cough, (-)hemoptysis, (-)Shortness of breathe  GI: (-)Nausea, (-)Vomiting, (-)Diarrhea, (-)Constipation, (-)Melena, (-)BRBPR  : (-)Hematuria, (-)Dysuria, (-)Polyuia  MSK: (-)Back pain, (-)Joint pain, (-)Stiffness  Dermatology: (-)Rash, (-)Itching  Neurology:  (-)Numbness, (-)Tingling, (-)Difficulty Walking, (-)Tremors, (+)Weakness  Psych: (-)Anxiety, (-)Depression, (-)Memory Loss  Hematology:  (-)Easy Bruising, (-)Easy Bleeding, (-)Night Sweats.     PAST MEDICAL & SURGICAL HISTORY:  Diffuse large B-cell lymphoma of intra-abdominal lymph nodes  Mejias esophagus  Intestinal infection: 2012  Bladder polyp  H/O inguinal hernia repair  S/P TURP (transurethral resection of prostate)  Bladder polyp: s/p TURBT  History of cholecystectomy: 1987    Social History  Tobacco: Denies current use  Alcohol: Denies current use  Drugs:  Denies current use    FAMILY HISTORY:  No pertinent family history in first degree relatives    MEDICATIONS  (STANDING):  aztreonam  IVPB 1000 milliGRAM(s) IV Intermittent every 8 hours  finasteride 5 milliGRAM(s) Oral daily  pantoprazole    Tablet 40 milliGRAM(s) Oral before breakfast  senna 2 Tablet(s) Oral at bedtime  polyethylene glycol 3350 17 Gram(s) Oral two times a day  docusate sodium 100 milliGRAM(s) Oral three times a day  influenza   Vaccine 0.5 milliLiter(s) IntraMuscular once  metroNIDAZOLE  IVPB 500 milliGRAM(s) IV Intermittent every 8 hours  metroNIDAZOLE  IVPB      heparin  Injectable 5000 Unit(s) SubCutaneous every 8 hours  sodium chloride 0.9%. 1000 milliLiter(s) (75 mL/Hr) IV Continuous <Continuous>  ALBUTerol/ipratropium for Nebulization 3 milliLiter(s) Nebulizer every 6 hours  pamidronate IVPB 90 milliGRAM(s) IV Intermittent once  allopurinol 300 milliGRAM(s) Oral daily  predniSONE   Tablet 60 milliGRAM(s) Oral daily    Allergies  latex (Rash; Blisters)  penicillins (Swelling)  sulfa drugs (Unknown)    Vital Signs Last 24 Hrs  T(C): 36.6 (21 Sep 2017 04:22), Max: 36.8 (20 Sep 2017 13:16)  T(F): 97.8 (21 Sep 2017 04:22), Max: 98.3 (20 Sep 2017 13:16)  HR: 99 (21 Sep 2017 04:22) (98 - 103)  BP: 110/62 (21 Sep 2017 06:44) (100/64 - 111/69)  RR: 18 (21 Sep 2017 04:22) (14 - 20)  SpO2: 95% (21 Sep 2017 04:22) (94% - 100%)    Physical Exam:  General: AAO x 3. NAD. Lying in bed comfortably. Intermittently confused. Tangential thoughts at times.   HEENT: clear oropharynx, anicteric sclera, pink conjunctivae, EOMi. PERRLA  Cardiac: S1, S2 present. No audible murmurs. RRR  Lungs: CTA B/L.   Abdomen: Soft, Non-Tender, Non-Distended. No palpable hepatosplenomegaly. +Abdominal pads soaked in urine.  Extremities: 2+ pulses. No edema  Skin: No Rashes. No petechiae  Neuro: No focal motor or sensory deficits.     Labs:  CBC Full  -  ( 20 Sep 2017 13:52 )  WBC Count : 13.5 K/uL  Hemoglobin : 12.7 g/dL  Hematocrit : 36.5 %  Platelet Count - Automated : 182 K/uL  Mean Cell Volume : 104.0 fl  Mean Cell Hemoglobin : 36.2 pg  Mean Cell Hemoglobin Concentration : 34.8 gm/dL  Auto Neutrophil # : 11.2 K/uL  Auto Lymphocyte # : 0.8 K/uL  Auto Monocyte # : 1.4 K/uL  Auto Eosinophil # : 0.0 K/uL  Auto Basophil # : 0.0 K/uL  Auto Neutrophil % : 83.1 %  Auto Lymphocyte % : 6.3 %  Auto Monocyte % : 10.3 %  Auto Eosinophil % : 0.3 %  Auto Basophil % : 0.1 %    09-20    133<L>  |  99  |  48<H>  ----------------------------<  83  5.4<H>   |  23  |  1.74<H>    Ca    11.7<H>      20 Sep 2017 16:15  Mg     2.3     09-20  TPro  5.3<L>  /  Alb  2.6<L>  /  TBili  1.5<H>  /  DBili  x   /  AST  57<H>  /  ALT  32  /  AlkPhos  116  09-20    PT/INR - ( 20 Sep 2017 13:52 )   PT: 15.4 sec;   INR: 1.42 ratio    PTT - ( 20 Sep 2017 13:52 )  PTT:25.3 sec    Radiology:  CT Chest/Abdomen/Pelvis:  IMPRESSION:   Left lower lobe pneumonia.  Bulky confluent upper abdominal and retroperitoneal adenopathy as   described, consistent with lymphoma.     CT Head;  IMPRESSION: Unremarkable noncontrast CT of the brain.    V/Q Scan   IMPRESSION: Very low probability of pulmonary embolism      Pathology:  Surgical Pathology Final Report - :   ACCESSION No:  [id number]    Final Diagnosis  Lymph node, periportal, biopsy  Diffuse large B-cell lymphoma, non-germinal center type

## 2017-09-21 NOTE — CONSULT NOTE ADULT - SUBJECTIVE AND OBJECTIVE BOX
[facility], Division of Infectious Diseases  JULIANN Peraza A. Lee  [phone]  MIROSLAVA VERGARA  84y, Male  [id number]      HPI: pt lethargic and a little confused.  Most history per chart.  84M c hx DLBCL not yet on chemo, biliary stenosis s/p cbd stent, mejias's esophagus, BPH c/b urinary retention s/p chronic tavarez (started 9/5/17), CKD (baseline Cr 1.2), recent discharge (9/15) pw hypotension at rehab.      Pt has been at [facility] rehab since hospitalization initially to [facility] with transfer to [facility] for biopsy. He has progressively been feeling weaker.  He was unable to stand up as he felt dizzy and weakness. Per wife, pt is also intermittently confused. At [facility], found to have SBP of 90. Pt reports intermittent coughing on eating food. He reports hoarseness and weaker voice. Reports some constipation. Denies fevers, chills, N/V, diarrhea, abd pain. Reports LLE pain and b/l LE swelling, as well as intermittent chest pain. Per charts, he was being treated for UTI at [facility] with 7 days of cipro, last dose to be 9/21.    currently denies cough.  but closing eyes during exam.  Following simple commands. oriented time 2      PMH/PSH--  Diffuse large B-cell lymphoma of intra-abdominal lymph nodes  Mejias esophagus  Intestinal infection  Bladder polyp  H/O inguinal hernia repair  S/P TURP (transurethral resection of prostate)  Bladder polyp  History of cholecystectomy      Allergies--PCN   Sulfa      Medications--  Antibiotics: aztreonam  IVPB      aztreonam  IVPB 1000 milliGRAM(s) IV Intermittent every 8 hours  metroNIDAZOLE  IVPB 500 milliGRAM(s) IV Intermittent every 8 hours  metroNIDAZOLE  IVPB        Immunologic: influenza   Vaccine 0.5 milliLiter(s) IntraMuscular once    Other: finasteride  pantoprazole    Tablet  senna  polyethylene glycol 3350  docusate sodium  heparin  Injectable  sodium chloride 0.9%.  ALBUTerol/ipratropium for Nebulization  pamidronate IVPB  allopurinol  predniSONE   Tablet      Social History--  EtOH: denies ***  Tobacco: denies ***  Drug Use: denies ***    Family/Marital History--  No pertinent family history in first degree relatives  Family history of multiple myeloma    Remainder not relevant to clincial concern.    Travel/Environmental/Occupational History:  *** inserth T/E/O Hx ***    Review of Systems:  A >=10-point review of systems was obtained.     Pertinent positives and negatives--  unable to obtain as pt lethargic  Allergy/Immunologic:    Review of systems otherwise negative except as previously noted.    Physical Exam--  Vital Signs: T(F): 97.8 (09-21-17 @ 04:22), Max: 98.3 (09-20-17 @ 13:16)  HR: 99 (09-21-17 @ 04:22)  BP: 110/62 (09-21-17 @ 06:44)  RR: 18 (09-21-17 @ 04:22)  SpO2: 95% (09-21-17 @ 04:22)  Wt(kg): --  General: Nontoxic-appearing Male in no acute distress.  HEENT: AT/NC. P+ arcus Anicteric. dry mucus membranes.  no thrush, dentition poor  Neck: Not rigid. No sense of mass.  Nodes: None palpable.  Lungs: decrease bs  Heart: Regular rate and rhythm. soft systolic Murmur.   Abdomen: Bowel sounds present and normoactive. Soft.  ulcerated/ scabs rash across abdomen.    Extremities: No cyanosis or clubbing. +++edema.  wrapped bLE  Skin: Warm. Dry. Good turgor. No rash. No vasculitic stigmata.  [facility]        Laboratory & Imaging Data--  CBC                        12.7   13.5  )-----------( 182      ( 20 Sep 2017 13:52 )             36.5       Chemistries  09-20    133<L>  |  99  |  48<H>  ----------------------------<  83  5.4<H>   |  23  |  1.74<H>    Ca    11.7<H>      20 Sep 2017 16:15  Mg     2.3     09-20    TPro  5.3<L>  /  Alb  2.6<L>  /  TBili  1.5<H>  /  DBili  x   /  AST  57<H>  /  ALT  32  /  AlkPhos  116  09-20      Culture Data  Culture - Urine (09.10.17 @ 18:34)    Specimen Source: .Urine Catheterized    Culture Results:   >100,000 CFU/ml Coag Negative Staphylococcus    < from: CT Chest No Cont (09.20.17 @ 15:48) >  EXAM:  CT CHEST                          EXAM:  CT ABDOMEN AND PELVIS                            PROCEDURE DATE:  09/20/2017            INTERPRETATION:  CLINICAL INFORMATION: Recent abdominal surgery.   Lymphoma. Acute renal failure. Hypotension.    COMPARISON: None.    PROCEDURE:   CT of the Chest, Abdomen and Pelvis was performed without intravenous   contrast.   Intravenous contrast: None.  Oral contrast: None.  Sagittal and coronal reformats were performed.    Evaluation of intra-abdominal organs and vasculature is limited due to   lack of IV or oral contrast and streak artifact due to bilateral arms.    FINDINGS:    CHEST:     LUNGS AND LARGE AIRWAYS: Debris in the trachea, otherwise patent central   airways. Left lower lobe consolidation. Right lower lobe linear   atelectasis.   PLEURA: Bilateral pleural effusion left greater than right.  VESSELS: Coronary artery calcifications. Mild atherosclerotic thoracic   aorta.  HEART: Heart size is normal. No pericardial effusion.  MEDIASTINUM AND MARILYN: No lymphadenopathy.  CHEST WALL AND LOWER NECK: Diffuse subcutaneous emphysema due to recent   abdominal surgery.    ABDOMEN AND PELVIS:    LIVER: Perihepatic fluid.   BILE DUCTS: Normal caliber. Status post CBD stent.  GALLBLADDER: Status post cholecystectomy.  SPLEEN: Splenomegaly. Small perisplenic fluid.  PANCREAS: Within normal limits.  ADRENALS: Within normal limits.  KIDNEYS/URETERS: Within normal limits.    BLADDER: Status post Tavarez placement  REPRODUCTIVE ORGANS: The prostate is within normal limits.    BOWEL: No bowel obstruction. Appendix not visualized.  PERITONEUM: No ascites.  VESSELS: Moderate to severe atherosclerotic vessels.  LYMPH NODES: Bulky confluent upper abdominal and retroperitoneal   adenopathy. For example, confluent right upper abdominal adenopathy   measures 11.0 x 8.5 cm. Confluent adenopathy which encases the aorta   measures 9.7 x 5.8 cm. Additional gastrohepatic and mesenteric adenopathy   is present.  ABDOMINAL WALL: Diffuse subcutaneous emphysema due to recent abdominal   surgery. Anasarca.  BONES: Degenerative changes of spine.    IMPRESSION:   Left lower lobe pneumonia.  Bulky confluent upper abdominal and retroperitoneal adenopathy as   described, consistent with lymphoma.       < end of copied text >      < from: NM Pulmonary Ventilation/Perfusion Scan (09.20.17 @ 20:56) >  EXAM:  NM PULM VENTILATION PERFUS IMG                                PROCEDURE DATE:  09/20/2017          INTERPRETATION:  EXAM: Nuclear Medicine Ventilation/Perfusion (VQ) Lung   Scan     RADIOPHARMACEUTICAL: 1 mCi Tc-99m-DTPA via aerosol; 6.6 mCiTc-99m-MAA,   I.V.    CLINICAL STATEMENT: Dyspnea and hypotension; evaluate for pulmonary   embolism    TECHNIQUE:  Ventilation and perfusion images of the lungs were obtained   following administration of Tc-99m-DTPA and Tc-99m-MAA. Images were   obtained in the anterior, posterior, both lateral, and all 4 oblique   projections. The study was interpreted in conjunction with chest   radiograph of September 20, 2017.    FINDINGS: Inhomogeneous distribution of radiotracer in the lungs on the   ventilation images. Small, nonsegmental matched defects. No segmental   perfusion defects.    IMPRESSION: Very low probability of pulmonary embolism    < end of copied text >

## 2017-09-21 NOTE — PROGRESS NOTE ADULT - PROBLEM SELECTOR PLAN 1
speech swallow eval  ID consult, covering for aspiration and HCAP with aztreonam and flagyl, PCN allergy  cont gentle IVF given b/l pleural effusion and evidence of anasarca  f/u blood cultures

## 2017-09-21 NOTE — DISCHARGE NOTE ADULT - PATIENT PORTAL LINK FT
“You can access the [facility] Patient Portal, offered by [facility], by registering with the following website: [email or web address]”

## 2017-09-21 NOTE — CONSULT NOTE ADULT - ASSESSMENT
84M with lymphoma, s/p biliary stent, urinary retention   here with encephalopathy,  weakness, and hypotension, leukocytosis, hypercalcemia    Found to have pneumonia on CT scan

## 2017-09-21 NOTE — SWALLOW BEDSIDE ASSESSMENT ADULT - PHARYNGEAL PHASE
facial grimacing upon swallow although Pt does not report pain; adequate laryngeal elevation/Delayed pharyngeal swallow/Delayed cough post oral intake Cough post oral intake/Delayed pharyngeal swallow

## 2017-09-21 NOTE — PROGRESS NOTE ADULT - ASSESSMENT
84M c hx DLBCL not yet on chemo, biliary stenosis s/p cbd stent, mejias's esophagus, BPH c/b urinary retention s/p chronic tavarez (started 9/5/17), CKD (baseline Cr 1.2), recent discharge (9/15) sp biopsy, pw sepsis 2/2 PNA

## 2017-09-21 NOTE — CONSULT NOTE ADULT - ASSESSMENT
84 year old man with DLBCL which is yet untreated admitted with confusion and weakness. Found to have hypercalcemia of malignancy and pneumonia

## 2017-09-21 NOTE — PROGRESS NOTE ADULT - SUBJECTIVE AND OBJECTIVE BOX
24H hour events: No acute events. Denies CP, SOB.    MEDICATIONS:  heparin  Injectable 5000 Unit(s) SubCutaneous every 8 hours    aztreonam  IVPB      aztreonam  IVPB 1000 milliGRAM(s) IV Intermittent every 8 hours  metroNIDAZOLE  IVPB 500 milliGRAM(s) IV Intermittent every 8 hours  metroNIDAZOLE  IVPB        ALBUTerol/ipratropium for Nebulization 3 milliLiter(s) Nebulizer every 6 hours      pantoprazole    Tablet 40 milliGRAM(s) Oral before breakfast  senna 2 Tablet(s) Oral at bedtime  polyethylene glycol 3350 17 Gram(s) Oral two times a day  docusate sodium 100 milliGRAM(s) Oral three times a day    finasteride 5 milliGRAM(s) Oral daily  pamidronate IVPB 90 milliGRAM(s) IV Intermittent once  allopurinol 300 milliGRAM(s) Oral daily  predniSONE   Tablet 60 milliGRAM(s) Oral daily    influenza   Vaccine 0.5 milliLiter(s) IntraMuscular once  sodium chloride 0.9%. 1000 milliLiter(s) IV Continuous <Continuous>      REVIEW OF SYSTEMS:  Denies CP, SOB, orthopnea, pnd, palpitations, LH.    PHYSICAL EXAM:  T(C): 36.6 (09-21-17 @ 04:22), Max: 36.8 (09-20-17 @ 13:16)  HR: 99 (09-21-17 @ 04:22) (98 - 103)  BP: 110/62 (09-21-17 @ 06:44) (100/64 - 111/69)  RR: 18 (09-21-17 @ 04:22) (14 - 20)  SpO2: 95% (09-21-17 @ 04:22) (94% - 100%)  Wt(kg): --  I&O's Summary    20 Sep 2017 07:01  -  21 Sep 2017 07:00  --------------------------------------------------------  IN: 466 mL / OUT: 1000 mL / NET: -534 mL    Appearance: [x ] Normal [ x] NAD; mild jaundice, dry mucus membranes, has picture of liver failure  Eyes: [x ] PERRL [x ] EOMI  HENT: [ ] Normal oral muscosa [ ]NC/AT  Cardiovascular: [ x] S1 [x ] S2; S4; [ x] RRR [ x] No m/r/g [ x] no JVP; LE edema 1+  Respiratory: [ x] Clear to auscultation bilaterally  Gastrointestinal: [ x] Soft, tender to palp; abd distention  Musculoskeletal: [ x] No clubbing [ x] No joint deformity   Neurologic: [x ] Non-focal  Lymphatic: [ x] No lymphadenopathy  Psychiatry: [ x] AAOx3 [ x] Mood & affect appropriate  Skin: multiple echymotic spots extremities    LABS:	 	    CBC Full  -  ( 20 Sep 2017 13:52 )  WBC Count : 13.5 K/uL  Hemoglobin : 12.7 g/dL  Hematocrit : 36.5 %  Platelet Count - Automated : 182 K/uL  Mean Cell Volume : 104.0 fl  Mean Cell Hemoglobin : 36.2 pg  Mean Cell Hemoglobin Concentration : 34.8 gm/dL  Auto Neutrophil # : 11.2 K/uL  Auto Lymphocyte # : 0.8 K/uL  Auto Monocyte # : 1.4 K/uL  Auto Eosinophil # : 0.0 K/uL  Auto Basophil # : 0.0 K/uL  Auto Neutrophil % : 83.1 %  Auto Lymphocyte % : 6.3 %  Auto Monocyte % : 10.3 %  Auto Eosinophil % : 0.3 %  Auto Basophil % : 0.1 %    09-20    133<L>  |  99  |  48<H>  ----------------------------<  83  5.4<H>   |  23  |  1.74<H>  09-20    132<L>  |  98  |  49<H>  ----------------------------<  90  5.8<H>   |  22  |  1.80<H>    Ca    11.7<H>      20 Sep 2017 16:15  Ca    12.5<H>      20 Sep 2017 13:52  Mg     2.3     09-20    TPro  5.3<L>  /  Alb  2.6<L>  /  TBili  1.5<H>  /  DBili  x   /  AST  57<H>  /  ALT  32  /  AlkPhos  116  09-20    proBNP: Serum Pro-Brain Natriuretic Peptide: 448 pg/mL (09-20 @ 13:52)  	  ASSESSMENT/PLAN: 	    84 year old man w/ recent diagnosis of DLBCL, biliary stenosis s/p CBD stent, mejias's esophagus, BPH w/ urinary retention s/p chronic tavarez placement; consult for abn EKG      Abnormal EKG  -continues to be without chest pain or anginal equivalent symptoms  -trend CE/EKG  -TTE    No planned intervention/diagnostics beyond above. Please call back if abnormal or further questions.    [id number]. -currently without chest pain or anginal equivalent symptoms

## 2017-09-21 NOTE — CONSULT NOTE ADULT - ASSESSMENT
B cell lymhoma with extensive intrabdominal adenopathy and secondary LE edema  OP dysphagia with LLL aspiration pneumonia  Bilateral small pleural effusions - doubt empyema, though would consider dx thoro on L if patient remains septic or if effusion increases  Barett's Esophagous  MARIA C  r/o UTi    REC:    f/u cxr 1-2 days  Continue Aztreonam/Flagyl per ID  f/u blood and urine cultures  Oncology f/u  Continue nasal oxygen to sat >= 90%  IV hydration and aspiration precautions - pt poor candidate for PEG at this time

## 2017-09-21 NOTE — SWALLOW BEDSIDE ASSESSMENT ADULT - ORAL PHASE
Decreased anterior-posterior movement of the bolus/Delayed oral transit time Decreased anterior-posterior movement of the bolus

## 2017-09-22 LAB
ANION GAP SERPL CALC-SCNC: 16 MMOL/L — SIGNIFICANT CHANGE UP (ref 5–17)
BUN SERPL-MCNC: 54 MG/DL — HIGH (ref 7–23)
CALCIUM SERPL-MCNC: 11.8 MG/DL — HIGH (ref 8.4–10.5)
CHLORIDE SERPL-SCNC: 97 MMOL/L — SIGNIFICANT CHANGE UP (ref 96–108)
CO2 SERPL-SCNC: 18 MMOL/L — LOW (ref 22–31)
CREAT SERPL-MCNC: 1.88 MG/DL — HIGH (ref 0.5–1.3)
GLUCOSE SERPL-MCNC: 118 MG/DL — HIGH (ref 70–99)
HCT VFR BLD CALC: 38.1 % — LOW (ref 39–50)
HGB BLD-MCNC: 12.6 G/DL — LOW (ref 13–17)
MCHC RBC-ENTMCNC: 33.1 GM/DL — SIGNIFICANT CHANGE UP (ref 32–36)
MCHC RBC-ENTMCNC: 33.2 PG — SIGNIFICANT CHANGE UP (ref 27–34)
MCV RBC AUTO: 100.3 FL — HIGH (ref 80–100)
PLATELET # BLD AUTO: 211 K/UL — SIGNIFICANT CHANGE UP (ref 150–400)
POTASSIUM SERPL-MCNC: 5.7 MMOL/L — HIGH (ref 3.5–5.3)
POTASSIUM SERPL-SCNC: 5.7 MMOL/L — HIGH (ref 3.5–5.3)
RBC # BLD: 3.8 M/UL — LOW (ref 4.2–5.8)
RBC # FLD: 15.6 % — HIGH (ref 10.3–14.5)
SODIUM SERPL-SCNC: 131 MMOL/L — LOW (ref 135–145)
URATE SERPL-MCNC: 11 MG/DL — HIGH (ref 3.4–8.8)
WBC # BLD: 17.21 K/UL — HIGH (ref 3.8–10.5)
WBC # FLD AUTO: 17.21 K/UL — HIGH (ref 3.8–10.5)

## 2017-09-22 PROCEDURE — 93970 EXTREMITY STUDY: CPT | Mod: 26

## 2017-09-22 RX ORDER — ENOXAPARIN SODIUM 100 MG/ML
100 INJECTION SUBCUTANEOUS EVERY 12 HOURS
Qty: 0 | Refills: 0 | Status: DISCONTINUED | OUTPATIENT
Start: 2017-09-22 | End: 2017-09-25

## 2017-09-22 RX ADMIN — ENOXAPARIN SODIUM 100 MILLIGRAM(S): 100 INJECTION SUBCUTANEOUS at 17:57

## 2017-09-22 RX ADMIN — POLYETHYLENE GLYCOL 3350 17 GRAM(S): 17 POWDER, FOR SOLUTION ORAL at 06:08

## 2017-09-22 RX ADMIN — Medication 100 MILLIGRAM(S): at 14:11

## 2017-09-22 RX ADMIN — Medication 3 MILLILITER(S): at 03:06

## 2017-09-22 RX ADMIN — Medication 100 MILLIGRAM(S): at 06:07

## 2017-09-22 RX ADMIN — Medication 300 MILLIGRAM(S): at 14:11

## 2017-09-22 RX ADMIN — Medication 3 MILLILITER(S): at 14:11

## 2017-09-22 RX ADMIN — Medication 100 MILLIGRAM(S): at 10:01

## 2017-09-22 RX ADMIN — Medication 100 MILLIGRAM(S): at 17:53

## 2017-09-22 RX ADMIN — Medication 60 MILLIGRAM(S): at 06:07

## 2017-09-22 RX ADMIN — Medication 3 MILLILITER(S): at 17:54

## 2017-09-22 RX ADMIN — Medication 50 MILLIGRAM(S): at 03:05

## 2017-09-22 RX ADMIN — FINASTERIDE 5 MILLIGRAM(S): 5 TABLET, FILM COATED ORAL at 14:11

## 2017-09-22 RX ADMIN — Medication 3 MILLILITER(S): at 23:13

## 2017-09-22 RX ADMIN — Medication 50 MILLIGRAM(S): at 17:53

## 2017-09-22 RX ADMIN — Medication 3 MILLILITER(S): at 06:08

## 2017-09-22 RX ADMIN — PANTOPRAZOLE SODIUM 40 MILLIGRAM(S): 20 TABLET, DELAYED RELEASE ORAL at 06:07

## 2017-09-22 RX ADMIN — HEPARIN SODIUM 5000 UNIT(S): 5000 INJECTION INTRAVENOUS; SUBCUTANEOUS at 06:08

## 2017-09-22 RX ADMIN — Medication 50 MILLIGRAM(S): at 10:01

## 2017-09-22 RX ADMIN — Medication 100 MILLIGRAM(S): at 03:14

## 2017-09-22 RX ADMIN — SENNA PLUS 2 TABLET(S): 8.6 TABLET ORAL at 21:17

## 2017-09-22 RX ADMIN — Medication 100 MILLIGRAM(S): at 21:17

## 2017-09-22 RX ADMIN — POLYETHYLENE GLYCOL 3350 17 GRAM(S): 17 POWDER, FOR SOLUTION ORAL at 17:53

## 2017-09-22 NOTE — PHYSICAL THERAPY INITIAL EVALUATION ADULT - DISCHARGE DISPOSITION, PT EVAL
rehabilitation facility/subacute rehab for strengthening, bed mob, transfer, gait and balance, endurance training

## 2017-09-22 NOTE — PHYSICAL THERAPY INITIAL EVALUATION ADULT - LIVES WITH, PROFILE
Pt lives with spouse in a condominium. Pt has 0 stairs to negotiate to enter or within. Pt lives with spouse in a condominium. Pt has 0 stairs to negotiate to enter or within./spouse

## 2017-09-22 NOTE — PROGRESS NOTE ADULT - SUBJECTIVE AND OBJECTIVE BOX
Follow-up Pulm Progress Note  Mathew Glass MD  [phone]    Notes reviewd  Prednisone 60 mg qd started per oncology  Increase WBC noted, yet afebrile and hemodynamically stable on abx for pneumonia  No new respiratory events overnight.  Denies SOB/CP.     Vital Signs Last 24 Hrs  T(C): 36.5 (22 Sep 2017 03:36), Max: 36.6 (21 Sep 2017 14:55)  T(F): 97.7 (22 Sep 2017 03:36), Max: 97.9 (21 Sep 2017 14:55)  HR: 97 (22 Sep 2017 03:36) (93 - 97)  BP: 102/62 (22 Sep 2017 03:36) (98/67 - 102/62)  BP(mean): --  RR: 18 (22 Sep 2017 03:36) (18 - 18)  SpO2: 96% (22 Sep 2017 03:36) (96% - 96%)                         12.6   17.21 )-----------( 211      ( 22 Sep 2017 07:34 )             38.1       09-22    131<L>  |  97  |  54<H>  ----------------------------<  118<H>  5.7<H>   |  18<L>  |  1.88<H>    Ca    11.8<H>      22 Sep 2017 07:36  Mg     2.3     09-20    TPro  5.3<L>  /  Alb  2.6<L>  /  TBili  1.5<H>  /  DBili  x   /  AST  57<H>  /  ALT  32  /  AlkPhos  116  09-20    Physical Examination:  PULM:   CVS: Regular rate and rhythm, no murmurs, rubs, or gallops  ABD: Soft, non-tender  EXT:  No clubbing, cyanosis, or edema    RADIOLOGY REVIEWED  CXR:    CT chest:    TTE: Follow-up Pulm Progress Note  Mathew Glass MD  [phone]    Notes reviewd  Prednisone 60 mg qd started per oncology  Increase WBC noted, yet afebrile and hemodynamically stable on abx for pneumonia  No new respiratory events overnight.  Denies SOB/CP.     Vital Signs Last 24 Hrs  T(C): 36.5 (22 Sep 2017 03:36), Max: 36.6 (21 Sep 2017 14:55)  T(F): 97.7 (22 Sep 2017 03:36), Max: 97.9 (21 Sep 2017 14:55)  HR: 97 (22 Sep 2017 03:36) (93 - 97)  BP: 102/62 (22 Sep 2017 03:36) (98/67 - 102/62)  BP(mean): --  RR: 18 (22 Sep 2017 03:36) (18 - 18)  SpO2: 96% (22 Sep 2017 03:36) (96% - 96%)                         12.6   17.21 )-----------( 211      ( 22 Sep 2017 07:34 )             38.1       09-22    131<L>  |  97  |  54<H>  ----------------------------<  118<H>  5.7<H>   |  18<L>  |  1.88<H>    Ca    11.8<H>      22 Sep 2017 07:36  Mg     2.3     09-20    TPro  5.3<L>  /  Alb  2.6<L>  /  TBili  1.5<H>  /  DBili  x   /  AST  57<H>  /  ALT  32  /  AlkPhos  116  09-20    Physical Examination:  PULM: Decr BS L base; without wheeze  CVS: Regular rate and rhythm, no murmurs, rubs, or gallops  ABD: Soft, non-tender  EXT:  No clubbing, cyanosis, or edema    RADIOLOGY REVIEWED  CXR:    CT chest:    TTE:

## 2017-09-22 NOTE — PROGRESS NOTE ADULT - ASSESSMENT
84 year old male with LLL PNA and recent robotic lymph node biopsy  - Newfound DVT, agree with T. Lovenox  - Continue supportive care  - No acute surgical intervention at this time    ARMANDO No PGY 2 [id number]

## 2017-09-22 NOTE — PROGRESS NOTE ADULT - PROBLEM SELECTOR PLAN 2
Hypercalcemia secondary to malginancy  - s/p aredia  - Serial Calcium levels. Will consider Calcitonin

## 2017-09-22 NOTE — PROGRESS NOTE ADULT - PROBLEM SELECTOR PLAN 5
appreciate oncology recs  sp pamidronate for hypercalcemia  cont prednisone 60mg daily for 5 days  cont allopurinol  daily calcium, mg, phos and uric acid

## 2017-09-22 NOTE — PROGRESS NOTE ADULT - PROBLEM SELECTOR PLAN 1
clinically stable  no new resp symptoms  elevated wbc today after steroids  aztreonam ++ flagyl  no change in antibx   will treat a total of 7 days

## 2017-09-22 NOTE — PHYSICAL THERAPY INITIAL EVALUATION ADULT - PLANNED THERAPY INTERVENTIONS, PT EVAL
transfer training/bed mobility training/gait training bed mobility training/gait training/balance training/transfer training

## 2017-09-22 NOTE — PROGRESS NOTE ADULT - PROBLEM SELECTOR PLAN 3
On Prednisone. Next week, as his general state improves, I hope to Administer Cytoxan and Vincristine. Eventually, I hope to be able to give R-CHOP, or at least r-miniCHOP.    Needs Cardiological evaluation in order to determine if he is a candidate for an anthracycline.

## 2017-09-22 NOTE — PROGRESS NOTE ADULT - SUBJECTIVE AND OBJECTIVE BOX
Surgical Oncology    Pt seen and examined. No nausea or emesis. Tolerating ice cream. Passing flatus.    PE  Gen: NAD  Abd: soft, nt, nd    Vital Signs Last 24 Hrs  T(C): 36.6 (09-22-17 @ 14:18), Max: 36.6 (09-22-17 @ 14:18)  T(F): 97.9 (09-22-17 @ 14:18), Max: 97.9 (09-22-17 @ 14:18)  HR: 100 (09-22-17 @ 14:18) (96 - 100)  BP: 100/64 (09-22-17 @ 14:18) (98/67 - 102/62)  BP(mean): --  RR: 18 (09-22-17 @ 14:18) (18 - 18)  SpO2: 95% (09-22-17 @ 14:18) (95% - 96%)  I&O's Detail    21 Sep 2017 07:01  -  22 Sep 2017 07:00  --------------------------------------------------------  IN:    Oral Fluid: 360 mL    Solution: 100 mL    Solution: 50 mL  Total IN: 510 mL    OUT:    Indwelling Catheter - Urethral: 725 mL  Total OUT: 725 mL    Total NET: -215 mL      22 Sep 2017 07:01  -  22 Sep 2017 18:18  --------------------------------------------------------  IN:    Oral Fluid: 240 mL  Total IN: 240 mL    OUT:  Total OUT: 0 mL    Total NET: 240 mL                              12.6   17.21 )-----------( 211      ( 22 Sep 2017 07:34 )             38.1     09-22    131<L>  |  97  |  54<H>  ----------------------------<  118<H>  5.7<H>   |  18<L>  |  1.88<H>    Ca    11.8<H>      22 Sep 2017 07:36        CAPILLARY BLOOD GLUCOSE  122 (22 Sep 2017 16:43)  123 (22 Sep 2017 11:12)  121 (22 Sep 2017 08:15)          MEDICATIONS  (STANDING):  aztreonam  IVPB      aztreonam  IVPB 1000 milliGRAM(s) IV Intermittent every 8 hours  finasteride 5 milliGRAM(s) Oral daily  pantoprazole    Tablet 40 milliGRAM(s) Oral before breakfast  senna 2 Tablet(s) Oral at bedtime  polyethylene glycol 3350 17 Gram(s) Oral two times a day  docusate sodium 100 milliGRAM(s) Oral three times a day  influenza   Vaccine 0.5 milliLiter(s) IntraMuscular once  metroNIDAZOLE  IVPB 500 milliGRAM(s) IV Intermittent every 8 hours  metroNIDAZOLE  IVPB      sodium chloride 0.9%. 1000 milliLiter(s) (75 mL/Hr) IV Continuous <Continuous>  ALBUTerol/ipratropium for Nebulization 3 milliLiter(s) Nebulizer every 6 hours  allopurinol 300 milliGRAM(s) Oral daily  predniSONE   Tablet 60 milliGRAM(s) Oral daily  enoxaparin Injectable 100 milliGRAM(s) SubCutaneous every 12 hours    MEDICATIONS  (PRN):

## 2017-09-22 NOTE — CHART NOTE - NSCHARTNOTEFT_GEN_A_CORE
Received call from vascular lab with + DVT results.  Pt with acute common femoral DVT.  Discussed with Dr. Quiroz, Lovenox 100 mg q 12 hrs ordered.  Platelets 211 today.

## 2017-09-22 NOTE — PROGRESS NOTE ADULT - PROBLEM SELECTOR PLAN 1
Being treated for HCAP  - Continue Flagyl and Aztreonam as per ID and Pulm  - Follow up ID and pulm recs  - Follow up cultures.

## 2017-09-22 NOTE — PROGRESS NOTE ADULT - ASSESSMENT
84M c hx DLBCL not yet on chemo, biliary stenosis s/p cbd stent, mejias's esophagus, BPH c/b urinary retention s/p chronic tavarez (started 9/5/17), CKD (baseline Cr 1.2), recent discharge (9/15) sp biopsy, pw sepsis 2/2 PNA 84M c hx DLBCL not yet on chemo, biliary stenosis s/p cbd stent, mejias's esophagus, BPH c/b urinary retention s/p chronic tavarez (started 9/5/17), CKD (baseline Cr 1.2), recent discharge (9/15) sp biopsy, pw sepsis 2/2 PNA.

## 2017-09-22 NOTE — PROGRESS NOTE ADULT - ASSESSMENT
84 year old man with DLBCL which is yet untreated admitted with confusion and weakness. Found to have hypercalcemia of malignancy and pneumonia. Currently on ABx. S/p Pamidronate

## 2017-09-22 NOTE — PROGRESS NOTE ADULT - ASSESSMENT
84M with lymphoma admitted with hypotension, encephalopathy and weakness  likely multifactorial  noted to have pneumonia on CT scan

## 2017-09-22 NOTE — PROGRESS NOTE ADULT - PROBLEM SELECTOR PLAN 3
Newly diagnosed DLBCL. Not yet to start treatment  - Continue prednisone 60mg PO daily x 5 days.   - TTE as per cardiology for evaluation of EF prior to adriamycin  - Continue allopurinol. Check Uric Acid, Phosphate, Magnesium, and Calcium Daily  - Will consider giving dose of cyclophosphamide, vincristine and prednisone on next week if clinically improved  - Needs eventual chemoport  - Will follow. Dr. Millre to cover weekend.     Mike Claudio MD  [facility] - [facility] Division [facility]   [address] [place] [address] [address] [place] [id number]  (T) [phone]  (F) [phone]

## 2017-09-22 NOTE — PROGRESS NOTE ADULT - SUBJECTIVE AND OBJECTIVE BOX
Patient is a 84y old  Male who presents with a chief complaint of hypotension (21 Sep 2017 12:22)      SUBJECTIVE / OVERNIGHT EVENTS:    Patient seen and examined.       Vital Signs Last 24 Hrs  T(C): 36.5 (22 Sep 2017 03:36), Max: 36.6 (21 Sep 2017 14:55)  T(F): 97.7 (22 Sep 2017 03:36), Max: 97.9 (21 Sep 2017 14:55)  HR: 97 (22 Sep 2017 03:36) (93 - 97)  BP: 102/62 (22 Sep 2017 03:36) (98/67 - 102/62)  BP(mean): --  RR: 18 (22 Sep 2017 03:36) (18 - 18)  SpO2: 96% (22 Sep 2017 03:36) (96% - 96%)  I&O's Summary    21 Sep 2017 07:01  -  22 Sep 2017 07:00  --------------------------------------------------------  IN: 510 mL / OUT: 725 mL / NET: -215 mL        PE:  GENERAL: NAD, AAOx1  HEAD:  Atraumatic, Normocephalic  EYES: EOMI, PERRLA, conjunctiva and sclera clear  NECK: Supple, No JVD  CHEST/LUNG: decreased BS  HEART: Regular rate and rhythm; + murmur  ABDOMEN: Soft, Nontender, Nondistended; Bowel sounds present, multiple erythematous areas, no pus  : chronic tavarez  EXTREMITIES:  2+ Peripheral Pulses, BL LE wrapped  SKIN: No rashes or lesions  NEURO: No focal deficits      LABS:                        12.6   17.21 )-----------( 211      ( 22 Sep 2017 07:34 )             38.1     09-20    133<L>  |  99  |  48<H>  ----------------------------<  83  5.4<H>   |  23  |  1.74<H>    Ca    11.7<H>      20 Sep 2017 16:15  Mg     2.3     09-20    TPro  5.3<L>  /  Alb  2.6<L>  /  TBili  1.5<H>  /  DBili  x   /  AST  57<H>  /  ALT  32  /  AlkPhos  116  09-20    PT/INR - ( 20 Sep 2017 13:52 )   PT: 15.4 sec;   INR: 1.42 ratio         PTT - ( 20 Sep 2017 13:52 )  PTT:25.3 sec  CAPILLARY BLOOD GLUCOSE  121 (22 Sep 2017 08:15)        CARDIAC MARKERS ( 20 Sep 2017 13:52 )  x     / <0.01 ng/mL / 49 U/L / x     / 4.1 ng/mL      Urinalysis Basic - ( 20 Sep 2017 14:37 )    Color: Yellow / Appearance: x / S.018 / pH: x  Gluc: x / Ketone: Negative  / Bili: Negative / Urobili: Negative   Blood: x / Protein: 30 mg/dL / Nitrite: Negative   Leuk Esterase: Moderate / RBC: >50 /HPF / WBC >50 /HPF   Sq Epi: x / Non Sq Epi: x / Bacteria: Moderate /HPF        RADIOLOGY & ADDITIONAL TESTS:    Imaging Personally Reviewed:  [x] YES  [ ] NO    Consultant(s) Notes Reviewed:  [x] YES  [ ] NO    MEDICATIONS  (STANDING):  aztreonam  IVPB      aztreonam  IVPB 1000 milliGRAM(s) IV Intermittent every 8 hours  finasteride 5 milliGRAM(s) Oral daily  pantoprazole    Tablet 40 milliGRAM(s) Oral before breakfast  senna 2 Tablet(s) Oral at bedtime  polyethylene glycol 3350 17 Gram(s) Oral two times a day  docusate sodium 100 milliGRAM(s) Oral three times a day  influenza   Vaccine 0.5 milliLiter(s) IntraMuscular once  metroNIDAZOLE  IVPB 500 milliGRAM(s) IV Intermittent every 8 hours  metroNIDAZOLE  IVPB      heparin  Injectable 5000 Unit(s) SubCutaneous every 8 hours  sodium chloride 0.9%. 1000 milliLiter(s) (75 mL/Hr) IV Continuous <Continuous>  ALBUTerol/ipratropium for Nebulization 3 milliLiter(s) Nebulizer every 6 hours  allopurinol 300 milliGRAM(s) Oral daily  predniSONE   Tablet 60 milliGRAM(s) Oral daily    MEDICATIONS  (PRN):      Care Discussed with Consultants/Other Providers [x] YES  [ ] NO    HEALTH ISSUES - PROBLEM Dx:  Rash: Rash  Hypotension: Hypotension  Hypercalcemia: Hypercalcemia  Leg swelling: Leg swelling  Advanced care planning/counseling discussion: Advanced care planning/counseling discussion  Urinary retention: Urinary retention  Debility: Debility  Diffuse large B-cell lymphoma of intra-abdominal lymph nodes: Diffuse large B-cell lymphoma of intra-abdominal lymph nodes  EKG abnormality: EKG abnormality  MARIA C (acute kidney injury): MARIA C (acute kidney injury)  Sepsis, due to unspecified organism: Sepsis, due to unspecified organism  Pneumonia, bacterial: Pneumonia, bacterial  Abnormal EKG: Abnormal EKG Patient is a 84y old  Male who presents with a chief complaint of hypotension (21 Sep 2017 12:22)      SUBJECTIVE / OVERNIGHT EVENTS:    Patient seen and examined. more alert today. states he feels better. feels that after he swallows pills he has a lot of sputum. denies cp. has poor appetite.      Vital Signs Last 24 Hrs  T(C): 36.5 (22 Sep 2017 03:36), Max: 36.6 (21 Sep 2017 14:55)  T(F): 97.7 (22 Sep 2017 03:36), Max: 97.9 (21 Sep 2017 14:55)  HR: 97 (22 Sep 2017 03:36) (93 - 97)  BP: 102/62 (22 Sep 2017 03:36) (98/67 - 102/62)  BP(mean): --  RR: 18 (22 Sep 2017 03:36) (18 - 18)  SpO2: 96% (22 Sep 2017 03:36) (96% - 96%)  I&O's Summary    21 Sep 2017 07:01  -  22 Sep 2017 07:00  --------------------------------------------------------  IN: 510 mL / OUT: 725 mL / NET: -215 mL        PE:  GENERAL: NAD, AAOx2, frail  HEAD:  Atraumatic, Normocephalic  EYES: EOMI, PERRLA, conjunctiva and sclera clear  NECK: Supple, No JVD  CHEST/LUNG: decreased BS, coarse  HEART: Regular rate and rhythm; + murmur  ABDOMEN: Soft, Nontender, Nondistended; Bowel sounds present, multiple erythematous areas, no pus  : chronic tavarez  EXTREMITIES:  2+ Peripheral Pulses, BL LE wrapped, + 2 pitting edema  SKIN: No rashes or lesions  NEURO: No focal deficits      LABS:                        12.6   17.21 )-----------( 211      ( 22 Sep 2017 07:34 )             38.1     09-20    133<L>  |  99  |  48<H>  ----------------------------<  83  5.4<H>   |  23  |  1.74<H>    Ca    11.7<H>      20 Sep 2017 16:15  Mg     2.3         TPro  5.3<L>  /  Alb  2.6<L>  /  TBili  1.5<H>  /  DBili  x   /  AST  57<H>  /  ALT  32  /  AlkPhos  116      PT/INR - ( 20 Sep 2017 13:52 )   PT: 15.4 sec;   INR: 1.42 ratio         PTT - ( 20 Sep 2017 13:52 )  PTT:25.3 sec  CAPILLARY BLOOD GLUCOSE  121 (22 Sep 2017 08:15)        CARDIAC MARKERS ( 20 Sep 2017 13:52 )  x     / <0.01 ng/mL / 49 U/L / x     / 4.1 ng/mL      Urinalysis Basic - ( 20 Sep 2017 14:37 )    Color: Yellow / Appearance: x / S.018 / pH: x  Gluc: x / Ketone: Negative  / Bili: Negative / Urobili: Negative   Blood: x / Protein: 30 mg/dL / Nitrite: Negative   Leuk Esterase: Moderate / RBC: >50 /HPF / WBC >50 /HPF   Sq Epi: x / Non Sq Epi: x / Bacteria: Moderate /HPF        RADIOLOGY & ADDITIONAL TESTS:    Imaging Personally Reviewed:  [x] YES  [ ] NO    Consultant(s) Notes Reviewed:  [x] YES  [ ] NO    MEDICATIONS  (STANDING):  aztreonam  IVPB      aztreonam  IVPB 1000 milliGRAM(s) IV Intermittent every 8 hours  finasteride 5 milliGRAM(s) Oral daily  pantoprazole    Tablet 40 milliGRAM(s) Oral before breakfast  senna 2 Tablet(s) Oral at bedtime  polyethylene glycol 3350 17 Gram(s) Oral two times a day  docusate sodium 100 milliGRAM(s) Oral three times a day  influenza   Vaccine 0.5 milliLiter(s) IntraMuscular once  metroNIDAZOLE  IVPB 500 milliGRAM(s) IV Intermittent every 8 hours  metroNIDAZOLE  IVPB      heparin  Injectable 5000 Unit(s) SubCutaneous every 8 hours  sodium chloride 0.9%. 1000 milliLiter(s) (75 mL/Hr) IV Continuous <Continuous>  ALBUTerol/ipratropium for Nebulization 3 milliLiter(s) Nebulizer every 6 hours  allopurinol 300 milliGRAM(s) Oral daily  predniSONE   Tablet 60 milliGRAM(s) Oral daily    MEDICATIONS  (PRN):      Care Discussed with Consultants/Other Providers [x] YES  [ ] NO    HEALTH ISSUES - PROBLEM Dx:  Rash: Rash  Hypotension: Hypotension  Hypercalcemia: Hypercalcemia  Leg swelling: Leg swelling  Advanced care planning/counseling discussion: Advanced care planning/counseling discussion  Urinary retention: Urinary retention  Debility: Debility  Diffuse large B-cell lymphoma of intra-abdominal lymph nodes: Diffuse large B-cell lymphoma of intra-abdominal lymph nodes  EKG abnormality: EKG abnormality  MARIA C (acute kidney injury): MARIA C (acute kidney injury)  Sepsis, due to unspecified organism: Sepsis, due to unspecified organism  Pneumonia, bacterial: Pneumonia, bacterial  Abnormal EKG: Abnormal EKG

## 2017-09-22 NOTE — PROGRESS NOTE ADULT - SUBJECTIVE AND OBJECTIVE BOX
[facility], Division of Infectious Diseases  JULIANN Peraza A. Lee  [phone]    Name: MIROSLAVA VERGARA  Age: 84y  Gender: Male  MRN: [id number]    Interval History--  Notes reviewed denies cough, no sob,  wife at bedside    Past Medical History--  Diffuse large B-cell lymphoma of intra-abdominal lymph nodes  Romero esophagus  Intestinal infection  Bladder polyp  H/O inguinal hernia repair  S/P TURP (transurethral resection of prostate)  Bladder polyp  History of cholecystectomy      For details regarding the patient's social history, family history, and other miscellaneous elements, please refer the initial infectious diseases consultation and/or the admitting history and physical examination for this admission.    Allergies    latex (Rash; Blisters)  penicillins (Swelling)  sulfa drugs (Unknown)    Intolerances        Medications--  Antibiotics:  aztreonam  IVPB      aztreonam  IVPB 1000 milliGRAM(s) IV Intermittent every 8 hours  metroNIDAZOLE  IVPB 500 milliGRAM(s) IV Intermittent every 8 hours  metroNIDAZOLE  IVPB        Immunologic:  influenza   Vaccine 0.5 milliLiter(s) IntraMuscular once    Other:  finasteride  pantoprazole    Tablet  senna  polyethylene glycol 3350  docusate sodium  sodium chloride 0.9%.  ALBUTerol/ipratropium for Nebulization  allopurinol  predniSONE   Tablet  enoxaparin Injectable      Review of Systems--  A 10-point review of systems was obtained.     Pertinent positives and negatives--  Constitutional: No fevers. No Chills. No Rigors.   Cardiovascular: No chest pain. No palpitations.  Respiratory: No shortness of breath. No cough.  Gastrointestinal: No nausea or vomiting. No diarrhea or constipation.     Review of systems otherwise negative except as previously noted.    Physical Examination--  Vital Signs: T(F): 97.9 (09-22-17 @ 14:18), Max: 97.9 (09-22-17 @ 14:18)  HR: 100 (09-22-17 @ 14:18)  BP: 100/64 (09-22-17 @ 14:18)  RR: 18 (09-22-17 @ 14:18)  SpO2: 95% (09-22-17 @ 14:18)  Wt(kg): --  General: Nontoxic-appearing Male in no acute distress.  HEENT: AT/NC.+ arcus Anicteric. Conjunctiva pink and moist .Dentition pair  Neck: Not rigid. No sense of mass.  Nodes: None palpable.  Lungs: Clear bilaterally without rales, wheezing or rhonchi  Heart: tachy s1s2  Abdomen: Bowel sounds present and normoactive. Soft. Nondistended.   Extremities: No cyanosis or clubbing. ++ edema.   Skin: Warm. Dry. Good turgor. No rash. No vasculitic stigmata.    Laboratory Studies--  CBC                        12.6   17.21 )-----------( 211      ( 22 Sep 2017 07:34 )             38.1       Chemistries  09-22    131<L>  |  97  |  54<H>  ----------------------------<  118<H>  5.7<H>   |  18<L>  |  1.88<H>    Ca    11.8<H>      22 Sep 2017 07:36        Culture Data  Culture - Blood (09.20.17 @ 16:36)    Specimen Source: .Blood Blood-Peripheral    Culture Results:   No growth to date.        Culture - Urine (09.20.17 @ 16:18)    Specimen Source: .Urine Catheterized    Culture Results:   10,000 - 49,000 CFU/mL Coag Negative Staphylococcus

## 2017-09-22 NOTE — PROGRESS NOTE ADULT - PROBLEM SELECTOR PLAN 1
speech swallow eval  ID consult, covering for aspiration and HCAP with aztreonam and flagyl, PCN allergy  cont gentle IVF given b/l pleural effusion and evidence of anasarca  f/u blood cultures appreciate speech and swallow eval  appreciate ID RECS, covering for aspiration and HCAP with aztreonam and flagyl, PCN allergy  BC NTD, urine cx neg

## 2017-09-22 NOTE — PROGRESS NOTE ADULT - PROBLEM SELECTOR PLAN 4
seen by cardiology in ED for T wave changes, no active cp, trop neg  VQ low prob PE  check TTE appreciate cardio recs, no active cp, trop neg  VQ low prob PE  check TTE

## 2017-09-22 NOTE — PROGRESS NOTE ADULT - SUBJECTIVE AND OBJECTIVE BOX
[facility] Hematology/Oncology - Tahmina Miller / Dandre / González - [phone]  Primary Outpatient Hematologist/Oncologist: Dr. Noah Miller    Subjective  Patient seen in the AM. Feeling better than yesterday. Still intermittently confused. No fevers or chills. Continues to be weak.    Admission History:  84 year old man with recent diagnosis of DLBCL, biliary stenosis s/p CBD stent, mejias's esophagus, BPH w/ urinary retention s/p chronic tavarez placement admitted with weakness and confusion. Patient reports having felt weak since admission to [facility] facility. Patient been unable to stand and at times is dizzy. No chest pain. No nausea or vomiting. Patient has been more confused than usual. No fevers or chills.      ROS:  General:  (-)Pain, (+)Decrease appetite, (-)Fevers, (-)Chills, (-)Weight Loss  Eyes: (-)Blurry Vision, (-)Double Vision, (-)Vision Loss  ENT: (-)Sinus Congestion, (-)Decreased Hearing, (-)Nosebleeds, (-)Sore Throat  Cardiac: (-)Chest Pain, (-)Palpitations, (+)Shortness of breathe on exertion  Respiratory: (-)Cough, (-)hemoptysis, (-)Shortness of breathe  GI: (-)Nausea, (-)Vomiting, (-)Diarrhea, (-)Constipation, (-)Melena, (-)BRBPR  : (-)Hematuria, (-)Dysuria, (-)Polyuia  MSK: (-)Back pain, (-)Joint pain, (-)Stiffness  Dermatology: (-)Rash, (-)Itching  Neurology:  (-)Numbness, (-)Tingling, (-)Difficulty Walking, (-)Tremors, (+)Weakness  Psych: (-)Anxiety, (-)Depression, (-)Memory Loss  Hematology:  (-)Easy Bruising, (-)Easy Bleeding, (-)Night Sweats.   Allergies  latex (Rash; Blisters)  penicillins (Swelling)  sulfa drugs (Unknown)    Vital Signs Last 24 Hrs  T(C): 36.6 (21 Sep 2017 04:22), Max: 36.8 (20 Sep 2017 13:16)  T(F): 97.8 (21 Sep 2017 04:22), Max: 98.3 (20 Sep 2017 13:16)  HR: 99 (21 Sep 2017 04:22) (98 - 103)  BP: 110/62 (21 Sep 2017 06:44) (100/64 - 111/69)  RR: 18 (21 Sep 2017 04:22) (14 - 20)  SpO2: 95% (21 Sep 2017 04:22) (94% - 100%)    Objective  Physical Exam:  General: AAO x 3. NAD. Lying in bed comfortably. Intermittently confused. No tangential thoughts but hard to understand at times.  HEENT: clear oropharynx, anicteric sclera, pink conjunctivae, EOMi. PERRLA  Cardiac: S1, S2 present. No audible murmurs. RRR  Lungs: Decreased breathe sounds.   Abdomen: Soft, Non-Tender, Non-Distended. No palpable hepatosplenomegaly.  Extremities: 2+ pulses. No edema  Skin: No Rashes. No petechiae  Neuro: No focal motor or sensory deficits.     Labs:                        12.7   13.5  )-----------( 182      ( 20 Sep 2017 13:52 )             36.5   09-20    133<L>  |  99  |  48<H>  ----------------------------<  83  5.4<H>   |  23  |  1.74<H>    Ca    11.7<H>      20 Sep 2017 16:15  Mg     2.3     09-20    TPro  5.3<L>  /  Alb  2.6<L>  /  TBili  1.5<H>  /  DBili  x   /  AST  57<H>  /  ALT  32  /  AlkPhos  116  09-20    Radiology:  CT Chest/Abdomen/Pelvis:  IMPRESSION:   Left lower lobe pneumonia.  Bulky confluent upper abdominal and retroperitoneal adenopathy as   described, consistent with lymphoma.     CT Head;  IMPRESSION: Unremarkable noncontrast CT of the brain.    V/Q Scan   IMPRESSION: Very low probability of pulmonary embolism    Pathology:  Surgical Pathology Final Report - :   ACCESSION No:  [id number]    Final Diagnosis  Lymph node, periportal, biopsy  Diffuse large B-cell lymphoma, non-germinal center type

## 2017-09-22 NOTE — PHYSICAL THERAPY INITIAL EVALUATION ADULT - PERTINENT HX OF CURRENT PROBLEM, REHAB EVAL
Pt is an 84 male c hx DLBCL not yet on chemo, biliary stenosis s/p cbd stent, mejias's esophagus, BPH c/b urinary retention s/p chronic tavarez (started 9/5/17), CKD (baseline Cr 1.2), recent discharge (9/15) pw hypotension at rehab.

## 2017-09-22 NOTE — PROGRESS NOTE ADULT - SUBJECTIVE AND OBJECTIVE BOX
Patient with Diffuse Large Cell Non Hodgkin's Lymphoma. Admitted with pneumonia and hypercalcemia. Subsequently found to have a DVT of the left common femoral vein.      Allergies    latex (Rash; Blisters)  penicillins (Swelling)  sulfa drugs (Unknown)    MEDICATIONS  (STANDING):  aztreonam  IVPB      aztreonam  IVPB 1000 milliGRAM(s) IV Intermittent every 8 hours  finasteride 5 milliGRAM(s) Oral daily  pantoprazole    Tablet 40 milliGRAM(s) Oral before breakfast  senna 2 Tablet(s) Oral at bedtime  polyethylene glycol 3350 17 Gram(s) Oral two times a day  docusate sodium 100 milliGRAM(s) Oral three times a day  influenza   Vaccine 0.5 milliLiter(s) IntraMuscular once  metroNIDAZOLE  IVPB 500 milliGRAM(s) IV Intermittent every 8 hours  metroNIDAZOLE  IVPB      sodium chloride 0.9%. 1000 milliLiter(s) (75 mL/Hr) IV Continuous <Continuous>  ALBUTerol/ipratropium for Nebulization 3 milliLiter(s) Nebulizer every 6 hours  allopurinol 300 milliGRAM(s) Oral daily  predniSONE   Tablet 60 milliGRAM(s) Oral daily  enoxaparin Injectable 100 milliGRAM(s) SubCutaneous every 12 hours    MEDICATIONS  (PRN):                                             12.6   17.21 )-----------( 211      ( 22 Sep 2017 07:34 )             38.1       09-22    131<L>  |  97  |  54<H>  ----------------------------<  118<H>  5.7<H>   |  18<L>  |  1.88<H>    Ca    11.8<H>      22 Sep 2017 07:36          PAST MEDICAL & SURGICAL HISTORY:  Diffuse large B-cell lymphoma of intra-abdominal lymph nodes  Romero esophagus  Intestinal infection: 2012  Bladder polyp  H/O inguinal hernia repair  S/P TURP (transurethral resection of prostate)  Bladder polyp: s/p TURBT  History of cholecystectomy: 1987    FAMILY HISTORY:  No pertinent family history in first degree relatives

## 2017-09-22 NOTE — PHYSICAL THERAPY INITIAL EVALUATION ADULT - ADDITIONAL COMMENTS
pertinent history continued....  Pt has been at [facility] rehab since hospitalization initially to [facility] with transfer to [facility] for biopsy. He has progressively been feeling weaker. Today, he was unable to stand up as he felt dizzy and weakness. Per wife, pt is also intermittently confused. At [facility], found to have SBP of 90. pertinent history continued....  Pt has been at [facility] rehab since hospitalization initially to [facility] with transfer to [facility] for biopsy. He has progressively been feeling weaker. Today, he was unable to stand up as he felt dizzy and weakness. Per wife, pt is also intermittently confused. At [facility], found to have SBP of 90.   Pt reports intermittent coughing on eating food. He reports hoarseness and weaker voice. Reports some constipation. Denies fevers, chills, N/V, diarrhea, abd pain. Reports LLE pain and b/l LE swelling, as well as intermittent chest pain. Per charts, he was being treated for UTI at [facility] with 7 days of cipro, last dose to be 9/21. pertinent history continued....  Pt has been at [facility] rehab since hospitalization initially to [facility] with transfer to [facility] for biopsy. He has progressively been feeling weaker. Today, he was unable to stand up as he felt dizzy and weakness. Per wife, pt is also intermittently confused. At [facility], found to have SBP of 90.   Pt reports intermittent coughing on eating food. He reports hoarseness and weaker voice. Reports some constipation. Denies fevers, chills, N/V, diarrhea, abd pain. Reports LLE pain and b/l LE swelling, as well as intermittent chest pain. Per charts, he was being treated for UTI at [facility] with 7 days of cipro, last dose to be 9/21. Found to have hypercalcemia of malignancy and pneumonia. Cu

## 2017-09-22 NOTE — PROGRESS NOTE ADULT - PROBLEM SELECTOR PLAN 2
2/2 pneumonia  BC NTD  appreciate ID recs  cont IV antibiotics aztreonam and flagyl for aspiration  leukocytosis possible 2/2 steroids see above  leukocytosis possible 2/2 steroids

## 2017-09-23 DIAGNOSIS — I82.412 ACUTE EMBOLISM AND THROMBOSIS OF LEFT FEMORAL VEIN: ICD-10-CM

## 2017-09-23 DIAGNOSIS — K22.70 BARRETT'S ESOPHAGUS WITHOUT DYSPLASIA: ICD-10-CM

## 2017-09-23 DIAGNOSIS — J18.9 PNEUMONIA, UNSPECIFIED ORGANISM: ICD-10-CM

## 2017-09-23 LAB
ANION GAP SERPL CALC-SCNC: 11 MMOL/L — SIGNIFICANT CHANGE UP (ref 5–17)
BUN SERPL-MCNC: 67 MG/DL — HIGH (ref 7–23)
CALCIUM SERPL-MCNC: 11.8 MG/DL — HIGH (ref 8.4–10.5)
CHLORIDE SERPL-SCNC: 102 MMOL/L — SIGNIFICANT CHANGE UP (ref 96–108)
CO2 SERPL-SCNC: 19 MMOL/L — LOW (ref 22–31)
CREAT SERPL-MCNC: 1.96 MG/DL — HIGH (ref 0.5–1.3)
GLUCOSE SERPL-MCNC: 101 MG/DL — HIGH (ref 70–99)
LEGIONELLA AG UR QL: NEGATIVE — SIGNIFICANT CHANGE UP
MAGNESIUM SERPL-MCNC: 2.5 MG/DL — SIGNIFICANT CHANGE UP (ref 1.6–2.6)
PHOSPHATE SERPL-MCNC: 3.6 MG/DL — SIGNIFICANT CHANGE UP (ref 2.5–4.5)
POTASSIUM SERPL-MCNC: 5.2 MMOL/L — SIGNIFICANT CHANGE UP (ref 3.5–5.3)
POTASSIUM SERPL-SCNC: 5.2 MMOL/L — SIGNIFICANT CHANGE UP (ref 3.5–5.3)
SODIUM SERPL-SCNC: 132 MMOL/L — LOW (ref 135–145)
URATE SERPL-MCNC: 11.3 MG/DL — HIGH (ref 3.4–8.8)

## 2017-09-23 PROCEDURE — 71010: CPT | Mod: 26

## 2017-09-23 RX ORDER — CALCITONIN SALMON 200 [IU]/ML
360 INJECTION, SOLUTION INTRAMUSCULAR EVERY 12 HOURS
Qty: 0 | Refills: 0 | Status: COMPLETED | OUTPATIENT
Start: 2017-09-23 | End: 2017-09-24

## 2017-09-23 RX ORDER — CALCITONIN SALMON 200 [IU]/ML
280 INJECTION, SOLUTION INTRAMUSCULAR EVERY 12 HOURS
Qty: 0 | Refills: 0 | Status: DISCONTINUED | OUTPATIENT
Start: 2017-09-23 | End: 2017-09-23

## 2017-09-23 RX ORDER — SODIUM CHLORIDE 9 MG/ML
1000 INJECTION INTRAMUSCULAR; INTRAVENOUS; SUBCUTANEOUS
Qty: 0 | Refills: 0 | Status: COMPLETED | OUTPATIENT
Start: 2017-09-23 | End: 2017-09-24

## 2017-09-23 RX ADMIN — CALCITONIN SALMON 280 INTERNATIONAL UNIT(S): 200 INJECTION, SOLUTION INTRAMUSCULAR at 18:13

## 2017-09-23 RX ADMIN — Medication 50 MILLIGRAM(S): at 09:26

## 2017-09-23 RX ADMIN — POLYETHYLENE GLYCOL 3350 17 GRAM(S): 17 POWDER, FOR SOLUTION ORAL at 06:10

## 2017-09-23 RX ADMIN — ENOXAPARIN SODIUM 100 MILLIGRAM(S): 100 INJECTION SUBCUTANEOUS at 06:11

## 2017-09-23 RX ADMIN — SODIUM CHLORIDE 80 MILLILITER(S): 9 INJECTION INTRAMUSCULAR; INTRAVENOUS; SUBCUTANEOUS at 23:17

## 2017-09-23 RX ADMIN — Medication 300 MILLIGRAM(S): at 12:18

## 2017-09-23 RX ADMIN — Medication 50 MILLIGRAM(S): at 17:40

## 2017-09-23 RX ADMIN — Medication 100 MILLIGRAM(S): at 06:10

## 2017-09-23 RX ADMIN — Medication 100 MILLIGRAM(S): at 13:37

## 2017-09-23 RX ADMIN — FINASTERIDE 5 MILLIGRAM(S): 5 TABLET, FILM COATED ORAL at 12:18

## 2017-09-23 RX ADMIN — Medication 50 MILLIGRAM(S): at 02:25

## 2017-09-23 RX ADMIN — CALCITONIN SALMON 360 INTERNATIONAL UNIT(S): 200 INJECTION, SOLUTION INTRAMUSCULAR at 23:25

## 2017-09-23 RX ADMIN — POLYETHYLENE GLYCOL 3350 17 GRAM(S): 17 POWDER, FOR SOLUTION ORAL at 17:36

## 2017-09-23 RX ADMIN — Medication 100 MILLIGRAM(S): at 02:25

## 2017-09-23 RX ADMIN — Medication 3 MILLILITER(S): at 06:10

## 2017-09-23 RX ADMIN — Medication 100 MILLIGRAM(S): at 17:40

## 2017-09-23 RX ADMIN — Medication 3 MILLILITER(S): at 12:18

## 2017-09-23 RX ADMIN — SODIUM CHLORIDE 80 MILLILITER(S): 9 INJECTION INTRAMUSCULAR; INTRAVENOUS; SUBCUTANEOUS at 12:45

## 2017-09-23 RX ADMIN — Medication 60 MILLIGRAM(S): at 06:10

## 2017-09-23 RX ADMIN — Medication 100 MILLIGRAM(S): at 09:26

## 2017-09-23 RX ADMIN — PANTOPRAZOLE SODIUM 40 MILLIGRAM(S): 20 TABLET, DELAYED RELEASE ORAL at 06:11

## 2017-09-23 RX ADMIN — ENOXAPARIN SODIUM 100 MILLIGRAM(S): 100 INJECTION SUBCUTANEOUS at 17:35

## 2017-09-23 RX ADMIN — Medication 3 MILLILITER(S): at 17:36

## 2017-09-23 NOTE — PROGRESS NOTE ADULT - PROBLEM SELECTOR PLAN 1
concern for aspiration given weakness and deconditioning  cont aztreonam and flagyl til sept 27  can change to oral meds on discharge to complete course

## 2017-09-23 NOTE — PROGRESS NOTE ADULT - SUBJECTIVE AND OBJECTIVE BOX
[facility], Division of Infectious Diseases  JULIANN Peraza A. Lee  [phone]  Name: MIROSLAVA VERGARA  Age: 84y  Gender: Male  MRN: [id number]    Interval History--  Notes reviewed  awake and alert, no cough, no dyspnea  + weakness  states the wound sites from biopsy  are oozing    Past Medical History--  Diffuse large B-cell lymphoma of intra-abdominal lymph nodes  Romero esophagus  Intestinal infection  Bladder polyp  H/O inguinal hernia repair  S/P TURP (transurethral resection of prostate)  Bladder polyp  History of cholecystectomy      For details regarding the patient's social history, family history, and other miscellaneous elements, please refer the initial infectious diseases consultation and/or the admitting history and physical examination for this admission.    Allergies    latex (Rash; Blisters)  penicillins (Swelling)  sulfa drugs (Unknown)    Intolerances        Medications--  Antibiotics:  aztreonam  IVPB      aztreonam  IVPB 1000 milliGRAM(s) IV Intermittent every 8 hours  metroNIDAZOLE  IVPB 500 milliGRAM(s) IV Intermittent every 8 hours  metroNIDAZOLE  IVPB        Immunologic:  influenza   Vaccine 0.5 milliLiter(s) IntraMuscular once    Other:  finasteride  pantoprazole    Tablet  senna  polyethylene glycol 3350  docusate sodium  sodium chloride 0.9%.  ALBUTerol/ipratropium for Nebulization  allopurinol  predniSONE   Tablet  enoxaparin Injectable  calcitonin Injectable  sodium chloride 0.9%.      Review of Systems--  A 10-point review of systems was obtained.     Pertinent positives and negatives--  Constitutional: No fevers. No Chills. No Rigors.   Cardiovascular: No chest pain. No palpitations.  Respiratory: No shortness of breath. No cough.  Gastrointestinal: No nausea or vomiting. No diarrhea or constipation.   Psychiatric: Pleasant. Appropriate affect.    Review of systems otherwise negative except as previously noted.    Physical Examination--  Vital Signs: T(F): 98.8 (09-23-17 @ 04:25), Max: 98.8 (09-23-17 @ 04:25)  HR: 82 (09-23-17 @ 04:25)  BP: 95/50 (09-23-17 @ 04:25)  RR: 17 (09-23-17 @ 04:25)  SpO2: 95% (09-23-17 @ 04:25)  Wt(kg): --  General: Nontoxic-appearing Male in no acute distress.  HEENT: AT/NC.  Anicteric. Conjunctiva pink and moist. Oropharynx clear. Dentition fair.  Neck: Not rigid. No sense of mass.  Nodes: None palpable.  Lungs: Clear bilaterally without rales, wheezing or rhonchi  Heart: Regular rate and rhythm. No Murmur. No rub. No gallop. No palpable thrill.  Abdomen: Bowel sounds present and normoactive. Soft. Nondistended. Nontender. No sense of mass. No organomegaly.  Extremities: No cyanosis or clubbing. ++ edema.   Skin: Warm. Dry. Good turgor. No rash. No vasculitic stigmata.  Psychiatric: Appropriate affect and mood for situation.         Laboratory Studies--  CBC                        12.6   17.21 )-----------( 211      ( 22 Sep 2017 07:34 )             38.1       Chemistries  09-23    132<L>  |  102  |  67<H>  ----------------------------<  101<H>  5.2   |  19<L>  |  1.96<H>    Ca    11.8<H>      23 Sep 2017 08:32  Phos  3.6     09-23  Mg     2.5     09-23        Culture Data      Culture - Blood (09.20.17 @ 16:36)    Specimen Source: .Blood Blood-Peripheral    Culture Results:   No growth to date.

## 2017-09-23 NOTE — CONSULT NOTE ADULT - ASSESSMENT
84M c hx DLBCL not yet on chemo, biliary stenosis s/p cbd stent, mejias's esophagus, BPH c/b urinary retention s/p chronic tavarez (started 9/5/17), recent discharge (9/15) pw hypotension from rehab. with jasmin with hypercalcemia and hyperkalemia as well as pna and recent dx of DLBCL. he is s/p aredia 90 mg     1- jasmin  2- hypercalcemia  3- b cell lymphoma  4- hyperkalemia  5- dvt   6- edema due to superimposed dvt      jasmin due to hypercalcemia likely and/or infiltrative process.   ivf hydration   tumor lysis labs  calcitonin 4 units per kg sq x 4 doses for now. weight 98 kg seems over estimated for this pt at present will give 280 units dose  hyperuricemia consider elitek

## 2017-09-23 NOTE — PROGRESS NOTE ADULT - ASSESSMENT
84M with lymphoma admitted for weakness and hypotension found to have pneumonia on CT scan  complicated by acute DVT

## 2017-09-23 NOTE — CONSULT NOTE ADULT - SUBJECTIVE AND OBJECTIVE BOX
[facility]  [phone]  NEPHROLOGY      INITIAL CONSULT NOTE  --------------------------------------------------------------------------------  HPI:  HPI:  84M c hx DLBCL not yet on chemo, biliary stenosis s/p cbd stent, mejias's esophagus, BPH c/b urinary retention s/p chronic tavarez (started 9/5/17), recent discharge (9/15) pw hypotension from rehab. being tx for pna. in addition he is with jasmin renal consult called.   recent dx DLBCL   PAST HISTORY  --------------------------------------------------------------------------------  PAST MEDICAL & SURGICAL HISTORY:  Diffuse large B-cell lymphoma of intra-abdominal lymph nodes  Mejias esophagus  Intestinal infection: 2012  Bladder polyp  H/O inguinal hernia repair  S/P TURP (transurethral resection of prostate)  Bladder polyp: s/p TURBT  History of cholecystectomy: 1987    FAMILY HISTORY:  No pertinent family history in first degree relatives    PAST SOCIAL HISTORY: no tobacco no alcohol    ALLERGIES & MEDICATIONS  --------------------------------------------------------------------------------  Allergies    latex (Rash; Blisters)  penicillins (Swelling)  sulfa drugs (Unknown)    Intolerances      Standing Inpatient Medications  aztreonam  IVPB      aztreonam  IVPB 1000 milliGRAM(s) IV Intermittent every 8 hours  finasteride 5 milliGRAM(s) Oral daily  pantoprazole    Tablet 40 milliGRAM(s) Oral before breakfast  senna 2 Tablet(s) Oral at bedtime  polyethylene glycol 3350 17 Gram(s) Oral two times a day  docusate sodium 100 milliGRAM(s) Oral three times a day  influenza   Vaccine 0.5 milliLiter(s) IntraMuscular once  metroNIDAZOLE  IVPB 500 milliGRAM(s) IV Intermittent every 8 hours  metroNIDAZOLE  IVPB      sodium chloride 0.9%. 1000 milliLiter(s) IV Continuous <Continuous>  ALBUTerol/ipratropium for Nebulization 3 milliLiter(s) Nebulizer every 6 hours  allopurinol 300 milliGRAM(s) Oral daily  predniSONE   Tablet 60 milliGRAM(s) Oral daily  enoxaparin Injectable 100 milliGRAM(s) SubCutaneous every 12 hours    PRN Inpatient Medications      REVIEW OF SYSTEMS  --------------------------------------------------------------------------------  Gen: + weight changes, fatigue, fevers/chills,   Skin: No rashes  Head/Eyes/Ears/Mouth: No headache; Normal hearing;  No sinus pain/discomfort, sore throat  Respiratory: No dyspnea, cough, wheezing, hemoptysis  CV: No chest pain, no palp   GI: No abdominal pain, diarrhea, constipation, nausea, vomiting,   : No  dysuria,   MSK: No joint pain/swelling; no back pain; no edema  Neuro: +dizziness/lightheadedness, weakness, seizures, numbness, tingling  All other systems were reviewed and are negative, except as noted.    VITALS/PHYSICAL EXAM  --------------------------------------------------------------------------------  T(C): 37.1 (09-23-17 @ 04:25), Max: 37.1 (09-23-17 @ 04:25)  HR: 82 (09-23-17 @ 04:25) (81 - 100)  BP: 95/50 (09-23-17 @ 04:25) (95/50 - 112/73)  RR: 17 (09-23-17 @ 04:25) (17 - 18)  SpO2: 95% (09-23-17 @ 04:25) (93% - 95%)  Wt(kg): --        09-22-17 @ 07:01  -  09-23-17 @ 07:00  --------------------------------------------------------  IN: 240 mL / OUT: 400 mL / NET: -160 mL      Physical Exam:  	Gen: thin elderly M   	no jvd supple neck,   	Pulm: mild decrease bs no rales or ronchi  	CV: RRR, S1S2; no rub  	Abd: +BS, soft, nontender/nondistended  	: No suprapubic tenderness  	LE: Warm, no clubbing, 2+  edema  	Skin: Warm, without rashes  	  LABS/STUDIES  --------------------------------------------------------------------------------              12.6   17.21 >-----------<  211      [09-22-17 @ 07:34]              38.1     132  |  102  |  67  ----------------------------<  101      [09-23-17 @ 08:32]  5.2   |  19  |  1.96        Ca     11.8     [09-23-17 @ 08:32]      Mg     2.5     [09-23-17 @ 08:32]      Phos  3.6     [09-23-17 @ 08:32]          Uric acid 11.3      [09-23-17 @ 08:32]    Creatinine Trend:  SCr 1.96 [09-23 @ 08:32]  SCr 1.88 [09-22 @ 07:36]  SCr 1.74 [09-20 @ 16:15]  SCr 1.80 [09-20 @ 13:52]  SCr 1.59 [09-19 @ 07:52]    Urinalysis - [09-20-17 @ 14:37]      Color Yellow / Appearance  / SG 1.018 / pH 6.0      Gluc Negative / Ketone Negative  / Bili Negative / Urobili Negative       Blood Moderate / Protein 30 / Leuk Est Moderate / Nitrite Negative      RBC >50 / WBC >50 / Hyaline  / Gran  / Sq Epi  / Non Sq Epi  / Bacteria Moderate [facility]  [phone]  NEPHROLOGY      INITIAL CONSULT NOTE  --------------------------------------------------------------------------------  HPI:    84M c hx DLBCL not yet on chemo, biliary stenosis s/p cbd stent, mejias's esophagus, BPH c/b urinary retention s/p chronic tavarez (started 9/5/17), recent discharge (9/15) pw hypotension from rehab. being tx for pna. in addition he is with jasmin renal consult called.   recent dx DLBCL   PAST HISTORY  --------------------------------------------------------------------------------  PAST MEDICAL & SURGICAL HISTORY:  Diffuse large B-cell lymphoma of intra-abdominal lymph nodes  Mejias esophagus  Intestinal infection: 2012  Bladder polyp  H/O inguinal hernia repair  S/P TURP (transurethral resection of prostate)  Bladder polyp: s/p TURBT  History of cholecystectomy: 1987    FAMILY HISTORY:  No pertinent family history in first degree relatives    PAST SOCIAL HISTORY: no tobacco no alcohol    ALLERGIES & MEDICATIONS  --------------------------------------------------------------------------------  Allergies    latex (Rash; Blisters)  penicillins (Swelling)  sulfa drugs (Unknown)    Intolerances      Standing Inpatient Medications  aztreonam  IVPB      aztreonam  IVPB 1000 milliGRAM(s) IV Intermittent every 8 hours  finasteride 5 milliGRAM(s) Oral daily  pantoprazole    Tablet 40 milliGRAM(s) Oral before breakfast  senna 2 Tablet(s) Oral at bedtime  polyethylene glycol 3350 17 Gram(s) Oral two times a day  docusate sodium 100 milliGRAM(s) Oral three times a day  influenza   Vaccine 0.5 milliLiter(s) IntraMuscular once  metroNIDAZOLE  IVPB 500 milliGRAM(s) IV Intermittent every 8 hours  metroNIDAZOLE  IVPB      sodium chloride 0.9%. 1000 milliLiter(s) IV Continuous <Continuous>  ALBUTerol/ipratropium for Nebulization 3 milliLiter(s) Nebulizer every 6 hours  allopurinol 300 milliGRAM(s) Oral daily  predniSONE   Tablet 60 milliGRAM(s) Oral daily  enoxaparin Injectable 100 milliGRAM(s) SubCutaneous every 12 hours    PRN Inpatient Medications      REVIEW OF SYSTEMS  --------------------------------------------------------------------------------  Gen: + weight changes, fatigue, fevers/chills,   Skin: No rashes  Head/Eyes/Ears/Mouth: No headache; Normal hearing;  No sinus pain/discomfort, sore throat  Respiratory: No dyspnea, cough, wheezing, hemoptysis  CV: No chest pain, no palp   GI: No abdominal pain, diarrhea, constipation, nausea, vomiting,   : No  dysuria,   MSK: No joint pain/swelling; no back pain; no edema  Neuro: +dizziness/lightheadedness, weakness, seizures, numbness, tingling  All other systems were reviewed and are negative, except as noted.    VITALS/PHYSICAL EXAM  --------------------------------------------------------------------------------  T(C): 37.1 (09-23-17 @ 04:25), Max: 37.1 (09-23-17 @ 04:25)  HR: 82 (09-23-17 @ 04:25) (81 - 100)  BP: 95/50 (09-23-17 @ 04:25) (95/50 - 112/73)  RR: 17 (09-23-17 @ 04:25) (17 - 18)  SpO2: 95% (09-23-17 @ 04:25) (93% - 95%)  Wt(kg): --        09-22-17 @ 07:01  -  09-23-17 @ 07:00  --------------------------------------------------------  IN: 240 mL / OUT: 400 mL / NET: -160 mL      Physical Exam:  	Gen: thin elderly M   	no jvd supple neck,   	Pulm: mild decrease bs no rales or ronchi  	CV: RRR, S1S2; no rub  	Abd: +BS, soft, nontender/nondistended  	: No suprapubic tenderness  	LE: Warm, no clubbing, 2+  edema  	Skin: Warm, without rashes  	  LABS/STUDIES  --------------------------------------------------------------------------------              12.6   17.21 >-----------<  211      [09-22-17 @ 07:34]              38.1     132  |  102  |  67  ----------------------------<  101      [09-23-17 @ 08:32]  5.2   |  19  |  1.96        Ca     11.8     [09-23-17 @ 08:32]      Mg     2.5     [09-23-17 @ 08:32]      Phos  3.6     [09-23-17 @ 08:32]          Uric acid 11.3      [09-23-17 @ 08:32]    Creatinine Trend:  SCr 1.96 [09-23 @ 08:32]  SCr 1.88 [09-22 @ 07:36]  SCr 1.74 [09-20 @ 16:15]  SCr 1.80 [09-20 @ 13:52]  SCr 1.59 [09-19 @ 07:52]    Urinalysis - [09-20-17 @ 14:37]      Color Yellow / Appearance  / SG 1.018 / pH 6.0      Gluc Negative / Ketone Negative  / Bili Negative / Urobili Negative       Blood Moderate / Protein 30 / Leuk Est Moderate / Nitrite Negative      RBC >50 / WBC >50 / Hyaline  / Gran  / Sq Epi  / Non Sq Epi  / Bacteria Moderate

## 2017-09-23 NOTE — PROGRESS NOTE ADULT - PROBLEM SELECTOR PLAN 5
appreciate oncology recs  sp pamidronate for hypercalcemia  cont prednisone 60mg daily for 5 days  cont allopurinol  daily calcium, mg, phos and uric acid  plan for chemo next week, Cytoxan and Vincristine  cardiology to comment on candidacy for anthracycline.

## 2017-09-23 NOTE — PROGRESS NOTE ADULT - SUBJECTIVE AND OBJECTIVE BOX
Follow-up Pulm Progress Note  Mathew Glass MD  [phone]    Notes reviewd  On full dose lovenox for UE DVT  Increase WBC noted, yet afebrile and hemodynamically stable on abx for pneumonia  No new respiratory events overnight.  Denies SOB/CP.   Brathing comfortably on RA    Vital Signs Last 24 Hrs  T(C): 37.1 (23 Sep 2017 04:25), Max: 37.1 (23 Sep 2017 04:25)  T(F): 98.8 (23 Sep 2017 04:25), Max: 98.8 (23 Sep 2017 04:25)  HR: 82 (23 Sep 2017 04:25) (81 - 100)  BP: 95/50 (23 Sep 2017 04:25) (95/50 - 112/73)  BP(mean): --  RR: 17 (23 Sep 2017 04:25) (17 - 18)  SpO2: 95% (23 Sep 2017 04:25) (93% - 95%)                        12.6   17.21 )-----------( 211      ( 22 Sep 2017 07:34 )             38.1       09-23    132<L>  |  102  |  67<H>  ----------------------------<  101<H>  5.2   |  19<L>  |  1.96<H>    Ca    11.8<H>      23 Sep 2017 08:32  Phos  3.6     09-23  Mg     2.5     09-23      Physical Examination:  PULM: Decr BS L base; without wheeze  CVS: Regular rate and rhythm, no murmurs, rubs, or gallops  ABD: Soft, non-tender  EXT:  No clubbing, cyanosis, or edema    RADIOLOGY REVIEWED  CXR:    CT chest:    TTE:

## 2017-09-23 NOTE — PROGRESS NOTE ADULT - PROBLEM SELECTOR PLAN 1
appreciate speech and swallow eval  appreciate ID recs, covering for aspiration and HCAP with aztreonam and flagyl, PCN allergy  BC NTD, urine cx neg

## 2017-09-23 NOTE — PROGRESS NOTE ADULT - ASSESSMENT
84M c hx DLBCL not yet on chemo, biliary stenosis s/p cbd stent, mejias's esophagus, BPH c/b urinary retention s/p chronic tavarez (started 9/5/17), CKD (baseline Cr 1.2), recent discharge (9/15) sp biopsy, pw sepsis 2/2 PNA.

## 2017-09-23 NOTE — CHART NOTE - NSCHARTNOTEFT_GEN_A_CORE
The note that I wrote in the morning of 7/23/2017 shows up with the erroneous date of 7/22/2017/ Please refer to my note from 7/22/2017.    Noah Starr MD

## 2017-09-23 NOTE — PROGRESS NOTE ADULT - SUBJECTIVE AND OBJECTIVE BOX
Patient is a 84y old  Male who presents with a chief complaint of hypotension (21 Sep 2017 12:22)      SUBJECTIVE / OVERNIGHT EVENTS:    Patient seen and examined. denies LE pain. doppler US positive for left DVT. denies cp sob.       Vital Signs Last 24 Hrs  T(C): 37.1 (23 Sep 2017 04:25), Max: 37.1 (23 Sep 2017 04:25)  T(F): 98.8 (23 Sep 2017 04:25), Max: 98.8 (23 Sep 2017 04:25)  HR: 82 (23 Sep 2017 04:25) (81 - 100)  BP: 95/50 (23 Sep 2017 04:25) (95/50 - 112/73)  BP(mean): --  RR: 17 (23 Sep 2017 04:25) (17 - 18)  SpO2: 95% (23 Sep 2017 04:25) (93% - 95%)  I&O's Summary    22 Sep 2017 07:01  -  23 Sep 2017 07:00  --------------------------------------------------------  IN: 240 mL / OUT: 400 mL / NET: -160 mL      PE:  GENERAL: NAD, AAOx2, frail  HEAD:  Atraumatic, Normocephalic  EYES: EOMI, PERRLA, conjunctiva and sclera clear  NECK: Supple, No JVD  CHEST/LUNG: decreased BS, coarse  HEART: Regular rate and rhythm; + murmur  ABDOMEN: Soft, Nontender, Nondistended; Bowel sounds present, multiple erythematous areas, no pus  : chronic tavarez  EXTREMITIES:  2+ Peripheral Pulses, + 2 pitting edema, NTTP left leg, no echymosis  SKIN: No rashes or lesions  NEURO: No focal deficits    LABS:                        12.6   17.21 )-----------( 211      ( 22 Sep 2017 07:34 )             38.1     09-23    132<L>  |  102  |  67<H>  ----------------------------<  101<H>  5.2   |  19<L>  |  1.96<H>    Ca    11.8<H>      23 Sep 2017 08:32  Phos  3.6     09-23  Mg     2.5     09-23        CAPILLARY BLOOD GLUCOSE  97 (23 Sep 2017 09:22)  131 (22 Sep 2017 21:27)  122 (22 Sep 2017 16:43)  123 (22 Sep 2017 11:12)                RADIOLOGY & ADDITIONAL TESTS:    Imaging Personally Reviewed:  [x] YES  [ ] NO    Consultant(s) Notes Reviewed:  [x] YES  [ ] NO    MEDICATIONS  (STANDING):  aztreonam  IVPB      aztreonam  IVPB 1000 milliGRAM(s) IV Intermittent every 8 hours  finasteride 5 milliGRAM(s) Oral daily  pantoprazole    Tablet 40 milliGRAM(s) Oral before breakfast  senna 2 Tablet(s) Oral at bedtime  polyethylene glycol 3350 17 Gram(s) Oral two times a day  docusate sodium 100 milliGRAM(s) Oral three times a day  influenza   Vaccine 0.5 milliLiter(s) IntraMuscular once  metroNIDAZOLE  IVPB 500 milliGRAM(s) IV Intermittent every 8 hours  metroNIDAZOLE  IVPB      sodium chloride 0.9%. 1000 milliLiter(s) (75 mL/Hr) IV Continuous <Continuous>  ALBUTerol/ipratropium for Nebulization 3 milliLiter(s) Nebulizer every 6 hours  allopurinol 300 milliGRAM(s) Oral daily  predniSONE   Tablet 60 milliGRAM(s) Oral daily  enoxaparin Injectable 100 milliGRAM(s) SubCutaneous every 12 hours    MEDICATIONS  (PRN):      Care Discussed with Consultants/Other Providers [x] YES  [ ] NO    HEALTH ISSUES - PROBLEM Dx:  Pneumonia: Pneumonia  DVT femoral (deep venous thrombosis) with thrombophlebitis, left: DVT femoral (deep venous thrombosis) with thrombophlebitis, left  Rash: Rash  Hypotension: Hypotension  Hypercalcemia: Hypercalcemia  Leg swelling: Leg swelling  Advanced care planning/counseling discussion: Advanced care planning/counseling discussion  Urinary retention: Urinary retention  Debility: Debility  Diffuse large B-cell lymphoma of intra-abdominal lymph nodes: Diffuse large B-cell lymphoma of intra-abdominal lymph nodes  EKG abnormality: EKG abnormality  MARIA C (acute kidney injury): MARIA C (acute kidney injury)  Sepsis, due to unspecified organism: Sepsis, due to unspecified organism  Pneumonia, bacterial: Pneumonia, bacterial  Abnormal EKG: Abnormal EKG

## 2017-09-24 LAB
ANION GAP SERPL CALC-SCNC: 11 MMOL/L — SIGNIFICANT CHANGE UP (ref 5–17)
BUN SERPL-MCNC: 64 MG/DL — HIGH (ref 7–23)
CALCIUM SERPL-MCNC: 11.1 MG/DL — HIGH (ref 8.4–10.5)
CHLORIDE SERPL-SCNC: 102 MMOL/L — SIGNIFICANT CHANGE UP (ref 96–108)
CO2 SERPL-SCNC: 22 MMOL/L — SIGNIFICANT CHANGE UP (ref 22–31)
CREAT SERPL-MCNC: 1.75 MG/DL — HIGH (ref 0.5–1.3)
GLUCOSE SERPL-MCNC: 88 MG/DL — SIGNIFICANT CHANGE UP (ref 70–99)
HCT VFR BLD CALC: 38.5 % — LOW (ref 39–50)
HGB BLD-MCNC: 12.6 G/DL — LOW (ref 13–17)
MCHC RBC-ENTMCNC: 32.7 GM/DL — SIGNIFICANT CHANGE UP (ref 32–36)
MCHC RBC-ENTMCNC: 32.8 PG — SIGNIFICANT CHANGE UP (ref 27–34)
MCV RBC AUTO: 100.3 FL — HIGH (ref 80–100)
PLATELET # BLD AUTO: 214 K/UL — SIGNIFICANT CHANGE UP (ref 150–400)
POTASSIUM SERPL-MCNC: 5 MMOL/L — SIGNIFICANT CHANGE UP (ref 3.5–5.3)
POTASSIUM SERPL-SCNC: 5 MMOL/L — SIGNIFICANT CHANGE UP (ref 3.5–5.3)
RBC # BLD: 3.84 M/UL — LOW (ref 4.2–5.8)
RBC # FLD: 15.8 % — HIGH (ref 10.3–14.5)
SODIUM SERPL-SCNC: 135 MMOL/L — SIGNIFICANT CHANGE UP (ref 135–145)
URATE SERPL-MCNC: 10.4 MG/DL — HIGH (ref 3.4–8.8)
WBC # BLD: 20 K/UL — HIGH (ref 3.8–10.5)
WBC # FLD AUTO: 20 K/UL — HIGH (ref 3.8–10.5)

## 2017-09-24 RX ADMIN — ENOXAPARIN SODIUM 100 MILLIGRAM(S): 100 INJECTION SUBCUTANEOUS at 17:01

## 2017-09-24 RX ADMIN — Medication 60 MILLIGRAM(S): at 07:06

## 2017-09-24 RX ADMIN — FINASTERIDE 5 MILLIGRAM(S): 5 TABLET, FILM COATED ORAL at 12:30

## 2017-09-24 RX ADMIN — Medication 50 MILLIGRAM(S): at 12:30

## 2017-09-24 RX ADMIN — Medication 100 MILLIGRAM(S): at 17:02

## 2017-09-24 RX ADMIN — ENOXAPARIN SODIUM 100 MILLIGRAM(S): 100 INJECTION SUBCUTANEOUS at 07:06

## 2017-09-24 RX ADMIN — CALCITONIN SALMON 360 INTERNATIONAL UNIT(S): 200 INJECTION, SOLUTION INTRAMUSCULAR at 21:59

## 2017-09-24 RX ADMIN — CALCITONIN SALMON 360 INTERNATIONAL UNIT(S): 200 INJECTION, SOLUTION INTRAMUSCULAR at 09:10

## 2017-09-24 RX ADMIN — Medication 100 MILLIGRAM(S): at 07:06

## 2017-09-24 RX ADMIN — Medication 3 MILLILITER(S): at 17:02

## 2017-09-24 RX ADMIN — PANTOPRAZOLE SODIUM 40 MILLIGRAM(S): 20 TABLET, DELAYED RELEASE ORAL at 07:06

## 2017-09-24 RX ADMIN — Medication 100 MILLIGRAM(S): at 09:10

## 2017-09-24 RX ADMIN — Medication 50 MILLIGRAM(S): at 17:02

## 2017-09-24 RX ADMIN — Medication 100 MILLIGRAM(S): at 02:33

## 2017-09-24 RX ADMIN — SODIUM CHLORIDE 80 MILLILITER(S): 9 INJECTION INTRAMUSCULAR; INTRAVENOUS; SUBCUTANEOUS at 07:05

## 2017-09-24 RX ADMIN — Medication 300 MILLIGRAM(S): at 12:30

## 2017-09-24 RX ADMIN — Medication 50 MILLIGRAM(S): at 04:01

## 2017-09-24 RX ADMIN — Medication 3 MILLILITER(S): at 12:29

## 2017-09-24 NOTE — PROGRESS NOTE ADULT - PROBLEM SELECTOR PLAN 3
On Prednisone. Next week, as his general state improves, I hope to Administer Cytoxan and Vincristine later this week. Eventually, I hope to be able to give R-CHOP, or at least r-miniCHOP.    Needs Cardiological evaluation in order to determine if he is a candidate for an anthracycline.

## 2017-09-24 NOTE — PROGRESS NOTE ADULT - SUBJECTIVE AND OBJECTIVE BOX
[place] [facility] Progress Note - Pager [phone]    Patient is a 84y old  Male who presents with a chief complaint of hypotension (21 Sep 2017 12:22)      SUBJECTIVE: Patient seen and examined. No nausea or vomiting.       OBJECTIVE:     ** Physical Exam **  Gen: Alert, NAD  Abd: Soft, nontender, nondistended    ** Vital signs / I&O's **    Vital Signs Last 24 Hrs  T(C): 36.3 (23 Sep 2017 21:43), Max: 36.3 (23 Sep 2017 13:21)  T(F): 97.3 (23 Sep 2017 21:43), Max: 97.4 (23 Sep 2017 20:04)  HR: 97 (23 Sep 2017 21:43) (91 - 112)  BP: 103/60 (23 Sep 2017 21:43) (99/55 - 123/68)  BP(mean): --  RR: 18 (23 Sep 2017 20:04) (17 - 18)  SpO2: 91% (23 Sep 2017 21:43) (91% - 95%)      22 Sep 2017 07:01  -  23 Sep 2017 07:00  --------------------------------------------------------  IN:    Oral Fluid: 240 mL  Total IN: 240 mL    OUT:    Indwelling Catheter - Urethral: 400 mL  Total OUT: 400 mL    Total NET: -160 mL      23 Sep 2017 07:01  -  24 Sep 2017 06:02  --------------------------------------------------------  IN:    sodium chloride 0.9%.: 640 mL    Solution: 150 mL    Solution: 300 mL  Total IN: 1090 mL    OUT:    Indwelling Catheter - Urethral: 400 mL  Total OUT: 400 mL    Total NET: 690 mL            ** Labs **                          12.6   17.21 )-----------( 211      ( 22 Sep 2017 07:34 )             38.1     23 Sep 2017 08:32    132    |  102    |  67     ----------------------------<  101    5.2     |  19     |  1.96     Ca    11.8       23 Sep 2017 08:32  Phos  3.6       23 Sep 2017 08:32  Mg     2.5       23 Sep 2017 08:32        CAPILLARY BLOOD GLUCOSE  111 (23 Sep 2017 21:43)  112 (23 Sep 2017 16:37)  127 (23 Sep 2017 11:52)  97 (23 Sep 2017 09:22)                  MEDICATIONS  (STANDING):  aztreonam  IVPB      aztreonam  IVPB 1000 milliGRAM(s) IV Intermittent every 8 hours  finasteride 5 milliGRAM(s) Oral daily  pantoprazole    Tablet 40 milliGRAM(s) Oral before breakfast  senna 2 Tablet(s) Oral at bedtime  polyethylene glycol 3350 17 Gram(s) Oral two times a day  docusate sodium 100 milliGRAM(s) Oral three times a day  influenza   Vaccine 0.5 milliLiter(s) IntraMuscular once  metroNIDAZOLE  IVPB 500 milliGRAM(s) IV Intermittent every 8 hours  metroNIDAZOLE  IVPB      ALBUTerol/ipratropium for Nebulization 3 milliLiter(s) Nebulizer every 6 hours  allopurinol 300 milliGRAM(s) Oral daily  predniSONE   Tablet 60 milliGRAM(s) Oral daily  enoxaparin Injectable 100 milliGRAM(s) SubCutaneous every 12 hours  sodium chloride 0.9%. 1000 milliLiter(s) (80 mL/Hr) IV Continuous <Continuous>  calcitonin Injectable 360 International Unit(s) SubCutaneous every 12 hours    MEDICATIONS  (PRN):

## 2017-09-24 NOTE — PROGRESS NOTE ADULT - SUBJECTIVE AND OBJECTIVE BOX
Patient with Diffuse Large Cell Non Hodgkin's Lymphoma. Admitted with pneumonia and hypercalcemia. On IV antibiotics, oral prednisone and S/P Zoledronic Acid (< 72 hours ago). Subsequently found to have a DVT of the left common femoral vein.      Allergies    latex (Rash; Blisters)  penicillins (Swelling)  sulfa drugs (Unknown)    MEDICATIONS  (STANDING):  aztreonam  IVPB      aztreonam  IVPB 1000 milliGRAM(s) IV Intermittent every 8 hours  finasteride 5 milliGRAM(s) Oral daily  pantoprazole    Tablet 40 milliGRAM(s) Oral before breakfast  senna 2 Tablet(s) Oral at bedtime  polyethylene glycol 3350 17 Gram(s) Oral two times a day  docusate sodium 100 milliGRAM(s) Oral three times a day  influenza   Vaccine 0.5 milliLiter(s) IntraMuscular once  metroNIDAZOLE  IVPB 500 milliGRAM(s) IV Intermittent every 8 hours  metroNIDAZOLE  IVPB      sodium chloride 0.9%. 1000 milliLiter(s) (75 mL/Hr) IV Continuous <Continuous>  ALBUTerol/ipratropium for Nebulization 3 milliLiter(s) Nebulizer every 6 hours  allopurinol 300 milliGRAM(s) Oral daily  predniSONE   Tablet 60 milliGRAM(s) Oral daily  enoxaparin Injectable 100 milliGRAM(s) SubCutaneous every 12 hours    MEDICATIONS  (PRN):      Vital Signs Last 24 Hrs  T(C): 36.3 (23 Sep 2017 21:43), Max: 36.3 (23 Sep 2017 13:21)  T(F): 97.3 (23 Sep 2017 21:43), Max: 97.4 (23 Sep 2017 20:04)  HR: 97 (23 Sep 2017 21:43) (91 - 112)  BP: 103/60 (23 Sep 2017 21:43) (99/55 - 123/68)  BP(mean): --  RR: 18 (23 Sep 2017 20:04) (17 - 18)  SpO2: 91% (23 Sep 2017 21:43) (91% - 95%)                                         12.6   17.21 )-----------( 211      ( 22 Sep 2017 07:34 )             38.1       09-23    132<L>  |  102  |  67<H>  ----------------------------<  101<H>  5.2   |  19<L>  |  1.96<H>    Ca    11.8<H>      23 Sep 2017 08:32  Phos  3.6     09-23  Mg     2.5     09-23            PAST MEDICAL & SURGICAL HISTORY:  Diffuse large B-cell lymphoma of intra-abdominal lymph nodes  Romero esophagus  Intestinal infection: 2012  Bladder polyp  H/O inguinal hernia repair  S/P TURP (transurethral resection of prostate)  Bladder polyp: s/p TURBT  History of cholecystectomy: 1987    FAMILY HISTORY:  No pertinent family history in first degree relatives

## 2017-09-24 NOTE — PROGRESS NOTE ADULT - NEGATIVE GENERAL SYMPTOMS
no sweating/no fever/no weight loss/no chills/no anorexia
no fever/no chills/no sweating/no anorexia/no weight loss
no weight loss/no chills/no sweating/no anorexia/no fever

## 2017-09-24 NOTE — PROGRESS NOTE ADULT - PROBLEM SELECTOR PLAN 8
+ left common femoral DVT likely 2/2 increased risk from sedentary and malignancy  on therapeutic lovenox

## 2017-09-24 NOTE — PROGRESS NOTE ADULT - ASSESSMENT
84 year old male with LLL PNA and recent robotic lymph node biopsy    Plan:  - Continue treatment for PNA  - Continue supportive care per primary team  - No acute surgical intervention at this time  - Discussed w/ Dr. Schuster

## 2017-09-25 LAB
ANION GAP SERPL CALC-SCNC: 13 MMOL/L — SIGNIFICANT CHANGE UP (ref 5–17)
BUN SERPL-MCNC: 66 MG/DL — HIGH (ref 7–23)
CALCIUM SERPL-MCNC: 11.3 MG/DL — HIGH (ref 8.4–10.5)
CHLORIDE SERPL-SCNC: 104 MMOL/L — SIGNIFICANT CHANGE UP (ref 96–108)
CO2 SERPL-SCNC: 21 MMOL/L — LOW (ref 22–31)
CREAT SERPL-MCNC: 1.63 MG/DL — HIGH (ref 0.5–1.3)
CULTURE RESULTS: SIGNIFICANT CHANGE UP
CULTURE RESULTS: SIGNIFICANT CHANGE UP
GLUCOSE SERPL-MCNC: 126 MG/DL — HIGH (ref 70–99)
HCT VFR BLD CALC: 41.1 % — SIGNIFICANT CHANGE UP (ref 39–50)
HGB BLD-MCNC: 13.8 G/DL — SIGNIFICANT CHANGE UP (ref 13–17)
MAGNESIUM SERPL-MCNC: 2.5 MG/DL — SIGNIFICANT CHANGE UP (ref 1.6–2.6)
MCHC RBC-ENTMCNC: 33.6 GM/DL — SIGNIFICANT CHANGE UP (ref 32–36)
MCHC RBC-ENTMCNC: 33.9 PG — SIGNIFICANT CHANGE UP (ref 27–34)
MCV RBC AUTO: 101 FL — HIGH (ref 80–100)
PLATELET # BLD AUTO: 238 K/UL — SIGNIFICANT CHANGE UP (ref 150–400)
POTASSIUM SERPL-MCNC: 5.2 MMOL/L — SIGNIFICANT CHANGE UP (ref 3.5–5.3)
POTASSIUM SERPL-SCNC: 5.2 MMOL/L — SIGNIFICANT CHANGE UP (ref 3.5–5.3)
RBC # BLD: 4.07 M/UL — LOW (ref 4.2–5.8)
RBC # FLD: 16.4 % — HIGH (ref 10.3–14.5)
SODIUM SERPL-SCNC: 138 MMOL/L — SIGNIFICANT CHANGE UP (ref 135–145)
SPECIMEN SOURCE: SIGNIFICANT CHANGE UP
SPECIMEN SOURCE: SIGNIFICANT CHANGE UP
URATE SERPL-MCNC: 9.4 MG/DL — HIGH (ref 3.4–8.8)
WBC # BLD: 18.8 K/UL — HIGH (ref 3.8–10.5)
WBC # FLD AUTO: 18.8 K/UL — HIGH (ref 3.8–10.5)

## 2017-09-25 RX ORDER — SODIUM CHLORIDE 9 MG/ML
1000 INJECTION INTRAMUSCULAR; INTRAVENOUS; SUBCUTANEOUS
Qty: 0 | Refills: 0 | Status: DISCONTINUED | OUTPATIENT
Start: 2017-09-25 | End: 2017-10-03

## 2017-09-25 RX ORDER — ENOXAPARIN SODIUM 100 MG/ML
90 INJECTION SUBCUTANEOUS EVERY 12 HOURS
Qty: 0 | Refills: 0 | Status: DISCONTINUED | OUTPATIENT
Start: 2017-09-25 | End: 2017-10-07

## 2017-09-25 RX ADMIN — ENOXAPARIN SODIUM 100 MILLIGRAM(S): 100 INJECTION SUBCUTANEOUS at 10:44

## 2017-09-25 RX ADMIN — Medication 3 MILLILITER(S): at 12:12

## 2017-09-25 RX ADMIN — Medication 3 MILLILITER(S): at 00:32

## 2017-09-25 RX ADMIN — Medication 50 MILLIGRAM(S): at 20:36

## 2017-09-25 RX ADMIN — FINASTERIDE 5 MILLIGRAM(S): 5 TABLET, FILM COATED ORAL at 12:12

## 2017-09-25 RX ADMIN — Medication 100 MILLIGRAM(S): at 02:32

## 2017-09-25 RX ADMIN — Medication 50 MILLIGRAM(S): at 12:23

## 2017-09-25 RX ADMIN — Medication 100 MILLIGRAM(S): at 10:45

## 2017-09-25 RX ADMIN — SENNA PLUS 2 TABLET(S): 8.6 TABLET ORAL at 22:58

## 2017-09-25 RX ADMIN — SODIUM CHLORIDE 75 MILLILITER(S): 9 INJECTION INTRAMUSCULAR; INTRAVENOUS; SUBCUTANEOUS at 14:40

## 2017-09-25 RX ADMIN — Medication 50 MILLIGRAM(S): at 04:02

## 2017-09-25 RX ADMIN — ENOXAPARIN SODIUM 90 MILLIGRAM(S): 100 INJECTION SUBCUTANEOUS at 22:58

## 2017-09-25 RX ADMIN — PANTOPRAZOLE SODIUM 40 MILLIGRAM(S): 20 TABLET, DELAYED RELEASE ORAL at 10:44

## 2017-09-25 RX ADMIN — Medication 100 MILLIGRAM(S): at 22:58

## 2017-09-25 RX ADMIN — Medication 100 MILLIGRAM(S): at 19:03

## 2017-09-25 RX ADMIN — Medication 300 MILLIGRAM(S): at 12:12

## 2017-09-25 RX ADMIN — Medication 60 MILLIGRAM(S): at 10:44

## 2017-09-25 NOTE — CHART NOTE - NSCHARTNOTEFT_GEN_A_CORE
Upon Nutritional Assessment by the Registered Dietitian your patient was determined to meet criteria / has evidence of the following diagnosis/diagnoses:          [ ]  Mild Protein Calorie Malnutrition        [x ]  Moderate Protein Calorie Malnutrition        [ ] Severe Protein Calorie Malnutrition        [ ] Unspecified Protein Calorie Malnutrition        [ ] Underweight / BMI <19        [ ] Morbid Obesity / BMI > 40      Findings as based on:  [x ] Comprehensive nutrition assessment   [ ] Nutrition Focused Physical Exam  [x ] Other: pt w/poor po intakes (<50%) since 2 weeks ago, stage 2 sacrum pressure ulcer, DLBCL      Nutrition Plan/Recommendations:  Recommend to add Nepro 1 x day and ProSource 1 x day        PROVIDER Section:     By signing this assessment you are acknowledging and agree with the diagnosis/diagnoses assigned by the Registered Dietitian    Comments:

## 2017-09-25 NOTE — PROGRESS NOTE ADULT - ASSESSMENT
84M c hx DLBCL not yet on chemo, biliary stenosis s/p cbd stent, mejias's esophagus, BPH c/b urinary retention s/p chronic tavarez (started 9/5/17), recent discharge (9/15) pw hypotension from rehab. with jasmin with hypercalcemia and hyperkalemia as well as pna and recent dx of DLBCL. he is s/p aredia 90 mg     1- jasmin  2- hypercalcemia  3- b cell lymphoma  4- hyperkalemia resolved   5- dvt   6- aspiration       calcium slightly better. check I ca++   chemo per onc.   steroids onboard hopefully also helps his ca+ level   continue allopurinol 300 mg qd  cont with abx

## 2017-09-25 NOTE — PROGRESS NOTE ADULT - PROBLEM SELECTOR PLAN 5
appreciate oncology recs  sp pamidronate for hypercalcemia, cont calcitonin per renal  cont prednisone 60mg daily for 5 days  cont allopurinol  daily calcium, mg, phos and uric acid appreciate oncology recs  sp pamidronate for hypercalcemia, cont calcitonin per renal  cont prednisone 60mg daily for 4/5 days  cont allopurinol  daily calcium, mg, phos and uric acid appreciate oncology recs  sp pamidronate for hypercalcemia, cont calcitonin per renal  cont prednisone 60mg daily for 4/5 days  cont allopurinol  daily calcium, mg, phos and uric acid  possible chemo later this wk if stable

## 2017-09-25 NOTE — PROGRESS NOTE ADULT - ASSESSMENT
84M with lymphoma sent from rehab with weakness, presyncope and hypotension  found to have pneumonia on CT scan-- symptoms improved, no dyspnea and no cough  for chemo in future, pt currently received prednisone is likely reason for leukocytosis

## 2017-09-25 NOTE — PROGRESS NOTE ADULT - SUBJECTIVE AND OBJECTIVE BOX
[facility]   [phone]  RENAL FOLLOW UP NOTE  --------------------------------------------------------------------------------  Chief Complaint:    24 hour events/subjective:    is confused this evening     PAST HISTORY  --------------------------------------------------------------------------------  No significant changes to PMH, PSH, FHx, SHx, unless otherwise noted    ALLERGIES & MEDICATIONS  --------------------------------------------------------------------------------  Allergies    latex (Rash; Blisters)  penicillins (Swelling)  sulfa drugs (Unknown)    Intolerances      Standing Inpatient Medications  aztreonam  IVPB      aztreonam  IVPB 1000 milliGRAM(s) IV Intermittent every 8 hours  finasteride 5 milliGRAM(s) Oral daily  pantoprazole    Tablet 40 milliGRAM(s) Oral before breakfast  senna 2 Tablet(s) Oral at bedtime  polyethylene glycol 3350 17 Gram(s) Oral two times a day  docusate sodium 100 milliGRAM(s) Oral three times a day  influenza   Vaccine 0.5 milliLiter(s) IntraMuscular once  metroNIDAZOLE  IVPB 500 milliGRAM(s) IV Intermittent every 8 hours  metroNIDAZOLE  IVPB      allopurinol 300 milliGRAM(s) Oral daily  enoxaparin Injectable 90 milliGRAM(s) SubCutaneous every 12 hours  sodium chloride 0.9%. 1000 milliLiter(s) IV Continuous <Continuous>    PRN Inpatient Medications      REVIEW OF SYSTEMS  --------------------------------------------------------------------------------  is confused and denies  Gen: denies fatigue, fevers/chills,  CVS: denies chest pain/palpitations  Resp: denies SOB/Cough  GI: Denies N/V/Abd pain  : Denies dysuria      VITALS/PHYSICAL EXAM  --------------------------------------------------------------------------------  T(C): 36.4 (09-25-17 @ 12:15), Max: 36.8 (09-24-17 @ 20:23)  HR: 74 (09-25-17 @ 12:15) (70 - 87)  BP: 109/66 (09-25-17 @ 12:15) (92/60 - 109/66)  RR: 18 (09-25-17 @ 12:15) (18 - 20)  SpO2: 94% (09-25-17 @ 12:15) (92% - 97%)  Wt(kg): --    Weight (kg): 93.4 (09-25-17 @ 10:56)      09-24-17 @ 07:01  -  09-25-17 @ 07:00  --------------------------------------------------------  IN: 850 mL / OUT: 1400 mL / NET: -550 mL    09-25-17 @ 07:01  -  09-25-17 @ 20:22  --------------------------------------------------------  IN: 1135 mL / OUT: 500 mL / NET: 635 mL      Physical Exam:  	    Physical Exam:  	Gen: alert oriented place person and date   	Pulm: Decreased breath sounds b/l bases. no rales or ronchi or wheezing  	CV: RRR, S1/S2. no rub  	Back: No CVA tenderness; no sacral edema  	Abd: +BS, soft, nontender/nondistended  	: No suprapubic tenderness.               Extremity: No cyanosis, 2+ edema no clubbing  	    LABS/STUDIES  --------------------------------------------------------------------------------              13.8   18.80 >-----------<  238      [09-25-17 @ 07:31]              41.1     138  |  104  |  66  ----------------------------<  126      [09-25-17 @ 07:28]  5.2   |  21  |  1.63        Ca     11.3     [09-25-17 @ 07:28]      Mg     2.5     [09-25-17 @ 07:28]          Uric acid 9.4      [09-25-17 @ 07:28]    Creatinine Trend:  SCr 1.63 [09-25 @ 07:28]  SCr 1.75 [09-24 @ 08:53]  SCr 1.96 [09-23 @ 08:32]  SCr 1.88 [09-22 @ 07:36]  SCr 1.74 [09-20 @ 16:15]              Urinalysis - [09-20-17 @ 14:37]      Color Yellow / Appearance  / SG 1.018 / pH 6.0      Gluc Negative / Ketone Negative  / Bili Negative / Urobili Negative       Blood Moderate / Protein 30 / Leuk Est Moderate / Nitrite Negative      RBC >50 / WBC >50 / Hyaline  / Gran  / Sq Epi  / Non Sq Epi  / Bacteria Moderate

## 2017-09-25 NOTE — PROGRESS NOTE ADULT - PROBLEM SELECTOR PLAN 2
Hypercalcemia secondary to malginancy  - s/p aredia. Calcium levels are still high. Continue calcitonin  - If calcium is still elevated on Thurday, will redose aredia.

## 2017-09-25 NOTE — DIETITIAN INITIAL EVALUATION ADULT. - OTHER INFO
Nutrition consult received for poor po intakes and pressure ulcer > stage 2. Per wife, pt's UBW is 180 pounds, has been gaining wt 2/2 fluid retention. Per previous RD note, pt wt was 178 pounds and had wt gain of 22 pounds 2/2 fluid retention. Current dosing wt is 205 pounds. Pt with poor appetite and po intakes, flowsheet reviewed. No GI distress noted at this time, +BM today. Per chart, 2/ SLP swallow evaluation (9/21/2017) recommends NPO, but pt's family declined and pt currently on Dysphagia 2 nectar consistency diet. NKFA. PTA drank Boost.

## 2017-09-25 NOTE — PROGRESS NOTE ADULT - SUBJECTIVE AND OBJECTIVE BOX
Subjective  Patient seen in the AM. Confused and angry about the lack of communication with his wife and the team although he is also acknowledges that he is quite confused also.     Admission History:  84 year old man with recent diagnosis of DLBCL, biliary stenosis s/p CBD stent, mejias's esophagus, BPH w/ urinary retention s/p chronic tavarez placement admitted with weakness and confusion. Patient reports having felt weak since admission to [facility]. Patient been unable to stand and at times is dizzy. No chest pain. No nausea or vomiting. Patient has been more confused than usual. No fevers or chills.      ROS:  General:  (-)Pain, (+)Decrease appetite, (-)Fevers, (-)Chills, (-)Weight Loss  Eyes: (-)Blurry Vision, (-)Double Vision, (-)Vision Loss  ENT: (-)Sinus Congestion, (-)Decreased Hearing, (-)Nosebleeds, (-)Sore Throat  Cardiac: (-)Chest Pain, (-)Palpitations, (+)Shortness of breathe on exertion  Respiratory: (-)Cough, (-)hemoptysis, (-)Shortness of breathe  GI: (-)Nausea, (-)Vomiting, (-)Diarrhea, (-)Constipation, (-)Melena, (-)BRBPR  : (-)Hematuria, (-)Dysuria, (-)Polyuia  MSK: (-)Back pain, (-)Joint pain, (-)Stiffness  Dermatology: (-)Rash, (-)Itching  Neurology:  (-)Numbness, (-)Tingling, (-)Difficulty Walking, (-)Tremors, (+)Weakness (+)Confusion  Psych: (-)Anxiety, (-)Depression, (-)Memory Loss  Hematology:  (-)Easy Bruising, (-)Easy Bleeding, (-)Night Sweats.     Allergies  latex (Rash; Blisters)  penicillins (Swelling)  sulfa drugs (Unknown)    Vital Signs Last 24 Hrs  Vital Signs Last 24 Hrs  T(C): 36.2 (25 Sep 2017 03:48), Max: 36.8 (24 Sep 2017 20:23)  T(F): 97.2 (25 Sep 2017 03:48), Max: 98.3 (24 Sep 2017 20:23)  HR: 87 (25 Sep 2017 03:48) (70 - 87)  BP: 105/49 (25 Sep 2017 03:48) (92/60 - 105/49)  RR: 20 (25 Sep 2017 03:48) (18 - 20)  SpO2: 92% (25 Sep 2017 03:48) (92% - 97%)    Objective  Physical Exam:  General: AAO x 2. NAD. Lying in bed comfortably. Angry and confused.   HEENT: clear oropharynx, anicteric sclera, pink conjunctivae, EOMi. PERRLA  Cardiac: S1, S2 present. No audible murmurs. RRR  Lungs: Decreased breathe sounds.   Abdomen: Soft, Non-Tender, Non-Distended. No palpable hepatosplenomegaly.  Extremities: 2+ pulses. Edema to the knees  Skin: No Rashes. No petechiae  Neuro: No focal motor or sensory deficits.     Labs:                        13.8   18.80 )-----------( 238      ( 25 Sep 2017 07:31 )             41.1   09-25    138  |  104  |  66<H>  ----------------------------<  126<H>  5.2   |  21<L>  |  1.63<H>    Ca    11.3<H>      25 Sep 2017 07:28  Mg     2.5     09-25    Radiology:  CT Chest/Abdomen/Pelvis:  IMPRESSION:   Left lower lobe pneumonia.  Bulky confluent upper abdominal and retroperitoneal adenopathy as   described, consistent with lymphoma.     CT Head;  IMPRESSION: Unremarkable noncontrast CT of the brain.    V/Q Scan   IMPRESSION: Very low probability of pulmonary embolism      US LE  IMPRESSION: Acute, above the knee free floating DVT in the left common   femoral vein. LINK Tavera was informed of the findings at 1:20 PM on   9/22/2017.      Pathology:  Surgical Pathology Final Report - :   ACCESSION No:  [id number]    Final Diagnosis  Lymph node, periportal, biopsy  Diffuse large B-cell lymphoma, non-germinal center type

## 2017-09-25 NOTE — PROGRESS NOTE ADULT - PROBLEM SELECTOR PLAN 1
appreciate ID recs, covering for aspiration and HCAP with aztreonam and flagyl, day 6  BC NTD, urine cx neg appreciate ID recs, covering for aspiration and HCAP with aztreonam and flagyl, day 6, ending 9/27  BC NTD, urine cx neg appreciate ID recs, covering for aspiration and HCAP with aztreonam and flagyl, day 6, ending 9/27  BC NTD, urine cx neg  CXR shows left pleural effusion. Bibasilar atelectasis versus pneumonia.  Small left pneumothorax cannot be excluded on the basis of this exam. will repeat CXR tomorrow.

## 2017-09-25 NOTE — PROGRESS NOTE ADULT - SUBJECTIVE AND OBJECTIVE BOX
Follow-up Pulm Progress Note  Mathew Glass MD  [phone]    Breathing comfortably supine; without significant cough  On full dose lovenox for UE DVT  Increase WBC noted, yet remains afebrile  on abx flagyl/aztreonam    Vital Signs Last 24 Hrs  T(C): 37.1 (23 Sep 2017 04:25), Max: 37.1 (23 Sep 2017 04:25)  T(F): 98.8 (23 Sep 2017 04:25), Max: 98.8 (23 Sep 2017 04:25)  HR: 82 (23 Sep 2017 04:25) (81 - 100)  BP: 95/50 (23 Sep 2017 04:25) (95/50 - 112/73)  BP(mean): --  RR: 17 (23 Sep 2017 04:25) (17 - 18)  SpO2: 95% (23 Sep 2017 04:25) (93% - 95%)                        12.6   17.21 )-----------( 211      ( 22 Sep 2017 07:34 )             38.1       09-23    132<L>  |  102  |  67<H>  ----------------------------<  101<H>  5.2   |  19<L>  |  1.96<H>    Ca    11.8<H>      23 Sep 2017 08:32  Phos  3.6     09-23  Mg     2.5     09-23      Physical Examination:  PULM: Decr BS L base; without wheeze  CVS: Regular rate and rhythm, no murmurs, rubs, or gallops  ABD: Soft, non-tender  EXT:  No clubbing, cyanosis, or edema    RADIOLOGY REVIEWED  CXR:    CT chest:    TTE: Follow-up Pulm Progress Note  Mathew Glass MD  [phone]    Breathing comfortably supine; without significant cough  On full dose lovenox for UE DVT  Increase WBC noted, yet remains afebrile  on abx flagyl/aztreonam    Vital Signs Last 24 Hrs  T(C): 37.1 (23 Sep 2017 04:25), Max: 37.1 (23 Sep 2017 04:25)  T(F): 98.8 (23 Sep 2017 04:25), Max: 98.8 (23 Sep 2017 04:25)  HR: 82 (23 Sep 2017 04:25) (81 - 100)  BP: 95/50 (23 Sep 2017 04:25) (95/50 - 112/73)  BP(mean): --  RR: 17 (23 Sep 2017 04:25) (17 - 18)  SpO2: 95% (23 Sep 2017 04:25) (93% - 95%)                        12.6   17.21 )-----------( 211      ( 22 Sep 2017 07:34 )             38.1       09-23    132<L>  |  102  |  67<H>  ----------------------------<  101<H>  5.2   |  19<L>  |  1.96<H>    Ca    11.8<H>      23 Sep 2017 08:32  Phos  3.6     09-23  Mg     2.5     09-23      Physical Examination:  PULM: Decr BS L base; without wheeze  CVS: Regular rate and rhythm, no murmurs, rubs, or gallops  ABD: Soft, non-tender  EXT:  Anasarca    RADIOLOGY REVIEWED  CXR:    CT chest:    TTE:

## 2017-09-25 NOTE — PROGRESS NOTE ADULT - SUBJECTIVE AND OBJECTIVE BOX
Patient is a 84y old  Male who presents with a chief complaint of hypotension (21 Sep 2017 12:22)      SUBJECTIVE / OVERNIGHT EVENTS:    Patient seen and examined.       Vital Signs Last 24 Hrs  T(C): 36.2 (25 Sep 2017 03:48), Max: 36.8 (24 Sep 2017 20:23)  T(F): 97.2 (25 Sep 2017 03:48), Max: 98.3 (24 Sep 2017 20:23)  HR: 87 (25 Sep 2017 03:48) (70 - 87)  BP: 105/49 (25 Sep 2017 03:48) (92/60 - 105/49)  BP(mean): --  RR: 20 (25 Sep 2017 03:48) (18 - 20)  SpO2: 92% (25 Sep 2017 03:48) (92% - 97%)  I&O's Summary    24 Sep 2017 07:01  -  25 Sep 2017 07:00  --------------------------------------------------------  IN: 850 mL / OUT: 1400 mL / NET: -550 mL      PE:  GENERAL: NAD, AAOx2, frail  HEAD:  Atraumatic, Normocephalic  EYES: EOMI, PERRLA, conjunctiva and sclera clear  NECK: Supple, No JVD  CHEST/LUNG: decreased BS, coarse  HEART: Regular rate and rhythm; + murmur  ABDOMEN: Soft, Nontender, Nondistended; Bowel sounds present, multiple erythematous areas, no pus  : chronic tavarez  EXTREMITIES:  2+ Peripheral Pulses, + 2 pitting edema, NTTP left leg, no echymosis  SKIN: No rashes or lesions  NEURO: No focal deficits    LABS:                        13.8   18.80 )-----------( 238      ( 25 Sep 2017 07:31 )             41.1     09-25    138  |  104  |  66<H>  ----------------------------<  126<H>  5.2   |  21<L>  |  1.63<H>    Ca    11.3<H>      25 Sep 2017 07:28  Mg     2.5     09-25        CAPILLARY BLOOD GLUCOSE  90 (25 Sep 2017 07:48)  110 (24 Sep 2017 21:55)  113 (24 Sep 2017 16:48)                RADIOLOGY & ADDITIONAL TESTS:    Imaging Personally Reviewed:  [x] YES  [ ] NO    Consultant(s) Notes Reviewed:  [x] YES  [ ] NO    MEDICATIONS  (STANDING):  aztreonam  IVPB      aztreonam  IVPB 1000 milliGRAM(s) IV Intermittent every 8 hours  finasteride 5 milliGRAM(s) Oral daily  pantoprazole    Tablet 40 milliGRAM(s) Oral before breakfast  senna 2 Tablet(s) Oral at bedtime  polyethylene glycol 3350 17 Gram(s) Oral two times a day  docusate sodium 100 milliGRAM(s) Oral three times a day  influenza   Vaccine 0.5 milliLiter(s) IntraMuscular once  metroNIDAZOLE  IVPB 500 milliGRAM(s) IV Intermittent every 8 hours  metroNIDAZOLE  IVPB      ALBUTerol/ipratropium for Nebulization 3 milliLiter(s) Nebulizer every 6 hours  allopurinol 300 milliGRAM(s) Oral daily  predniSONE   Tablet 60 milliGRAM(s) Oral daily  enoxaparin Injectable 100 milliGRAM(s) SubCutaneous every 12 hours    MEDICATIONS  (PRN):      Care Discussed with Consultants/Other Providers [x] YES  [ ] NO    HEALTH ISSUES - PROBLEM Dx:  Romero esophagus: Romero esophagus  Pneumonia: Pneumonia  DVT femoral (deep venous thrombosis) with thrombophlebitis, left: DVT femoral (deep venous thrombosis) with thrombophlebitis, left  Rash: Rash  Hypotension: Hypotension  Hypercalcemia: Hypercalcemia  Leg swelling: Leg swelling  Advanced care planning/counseling discussion: Advanced care planning/counseling discussion  Urinary retention: Urinary retention  Debility: Debility  Diffuse large B-cell lymphoma of intra-abdominal lymph nodes: Diffuse large B-cell lymphoma of intra-abdominal lymph nodes  EKG abnormality: EKG abnormality  MARIA C (acute kidney injury): MARIA C (acute kidney injury)  Sepsis, due to unspecified organism: Sepsis, due to unspecified organism  Pneumonia, bacterial: Pneumonia, bacterial  Abnormal EKG: Abnormal EKG Patient is a 84y old  Male who presents with a chief complaint of hypotension (21 Sep 2017 12:22)      SUBJECTIVE / OVERNIGHT EVENTS:    Patient seen and examined. patient upset today and does not want to discuss / explain what is upseting him. patient denies pain.       Vital Signs Last 24 Hrs  T(C): 36.2 (25 Sep 2017 03:48), Max: 36.8 (24 Sep 2017 20:23)  T(F): 97.2 (25 Sep 2017 03:48), Max: 98.3 (24 Sep 2017 20:23)  HR: 87 (25 Sep 2017 03:48) (70 - 87)  BP: 105/49 (25 Sep 2017 03:48) (92/60 - 105/49)  BP(mean): --  RR: 20 (25 Sep 2017 03:48) (18 - 20)  SpO2: 92% (25 Sep 2017 03:48) (92% - 97%)  I&O's Summary    24 Sep 2017 07:01  -  25 Sep 2017 07:00  --------------------------------------------------------  IN: 850 mL / OUT: 1400 mL / NET: -550 mL      PE:  GENERAL: NAD, AAOx2, frail  HEAD:  Atraumatic, Normocephalic  EYES: EOMI, PERRLA, conjunctiva and sclera clear  NECK: Supple, No JVD  CHEST/LUNG: decreased BS, coarse  HEART: Regular rate and rhythm; + murmur  ABDOMEN: Soft, Nontender, Nondistended; Bowel sounds present, multiple erythematous areas, no pus  : chronic tavarez  EXTREMITIES:  2+ Peripheral Pulses, + 2 pitting edema  SKIN: No rashes or lesions  NEURO: No focal deficits    LABS:                        13.8   18.80 )-----------( 238      ( 25 Sep 2017 07:31 )             41.1     09-25    138  |  104  |  66<H>  ----------------------------<  126<H>  5.2   |  21<L>  |  1.63<H>    Ca    11.3<H>      25 Sep 2017 07:28  Mg     2.5     09-25        CAPILLARY BLOOD GLUCOSE  90 (25 Sep 2017 07:48)  110 (24 Sep 2017 21:55)  113 (24 Sep 2017 16:48)                RADIOLOGY & ADDITIONAL TESTS:    Imaging Personally Reviewed:  [x] YES  [ ] NO    Consultant(s) Notes Reviewed:  [x] YES  [ ] NO    MEDICATIONS  (STANDING):  aztreonam  IVPB      aztreonam  IVPB 1000 milliGRAM(s) IV Intermittent every 8 hours  finasteride 5 milliGRAM(s) Oral daily  pantoprazole    Tablet 40 milliGRAM(s) Oral before breakfast  senna 2 Tablet(s) Oral at bedtime  polyethylene glycol 3350 17 Gram(s) Oral two times a day  docusate sodium 100 milliGRAM(s) Oral three times a day  influenza   Vaccine 0.5 milliLiter(s) IntraMuscular once  metroNIDAZOLE  IVPB 500 milliGRAM(s) IV Intermittent every 8 hours  metroNIDAZOLE  IVPB      ALBUTerol/ipratropium for Nebulization 3 milliLiter(s) Nebulizer every 6 hours  allopurinol 300 milliGRAM(s) Oral daily  predniSONE   Tablet 60 milliGRAM(s) Oral daily  enoxaparin Injectable 100 milliGRAM(s) SubCutaneous every 12 hours    MEDICATIONS  (PRN):      Care Discussed with Consultants/Other Providers [x] YES  [ ] NO    HEALTH ISSUES - PROBLEM Dx:  Romero esophagus: Romero esophagus  Pneumonia: Pneumonia  DVT femoral (deep venous thrombosis) with thrombophlebitis, left: DVT femoral (deep venous thrombosis) with thrombophlebitis, left  Rash: Rash  Hypotension: Hypotension  Hypercalcemia: Hypercalcemia  Leg swelling: Leg swelling  Advanced care planning/counseling discussion: Advanced care planning/counseling discussion  Urinary retention: Urinary retention  Debility: Debility  Diffuse large B-cell lymphoma of intra-abdominal lymph nodes: Diffuse large B-cell lymphoma of intra-abdominal lymph nodes  EKG abnormality: EKG abnormality  MARIA C (acute kidney injury): MARIA C (acute kidney injury)  Sepsis, due to unspecified organism: Sepsis, due to unspecified organism  Pneumonia, bacterial: Pneumonia, bacterial  Abnormal EKG: Abnormal EKG Patient is a 84y old  Male who presents with a chief complaint of hypotension (21 Sep 2017 12:22)      SUBJECTIVE / OVERNIGHT EVENTS:    Patient seen and examined. patient upset today and does not want to discuss / explain what is upseting him. patient denies pain.       Vital Signs Last 24 Hrs  T(C): 36.2 (25 Sep 2017 03:48), Max: 36.8 (24 Sep 2017 20:23)  T(F): 97.2 (25 Sep 2017 03:48), Max: 98.3 (24 Sep 2017 20:23)  HR: 87 (25 Sep 2017 03:48) (70 - 87)  BP: 105/49 (25 Sep 2017 03:48) (92/60 - 105/49)  BP(mean): --  RR: 20 (25 Sep 2017 03:48) (18 - 20)  SpO2: 92% (25 Sep 2017 03:48) (92% - 97%)  I&O's Summary    24 Sep 2017 07:01  -  25 Sep 2017 07:00  --------------------------------------------------------  IN: 850 mL / OUT: 1400 mL / NET: -550 mL      PE:  GENERAL: NAD, AAOx2, frail  HEAD:  Atraumatic, Normocephalic  EYES: EOMI, PERRLA, conjunctiva and sclera clear  NECK: Supple, No JVD  CHEST/LUNG: decreased BS, coarse  HEART: Regular rate and rhythm; + murmur  ABDOMEN: Soft, Nontender, Nondistended; Bowel sounds present, multiple erythematous areas, no pus  : chronic tavarez  EXTREMITIES:  2+ Peripheral Pulses, + 2 pitting edema  SKIN: No rashes or lesions  NEURO: No focal deficits, confused    LABS:                        13.8   18.80 )-----------( 238      ( 25 Sep 2017 07:31 )             41.1     09-25    138  |  104  |  66<H>  ----------------------------<  126<H>  5.2   |  21<L>  |  1.63<H>    Ca    11.3<H>      25 Sep 2017 07:28  Mg     2.5     09-25        CAPILLARY BLOOD GLUCOSE  90 (25 Sep 2017 07:48)  110 (24 Sep 2017 21:55)  113 (24 Sep 2017 16:48)                RADIOLOGY & ADDITIONAL TESTS:    Imaging Personally Reviewed:  [x] YES  [ ] NO    Consultant(s) Notes Reviewed:  [x] YES  [ ] NO    MEDICATIONS  (STANDING):  aztreonam  IVPB      aztreonam  IVPB 1000 milliGRAM(s) IV Intermittent every 8 hours  finasteride 5 milliGRAM(s) Oral daily  pantoprazole    Tablet 40 milliGRAM(s) Oral before breakfast  senna 2 Tablet(s) Oral at bedtime  polyethylene glycol 3350 17 Gram(s) Oral two times a day  docusate sodium 100 milliGRAM(s) Oral three times a day  influenza   Vaccine 0.5 milliLiter(s) IntraMuscular once  metroNIDAZOLE  IVPB 500 milliGRAM(s) IV Intermittent every 8 hours  metroNIDAZOLE  IVPB      ALBUTerol/ipratropium for Nebulization 3 milliLiter(s) Nebulizer every 6 hours  allopurinol 300 milliGRAM(s) Oral daily  predniSONE   Tablet 60 milliGRAM(s) Oral daily  enoxaparin Injectable 100 milliGRAM(s) SubCutaneous every 12 hours    MEDICATIONS  (PRN):      Care Discussed with Consultants/Other Providers [x] YES  [ ] NO    HEALTH ISSUES - PROBLEM Dx:  Romero esophagus: Romero esophagus  Pneumonia: Pneumonia  DVT femoral (deep venous thrombosis) with thrombophlebitis, left: DVT femoral (deep venous thrombosis) with thrombophlebitis, left  Rash: Rash  Hypotension: Hypotension  Hypercalcemia: Hypercalcemia  Leg swelling: Leg swelling  Advanced care planning/counseling discussion: Advanced care planning/counseling discussion  Urinary retention: Urinary retention  Debility: Debility  Diffuse large B-cell lymphoma of intra-abdominal lymph nodes: Diffuse large B-cell lymphoma of intra-abdominal lymph nodes  EKG abnormality: EKG abnormality  MARIA C (acute kidney injury): MARIA C (acute kidney injury)  Sepsis, due to unspecified organism: Sepsis, due to unspecified organism  Pneumonia, bacterial: Pneumonia, bacterial  Abnormal EKG: Abnormal EKG

## 2017-09-25 NOTE — DIETITIAN INITIAL EVALUATION ADULT. - NS AS NUTRI INTERV FEED ASSISTANCE
Encouraged pt to increase po intake of meals as tolerated and discussed importance of adequate nutrition intake./Meal set -up/Feeding Assistance/Menu selection assistance

## 2017-09-25 NOTE — PROGRESS NOTE ADULT - PROBLEM SELECTOR PLAN 3
Newly diagnosed DLBCL. Not yet to start treatment  - Continue prednisone 60mg PO daily x 5 days. Last dose to be given on Tuesday  - TTE as per cardiology for evaluation of EF prior to adriamycin  - Continue allopurinol. Check Uric Acid, Phosphate, Magnesium, and Calcium Daily  - Will consider giving dose of cyclophosphamide, vincristine and prednisone this week if clinically improves.   - Will follow    Mike Claudio MD  [facility] - A Division of [facility]   [address] [place] [address] [address]  [place] [address]  (T) [phone]  (F) [phone]

## 2017-09-25 NOTE — DIETITIAN INITIAL EVALUATION ADULT. - ENERGY NEEDS
Height: 72 inches, Weight: 180 pounds (stated usual)  BMI:24.4 kg/m2 IBW: 178 pounds (+/-10%), %IBW: 102%  Pertinent Info: Per chart, 85 y/o male with DLBCL, biliary stenosis, mejias esophagus, admitted from [facility] facility with sepsis 2/2 PNA, s/p SLP swallow evaluation recommends NPO but pt on po diet per family's request. +2 dependent and right foot edema, stage 2 sacrum pressure ulcer noted at this time.

## 2017-09-25 NOTE — PROGRESS NOTE ADULT - PROBLEM SELECTOR PLAN 8
+ left common femoral DVT likely 2/2 increased risk from sedentary and malignancy  on therapeutic lovenox + left common femoral DVT likely 2/2 increased risk from sedentary and malignancy  on therapeutic lovenox    Called house number to update wife but no answer.

## 2017-09-25 NOTE — DIETITIAN INITIAL EVALUATION ADULT. - DIET TYPE
dysphagia 2, mechanical soft, nectar consistency fld/no concentrated potassium/no concentrated phosphorus

## 2017-09-25 NOTE — PROGRESS NOTE ADULT - SUBJECTIVE AND OBJECTIVE BOX
[facility], Division of Infectious Diseases  JULIANN Peraza A. Lee  [phone]  Name: MIROSLAVA VERGARA  Age: 84y  Gender: Male  MRN: [id number]    Interval History--  Notes reviewed  alert, denies cough, no sob  feels weak  Past Medical History--  Diffuse large B-cell lymphoma of intra-abdominal lymph nodes  Romero esophagus  Intestinal infection  Bladder polyp  H/O inguinal hernia repair  S/P TURP (transurethral resection of prostate)  Bladder polyp  History of cholecystectomy      For details regarding the patient's social history, family history, and other miscellaneous elements, please refer the initial infectious diseases consultation and/or the admitting history and physical examination for this admission.    Allergies    latex (Rash; Blisters)  penicillins (Swelling)  sulfa drugs (Unknown)    Intolerances        Medications--  Antibiotics:  aztreonam  IVPB      aztreonam  IVPB 1000 milliGRAM(s) IV Intermittent every 8 hours  metroNIDAZOLE  IVPB 500 milliGRAM(s) IV Intermittent every 8 hours  metroNIDAZOLE  IVPB        Immunologic:  influenza   Vaccine 0.5 milliLiter(s) IntraMuscular once    Other:  finasteride  pantoprazole    Tablet  senna  polyethylene glycol 3350  docusate sodium  allopurinol  enoxaparin Injectable  sodium chloride 0.9%.      Review of Systems--  A 10-point review of systems was obtained.     Pertinent positives and negatives--  Constitutional: No fevers. No Chills. No Rigors.   Cardiovascular: No chest pain. No palpitations.  Respiratory: No shortness of breath. No cough.  Gastrointestinal: No nausea or vomiting. No diarrhea or constipation.   Psychiatric: no anxiety    Review of systems otherwise negative except as previously noted.    Physical Examination--  Vital Signs: T(F): 97.5 (09-25-17 @ 12:15), Max: 98.3 (09-24-17 @ 20:23)  HR: 74 (09-25-17 @ 12:15)  BP: 109/66 (09-25-17 @ 12:15)  RR: 18 (09-25-17 @ 12:15)  SpO2: 94% (09-25-17 @ 12:15)  Wt(kg): --  General: Nontoxic-appearing Male in no acute distress.  HEENT: AT/NC. Anicteric. Conjunctiva pink and moist. Oropharynx clear. Dentition poor.  Neck: Not rigid. No sense of mass.  Nodes: None palpable.  Lungs: Clear bilaterally without rales, wheezing or rhonchi  Heart: Regular rate and rhythm. No Murmur. No rub. No gallop. No palpable thrill.  Abdomen: Bowel sounds present and normoactive. Soft. Nondistended. Nontender. No sense of mass. No organomegaly.  Back: No spinal tenderness. No costovertebral angle tenderness.   Extremities: No cyanosis or clubbing. ++ edema.   Skin: Warm. Dry. Good turgor. No rash. No vasculitic stigmata.  Psychiatric: Appropriate affect and mood for situation.         Laboratory Studies--  CBC                        13.8   18.80 )-----------( 238      ( 25 Sep 2017 07:31 )             41.1       Chemistries  09-25    138  |  104  |  66<H>  ----------------------------<  126<H>  5.2   |  21<L>  |  1.63<H>    Ca    11.3<H>      25 Sep 2017 07:28  Mg     2.5     09-25        Culture Data        Culture - Blood (09.20.17 @ 16:36)    Specimen Source: .Blood Blood-Peripheral    Culture Results:   No growth to date.

## 2017-09-25 NOTE — DIETITIAN INITIAL EVALUATION ADULT. - ORAL INTAKE PTA
poor/Per wife, pt had por appetite and po intakes since 9/11 at the rehab facility, had regular consistency diet

## 2017-09-25 NOTE — PROGRESS NOTE ADULT - ASSESSMENT
84 year old man with DLBCL which is yet untreated admitted with confusion and weakness. Found to have hypercalcemia of malignancy and pneumonia. Currently on ABx. S/p Pamidronate. Calcium still elevated. Still confused.

## 2017-09-25 NOTE — DIETITIAN INITIAL EVALUATION ADULT. - PROBLEM SELECTOR PLAN 3
- IVF as above  - no hydro on ct scan  - low k diet  - no concerning EKG findings for hyperkalemia  - cont tavarez for now  - trend cr

## 2017-09-25 NOTE — PROGRESS NOTE ADULT - PROBLEM SELECTOR PLAN 4
appreciate cardio recs, no active cp, trop neg  VQ low prob PE  check TTE appreciate cardio recs, no active cp, trop neg  VQ low prob PE  pending TTE

## 2017-09-26 LAB
ANION GAP SERPL CALC-SCNC: 11 MMOL/L — SIGNIFICANT CHANGE UP (ref 5–17)
BUN SERPL-MCNC: 65 MG/DL — HIGH (ref 7–23)
CALCIUM SERPL-MCNC: 10.3 MG/DL — SIGNIFICANT CHANGE UP (ref 8.4–10.5)
CHLORIDE SERPL-SCNC: 109 MMOL/L — HIGH (ref 96–108)
CO2 SERPL-SCNC: 21 MMOL/L — LOW (ref 22–31)
CREAT SERPL-MCNC: 1.45 MG/DL — HIGH (ref 0.5–1.3)
GLUCOSE SERPL-MCNC: 117 MG/DL — HIGH (ref 70–99)
HCT VFR BLD CALC: 40.7 % — SIGNIFICANT CHANGE UP (ref 39–50)
HGB BLD-MCNC: 13.3 G/DL — SIGNIFICANT CHANGE UP (ref 13–17)
MAGNESIUM SERPL-MCNC: 2.6 MG/DL — SIGNIFICANT CHANGE UP (ref 1.6–2.6)
MCHC RBC-ENTMCNC: 32.7 GM/DL — SIGNIFICANT CHANGE UP (ref 32–36)
MCHC RBC-ENTMCNC: 34.5 PG — HIGH (ref 27–34)
MCV RBC AUTO: 106 FL — HIGH (ref 80–100)
PLATELET # BLD AUTO: 197 K/UL — SIGNIFICANT CHANGE UP (ref 150–400)
POTASSIUM SERPL-MCNC: 4.9 MMOL/L — SIGNIFICANT CHANGE UP (ref 3.5–5.3)
POTASSIUM SERPL-SCNC: 4.9 MMOL/L — SIGNIFICANT CHANGE UP (ref 3.5–5.3)
RBC # BLD: 3.85 M/UL — LOW (ref 4.2–5.8)
RBC # FLD: 14.3 % — SIGNIFICANT CHANGE UP (ref 10.3–14.5)
SODIUM SERPL-SCNC: 141 MMOL/L — SIGNIFICANT CHANGE UP (ref 135–145)
URATE SERPL-MCNC: 8.6 MG/DL — SIGNIFICANT CHANGE UP (ref 3.4–8.8)
WBC # BLD: 19.5 K/UL — HIGH (ref 3.8–10.5)
WBC # FLD AUTO: 19.5 K/UL — HIGH (ref 3.8–10.5)

## 2017-09-26 PROCEDURE — 71010: CPT | Mod: 26

## 2017-09-26 RX ADMIN — Medication 50 MILLIGRAM(S): at 19:12

## 2017-09-26 RX ADMIN — ENOXAPARIN SODIUM 90 MILLIGRAM(S): 100 INJECTION SUBCUTANEOUS at 22:22

## 2017-09-26 RX ADMIN — ENOXAPARIN SODIUM 90 MILLIGRAM(S): 100 INJECTION SUBCUTANEOUS at 09:17

## 2017-09-26 RX ADMIN — PANTOPRAZOLE SODIUM 40 MILLIGRAM(S): 20 TABLET, DELAYED RELEASE ORAL at 06:18

## 2017-09-26 RX ADMIN — Medication 100 MILLIGRAM(S): at 09:17

## 2017-09-26 RX ADMIN — Medication 50 MILLIGRAM(S): at 12:19

## 2017-09-26 RX ADMIN — Medication 100 MILLIGRAM(S): at 17:15

## 2017-09-26 RX ADMIN — Medication 50 MILLIGRAM(S): at 03:10

## 2017-09-26 RX ADMIN — FINASTERIDE 5 MILLIGRAM(S): 5 TABLET, FILM COATED ORAL at 12:20

## 2017-09-26 RX ADMIN — Medication 100 MILLIGRAM(S): at 22:22

## 2017-09-26 RX ADMIN — SENNA PLUS 2 TABLET(S): 8.6 TABLET ORAL at 22:21

## 2017-09-26 RX ADMIN — Medication 100 MILLIGRAM(S): at 06:19

## 2017-09-26 RX ADMIN — Medication 100 MILLIGRAM(S): at 17:14

## 2017-09-26 RX ADMIN — Medication 100 MILLIGRAM(S): at 02:14

## 2017-09-26 RX ADMIN — POLYETHYLENE GLYCOL 3350 17 GRAM(S): 17 POWDER, FOR SOLUTION ORAL at 06:18

## 2017-09-26 RX ADMIN — Medication 300 MILLIGRAM(S): at 12:20

## 2017-09-26 NOTE — PROGRESS NOTE ADULT - SUBJECTIVE AND OBJECTIVE BOX
[facility], Division of Infectious Diseases  JULIANN Peraza A. Lee    Name: MIROSLAVA VERGARA  Age: 84y  Gender: Male  MRN: [id number]    Interval History--  Notes reviewed. Confused. No complaints. Wife at bedside, going to oncology office to sign consent.    Past Medical History--  Diffuse large B-cell lymphoma of intra-abdominal lymph nodes  Romero esophagus  Intestinal infection  Bladder polyp  H/O inguinal hernia repair  S/P TURP (transurethral resection of prostate)  Bladder polyp  History of cholecystectomy      For details regarding the patient's social history, family history, and other miscellaneous elements, please refer the initial infectious diseases consultation and/or the admitting history and physical examination for this admission.    Allergies    latex (Rash; Blisters)  penicillins (Swelling)  sulfa drugs (Unknown)    Intolerances        Medications--  Antibiotics:  aztreonam  IVPB      aztreonam  IVPB 1000 milliGRAM(s) IV Intermittent every 8 hours  metroNIDAZOLE  IVPB 500 milliGRAM(s) IV Intermittent every 8 hours  metroNIDAZOLE  IVPB        Immunologic:  influenza   Vaccine 0.5 milliLiter(s) IntraMuscular once    Other:  finasteride  pantoprazole    Tablet  senna  polyethylene glycol 3350  docusate sodium  allopurinol  enoxaparin Injectable  sodium chloride 0.9%.      Review of Systems--  Review of systems unable secondary to clinical condition.    Physical Examination--  Vital Signs: T(F): 97.6 (09-26-17 @ 04:07), Max: 97.8 (09-25-17 @ 20:10)  HR: 60 (09-26-17 @ 04:07)  BP: 119/75 (09-26-17 @ 04:07)  RR: 18 (09-26-17 @ 04:07)  SpO2: 98% (09-26-17 @ 04:07)  Wt(kg): --  GGeneral: Chronically ill-appearing Male in no acute distress.  HEENT: Bitemporal wasting. Anicteric. Conjunctiva pink and moist. Oropharynx clear. Dentition poor.  Neck: Not rigid. No sense of mass.  Nodes: None palpable.  Lungs: Poor effort. Diminished breath sounds bilaterally without rales, wheezing or rhonchi  Heart: Regular rate and rhythm. No Murmur. No rub. No gallop. No palpable thrill.  Abdomen: Bowel sounds present and normoactive. Soft. Distended, dull to percussion. Nontender. No sense of mass. No organomegaly.  Back: No spinal tenderness. No costovertebral angle tenderness.   Extremities: No cyanosis or clubbing.  3+ LE edema, upper extremities wasted..   Skin: Warm. Dry. Good turgor. No rash. No vasculitic stigmata.    Laboratory Studies--  CBC                        13.8   18.80 )-----------( 238      ( 25 Sep 2017 07:31 )             41.1       Chemistries  09-25    138  |  104  |  66<H>  ----------------------------<  126<H>  5.2   |  21<L>  |  1.63<H>    Ca    11.3<H>      25 Sep 2017 07:28  Mg     2.5     09-25        Culture Data  No new culture data

## 2017-09-26 NOTE — PROGRESS NOTE ADULT - PROBLEM SELECTOR PLAN 2
Hypercalcemia secondary to malginancy  - s/p aredia. Calcium levels are still elevated but is lower overall. Continue calcitonin  - If calcium is still elevated on Thurday, will redose aredia.

## 2017-09-26 NOTE — PROGRESS NOTE ADULT - PROBLEM SELECTOR PLAN 1
Being treated for HCAP  - Continue Flagyl and Aztreonam   - Last day of Abx is 9/27/2017    - Follow up cultures.

## 2017-09-26 NOTE — PROGRESS NOTE ADULT - PROBLEM SELECTOR PLAN 5
appreciate oncology recs  cont calcitonin per renal  cont allopurinol  daily calcium, mg, phos and uric acid  plan for chemo today per oncology

## 2017-09-26 NOTE — PROGRESS NOTE ADULT - SUBJECTIVE AND OBJECTIVE BOX
Subjective  Patient seen in the AM. Still a bit confused. No acute events overnight.     Admission History:  84 year old man with recent diagnosis of DLBCL, biliary stenosis s/p CBD stent, mejias's esophagus, BPH w/ urinary retention s/p chronic tavarez placement admitted with weakness and confusion. Patient reports having felt weak since admission to [facility]. Patient been unable to stand and at times is dizzy. No chest pain. No nausea or vomiting. Patient has been more confused than usual. No fevers or chills.      ROS:  General:  (-)Pain, (+)Decrease appetite, (-)Fevers, (-)Chills, (-)Weight Loss  Eyes: (-)Blurry Vision, (-)Double Vision, (-)Vision Loss  ENT: (-)Sinus Congestion, (-)Decreased Hearing, (-)Nosebleeds, (-)Sore Throat  Cardiac: (-)Chest Pain, (-)Palpitations, (+)Shortness of breathe on exertion  Respiratory: (-)Cough, (-)hemoptysis, (-)Shortness of breathe  GI: (-)Nausea, (-)Vomiting, (-)Diarrhea, (-)Constipation, (-)Melena, (-)BRBPR  : (-)Hematuria, (-)Dysuria, (-)Polyuia  MSK: (-)Back pain, (-)Joint pain, (-)Stiffness  Dermatology: (-)Rash, (-)Itching  Neurology:  (-)Numbness, (-)Tingling, (-)Difficulty Walking, (-)Tremors, (+)Weakness (+)Confusion  Psych: (-)Anxiety, (-)Depression, (-)Memory Loss  Hematology:  (-)Easy Bruising, (-)Easy Bleeding, (-)Night Sweats.     Vital Signs Last 24 Hrs  T(C): 36.4 (26 Sep 2017 04:07), Max: 36.6 (25 Sep 2017 20:10)  T(F): 97.6 (26 Sep 2017 04:07), Max: 97.8 (25 Sep 2017 20:10)  HR: 60 (26 Sep 2017 04:07) (60 - 81)  BP: 119/75 (26 Sep 2017 04:07) (109/66 - 122/72)  RR: 18 (26 Sep 2017 04:07) (18 - 18)  SpO2: 98% (26 Sep 2017 04:07) (94% - 98%)    Objective  Physical Exam:  General: AAO x 2. NAD. Lying in bed comfortably. Angry and confused.   HEENT: clear oropharynx, anicteric sclera, pink conjunctivae, EOMi. PERRLA  Cardiac: S1, S2 present. No audible murmurs. RRR  Lungs: Decreased breathe sounds.   Abdomen: Soft, Non-Tender, Non-Distended. No palpable hepatosplenomegaly.  Extremities: 2+ pulses. Edema to the knees  Skin: No Rashes. No petechiae  Neuro: No focal motor or sensory deficits.     Labs:                        13.8   18.80 )-----------( 238      ( 25 Sep 2017 07:31 )             41.1   09-25    138  |  104  |  66<H>  ----------------------------<  126<H>  5.2   |  21<L>  |  1.63<H>    Ca    11.3<H>      25 Sep 2017 07:28  Mg     2.5     09-25    Radiology:  CT Chest/Abdomen/Pelvis:  IMPRESSION:   Left lower lobe pneumonia.  Bulky confluent upper abdominal and retroperitoneal adenopathy as   described, consistent with lymphoma.     CT Head;  IMPRESSION: Unremarkable noncontrast CT of the brain.    V/Q Scan   IMPRESSION: Very low probability of pulmonary embolism      US LE  IMPRESSION: Acute, above the knee free floating DVT in the left common   femoral vein. LINK Tavera was informed of the findings at 1:20 PM on   9/22/2017.      Pathology:  Surgical Pathology Final Report - :   ACCESSION No:  [id number]    Final Diagnosis  Lymph node, periportal, biopsy  Diffuse large B-cell lymphoma, non-germinal center type

## 2017-09-26 NOTE — PROGRESS NOTE ADULT - PROBLEM SELECTOR PLAN 8
+ left common femoral DVT likely 2/2 increased risk from sedentary and malignancy  on therapeutic lovenox    Called house number to update wife but no answer.

## 2017-09-26 NOTE — PROGRESS NOTE ADULT - ASSESSMENT
84M with lymphoma sent from rehab with weakness, presyncope and hypotension  found to have pneumonia on CT scan-- symptoms improved, no dyspnea and no cough  for chemo in future, pt currently received prednisone is likely reason for leukocytosis  Overall stable from and ID perspective.  Patient will remain at risk for complications related to his malignancy and its treatment for the foreseeable future.

## 2017-09-26 NOTE — PROGRESS NOTE ADULT - PROBLEM SELECTOR PLAN 3
Newly diagnosed DLBCL. Not yet to start treatment  - Will plan to arrange for 1st dose of chemotherapy to be administered on 9/26/2017. Will give dose of vincristine and cyclophosphamide  - As patient is a bit confused, I have asked his wife to come to the office to consent to treatment with chemotherapy  - Will contact chemotherapy nurse to arrange for treatment.   - Will follow    MD [facility]man [facility] - A Division of [place] [id number]  (T) [phone]  (F) [phone]

## 2017-09-26 NOTE — PROGRESS NOTE ADULT - SUBJECTIVE AND OBJECTIVE BOX
Follow-up Pulm Progress Note  Mathew Glass MD  [phone]    Breathing comfortably supine; without significant cough  On full dose lovenox for LE common femoral DVT   Afebrile  on abx flagyl/aztreonam    Vital Signs Last 24 Hrs  T(C): 36.3 (26 Sep 2017 13:42), Max: 36.6 (25 Sep 2017 20:10)  T(F): 97.4 (26 Sep 2017 13:42), Max: 97.8 (25 Sep 2017 20:10)  HR: 95 (26 Sep 2017 13:42) (60 - 95)  BP: 109/70 (26 Sep 2017 13:42) (109/70 - 122/72)  BP(mean): --  RR: 18 (26 Sep 2017 13:42) (18 - 18)  SpO2: 96% (26 Sep 2017 13:42) (96% - 98%)                       13.3   19.5  )-----------( 197      ( 26 Sep 2017 11:22 )             40.7     09-26    141  |  109<H>  |  65<H>  ----------------------------<  117<H>  4.9   |  21<L>  |  1.45<H>    Ca    10.3      26 Sep 2017 11:17  Mg     2.6     09-26    Physical Examination:  PULM: Decr BS L base; without wheeze  CVS: Regular rate and rhythm, no murmurs, rubs, or gallops  ABD: Soft, non-tender  EXT:  Anasarca    RADIOLOGY REVIEWED  CXR:    CT chest:    TTE:

## 2017-09-26 NOTE — PROGRESS NOTE ADULT - SUBJECTIVE AND OBJECTIVE BOX
Patient is a 84y old  Male who presents with a chief complaint of hypotension (21 Sep 2017 12:22)      SUBJECTIVE / OVERNIGHT EVENTS:    Patient seen and examined.       Vital Signs Last 24 Hrs  T(C): 36.4 (26 Sep 2017 04:07), Max: 36.6 (25 Sep 2017 20:10)  T(F): 97.6 (26 Sep 2017 04:07), Max: 97.8 (25 Sep 2017 20:10)  HR: 60 (26 Sep 2017 04:07) (60 - 81)  BP: 119/75 (26 Sep 2017 04:07) (109/66 - 122/72)  BP(mean): --  RR: 18 (26 Sep 2017 04:07) (18 - 18)  SpO2: 98% (26 Sep 2017 04:07) (94% - 98%)  I&O's Summary    25 Sep 2017 07:01  -  26 Sep 2017 07:00  --------------------------------------------------------  IN: 1860 mL / OUT: 1100 mL / NET: 760 mL      PE:  GENERAL: NAD, AAOx2, frail  HEAD:  Atraumatic, Normocephalic  EYES: EOMI, PERRLA, conjunctiva and sclera clear  NECK: Supple, No JVD  CHEST/LUNG: decreased BS, coarse  HEART: Regular rate and rhythm; + murmur  ABDOMEN: Soft, Nontender, Nondistended; Bowel sounds present, multiple erythematous areas, no pus  : chronic tavarez  EXTREMITIES:  2+ Peripheral Pulses, + 2 pitting edema  SKIN: No rashes or lesions  NEURO: No focal deficits, confused    LABS:                        13.3   19.5  )-----------( 197      ( 26 Sep 2017 11:22 )             40.7     09-26    141  |  109<H>  |  65<H>  ----------------------------<  117<H>  4.9   |  21<L>  |  1.45<H>    Ca    10.3      26 Sep 2017 11:17  Mg     2.6     09-26        CAPILLARY BLOOD GLUCOSE  106 (26 Sep 2017 11:48)  110 (26 Sep 2017 07:34)  122 (25 Sep 2017 21:45)  118 (25 Sep 2017 17:00)                RADIOLOGY & ADDITIONAL TESTS:    Imaging Personally Reviewed:  [x] YES  [ ] NO    Consultant(s) Notes Reviewed:  [x] YES  [ ] NO    MEDICATIONS  (STANDING):  aztreonam  IVPB      aztreonam  IVPB 1000 milliGRAM(s) IV Intermittent every 8 hours  finasteride 5 milliGRAM(s) Oral daily  pantoprazole    Tablet 40 milliGRAM(s) Oral before breakfast  senna 2 Tablet(s) Oral at bedtime  polyethylene glycol 3350 17 Gram(s) Oral two times a day  docusate sodium 100 milliGRAM(s) Oral three times a day  influenza   Vaccine 0.5 milliLiter(s) IntraMuscular once  metroNIDAZOLE  IVPB 500 milliGRAM(s) IV Intermittent every 8 hours  metroNIDAZOLE  IVPB      allopurinol 300 milliGRAM(s) Oral daily  enoxaparin Injectable 90 milliGRAM(s) SubCutaneous every 12 hours  sodium chloride 0.9%. 1000 milliLiter(s) (75 mL/Hr) IV Continuous <Continuous>    MEDICATIONS  (PRN):      Care Discussed with Consultants/Other Providers [x] YES  [ ] NO    HEALTH ISSUES - PROBLEM Dx:  Romero esophagus: Romero esophagus  Pneumonia: Pneumonia  DVT femoral (deep venous thrombosis) with thrombophlebitis, left: DVT femoral (deep venous thrombosis) with thrombophlebitis, left  Rash: Rash  Hypotension: Hypotension  Hypercalcemia: Hypercalcemia  Leg swelling: Leg swelling  Advanced care planning/counseling discussion: Advanced care planning/counseling discussion  Urinary retention: Urinary retention  Debility: Debility  Diffuse large B-cell lymphoma of intra-abdominal lymph nodes: Diffuse large B-cell lymphoma of intra-abdominal lymph nodes  EKG abnormality: EKG abnormality  MARIA C (acute kidney injury): MARIA C (acute kidney injury)  Sepsis, due to unspecified organism: Sepsis, due to unspecified organism  Pneumonia, bacterial: Pneumonia, bacterial  Abnormal EKG: Abnormal EKG

## 2017-09-26 NOTE — PROGRESS NOTE ADULT - PROBLEM SELECTOR PLAN 1
appreciate ID recs, covering for aspiration and HCAP with aztreonam and flagyl, day 6, ending 9/27  BC NTD, urine cx neg

## 2017-09-27 LAB
ANION GAP SERPL CALC-SCNC: 9 MMOL/L — SIGNIFICANT CHANGE UP (ref 5–17)
BUN SERPL-MCNC: 57 MG/DL — HIGH (ref 7–23)
CALCIUM SERPL-MCNC: 9.8 MG/DL — SIGNIFICANT CHANGE UP (ref 8.4–10.5)
CHLORIDE SERPL-SCNC: 108 MMOL/L — SIGNIFICANT CHANGE UP (ref 96–108)
CO2 SERPL-SCNC: 24 MMOL/L — SIGNIFICANT CHANGE UP (ref 22–31)
CREAT SERPL-MCNC: 1.43 MG/DL — HIGH (ref 0.5–1.3)
GLUCOSE SERPL-MCNC: 95 MG/DL — SIGNIFICANT CHANGE UP (ref 70–99)
HCT VFR BLD CALC: 40.6 % — SIGNIFICANT CHANGE UP (ref 39–50)
HGB BLD-MCNC: 13.3 G/DL — SIGNIFICANT CHANGE UP (ref 13–17)
MCHC RBC-ENTMCNC: 32.8 GM/DL — SIGNIFICANT CHANGE UP (ref 32–36)
MCHC RBC-ENTMCNC: 33.3 PG — SIGNIFICANT CHANGE UP (ref 27–34)
MCV RBC AUTO: 101.8 FL — HIGH (ref 80–100)
PLATELET # BLD AUTO: 199 K/UL — SIGNIFICANT CHANGE UP (ref 150–400)
POTASSIUM SERPL-MCNC: 5 MMOL/L — SIGNIFICANT CHANGE UP (ref 3.5–5.3)
POTASSIUM SERPL-SCNC: 5 MMOL/L — SIGNIFICANT CHANGE UP (ref 3.5–5.3)
RBC # BLD: 3.99 M/UL — LOW (ref 4.2–5.8)
RBC # FLD: 16.3 % — HIGH (ref 10.3–14.5)
SODIUM SERPL-SCNC: 141 MMOL/L — SIGNIFICANT CHANGE UP (ref 135–145)
WBC # BLD: 16.17 K/UL — HIGH (ref 3.8–10.5)
WBC # FLD AUTO: 16.17 K/UL — HIGH (ref 3.8–10.5)

## 2017-09-27 RX ORDER — DEXAMETHASONE 0.5 MG/5ML
10 ELIXIR ORAL ONCE
Qty: 0 | Refills: 0 | Status: COMPLETED | OUTPATIENT
Start: 2017-09-27 | End: 2017-09-27

## 2017-09-27 RX ORDER — ONDANSETRON 8 MG/1
8 TABLET, FILM COATED ORAL EVERY 6 HOURS
Qty: 0 | Refills: 0 | Status: DISCONTINUED | OUTPATIENT
Start: 2017-09-27 | End: 2017-09-27

## 2017-09-27 RX ORDER — VINCRISTINE SULFATE 1 MG/ML
2 VIAL (ML) INTRAVENOUS ONCE
Qty: 0 | Refills: 0 | Status: DISCONTINUED | OUTPATIENT
Start: 2017-09-27 | End: 2017-10-08

## 2017-09-27 RX ORDER — SODIUM CHLORIDE 9 MG/ML
1000 INJECTION INTRAMUSCULAR; INTRAVENOUS; SUBCUTANEOUS
Qty: 0 | Refills: 0 | Status: DISCONTINUED | OUTPATIENT
Start: 2017-09-27 | End: 2017-10-03

## 2017-09-27 RX ORDER — ONDANSETRON 8 MG/1
8 TABLET, FILM COATED ORAL EVERY 8 HOURS
Qty: 0 | Refills: 0 | Status: DISCONTINUED | OUTPATIENT
Start: 2017-09-27 | End: 2017-10-07

## 2017-09-27 RX ORDER — CYCLOPHOSPHAMIDE 100 MG
1344 VIAL (EA) INTRAVENOUS ONCE
Qty: 0 | Refills: 0 | Status: DISCONTINUED | OUTPATIENT
Start: 2017-09-27 | End: 2017-10-08

## 2017-09-27 RX ORDER — ONDANSETRON 8 MG/1
16 TABLET, FILM COATED ORAL ONCE
Qty: 0 | Refills: 0 | Status: COMPLETED | OUTPATIENT
Start: 2017-09-27 | End: 2017-09-27

## 2017-09-27 RX ADMIN — Medication 50 MILLIGRAM(S): at 03:45

## 2017-09-27 RX ADMIN — Medication 50 MILLIGRAM(S): at 09:13

## 2017-09-27 RX ADMIN — Medication 50 MILLIGRAM(S): at 17:40

## 2017-09-27 RX ADMIN — ENOXAPARIN SODIUM 90 MILLIGRAM(S): 100 INJECTION SUBCUTANEOUS at 17:41

## 2017-09-27 RX ADMIN — Medication 102 MILLIGRAM(S): at 13:42

## 2017-09-27 RX ADMIN — Medication 300 MILLIGRAM(S): at 11:23

## 2017-09-27 RX ADMIN — Medication 100 MILLIGRAM(S): at 02:02

## 2017-09-27 RX ADMIN — Medication 100 MILLIGRAM(S): at 17:41

## 2017-09-27 RX ADMIN — FINASTERIDE 5 MILLIGRAM(S): 5 TABLET, FILM COATED ORAL at 11:22

## 2017-09-27 RX ADMIN — Medication 100 MILLIGRAM(S): at 09:13

## 2017-09-27 RX ADMIN — SODIUM CHLORIDE 250 MILLILITER(S): 9 INJECTION INTRAMUSCULAR; INTRAVENOUS; SUBCUTANEOUS at 13:30

## 2017-09-27 RX ADMIN — PANTOPRAZOLE SODIUM 40 MILLIGRAM(S): 20 TABLET, DELAYED RELEASE ORAL at 06:27

## 2017-09-27 RX ADMIN — ONDANSETRON 116 MILLIGRAM(S): 8 TABLET, FILM COATED ORAL at 13:41

## 2017-09-27 NOTE — PROGRESS NOTE ADULT - PROBLEM SELECTOR PLAN 3
Newly diagnosed DLBCL. Not yet to start treatment  - Met with wife yesterday in the office. Discussed the risks, benefits, and side effects of chemotherapy with her. She is agreeable to her  being treated.   - Consent signed in office. Copy placed into paper chart  - Will give dose of Vincristine and Cyclophosphamide today. Next dose is at least 3 weeks away  - Will consider Rituximab in a week or so if clinically improves.   - Zofran 4mg q6hrs prn for nausea ordered for patient  - Will proceed with chemotherapy today  - Will follow    Mike Claudio MD  [place] [facility] - [facility]   [address] [place] [address]  (T) [phone]  (F) [phone] Newly diagnosed DLBCL. Not yet to start treatment  - Met with wife yesterday in the office. Discussed the risks, benefits, and side effects of chemotherapy with her. She is agreeable to her  being treated.   - Consent signed in office. Copy placed into paper chart  - Will give dose of Vincristine and Cyclophosphamide today. Next dose is at least 3 weeks away  - Will consider Rituximab in a week or so if clinically improves.   - Zofran 8mg q6hrs prn for nausea ordered for patient  - Will proceed with chemotherapy today  - Will follow    Mike Claudio MD  [place] [facility]   [address] [place] [address]  (T) [phone]  (F) [phone]

## 2017-09-27 NOTE — PROGRESS NOTE ADULT - SUBJECTIVE AND OBJECTIVE BOX
Follow-up Pulm Progress Note  Mathew Glass MD  [phone]    Notes reviewed  Breathing comfortably supine; without significant cough  On full dose lovenox for LE common femoral DVT   Afebrile  on abx flagyl/aztreonam    Vital Signs Last 24 Hrs  T(C): 36.6 (27 Sep 2017 04:44), Max: 36.6 (27 Sep 2017 04:44)  T(F): 97.8 (27 Sep 2017 04:44), Max: 97.8 (27 Sep 2017 04:44)  HR: 94 (27 Sep 2017 04:44) (94 - 97)  BP: 118/68 (27 Sep 2017 04:44) (118/68 - 126/65)  BP(mean): --  RR: 18 (27 Sep 2017 04:44) (18 - 18)  SpO2: 96% (27 Sep 2017 04:44) (94% - 96%)                          13.3   16.17 )-----------( 199      ( 27 Sep 2017 07:42 )             40.6     09-27    141  |  108  |  57<H>  ----------------------------<  95  5.0   |  24  |  1.43<H>    Ca    9.8      27 Sep 2017 08:54  Mg     2.6     09-26      Physical Examination:  PULM: Decr BS L base; without wheeze  CVS: Regular rate and rhythm, no murmurs, rubs, or gallops  ABD: Soft, non-tender  EXT:  Anasarca    RADIOLOGY REVIEWED  CXR:    CT chest:    TTE:

## 2017-09-27 NOTE — PROGRESS NOTE ADULT - PROBLEM SELECTOR PLAN 4
appreciate oncology recs  cont allopurinol  daily calcium, mg, phos and uric acid  first dose vincristine and cyclophosphamide 9/27/17

## 2017-09-27 NOTE — PROGRESS NOTE ADULT - SUBJECTIVE AND OBJECTIVE BOX
Subjective  Patient seen in the AM. Still a bit confused at times but improved compared to yesterday. No acute events overnight. Met with wife yesterday. Discussed chemotherapy with her and obtained consent from her.     Admission History:  84 year old man with recent diagnosis of DLBCL, biliary stenosis s/p CBD stent, mejias's esophagus, BPH w/ urinary retention s/p chronic tavarez placement admitted with weakness and confusion. Patient reports having felt weak since admission to [facility]. Patient been unable to stand and at times is dizzy. No chest pain. No nausea or vomiting. Patient has been more confused than usual. No fevers or chills.      ROS:  General:  (-)Pain, (+)Decrease appetite, (-)Fevers, (-)Chills, (-)Weight Loss  Eyes: (-)Blurry Vision, (-)Double Vision, (-)Vision Loss  ENT: (-)Sinus Congestion, (-)Decreased Hearing, (-)Nosebleeds, (-)Sore Throat  Cardiac: (-)Chest Pain, (-)Palpitations, (+)Shortness of breathe on exertion  Respiratory: (-)Cough, (-)hemoptysis, (-)Shortness of breathe  GI: (-)Nausea, (-)Vomiting, (-)Diarrhea, (-)Constipation, (-)Melena, (-)BRBPR  : (-)Hematuria, (-)Dysuria, (-)Polyuia  MSK: (-)Back pain, (-)Joint pain, (-)Stiffness  Dermatology: (-)Rash, (-)Itching  Neurology:  (-)Numbness, (-)Tingling, (-)Difficulty Walking, (-)Tremors, (+)Weakness (+)Confusion  Psych: (-)Anxiety, (-)Depression, (-)Memory Loss  Hematology:  (-)Easy Bruising, (-)Easy Bleeding, (-)Night Sweats.     Vital Signs Last 24 Hrs  T(C): 36.6 (27 Sep 2017 04:44), Max: 36.6 (27 Sep 2017 04:44)  T(F): 97.8 (27 Sep 2017 04:44), Max: 97.8 (27 Sep 2017 04:44)  HR: 94 (27 Sep 2017 04:44) (94 - 97)  BP: 118/68 (27 Sep 2017 04:44) (109/70 - 126/65)  RR: 18 (27 Sep 2017 04:44) (18 - 18)  SpO2: 96% (27 Sep 2017 04:44) (94% - 96%)    Objective  Physical Exam:  General: AAO x 2. NAD. Lying in bed comfortably. Pleasant this morning. Slight confusion.  HEENT: clear oropharynx, anicteric sclera, pink conjunctivae, EOMi. PERRLA  Cardiac: S1, S2 present. No audible murmurs. RRR  Lungs: Decreased breathe sounds.   Abdomen: Soft, Non-Tender, Non-Distended. No palpable hepatosplenomegaly.  Extremities: 2+ pulses. Edema to the knees  Skin: No Rashes. No petechiae  Neuro: No focal motor or sensory deficits.     Labs:                        13.3   19.5  )-----------( 197      ( 26 Sep 2017 11:22 )             40.7   09-26    141  |  109<H>  |  65<H>  ----------------------------<  117<H>  4.9   |  21<L>  |  1.45<H>    Ca    10.3      26 Sep 2017 11:17  Mg     2.6     09-26    Radiology:  CT Chest/Abdomen/Pelvis:  IMPRESSION:   Left lower lobe pneumonia.  Bulky confluent upper abdominal and retroperitoneal adenopathy as   described, consistent with lymphoma.     CT Head;  IMPRESSION: Unremarkable noncontrast CT of the brain.    V/Q Scan   IMPRESSION: Very low probability of pulmonary embolism      US LE  IMPRESSION: Acute, above the knee free floating DVT in the left common   femoral vein. LINK Tavera was informed of the findings at 1:20 PM on   9/22/2017.      Pathology:  Surgical Pathology Final Report - :   ACCESSION No:  [id number]    Final Diagnosis  Lymph node, periportal, biopsy  Diffuse large B-cell lymphoma, non-germinal center type

## 2017-09-27 NOTE — PROGRESS NOTE ADULT - ASSESSMENT
84 year old man with DLBCL which is yet untreated admitted with confusion and weakness. Found to have hypercalcemia of malignancy and pneumonia. Currently on ABx. S/p Pamidronate. Calcium normalized. Confusion minimal this morning. For chemotherapy today.

## 2017-09-27 NOTE — PROGRESS NOTE ADULT - PROBLEM SELECTOR PLAN 1
appreciate ID recs, covered for aspiration and HCAP with aztreonam and flagyl  finished 7 days on 9/27  BC NTD, urine cx neg

## 2017-09-27 NOTE — ADVANCED PRACTICE NURSE CONSULT - ASSESSMENT
Pt in bed awake and alert family @ bedside v/s stable afebrile HT/WT verified; labs as documented in isai ROMEO aware of the same; Discussed with pt/family chemotherapy drugs/dosages and possible side effects-hand-outs given verbalized understanding of all education; chemo consent in chart.  IV pre-hydration 500cc NS over 2hrs given premedicated with zofran 16mg IVSS and decadron 10mg IVSS  New #22 ga PIV smoothly  inserted by IV team in right arm-site without redness no swelling or pain positive brisk blood return noted used for Vincristine 1.4mg/m2=2mg infused via pump over 10 minutes without incidence-positive blood return maintained pre-during and post infusion site remains unchanged; followed by cyclophosphamide 600mg/s2=8400cu  in 250cc NS infused via pump over 1-hr pt mauro well no c/o followed by post hydration 500cc NS over 2hrs as ordered-pt with tavarez catheter in place -area nurse informed RE: strict I&O with chemo and full report given

## 2017-09-27 NOTE — PROGRESS NOTE ADULT - SUBJECTIVE AND OBJECTIVE BOX
[facility], Division of Infectious Diseases  JULIANN Peraza A. Lee    Name: MIROSLAVA VERGARA  Age: 84y  Gender: Male  MRN: [id number]    Interval History--  Notes reviewed. Family at bedside. To begin chemotherapy today. More alert/oriented per family at bedside but still confused at times.     Past Medical History--  Diffuse large B-cell lymphoma of intra-abdominal lymph nodes  Romero esophagus  Intestinal infection  Bladder polyp  H/O inguinal hernia repair  S/P TURP (transurethral resection of prostate)  Bladder polyp  History of cholecystectomy      For details regarding the patient's social history, family history, and other miscellaneous elements, please refer the initial infectious diseases consultation and/or the admitting history and physical examination for this admission.    Allergies    latex (Rash; Blisters)  penicillins (Swelling)  sulfa drugs (Unknown)    Intolerances        Medications--  Antibiotics:  aztreonam  IVPB      aztreonam  IVPB 1000 milliGRAM(s) IV Intermittent every 8 hours  metroNIDAZOLE  IVPB 500 milliGRAM(s) IV Intermittent every 8 hours  metroNIDAZOLE  IVPB        Immunologic:  influenza   Vaccine 0.5 milliLiter(s) IntraMuscular once    Other:  finasteride  pantoprazole    Tablet  senna  polyethylene glycol 3350  docusate sodium  allopurinol  enoxaparin Injectable  sodium chloride 0.9%.  ondansetron Injectable PRN  vinCRIStine IVPB  cyclophosphamide IVPB  sodium chloride 0.9%.      Review of Systems--  Review of systems unable secondary to clinical condition.      Physical Examination--  Vital Signs: T(F): 97.8 (09-27-17 @ 04:44), Max: 97.8 (09-27-17 @ 04:44)  HR: 94 (09-27-17 @ 04:44)  BP: 118/68 (09-27-17 @ 04:44)  RR: 18 (09-27-17 @ 04:44)  SpO2: 96% (09-27-17 @ 04:44)  Wt(kg): --  General: Chronically ill-appearing Male in no acute distress.  HEENT: Bitemporal wasting. Anicteric. Conjunctiva pink and moist. Oropharynx clear. Dentition poor.  Neck: Not rigid. No sense of mass.  Nodes: None palpable.  Lungs: Poor effort. Diminished breath sounds bilaterally without rales, wheezing or rhonchi  Heart: Regular rate and rhythm. No Murmur. No rub. No gallop. No palpable thrill.  Abdomen: Bowel sounds present and normoactive. Soft. Distended, dull to percussion. Nontender. No sense of mass. No organomegaly.  Back: No spinal tenderness. No costovertebral angle tenderness.   Extremities: No cyanosis or clubbing.  2-3+ LE edema, upper extremities wasted..   Skin: Warm. Dry. Good turgor. No rash. No vasculitic stigmata.    Laboratory Studies--  CBC                        13.3   16.17 )-----------( 199      ( 27 Sep 2017 07:42 )             40.6     Chemistries  09-27    141  |  108  |  57<H>  ----------------------------<  95  5.0   |  24  |  1.43<H>    Ca    9.8      27 Sep 2017 08:54  Mg     2.6     09-26      Culture Data  No new data

## 2017-09-27 NOTE — PROGRESS NOTE ADULT - ASSESSMENT
84-yo Male w/PMHx of DLBCL started chemo this admission, biliary stenosis s/p cbd stent, mejias's esophagus, BPH c/b urinary retention s/p chronic tavarez (started 9/5/17), CKD (baseline Cr 1.2), recent discharge (9/15) sp biopsy, pw sepsis 2/2 PNA.

## 2017-09-27 NOTE — PROGRESS NOTE ADULT - SUBJECTIVE AND OBJECTIVE BOX
Feels a bit more lively than yesterday.  Still has dry throat which makes food stick; coughing a little with thin liquids    Vital Signs Last 24 Hrs  T(C): 36.9 (27 Sep 2017 14:46), Max: 36.9 (27 Sep 2017 14:46)  T(F): 98.5 (27 Sep 2017 14:46), Max: 98.5 (27 Sep 2017 14:46)  HR: 96 (27 Sep 2017 14:46) (94 - 97)  BP: 111/60 (27 Sep 2017 14:46) (111/60 - 126/65)  BP(mean): --  RR: 18 (27 Sep 2017 14:46) (18 - 18)  SpO2: 95% (27 Sep 2017 14:46) (94% - 96%)    GENERAL: NAD, AAOx2, frail  HEAD:  Atraumatic, Normocephalic  EYES: EOMI  BUCCAL: dry mucosa  CHEST/LUNG: decreased BS, coarse  HEART: Regular rate and rhythm; + murmur  ABDOMEN: Soft, Nontender, Nondistended; Bowel sounds present, multiple erythematous areas, no pus  : chronic tavarez  EXTREMITIES:  2+ Peripheral Pulses, + 2 pitting edema  SKIN: No rashes or lesions  NEURO: No focal deficits, confused at times    LABS:                        13.3   16.17 )-----------( 199      ( 27 Sep 2017 07:42 )             40.6     09-27    141  |  108  |  57<H>  ----------------------------<  95  5.0   |  24  |  1.43<H>    Ca    9.8      27 Sep 2017 08:54  Mg     2.6     09-26        CAPILLARY BLOOD GLUCOSE  93 (27 Sep 2017 16:40)  85 (27 Sep 2017 11:56)  81 (27 Sep 2017 07:42)  97 (26 Sep 2017 21:33)

## 2017-09-28 LAB
ANION GAP SERPL CALC-SCNC: 14 MMOL/L — SIGNIFICANT CHANGE UP (ref 5–17)
BUN SERPL-MCNC: 53 MG/DL — HIGH (ref 7–23)
CALCIUM SERPL-MCNC: 9.3 MG/DL — SIGNIFICANT CHANGE UP (ref 8.4–10.5)
CHLORIDE SERPL-SCNC: 110 MMOL/L — HIGH (ref 96–108)
CO2 SERPL-SCNC: 21 MMOL/L — LOW (ref 22–31)
CREAT SERPL-MCNC: 1.25 MG/DL — SIGNIFICANT CHANGE UP (ref 0.5–1.3)
GLUCOSE SERPL-MCNC: 109 MG/DL — HIGH (ref 70–99)
HCT VFR BLD CALC: 40.1 % — SIGNIFICANT CHANGE UP (ref 39–50)
HGB BLD-MCNC: 13.2 G/DL — SIGNIFICANT CHANGE UP (ref 13–17)
MCHC RBC-ENTMCNC: 32.9 GM/DL — SIGNIFICANT CHANGE UP (ref 32–36)
MCHC RBC-ENTMCNC: 33.5 PG — SIGNIFICANT CHANGE UP (ref 27–34)
MCV RBC AUTO: 101.8 FL — HIGH (ref 80–100)
PLATELET # BLD AUTO: 187 K/UL — SIGNIFICANT CHANGE UP (ref 150–400)
POTASSIUM SERPL-MCNC: 4.7 MMOL/L — SIGNIFICANT CHANGE UP (ref 3.5–5.3)
POTASSIUM SERPL-SCNC: 4.7 MMOL/L — SIGNIFICANT CHANGE UP (ref 3.5–5.3)
RBC # BLD: 3.94 M/UL — LOW (ref 4.2–5.8)
RBC # FLD: 16.1 % — HIGH (ref 10.3–14.5)
SODIUM SERPL-SCNC: 145 MMOL/L — SIGNIFICANT CHANGE UP (ref 135–145)
WBC # BLD: 15.4 K/UL — HIGH (ref 3.8–10.5)
WBC # FLD AUTO: 15.4 K/UL — HIGH (ref 3.8–10.5)

## 2017-09-28 RX ADMIN — Medication 300 MILLIGRAM(S): at 11:24

## 2017-09-28 RX ADMIN — ENOXAPARIN SODIUM 90 MILLIGRAM(S): 100 INJECTION SUBCUTANEOUS at 17:31

## 2017-09-28 RX ADMIN — Medication 100 MILLIGRAM(S): at 05:19

## 2017-09-28 RX ADMIN — Medication 100 MILLIGRAM(S): at 02:35

## 2017-09-28 RX ADMIN — Medication 100 MILLIGRAM(S): at 09:05

## 2017-09-28 RX ADMIN — ENOXAPARIN SODIUM 90 MILLIGRAM(S): 100 INJECTION SUBCUTANEOUS at 05:19

## 2017-09-28 RX ADMIN — FINASTERIDE 5 MILLIGRAM(S): 5 TABLET, FILM COATED ORAL at 11:24

## 2017-09-28 RX ADMIN — Medication 50 MILLIGRAM(S): at 02:34

## 2017-09-28 RX ADMIN — Medication 50 MILLIGRAM(S): at 09:06

## 2017-09-28 RX ADMIN — PANTOPRAZOLE SODIUM 40 MILLIGRAM(S): 20 TABLET, DELAYED RELEASE ORAL at 05:19

## 2017-09-28 NOTE — PROGRESS NOTE ADULT - SUBJECTIVE AND OBJECTIVE BOX
Subjective  Patient seen in the AM. Received chemotherapy yesterday. More confused this morning that yesterdays. Confusion worsened after moving rooms.     Admission History:  84 year old man with recent diagnosis of DLBCL, biliary stenosis s/p CBD stent, mejias's esophagus, BPH w/ urinary retention s/p chronic tavarez placement admitted with weakness and confusion. Patient reports having felt weak since admission to [facility] facility. Patient been unable to stand and at times is dizzy. No chest pain. No nausea or vomiting. Patient has been more confused than usual. No fevers or chills.      ROS:  General:  (-)Pain, (+)Decrease appetite, (-)Fevers, (-)Chills, (-)Weight Loss  Eyes: (-)Blurry Vision, (-)Double Vision, (-)Vision Loss  ENT: (-)Sinus Congestion, (-)Decreased Hearing, (-)Nosebleeds, (-)Sore Throat  Cardiac: (-)Chest Pain, (-)Palpitations, (+)Shortness of breathe on exertion  Respiratory: (-)Cough, (-)hemoptysis, (-)Shortness of breathe  GI: (-)Nausea, (-)Vomiting, (-)Diarrhea, (-)Constipation, (-)Melena, (-)BRBPR  : (-)Hematuria, (-)Dysuria, (-)Polyuia  MSK: (-)Back pain, (-)Joint pain, (-)Stiffness  Dermatology: (-)Rash, (-)Itching  Neurology:  (-)Numbness, (-)Tingling, (-)Difficulty Walking, (-)Tremors, (+)Weakness (+)Confusion  Psych: (-)Anxiety, (-)Depression, (-)Memory Loss  Hematology:  (-)Easy Bruising, (-)Easy Bleeding, (-)Night Sweats.     Vital Signs Last 24 Hrs  T(C): 36.6 (27 Sep 2017 21:47), Max: 36.9 (27 Sep 2017 14:46)  T(F): 97.9 (27 Sep 2017 21:47), Max: 98.5 (27 Sep 2017 14:46)  HR: 87 (27 Sep 2017 21:47) (87 - 96)  BP: 105/72 (27 Sep 2017 21:47) (105/72 - 111/60)  RR: 18 (27 Sep 2017 21:47) (18 - 18)  SpO2: 95% (27 Sep 2017 14:46) (95% - 95%)    Objective  Physical Exam:  General: AAO x 2. NAD. Lying in bed comfortably. Very confused this mornig.  HEENT: clear oropharynx, anicteric sclera, pink conjunctivae, EOMi. PERRLA  Cardiac: S1, S2 present. No audible murmurs. RRR  Lungs: Decreased breathe sounds.   Abdomen: Soft, Non-Tender, Non-Distended. No palpable hepatosplenomegaly.  Extremities: 2+ pulses. Edema to the knees  Skin: No Rashes. No petechiae  Neuro: No focal motor or sensory deficits.     Labs:                        13.3   16.17 )-----------( 199      ( 27 Sep 2017 07:42 )             40.6   09-27    141  |  108  |  57<H>  ----------------------------<  95  5.0   |  24  |  1.43<H>    Ca    9.8      27 Sep 2017 08:54  Mg     2.6     09-26    Radiology:  CT Chest/Abdomen/Pelvis:  IMPRESSION:   Left lower lobe pneumonia.  Bulky confluent upper abdominal and retroperitoneal adenopathy as   described, consistent with lymphoma.     CT Head;  IMPRESSION: Unremarkable noncontrast CT of the brain.    V/Q Scan   IMPRESSION: Very low probability of pulmonary embolism      US LE  IMPRESSION: Acute, above the knee free floating DVT in the left common   femoral vein. LINK Tavera was informed of the findings at 1:20 PM on   9/22/2017.      Pathology:  Surgical Pathology Final Report - :   ACCESSION No:  [id number]    Final Diagnosis  Lymph node, periportal, biopsy  Diffuse large B-cell lymphoma, non-germinal center type

## 2017-09-28 NOTE — PROGRESS NOTE ADULT - PROBLEM SELECTOR PLAN 2
Hypercalcemia secondary to malignancy  - s/p aredia. Calcium levels normalizing.   - Will hold off on dosing Aredia again  - Will monitor

## 2017-09-28 NOTE — PROGRESS NOTE ADULT - ASSESSMENT
84 year old man with DLBCL which is yet untreated admitted with confusion and weakness. Found to have hypercalcemia of malignancy and pneumonia. Currently on ABx. S/p Pamidronate. Calcium normalizing. More confused today.

## 2017-09-28 NOTE — PROGRESS NOTE ADULT - SUBJECTIVE AND OBJECTIVE BOX
Confused last night after moving up to [facility].    Otherwise tolerated chemo yesterday     Vital Signs Last 24 Hrs  T(C): 36.4 (28 Sep 2017 08:25), Max: 36.9 (27 Sep 2017 14:46)  T(F): 97.5 (28 Sep 2017 08:25), Max: 98.5 (27 Sep 2017 14:46)  HR: 69 (28 Sep 2017 08:25) (69 - 96)  BP: 127/84 (28 Sep 2017 08:25) (105/72 - 127/84)  BP(mean): --  RR: 16 (28 Sep 2017 08:25) (16 - 18)  SpO2: 98% (28 Sep 2017 08:25) (95% - 98%)    GENERAL: NAD, AAOx2, frail  HEAD:  Atraumatic, Normocephalic  EYES: EOMI  BUCCAL: dry mucosa  CHEST/LUNG: decreased BS, coarse  HEART: Regular rate and rhythm; + murmur  ABDOMEN: Soft, Nontender, Nondistended; Bowel sounds present, multiple erythematous areas, no pus  : chronic tavarez  EXTREMITIES:  2+ Peripheral Pulses, + 2 pitting edema  SKIN: No rashes or lesions  NEURO: No focal deficits, confused at times    LABS:                        13.2   15.40 )-----------( 187      ( 28 Sep 2017 08:33 )             40.1     09-27    141  |  108  |  57<H>  ----------------------------<  95  5.0   |  24  |  1.43<H>    Ca    9.8      27 Sep 2017 08:54  Mg     2.6     09-26        CAPILLARY BLOOD GLUCOSE  103 (28 Sep 2017 08:25)  103 (27 Sep 2017 21:51)  93 (27 Sep 2017 16:40)  85 (27 Sep 2017 11:56)

## 2017-09-28 NOTE — PROGRESS NOTE ADULT - SUBJECTIVE AND OBJECTIVE BOX
[facility], Division of Infectious Diseases  JULIANN Peraza A. Lee  [phone]  Name: MIROSLAVA VERGARA  Age: 84y  Gender: Male  MRN: [id number]    Interval History--  Notes reviewed  family at bedside  no new complaints    Past Medical History--  Diffuse large B-cell lymphoma of intra-abdominal lymph nodes  Romero esophagus  Intestinal infection  Bladder polyp  H/O inguinal hernia repair  S/P TURP (transurethral resection of prostate)  Bladder polyp  History of cholecystectomy      For details regarding the patient's social history, family history, and other miscellaneous elements, please refer the initial infectious diseases consultation and/or the admitting history and physical examination for this admission.    Allergies    latex (Rash; Blisters)  penicillins (Swelling)  sulfa drugs (Unknown)    Intolerances        Medications--  Antibiotics:    Immunologic:  influenza   Vaccine 0.5 milliLiter(s) IntraMuscular once    Other:  finasteride  pantoprazole    Tablet  senna  polyethylene glycol 3350  docusate sodium  allopurinol  enoxaparin Injectable  sodium chloride 0.9%.  ondansetron Injectable PRN  vinCRIStine IVPB  cyclophosphamide IVPB  sodium chloride 0.9%.      Review of Systems--  A 10-point review of systems was obtained.     Pertinent positives and negatives--  Constitutional: No fevers. No Chills. No Rigors.   Cardiovascular: No chest pain. No palpitations.  Respiratory: No shortness of breath. No cough.  Gastrointestinal: No nausea or vomiting. No diarrhea or constipation.   Psychiatric: no depression    Review of systems otherwise negative except as previously noted.    Physical Examination--  Vital Signs: T(F): 97.5 (09-28-17 @ 08:25), Max: 98.5 (09-27-17 @ 14:46)  HR: 69 (09-28-17 @ 08:25)  BP: 127/84 (09-28-17 @ 08:25)  RR: 16 (09-28-17 @ 08:25)  SpO2: 98% (09-28-17 @ 08:25)  Wt(kg): --  General: Nontoxic-appearing Male in no acute distress.  HEENT: AT/NC. Anicteric. Conjunctiva pink and moist. Oropharynx clear. Dentition fair.  Neck: Not rigid. No sense of mass.  Nodes: None palpable.  Lungs: Clear bilaterally without rales, wheezing or rhonchi  Heart: Regular rate and rhythm. No Murmur. No rub. No gallop. No palpable thrill.  Abdomen: Bowel sounds present and normoactive. Soft. Nondistended. Nontender. No sense of mass. No organomegaly..   Extremities: No cyanosis or clubbing. ++edema.   Skin: Warm. Dry. Good turgor. No rash. No vasculitic stigmata.  Psychiatric: Appropriate affect and mood for situation.         Laboratory Studies--  CBC                        13.2   15.40 )-----------( 187      ( 28 Sep 2017 08:33 )             40.1       Chemistries  09-28    145  |  110<H>  |  53<H>  ----------------------------<  109<H>  4.7   |  21<L>  |  1.25    Ca    9.3      28 Sep 2017 09:34  Mg     2.6     09-26        Culture Data  Culture - Blood (09.20.17 @ 16:36)    Specimen Source: .Blood Blood-Peripheral    Culture Results:   No growth at 5 days.

## 2017-09-28 NOTE — PROGRESS NOTE ADULT - PROBLEM SELECTOR PLAN 4
appreciate oncology recs  cont allopurinol  ordered Mg, PO4, uric acid for AM  first dose vincristine and cyclophosphamide 9/27/17

## 2017-09-28 NOTE — PROGRESS NOTE ADULT - SUBJECTIVE AND OBJECTIVE BOX
Follow-up Pulm Progress Note  Mathew Glass MD  [phone]    Notes reviewed  Breathing comfortably supine; without significant cough  On full dose lovenox for LE common femoral DVT   Afebrile  off antibiotics - s/p full course for aspiration pneumonia  No rspiratory complaints    Vital Signs Last 24 Hrs  T(C): 36.4 (28 Sep 2017 08:25), Max: 36.9 (27 Sep 2017 14:46)  T(F): 97.5 (28 Sep 2017 08:25), Max: 98.5 (27 Sep 2017 14:46)  HR: 69 (28 Sep 2017 08:25) (69 - 96)  BP: 127/84 (28 Sep 2017 08:25) (105/72 - 127/84)  BP(mean): --  RR: 16 (28 Sep 2017 08:25) (16 - 18)  SpO2: 98% (28 Sep 2017 08:25) (95% - 98%)                          13.2   15.40 )-----------( 187      ( 28 Sep 2017 08:33 )             40.1       09-28    145  |  110<H>  |  53<H>  ----------------------------<  109<H>  4.7   |  21<L>  |  1.25    Ca    9.3      28 Sep 2017 09:34      Physical Examination:  PULM: Decr BS L base; without wheeze  CVS: Regular rate and rhythm, no murmurs, rubs, or gallops  ABD: Soft, non-tender  EXT:  Anasarca    RADIOLOGY REVIEWED  CXR:    CT chest:    TTE:

## 2017-09-28 NOTE — PROGRESS NOTE ADULT - PROBLEM SELECTOR PLAN 3
Newly diagnosed DLBCL. Not yet to start treatment  - s/p Vincristine and Cyclophosphamide. Tolerated  - Confusion appears to be due to patient changing rooms  - Check Uric Acid, Magnesium and Phosphate  - Will follow    Mike Claudio MD  [facility]   [address] [place] [id number]  (T) [phone]  (F) [phone]

## 2017-09-28 NOTE — CHART NOTE - NSCHARTNOTEFT_GEN_A_CORE
Malnutrition follow up. Pt with newly diagnosed DLBCL S/P chemotherapy 9/27 now admitted with confusion and weakness found to have hypercalcemia related to malignancy-now improved and pneumonia. Pt S/P swallow eval 9/21 and recommended NPO with non-oral nutrition/hydration, pt receiving dysphagia 2 diet with nectar thickened liquids.  Pt with MARIA C on CKD-improving, S/P chemotherapy yesterday, per MD and wife pt with ongoing confusion.    Source: Patient [x ]    Family [ x]     other [ ] Wife at bedside    Diet : Dysphagia 2 diet with nectar thickened liquids, no concentrated potassium, no concentrated phosphorus       Patient reports [ ] nausea  [ ] vomiting [ ] diarrhea [ ] constipation  [ ]chewing problems [ ] swallowing issues  [ ] other: No N+V, last BM 9/28     PO intake:  < 50% [ x] 50-75% [ ]   % [ ]  other : per wife, pt with ongoing decreased po intake due to dislike of institutional and modified texture foods and liquids. Pt takes prosource 1 packet daily and will have some thickened nepro in the morning. Pt requesting thin liquids including water and ginger ale-communicated with MD at bedside.       Current Weight: No new wt.    Pertinent Medications: MEDICATIONS  (STANDING):  finasteride 5 milliGRAM(s) Oral daily  pantoprazole    Tablet 40 milliGRAM(s) Oral before breakfast  senna 2 Tablet(s) Oral at bedtime  polyethylene glycol 3350 17 Gram(s) Oral two times a day  docusate sodium 100 milliGRAM(s) Oral three times a day  influenza   Vaccine 0.5 milliLiter(s) IntraMuscular once  allopurinol 300 milliGRAM(s) Oral daily  enoxaparin Injectable 90 milliGRAM(s) SubCutaneous every 12 hours  sodium chloride 0.9%. 1000 milliLiter(s) (75 mL/Hr) IV Continuous <Continuous>  vinCRIStine IVPB 2 milliGRAM(s) IV Intermittent once  cyclophosphamide IVPB 1344 milliGRAM(s) IV Intermittent once  sodium chloride 0.9%. 1000 milliLiter(s) (250 mL/Hr) IV Continuous <Continuous>    MEDICATIONS  (PRN):  ondansetron Injectable 8 milliGRAM(s) IV Push every 8 hours PRN Nausea and/or Vomiting    Pertinent Labs:  09-27 Na141 mmol/L Glu 95 mg/dL K+ 5.0 mmol/L Cr  1.43 mg/dL<H> BUN 57 mg/dL<H> Phos n/a   Alb n/a   PAB n/a   BG 9/27-9/28:       Skin: No edema, pt noted with stage II coccyx ulcer    Estimated Needs:   [ x] no change since previous assessment  [ ] recalculated:       Previous Nutrition Diagnosis:      [x ] Malnutrition (moderate)         Nutrition Diagnosis is [ x] ongoing  [ ] resolved [ ] not applicable          New Nutrition Diagnosis: [ x] not applicable         Interventions:     Recommend    [ ] Change Diet To:    [ ] Nutrition Supplement    [ ] Nutrition Support    [ ] Other:     1. Consider liberalizing potassium and phosphorus restriction given improvement of MARIA C to further optimize po intake, noted both potassium and phosphorus within desired limits, continue to closely monitor  2. Diet texture deferred to medical team currently dysphagia 2 diet with nectar thickened liquids  3. Continue to encourage po intake and obtain/honor food preferences as able      Monitoring and Evaluation:     [x ] PO intake [ x] Tolerance to diet prescription [ x] weights [x ] follow up per protocol    [ ] other: Malnutrition follow up. Pt with newly diagnosed DLBCL S/P chemotherapy 9/27 now admitted with confusion and weakness found to have hypercalcemia related to malignancy-now improved and pneumonia. Pt S/P swallow eval 9/21 and recommended NPO with non-oral nutrition/hydration, pt receiving dysphagia 2 diet with nectar thickened liquids.  Pt with MARIA C on CKD-improving, S/P chemotherapy yesterday, per MD and wife pt with ongoing confusion.    Source: Patient [x ]    Family [ x]     other [ ] Wife at bedside    Diet : Dysphagia 2 diet with nectar thickened liquids, no concentrated potassium, no concentrated phosphorus       Patient reports [ ] nausea  [ ] vomiting [ ] diarrhea [ ] constipation  [ ]chewing problems [ ] swallowing issues  [ ] other: No N+V, last BM 9/28     PO intake:  < 50% [ x] 50-75% [ ]   % [ ]  other : per wife, pt with ongoing decreased po intake due to dislike of institutional and modified texture foods and liquids. Pt takes prosource 1 packet daily and will have some thickened nepro in the morning. Pt requesting thin liquids including water and ginger ale-communicated with MD at bedside.       Current Weight: No new wt.    Pertinent Medications: MEDICATIONS  (STANDING):  finasteride 5 milliGRAM(s) Oral daily  pantoprazole    Tablet 40 milliGRAM(s) Oral before breakfast  senna 2 Tablet(s) Oral at bedtime  polyethylene glycol 3350 17 Gram(s) Oral two times a day  docusate sodium 100 milliGRAM(s) Oral three times a day  influenza   Vaccine 0.5 milliLiter(s) IntraMuscular once  allopurinol 300 milliGRAM(s) Oral daily  enoxaparin Injectable 90 milliGRAM(s) SubCutaneous every 12 hours  sodium chloride 0.9%. 1000 milliLiter(s) (75 mL/Hr) IV Continuous <Continuous>  vinCRIStine IVPB 2 milliGRAM(s) IV Intermittent once  cyclophosphamide IVPB 1344 milliGRAM(s) IV Intermittent once  sodium chloride 0.9%. 1000 milliLiter(s) (250 mL/Hr) IV Continuous <Continuous>    MEDICATIONS  (PRN):  ondansetron Injectable 8 milliGRAM(s) IV Push every 8 hours PRN Nausea and/or Vomiting    Pertinent Labs:  09-27 Na141 mmol/L Glu 95 mg/dL K+ 5.0 mmol/L Cr  1.43 mg/dL<H> BUN 57 mg/dL<H> Phos n/a   Alb n/a   PAB n/a   BG 9/27-9/28:       Skin: No edema, pt noted with stage II coccyx ulcer    Estimated Needs:   [ x] no change since previous assessment  [ ] recalculated:       Previous Nutrition Diagnosis:      [x ] Malnutrition (moderate)         Nutrition Diagnosis is [ x] ongoing  [ ] resolved [ ] not applicable          New Nutrition Diagnosis: [ x] not applicable         Interventions:     Recommend    [ ] Change Diet To:    [ ] Nutrition Supplement    [ ] Nutrition Support    [ ] Other:     1. Consider liberalizing potassium and phosphorus restriction given improvement of MARIA C to further optimize po intake, noted both potassium and phosphorus within desired limits, continue to closely monitor  2. Diet texture deferred to medical team currently dysphagia 2 diet with nectar thickened liquids  3. Continue nepro 1 x daily and prosource 1 x daily for now  4. Continue to encourage po intake and obtain/honor food preferences as able      Monitoring and Evaluation:     [x ] PO intake [ x] Tolerance to diet prescription [ x] weights [x ] follow up per protocol    [ ] other:

## 2017-09-29 DIAGNOSIS — Z02.9 ENCOUNTER FOR ADMINISTRATIVE EXAMINATIONS, UNSPECIFIED: ICD-10-CM

## 2017-09-29 LAB
ANION GAP SERPL CALC-SCNC: 10 MMOL/L — SIGNIFICANT CHANGE UP (ref 5–17)
BUN SERPL-MCNC: 52 MG/DL — HIGH (ref 7–23)
CALCIUM SERPL-MCNC: 9.2 MG/DL — SIGNIFICANT CHANGE UP (ref 8.4–10.5)
CHLORIDE SERPL-SCNC: 114 MMOL/L — HIGH (ref 96–108)
CO2 SERPL-SCNC: 24 MMOL/L — SIGNIFICANT CHANGE UP (ref 22–31)
CREAT SERPL-MCNC: 1.16 MG/DL — SIGNIFICANT CHANGE UP (ref 0.5–1.3)
GLUCOSE SERPL-MCNC: 110 MG/DL — HIGH (ref 70–99)
HCT VFR BLD CALC: 39.2 % — SIGNIFICANT CHANGE UP (ref 39–50)
HGB BLD-MCNC: 12.9 G/DL — LOW (ref 13–17)
MAGNESIUM SERPL-MCNC: 2.5 MG/DL — SIGNIFICANT CHANGE UP (ref 1.6–2.6)
MCHC RBC-ENTMCNC: 32.9 GM/DL — SIGNIFICANT CHANGE UP (ref 32–36)
MCHC RBC-ENTMCNC: 33.5 PG — SIGNIFICANT CHANGE UP (ref 27–34)
MCV RBC AUTO: 101.8 FL — HIGH (ref 80–100)
PHOSPHATE SERPL-MCNC: 2.8 MG/DL — SIGNIFICANT CHANGE UP (ref 2.5–4.5)
PLATELET # BLD AUTO: 138 K/UL — LOW (ref 150–400)
POTASSIUM SERPL-MCNC: 5.4 MMOL/L — HIGH (ref 3.5–5.3)
POTASSIUM SERPL-SCNC: 5.4 MMOL/L — HIGH (ref 3.5–5.3)
RBC # BLD: 3.85 M/UL — LOW (ref 4.2–5.8)
RBC # FLD: 15.8 % — HIGH (ref 10.3–14.5)
SODIUM SERPL-SCNC: 148 MMOL/L — HIGH (ref 135–145)
URATE SERPL-MCNC: 6.4 MG/DL — SIGNIFICANT CHANGE UP (ref 3.4–8.8)
WBC # BLD: 13.52 K/UL — HIGH (ref 3.8–10.5)
WBC # FLD AUTO: 13.52 K/UL — HIGH (ref 3.8–10.5)

## 2017-09-29 PROCEDURE — 99232 SBSQ HOSP IP/OBS MODERATE 35: CPT

## 2017-09-29 RX ADMIN — ENOXAPARIN SODIUM 90 MILLIGRAM(S): 100 INJECTION SUBCUTANEOUS at 06:00

## 2017-09-29 RX ADMIN — FINASTERIDE 5 MILLIGRAM(S): 5 TABLET, FILM COATED ORAL at 12:20

## 2017-09-29 RX ADMIN — Medication 300 MILLIGRAM(S): at 12:20

## 2017-09-29 RX ADMIN — ENOXAPARIN SODIUM 90 MILLIGRAM(S): 100 INJECTION SUBCUTANEOUS at 18:00

## 2017-09-29 NOTE — PROGRESS NOTE ADULT - SUBJECTIVE AND OBJECTIVE BOX
Subjective  Patient seen in the AM. Still quite confused. Discussing about how much he loves his daughter and that the TV is sending him messages.     Admission History:  84 year old man with recent diagnosis of DLBCL, biliary stenosis s/p CBD stent, mejias's esophagus, BPH w/ urinary retention s/p chronic tavarez placement admitted with weakness and confusion. Patient reports having felt weak since admission to [facility] facility. Patient been unable to stand and at times is dizzy. No chest pain. No nausea or vomiting. Patient has been more confused than usual. No fevers or chills.      ROS:  General:  (-)Pain, (+)Decrease appetite, (-)Fevers, (-)Chills, (-)Weight Loss  Eyes: (-)Blurry Vision, (-)Double Vision, (-)Vision Loss  ENT: (-)Sinus Congestion, (-)Decreased Hearing, (-)Nosebleeds, (-)Sore Throat  Cardiac: (-)Chest Pain, (-)Palpitations, (+)Shortness of breathe on exertion  Respiratory: (-)Cough, (-)hemoptysis, (-)Shortness of breathe  GI: (-)Nausea, (-)Vomiting, (-)Diarrhea, (-)Constipation, (-)Melena, (-)BRBPR  : (-)Hematuria, (-)Dysuria, (-)Polyuia  MSK: (-)Back pain, (-)Joint pain, (-)Stiffness  Dermatology: (-)Rash, (-)Itching  Neurology:  (-)Numbness, (-)Tingling, (-)Difficulty Walking, (-)Tremors, (+)Weakness (+)Confusion  Psych: (-)Anxiety, (-)Depression, (-)Memory Loss  Hematology:  (-)Easy Bruising, (-)Easy Bleeding, (-)Night Sweats.     Vital Signs Last 24 Hrs  T(C): 36.6 (29 Sep 2017 07:30), Max: 36.6 (28 Sep 2017 16:03)  T(F): 97.9 (29 Sep 2017 07:30), Max: 97.9 (28 Sep 2017 16:03)  HR: 103 (29 Sep 2017 07:30) (103 - 109)  BP: 112/70 (29 Sep 2017 07:30) (105/73 - 112/70)  RR: 16 (29 Sep 2017 07:30) (16 - 18)  SpO2: 98% (29 Sep 2017 07:30) (97% - 100%)    Objective  Physical Exam:  General: AAO x 2. NAD. Lying in bed comfortably. Still confused  HEENT: clear oropharynx, anicteric sclera, pink conjunctivae, EOMi. PERRLA  Cardiac: S1, S2 present. No audible murmurs. RRR  Lungs: Decreased breathe sounds.   Abdomen: Soft, Non-Tender, Non-Distended. No palpable hepatosplenomegaly.  Extremities: 2+ pulses. Edema to the knees  Skin: No Rashes. No petechiae  Neuro: No focal motor or sensory deficits.     Labs:                        13.2   15.40 )-----------( 187      ( 28 Sep 2017 08:33 )             40.1   09-28    145  |  110<H>  |  53<H>  ----------------------------<  109<H>  4.7   |  21<L>  |  1.25    Ca    9.3      28 Sep 2017 09:34    Radiology:  CT Chest/Abdomen/Pelvis:  IMPRESSION:   Left lower lobe pneumonia.  Bulky confluent upper abdominal and retroperitoneal adenopathy as   described, consistent with lymphoma.     CT Head;  IMPRESSION: Unremarkable noncontrast CT of the brain.    V/Q Scan   IMPRESSION: Very low probability of pulmonary embolism      US LE  IMPRESSION: Acute, above the knee free floating DVT in the left common   femoral vein. LINK Tavera was informed of the findings at 1:20 PM on   9/22/2017.      Pathology:  Surgical Pathology Final Report - :   ACCESSION No:  [id number]    Final Diagnosis  Lymph node, periportal, biopsy  Diffuse large B-cell lymphoma, non-germinal center type

## 2017-09-29 NOTE — PROGRESS NOTE ADULT - ASSESSMENT
84 year old man with DLBCL which is yet untreated admitted with confusion and weakness. Found to have hypercalcemia of malignancy and pneumonia. Currently on ABx. S/p Pamidronate. Calcium normalizing. Still confused.

## 2017-09-29 NOTE — PROGRESS NOTE ADULT - SUBJECTIVE AND OBJECTIVE BOX
Follow-up Pulm Progress Note  Mathew Glass MD  [phone]    Notes reviewed  More energetic and in better spirits today  Breathing comfortably supine; without significant cough  On full dose lovenox for LE common femoral DVT   Afebrile  off antibiotics - s/p full course for aspiration pneumonia  No respiratory complaints    Vital Signs Last 24 Hrs  T(C): 36.6 (29 Sep 2017 07:30), Max: 36.6 (28 Sep 2017 16:03)  T(F): 97.9 (29 Sep 2017 07:30), Max: 97.9 (28 Sep 2017 16:03)  HR: 103 (29 Sep 2017 07:30) (103 - 109)  BP: 112/70 (29 Sep 2017 07:30) (105/73 - 112/70)  BP(mean): --  RR: 16 (29 Sep 2017 07:30) (16 - 18)  SpO2: 98% (29 Sep 2017 07:30) (97% - 100%)                        12.9   13.52 )-----------( 138      ( 29 Sep 2017 09:02 )             39.2       09-29    148<H>  |  114<H>  |  52<H>  ----------------------------<  110<H>  5.4<H>   |  24  |  1.16    Ca    9.2      29 Sep 2017 09:01  Phos  2.8     09-29  Mg     2.5     09-29      Physical Examination:  PULM: Decr BS L base; without wheeze: no change  CVS: Regular rate and rhythm, no murmurs, rubs, or gallops  ABD: Soft, non-tender  EXT:  Decr in LE edema  RADIOLOGY REVIEWED  CXR:    CT chest:    TTE:

## 2017-09-29 NOTE — PROGRESS NOTE ADULT - SUBJECTIVE AND OBJECTIVE BOX
**********              CARDIOLOGY CONSULT NOTE              ************  ============================================================  CHIEF COMPLAINT/REASON FOR CONSULT:  Patient is a 84y old  Male who presents with a chief complaint of hypotension (21 Sep 2017 12:22)      HISTORY OF PRESENT ILLNESS:  84yMale with a history of DLBCL,biliary stenosis s/p CBD stent, mejias's esophagus, BPH w/ urinary retention s/p chronic tavarez placement; consult for abn EKG - on further examination demonstrated to be normal; no tropinin elevation, no ischemic evidence    ============  24 Hour Events  - no sig changes  - stable hemodynamically   ============    Allergies    latex (Rash; Blisters)  penicillins (Swelling)  sulfa drugs (Unknown)    Intolerances    	    MEDICATIONS:  enoxaparin Injectable 90 milliGRAM(s) SubCutaneous every 12 hours        ondansetron Injectable 8 milliGRAM(s) IV Push every 8 hours PRN    pantoprazole    Tablet 40 milliGRAM(s) Oral before breakfast  senna 2 Tablet(s) Oral at bedtime  polyethylene glycol 3350 17 Gram(s) Oral two times a day  docusate sodium 100 milliGRAM(s) Oral three times a day    finasteride 5 milliGRAM(s) Oral daily  allopurinol 300 milliGRAM(s) Oral daily    influenza   Vaccine 0.5 milliLiter(s) IntraMuscular once  sodium chloride 0.9%. 1000 milliLiter(s) IV Continuous <Continuous>  vinCRIStine IVPB 2 milliGRAM(s) IV Intermittent once  cyclophosphamide IVPB 1344 milliGRAM(s) IV Intermittent once  sodium chloride 0.9%. 1000 milliLiter(s) IV Continuous <Continuous>      PAST MEDICAL & SURGICAL HISTORY:  Diffuse large B-cell lymphoma of intra-abdominal lymph nodes  Mejias esophagus  Intestinal infection: 2012  Bladder polyp  H/O inguinal hernia repair  S/P TURP (transurethral resection of prostate)  Bladder polyp: s/p TURBT  History of cholecystectomy: 1987      FAMILY HISTORY:  No pertinent family history in first degree relatives      SOCIAL HISTORY:    [ x] Non-smoker  [x] No Illicit Drug Use  [ x] No Excess EtOH Use    Vital Signs Last 24 Hrs  T(C): 36.6 (29 Sep 2017 07:30), Max: 36.6 (28 Sep 2017 16:03)  T(F): 97.9 (29 Sep 2017 07:30), Max: 97.9 (28 Sep 2017 16:03)  HR: 103 (29 Sep 2017 07:30) (103 - 109)  BP: 112/70 (29 Sep 2017 07:30) (105/73 - 112/70)  BP(mean): --  RR: 16 (29 Sep 2017 07:30) (16 - 18)  SpO2: 98% (29 Sep 2017 07:30) (97% - 100%)      LABS:	   Labs Reviewed09-29-17 @ 12:13	    CBC Full  -  ( 29 Sep 2017 09:02 )  WBC Count : 13.52 K/uL  Hemoglobin : 12.9 g/dL  Hematocrit : 39.2 %  Platelet Count - Automated : 138 K/uL  Mean Cell Volume : 101.8 fl  Mean Cell Hemoglobin : 33.5 pg  Mean Cell Hemoglobin Concentration : 32.9 gm/dL  Auto Neutrophil # : x  Auto Lymphocyte # : x  Auto Monocyte # : x  Auto Eosinophil # : x  Auto Basophil # : x  Auto Neutrophil % : x  Auto Lymphocyte % : x  Auto Monocyte % : x  Auto Eosinophil % : x  Auto Basophil % : x    09-29    148<H>  |  114<H>  |  52<H>  ----------------------------<  110<H>  5.4<H>   |  24  |  1.16  09-28    145  |  110<H>  |  53<H>  ----------------------------<  109<H>  4.7   |  21<L>  |  1.25    Ca    9.2      29 Sep 2017 09:01  Ca    9.3      28 Sep 2017 09:34  Phos  2.8     09-29  Mg     2.5     09-29      =========================================================================  ASSESSMENT:  84yMale with a history of DLBCL,biliary stenosis s/p CBD stent, mejias's esophagus, BPH w/ urinary retention s/p chronic tavarez placement; consult for abn EKG - on further examination demonstrated to be normal; no tropinin elevation, no ischemic evidence      Recommendations  - Continue Current Management   - If c/f ischemia, obtain ecg,echo,enzymes  - otherwise, stable from CV perspective       Please call with questions.     Paramjit Flower MD, FACC  [phone]

## 2017-09-29 NOTE — PROGRESS NOTE ADULT - PROBLEM SELECTOR PLAN 3
Secondary to malignancy and stasis.   - Continue full dose anticoagulation  - Will follow    Mike Claudio MD  [facility] - [facility] Division of [facility]   [address] [place] [address] [address]  [place] [address]  (T) [phone]  (F) [phone]

## 2017-09-29 NOTE — PROGRESS NOTE ADULT - SUBJECTIVE AND OBJECTIVE BOX
Complaining about "being a prisoner" in the hospital.    Vital Signs Last 24 Hrs  T(C): 36.6 (29 Sep 2017 07:30), Max: 36.6 (28 Sep 2017 16:03)  T(F): 97.9 (29 Sep 2017 07:30), Max: 97.9 (28 Sep 2017 16:03)  HR: 103 (29 Sep 2017 07:30) (103 - 109)  BP: 112/70 (29 Sep 2017 07:30) (105/73 - 112/70)  BP(mean): --  RR: 16 (29 Sep 2017 07:30) (16 - 18)  SpO2: 98% (29 Sep 2017 07:30) (97% - 100%)    GENERAL: NAD, AAOx2  HEAD:  Atraumatic, Normocephalic  EYES: EOMI  BUCCAL: dry mucosa  CHEST/LUNG: decreased BS, coarse  HEART: Regular rate and rhythm; + murmur  ABDOMEN: Soft, Nontender, Nondistended; Bowel sounds present, multiple erythematous areas, no pus  : chronic tavarez  EXTREMITIES:  2+ Peripheral Pulses, + 2 pitting edema both LE  SKIN: No rashes or lesions  NEURO: No focal deficits, confused at times    LABS:                        13.2   15.40 )-----------( 187      ( 28 Sep 2017 08:33 )             40.1     09-28    145  |  110<H>  |  53<H>  ----------------------------<  109<H>  4.7   |  21<L>  |  1.25    Ca    9.3      28 Sep 2017 09:34        CAPILLARY BLOOD GLUCOSE  85 (29 Sep 2017 08:09)  100 (28 Sep 2017 21:51)  109 (28 Sep 2017 16:58)  106 (28 Sep 2017 12:17)

## 2017-09-29 NOTE — PROGRESS NOTE ADULT - PROBLEM SELECTOR PLAN 2
Newly diagnosed DLBCL. Not yet to start treatment  - s/p Vincristine and Cyclophosphamide. Tolerated  - Still Confused  - Will monitor. Will consider dose of Rituximab next week

## 2017-09-29 NOTE — PROGRESS NOTE ADULT - PROBLEM SELECTOR PLAN 4
appreciate oncology recs  cont allopurinol  check Mg, PO4, uric acid daily  first dose vincristine and cyclophosphamide 9/27/17

## 2017-09-30 LAB
ANION GAP SERPL CALC-SCNC: 10 MMOL/L — SIGNIFICANT CHANGE UP (ref 5–17)
BASOPHILS # BLD AUTO: 0 K/UL — SIGNIFICANT CHANGE UP (ref 0–0.2)
BASOPHILS NFR BLD AUTO: 0.1 % — SIGNIFICANT CHANGE UP (ref 0–2)
BUN SERPL-MCNC: 52 MG/DL — HIGH (ref 7–23)
CALCIUM SERPL-MCNC: 9.2 MG/DL — SIGNIFICANT CHANGE UP (ref 8.4–10.5)
CHLORIDE SERPL-SCNC: 119 MMOL/L — HIGH (ref 96–108)
CO2 SERPL-SCNC: 21 MMOL/L — LOW (ref 22–31)
CREAT SERPL-MCNC: 1.05 MG/DL — SIGNIFICANT CHANGE UP (ref 0.5–1.3)
EOSINOPHIL # BLD AUTO: 0.1 K/UL — SIGNIFICANT CHANGE UP (ref 0–0.5)
EOSINOPHIL NFR BLD AUTO: 0.6 % — SIGNIFICANT CHANGE UP (ref 0–6)
GLUCOSE SERPL-MCNC: 106 MG/DL — HIGH (ref 70–99)
HCT VFR BLD CALC: 39.3 % — SIGNIFICANT CHANGE UP (ref 39–50)
HGB BLD-MCNC: 13 G/DL — SIGNIFICANT CHANGE UP (ref 13–17)
LYMPHOCYTES # BLD AUTO: 0.5 K/UL — LOW (ref 1–3.3)
LYMPHOCYTES # BLD AUTO: 4.6 % — LOW (ref 13–44)
MCHC RBC-ENTMCNC: 33.1 GM/DL — SIGNIFICANT CHANGE UP (ref 32–36)
MCHC RBC-ENTMCNC: 35.4 PG — HIGH (ref 27–34)
MCV RBC AUTO: 107 FL — HIGH (ref 80–100)
MONOCYTES # BLD AUTO: 0.3 K/UL — SIGNIFICANT CHANGE UP (ref 0–0.9)
MONOCYTES NFR BLD AUTO: 2.8 % — SIGNIFICANT CHANGE UP (ref 2–14)
NEUTROPHILS # BLD AUTO: 9.3 K/UL — HIGH (ref 1.8–7.4)
NEUTROPHILS NFR BLD AUTO: 91.9 % — HIGH (ref 43–77)
PLATELET # BLD AUTO: 102 K/UL — LOW (ref 150–400)
POTASSIUM SERPL-MCNC: 4.4 MMOL/L — SIGNIFICANT CHANGE UP (ref 3.5–5.3)
POTASSIUM SERPL-SCNC: 4.4 MMOL/L — SIGNIFICANT CHANGE UP (ref 3.5–5.3)
RBC # BLD: 3.68 M/UL — LOW (ref 4.2–5.8)
RBC # FLD: 14 % — SIGNIFICANT CHANGE UP (ref 10.3–14.5)
SODIUM SERPL-SCNC: 150 MMOL/L — HIGH (ref 135–145)
WBC # BLD: 10.1 K/UL — SIGNIFICANT CHANGE UP (ref 3.8–10.5)
WBC # FLD AUTO: 10.1 K/UL — SIGNIFICANT CHANGE UP (ref 3.8–10.5)

## 2017-09-30 RX ORDER — SODIUM CHLORIDE 9 MG/ML
1000 INJECTION, SOLUTION INTRAVENOUS
Qty: 0 | Refills: 0 | Status: DISCONTINUED | OUTPATIENT
Start: 2017-09-30 | End: 2017-10-01

## 2017-09-30 RX ADMIN — Medication 300 MILLIGRAM(S): at 11:53

## 2017-09-30 RX ADMIN — POLYETHYLENE GLYCOL 3350 17 GRAM(S): 17 POWDER, FOR SOLUTION ORAL at 05:33

## 2017-09-30 RX ADMIN — Medication 100 MILLIGRAM(S): at 05:34

## 2017-09-30 RX ADMIN — FINASTERIDE 5 MILLIGRAM(S): 5 TABLET, FILM COATED ORAL at 11:53

## 2017-09-30 RX ADMIN — PANTOPRAZOLE SODIUM 40 MILLIGRAM(S): 20 TABLET, DELAYED RELEASE ORAL at 05:33

## 2017-09-30 RX ADMIN — ENOXAPARIN SODIUM 90 MILLIGRAM(S): 100 INJECTION SUBCUTANEOUS at 18:30

## 2017-09-30 RX ADMIN — ENOXAPARIN SODIUM 90 MILLIGRAM(S): 100 INJECTION SUBCUTANEOUS at 05:33

## 2017-09-30 NOTE — PROGRESS NOTE ADULT - PROBLEM SELECTOR PLAN 3
Secondary to malignancy and stasis.   - Continue full dose anticoagulation  - Will follow    Mike Claudio MD  [facility]   [address] [place] [address] [address]  [place] [address]  (T) [phone]  (F) [phone]

## 2017-09-30 NOTE — PROGRESS NOTE ADULT - SUBJECTIVE AND OBJECTIVE BOX
Follow-up Pulm Progress Note  Mathew Glass MD  [phone]    Notes reviewed  Afebrile and hemondynamically stble off antibiotics  Breathing comfortably supine; without significant cough  On full dose lovenox for LE common femoral DVT   No new respiratory issues    Vital Signs Last 24 Hrs  T(C): 36.9 (30 Sep 2017 08:33), Max: 36.9 (30 Sep 2017 08:33)  T(F): 98.5 (30 Sep 2017 08:33), Max: 98.5 (30 Sep 2017 08:33)  HR: 110 (30 Sep 2017 08:33) (109 - 116)  BP: 119/63 (30 Sep 2017 08:33) (103/62 - 121/82)  BP(mean): --  RR: 18 (30 Sep 2017 08:33) (18 - 18)  SpO2: 94% (30 Sep 2017 08:33) (94% - 94%)                              13.0   10.1  )-----------( 102      ( 30 Sep 2017 06:53 )             39.3       09-30    150<H>  |  119<H>  |  52<H>  ----------------------------<  106<H>  4.4   |  21<L>  |  1.05    Ca    9.2      30 Sep 2017 06:53  Phos  2.8     09-29  Mg     2.5     09-29      Physical Examination:  PULM: Decr BS L base; without wheeze: no change  CVS: Regular rate and rhythm, no murmurs, rubs, or gallops  ABD: Soft, non-tender  EXT:  Decr in LE edema  RADIOLOGY REVIEWED  CXR:    CT chest:    TTE:

## 2017-09-30 NOTE — PROGRESS NOTE ADULT - PROBLEM SELECTOR PLAN 2
Newly diagnosed DLBCL.   - s/p Vincristine and Cyclophosphamide. Tolerated  - Still Confused  - Will monitor. Will consider dose of Rituximab next week.  If confusion continues, consider neuro evaluation.

## 2017-10-01 DIAGNOSIS — E87.0 HYPEROSMOLALITY AND HYPERNATREMIA: ICD-10-CM

## 2017-10-01 LAB
ANION GAP SERPL CALC-SCNC: 10 MMOL/L — SIGNIFICANT CHANGE UP (ref 5–17)
BUN SERPL-MCNC: 42 MG/DL — HIGH (ref 7–23)
CALCIUM SERPL-MCNC: 9.4 MG/DL — SIGNIFICANT CHANGE UP (ref 8.4–10.5)
CHLORIDE SERPL-SCNC: 120 MMOL/L — HIGH (ref 96–108)
CO2 SERPL-SCNC: 23 MMOL/L — SIGNIFICANT CHANGE UP (ref 22–31)
CREAT SERPL-MCNC: 1 MG/DL — SIGNIFICANT CHANGE UP (ref 0.5–1.3)
FOLATE SERPL-MCNC: 11.6 NG/ML — SIGNIFICANT CHANGE UP (ref 4.8–24.2)
GLUCOSE SERPL-MCNC: 112 MG/DL — HIGH (ref 70–99)
POTASSIUM SERPL-MCNC: 4 MMOL/L — SIGNIFICANT CHANGE UP (ref 3.5–5.3)
POTASSIUM SERPL-SCNC: 4 MMOL/L — SIGNIFICANT CHANGE UP (ref 3.5–5.3)
SODIUM SERPL-SCNC: 153 MMOL/L — HIGH (ref 135–145)
TSH SERPL-MCNC: 1.08 UIU/ML — SIGNIFICANT CHANGE UP (ref 0.27–4.2)
VIT B12 SERPL-MCNC: >2000 PG/ML — HIGH (ref 243–894)

## 2017-10-01 RX ORDER — SODIUM CHLORIDE 9 MG/ML
1000 INJECTION, SOLUTION INTRAVENOUS
Qty: 0 | Refills: 0 | Status: DISCONTINUED | OUTPATIENT
Start: 2017-10-01 | End: 2017-10-03

## 2017-10-01 RX ORDER — NYSTATIN CREAM 100000 [USP'U]/G
1 CREAM TOPICAL THREE TIMES A DAY
Qty: 0 | Refills: 0 | Status: DISCONTINUED | OUTPATIENT
Start: 2017-10-01 | End: 2017-10-08

## 2017-10-01 RX ADMIN — SODIUM CHLORIDE 75 MILLILITER(S): 9 INJECTION, SOLUTION INTRAVENOUS at 05:55

## 2017-10-01 RX ADMIN — FINASTERIDE 5 MILLIGRAM(S): 5 TABLET, FILM COATED ORAL at 13:34

## 2017-10-01 RX ADMIN — Medication 300 MILLIGRAM(S): at 13:35

## 2017-10-01 RX ADMIN — ENOXAPARIN SODIUM 90 MILLIGRAM(S): 100 INJECTION SUBCUTANEOUS at 05:55

## 2017-10-01 RX ADMIN — ENOXAPARIN SODIUM 90 MILLIGRAM(S): 100 INJECTION SUBCUTANEOUS at 20:13

## 2017-10-01 NOTE — PROGRESS NOTE ADULT - SUBJECTIVE AND OBJECTIVE BOX
Stools more hard today.  Denies any acute SOB or CP.  No acut epain    Vital Signs Last 24 Hrs  T(C): 36.4 (01 Oct 2017 08:25), Max: 36.8 (30 Sep 2017 21:42)  T(F): 97.5 (01 Oct 2017 08:25), Max: 98.3 (30 Sep 2017 21:42)  HR: 99 (01 Oct 2017 08:25) (98 - 110)  BP: 100/61 (01 Oct 2017 08:25) (100/61 - 107/62)  BP(mean): --  RR: 18 (01 Oct 2017 08:25) (18 - 18)  SpO2: 98% (01 Oct 2017 08:25) (92% - 100%)    GENERAL: NAD, AAOx2  HEAD:  Atraumatic, Normocephalic  EYES: EOMI  BUCCAL: dry mucosa  CHEST/LUNG: decreased BS, coarse  HEART: Regular rate and rhythm; + murmur  ABDOMEN: Soft, Nontender, Nondistended; Bowel sounds present  : chronic tavarez  EXTREMITIES:  2+ Peripheral Pulses, + 2 pitting edema both LE  SKIN: No rashes or lesions  NEURO: No focal deficits, confused at times    LABS:                        13.0   10.1  )-----------( 102      ( 30 Sep 2017 06:53 )             39.3     09-30    150<H>  |  119<H>  |  52<H>  ----------------------------<  106<H>  4.4   |  21<L>  |  1.05    Ca    9.2      30 Sep 2017 06:53        CAPILLARY BLOOD GLUCOSE  99 (01 Oct 2017 08:25)  132 (30 Sep 2017 21:42)  93 (30 Sep 2017 12:07)

## 2017-10-02 LAB
ANION GAP SERPL CALC-SCNC: 6 MMOL/L — SIGNIFICANT CHANGE UP (ref 5–17)
BUN SERPL-MCNC: 35 MG/DL — HIGH (ref 7–23)
CALCIUM SERPL-MCNC: 8.8 MG/DL — SIGNIFICANT CHANGE UP (ref 8.4–10.5)
CHLORIDE SERPL-SCNC: 117 MMOL/L — HIGH (ref 96–108)
CO2 SERPL-SCNC: 25 MMOL/L — SIGNIFICANT CHANGE UP (ref 22–31)
CREAT SERPL-MCNC: 0.85 MG/DL — SIGNIFICANT CHANGE UP (ref 0.5–1.3)
GLUCOSE SERPL-MCNC: 134 MG/DL — HIGH (ref 70–99)
HCT VFR BLD CALC: 36.8 % — LOW (ref 39–50)
HGB BLD-MCNC: 11.9 G/DL — LOW (ref 13–17)
MAGNESIUM SERPL-MCNC: 2.2 MG/DL — SIGNIFICANT CHANGE UP (ref 1.6–2.6)
MCHC RBC-ENTMCNC: 32.3 GM/DL — SIGNIFICANT CHANGE UP (ref 32–36)
MCHC RBC-ENTMCNC: 32.9 PG — SIGNIFICANT CHANGE UP (ref 27–34)
MCV RBC AUTO: 101.7 FL — HIGH (ref 80–100)
PHOSPHATE SERPL-MCNC: 2 MG/DL — LOW (ref 2.5–4.5)
PLATELET # BLD AUTO: 99 K/UL — LOW (ref 150–400)
POTASSIUM SERPL-MCNC: 4.3 MMOL/L — SIGNIFICANT CHANGE UP (ref 3.5–5.3)
POTASSIUM SERPL-SCNC: 4.3 MMOL/L — SIGNIFICANT CHANGE UP (ref 3.5–5.3)
RBC # BLD: 3.62 M/UL — LOW (ref 4.2–5.8)
RBC # FLD: 15.7 % — HIGH (ref 10.3–14.5)
SODIUM SERPL-SCNC: 148 MMOL/L — HIGH (ref 135–145)
T PALLIDUM AB TITR SER: NEGATIVE — SIGNIFICANT CHANGE UP
URATE SERPL-MCNC: 5 MG/DL — SIGNIFICANT CHANGE UP (ref 3.4–8.8)
WBC # BLD: 6.31 K/UL — SIGNIFICANT CHANGE UP (ref 3.8–10.5)
WBC # FLD AUTO: 6.31 K/UL — SIGNIFICANT CHANGE UP (ref 3.8–10.5)

## 2017-10-02 PROCEDURE — 99232 SBSQ HOSP IP/OBS MODERATE 35: CPT

## 2017-10-02 RX ADMIN — ENOXAPARIN SODIUM 90 MILLIGRAM(S): 100 INJECTION SUBCUTANEOUS at 08:39

## 2017-10-02 RX ADMIN — SODIUM CHLORIDE 55 MILLILITER(S): 9 INJECTION, SOLUTION INTRAVENOUS at 11:48

## 2017-10-02 RX ADMIN — NYSTATIN CREAM 1 APPLICATION(S): 100000 CREAM TOPICAL at 21:13

## 2017-10-02 RX ADMIN — FINASTERIDE 5 MILLIGRAM(S): 5 TABLET, FILM COATED ORAL at 11:48

## 2017-10-02 RX ADMIN — NYSTATIN CREAM 1 APPLICATION(S): 100000 CREAM TOPICAL at 05:02

## 2017-10-02 RX ADMIN — Medication 300 MILLIGRAM(S): at 11:48

## 2017-10-02 RX ADMIN — NYSTATIN CREAM 1 APPLICATION(S): 100000 CREAM TOPICAL at 16:11

## 2017-10-02 RX ADMIN — ENOXAPARIN SODIUM 90 MILLIGRAM(S): 100 INJECTION SUBCUTANEOUS at 21:14

## 2017-10-02 NOTE — PROGRESS NOTE ADULT - SUBJECTIVE AND OBJECTIVE BOX
No acute CP or SOB  No acute pain    Vital Signs Last 24 Hrs  T(C): 36.6 (02 Oct 2017 07:41), Max: 36.6 (02 Oct 2017 07:41)  T(F): 97.8 (02 Oct 2017 07:41), Max: 97.8 (02 Oct 2017 07:41)  HR: 99 (02 Oct 2017 07:41) (99 - 108)  BP: 111/72 (02 Oct 2017 07:41) (111/72 - 124/73)  BP(mean): --  RR: 20 (02 Oct 2017 07:41) (18 - 20)  SpO2: 97% (02 Oct 2017 07:41) (96% - 97%)    GENERAL: NAD, AAOx2  HEAD:  Atraumatic, Normocephalic  EYES: EOMI  BUCCAL: dry mucosa  CHEST/LUNG: decreased BS, coarse  HEART: Regular rate and rhythm; + murmur  ABDOMEN: Soft, Nontender, Nondistended; Bowel sounds present  : chronic tavarez  EXTREMITIES:  2+ Peripheral Pulses, + 2 pitting edema both LE  SKIN: No rashes or lesions  NEURO: No focal deficits, confused at times    LABS:                        11.9   6.31  )-----------( 99       ( 02 Oct 2017 09:13 )             36.8     10-02    148<H>  |  117<H>  |  35<H>  ----------------------------<  134<H>  4.3   |  25  |  0.85    Ca    8.8      02 Oct 2017 09:09  Phos  2.0     10-02  Mg     2.2     10-02        CAPILLARY BLOOD GLUCOSE  110 (02 Oct 2017 11:42)  123 (02 Oct 2017 07:51)  109 (01 Oct 2017 21:14)  135 (01 Oct 2017 17:11)

## 2017-10-02 NOTE — PROGRESS NOTE ADULT - SUBJECTIVE AND OBJECTIVE BOX
[facility]   [phone]  RENAL FOLLOW UP NOTE  --------------------------------------------------------------------------------  Chief Complaint:    24 hour events/subjective:    c/o edema c/o fatigue.     PAST HISTORY  --------------------------------------------------------------------------------  No significant changes to PMH, PSH, FHx, SHx, unless otherwise noted    ALLERGIES & MEDICATIONS  --------------------------------------------------------------------------------  Allergies    latex (Rash; Blisters)  penicillins (Swelling)  sulfa drugs (Unknown)    Intolerances      Standing Inpatient Medications  finasteride 5 milliGRAM(s) Oral daily  pantoprazole    Tablet 40 milliGRAM(s) Oral before breakfast  senna 2 Tablet(s) Oral at bedtime  influenza   Vaccine 0.5 milliLiter(s) IntraMuscular once  allopurinol 300 milliGRAM(s) Oral daily  enoxaparin Injectable 90 milliGRAM(s) SubCutaneous every 12 hours  sodium chloride 0.9%. 1000 milliLiter(s) IV Continuous <Continuous>  vinCRIStine IVPB 2 milliGRAM(s) IV Intermittent once  cyclophosphamide IVPB 1344 milliGRAM(s) IV Intermittent once  sodium chloride 0.9%. 1000 milliLiter(s) IV Continuous <Continuous>  nystatin Powder 1 Application(s) Topical three times a day  dextrose 5%. 1000 milliLiter(s) IV Continuous <Continuous>    PRN Inpatient Medications  ondansetron Injectable 8 milliGRAM(s) IV Push every 8 hours PRN      REVIEW OF SYSTEMS  --------------------------------------------------------------------------------    Gen: denies fatigue, fevers/chills,  CVS: denies chest pain/palpitations  Resp: denies SOB/Cough  GI: Denies N/V/Abd pain  : Denies dysuria has tavarez    All other systems were reviewed and are negative, except as noted.    VITALS/PHYSICAL EXAM  --------------------------------------------------------------------------------  T(C): 36.6 (10-02-17 @ 07:41), Max: 36.6 (10-02-17 @ 07:41)  HR: 99 (10-02-17 @ 07:41) (99 - 108)  BP: 111/72 (10-02-17 @ 07:41) (111/72 - 124/69)  RR: 20 (10-02-17 @ 07:41) (18 - 20)  SpO2: 97% (10-02-17 @ 07:41) (96% - 97%)  Wt(kg): --        10-01-17 @ 07:01  -  10-02-17 @ 07:00  --------------------------------------------------------  IN: 990 mL / OUT: 850 mL / NET: 140 mL    10-02-17 @ 07:01  -  10-02-17 @ 18:29  --------------------------------------------------------  IN: 380 mL / OUT: 750 mL / NET: -370 mL      Physical Exam:  	    Physical Exam:  	Gen: communicative thin   	Pulm: Decreased breath sounds b/l bases. no rales or ronchi or wheezing  	CV: RRR, S1/S2. no rub  	Back: No CVA tenderness; no sacral edema  	Abd: +BS, soft, nontender/nondistended  	: No suprapubic tenderness.               Extremity: No cyanosis, 3+  edema no clubbing  	LABS/STUDIES  --------------------------------------------------------------------------------              11.9   6.31  >-----------<  99       [10-02-17 @ 09:13]              36.8     148  |  117  |  35  ----------------------------<  134      [10-02-17 @ 09:09]  4.3   |  25  |  0.85        Ca     8.8     [10-02-17 @ 09:09]      Mg     2.2     [10-02-17 @ 09:09]      Phos  2.0     [10-02-17 @ 09:09]          Uric acid 5.0      [10-02-17 @ 09:09]    Creatinine Trend:  SCr 0.85 [10-02 @ 09:09]  SCr 1.00 [10-01 @ 11:31]  SCr 1.05 [09-30 @ 06:53]  SCr 1.16 [09-29 @ 09:01]  SCr 1.25 [09-28 @ 09:34]              Urinalysis - [09-20-17 @ 14:37]      Color Yellow / Appearance  / SG 1.018 / pH 6.0      Gluc Negative / Ketone Negative  / Bili Negative / Urobili Negative       Blood Moderate / Protein 30 / Leuk Est Moderate / Nitrite Negative      RBC >50 / WBC >50 / Hyaline  / Gran  / Sq Epi  / Non Sq Epi  / Bacteria Moderate      TSH 1.08      [10-01-17 @ 11:31]    Syphilis Screen (Treponema Pallidum Ab) Negative      [10-01-17 @ 11:31]

## 2017-10-02 NOTE — PROGRESS NOTE ADULT - SUBJECTIVE AND OBJECTIVE BOX
**********              CARDIOLOGY CONSULT NOTE              ************  ============================================================  CHIEF COMPLAINT/REASON FOR CONSULT:  Patient is a 84y old  Male who presents with a chief complaint of hypotension (21 Sep 2017 12:22)      HISTORY OF PRESENT ILLNESS:  84yMale with a history of DLBCL,biliary stenosis s/p CBD stent, mejias's esophagus, BPH w/ urinary retention s/p chronic tavarez placement; consult for abn EKG - on further examination demonstrated to be normal; no tropinin elevation, no ischemia; active CV includes DVT    ============  24 Hour Events  - no sig changes  - stable hemodynamically   ============    Allergies    latex (Rash; Blisters)  penicillins (Swelling)  sulfa drugs (Unknown)    Intolerances    	    MEDICATIONS:  MEDICATIONS  (STANDING):  finasteride 5 milliGRAM(s) Oral daily  pantoprazole    Tablet 40 milliGRAM(s) Oral before breakfast  senna 2 Tablet(s) Oral at bedtime  influenza   Vaccine 0.5 milliLiter(s) IntraMuscular once  allopurinol 300 milliGRAM(s) Oral daily  enoxaparin Injectable 90 milliGRAM(s) SubCutaneous every 12 hours  sodium chloride 0.9%. 1000 milliLiter(s) (75 mL/Hr) IV Continuous <Continuous>  vinCRIStine IVPB 2 milliGRAM(s) IV Intermittent once  cyclophosphamide IVPB 1344 milliGRAM(s) IV Intermittent once  sodium chloride 0.9%. 1000 milliLiter(s) (250 mL/Hr) IV Continuous <Continuous>  nystatin Powder 1 Application(s) Topical three times a day  dextrose 5%. 1000 milliLiter(s) (55 mL/Hr) IV Continuous <Continuous>        PAST MEDICAL & SURGICAL HISTORY:  Diffuse large B-cell lymphoma of intra-abdominal lymph nodes  Mejias esophagus  Intestinal infection: 2012  Bladder polyp  H/O inguinal hernia repair  S/P TURP (transurethral resection of prostate)  Bladder polyp: s/p TURBT  History of cholecystectomy: 1987      FAMILY HISTORY:  No pertinent family history in first degree relatives      SOCIAL HISTORY:    [ x] Non-smoker  [x] No Illicit Drug Use  [ x] No Excess EtOH Use    Vital Signs Last 24 Hrs  T(C): 36.6 (29 Sep 2017 07:30), Max: 36.6 (28 Sep 2017 16:03)  T(F): 97.9 (29 Sep 2017 07:30), Max: 97.9 (28 Sep 2017 16:03)  HR: 103 (29 Sep 2017 07:30) (103 - 109)  BP: 112/70 (29 Sep 2017 07:30) (105/73 - 112/70)  BP(mean): --  RR: 16 (29 Sep 2017 07:30) (16 - 18)  SpO2: 98% (29 Sep 2017 07:30) (97% - 100%)      LABS:	   Labs Reviewed 10-    CBC Full  -  ( 29 Sep 2017 09:02 )  WBC Count : 13.52 K/uL  Hemoglobin : 12.9 g/dL  Hematocrit : 39.2 %  Platelet Count - Automated : 138 K/uL  Mean Cell Volume : 101.8 fl  Mean Cell Hemoglobin : 33.5 pg  Mean Cell Hemoglobin Concentration : 32.9 gm/dL  Auto Neutrophil # : x  Auto Lymphocyte # : x  Auto Monocyte # : x  Auto Eosinophil # : x  Auto Basophil # : x  Auto Neutrophil % : x  Auto Lymphocyte % : x  Auto Monocyte % : x  Auto Eosinophil % : x  Auto Basophil % : x    09-29    148<H>  |  114<H>  |  52<H>  ----------------------------<  110<H>  5.4<H>   |  24  |  1.16  09-28    145  |  110<H>  |  53<H>  ----------------------------<  109<H>  4.7   |  21<L>  |  1.25    Ca    9.2      29 Sep 2017 09:01  Ca    9.3      28 Sep 2017 09:34  Phos  2.8     09-29  Mg     2.5     09-29      =========================================================================  ASSESSMENT:  84yMale with a history of DLBCL,biliary stenosis s/p CBD stent, mejias's esophagus, BPH w/ urinary retention s/p chronic tavarez placement; consult for abn EKG - on further examination demonstrated to be normal; no tropinin elevation, no ischemia; active CV includes DVT      Recommendations  - Continue Current Management   - Monitor Plt Count with Lovenox as it continues to drop   - If c/f ischemia, obtain ecg,echo,enzymes      Please call with questions.     Paramjit Flower MD, FACC  [phone]

## 2017-10-02 NOTE — PROGRESS NOTE ADULT - ASSESSMENT
84M c hx DLBCL not yet on chemo, biliary stenosis s/p cbd stent, mejias's esophagus, BPH c/b urinary retention s/p chronic tavarez (started 9/5/17), recent discharge (9/15) pw hypotension from rehab. with jasmin with hypercalcemia and hyperkalemia as well as pna and recent dx of DLBCL. he is s/p aredia 90 mg     1- hypernatremia  2- aspiration /dysphagia    d5w @ 55 cc/hr.   ?? diuretics unclear if his bp will tolerate.   check alb  calcium better.   chemo per onc.   continue allopurinol 300 mg qd  cont with abx

## 2017-10-02 NOTE — PROGRESS NOTE ADULT - PROBLEM SELECTOR PLAN 3
Secondary to malignancy and stasis.   - Continue full dose anticoagulation  - Will follow    Mike Claudio MD  [facility]   [address] [place] [address] [address]  [place] [address]  (T) [phone]  (F) [phone]

## 2017-10-02 NOTE — PROGRESS NOTE ADULT - PROBLEM SELECTOR PLAN 2
Newly diagnosed DLBCL. Not yet to start treatment  - s/p Vincristine and Cyclophosphamide. Tolerated  - Less confused  - Will discuss with Dr. Miller about treating with Rituximab tomorrow/Wednesday

## 2017-10-02 NOTE — PROGRESS NOTE ADULT - ASSESSMENT
84 year old man with DLBCL which is yet untreated admitted with confusion and weakness. Found to have hypercalcemia of malignancy and pneumonia. Currently on ABx. S/p Pamidronate. Calcium normalizing. Improving in confusion.

## 2017-10-02 NOTE — PROGRESS NOTE ADULT - PROBLEM SELECTOR PLAN 5
appreciate oncology recs  cont allopurinol  check Mg, PO4, uric acid daily  first dose vincristine and cyclophosphamide 9/27/17  next rituximab next week likely

## 2017-10-02 NOTE — PROGRESS NOTE ADULT - SUBJECTIVE AND OBJECTIVE BOX
Subjective  Patient seen in the morning. Doing well. No complaints. Less confused this morning    Admission History:  84 year old man with recent diagnosis of DLBCL, biliary stenosis s/p CBD stent, mejias's esophagus, BPH w/ urinary retention s/p chronic tavarez placement admitted with weakness and confusion. Patient reports having felt weak since admission to [email or web address]. Patient been unable to stand and at times is dizzy. No chest pain. No nausea or vomiting. Patient has been more confused than usual. No fevers or chills.      ROS:  General:  (-)Pain, (-)Decrease appetite, (-)Fevers, (-)Chills, (-)Weight Loss  Eyes: (-)Blurry Vision, (-)Double Vision, (-)Vision Loss  ENT: (-)Sinus Congestion, (-)Decreased Hearing, (-)Nosebleeds, (-)Sore Throat  Cardiac: (-)Chest Pain, (-)Palpitations, (+)Shortness of breathe on exertion  Respiratory: (-)Cough, (-)hemoptysis, (-)Shortness of breathe  GI: (-)Nausea, (-)Vomiting, (-)Diarrhea, (-)Constipation, (-)Melena, (-)BRBPR  : (-)Hematuria, (-)Dysuria, (-)Polyuia  MSK: (-)Back pain, (-)Joint pain, (-)Stiffness  Dermatology: (-)Rash, (-)Itching  Neurology:  (-)Numbness, (-)Tingling, (-)Difficulty Walking, (-)Tremors, (+)Weakness (-)Confusion  Psych: (-)Anxiety, (-)Depression, (-)Memory Loss  Hematology:  (-)Easy Bruising, (-)Easy Bleeding, (-)Night Sweats.     Vital Signs Last 24 Hrs  T(C): 36.6 (02 Oct 2017 07:41), Max: 36.6 (02 Oct 2017 07:41)  T(F): 97.8 (02 Oct 2017 07:41), Max: 97.8 (02 Oct 2017 07:41)  HR: 99 (02 Oct 2017 07:41) (99 - 108)  BP: 111/72 (02 Oct 2017 07:41) (111/72 - 124/73)  RR: 20 (02 Oct 2017 07:41) (18 - 20)  SpO2: 97% (02 Oct 2017 07:41) (96% - 97%)    Objective  Physical Exam:  General: AAO x 2. NAD. Lying in bed comfortably. Still confused  HEENT: clear oropharynx, anicteric sclera, pink conjunctivae, EOMi. PERRLA  Cardiac: S1, S2 present. No audible murmurs. RRR  Lungs: Decreased breathe sounds.   Abdomen: Soft, Non-Tender, Non-Distended. No palpable hepatosplenomegaly.  Extremities: 2+ pulses. Edema to the knees  Skin: No Rashes. No petechiae  Neuro: No focal motor or sensory deficits.     Labs:  10-01    153<H>  |  120<H>  |  42<H>  ----------------------------<  112<H>  4.0   |  23  |  1.00    Ca    9.4      01 Oct 2017 11:31    Radiology:  CT Chest/Abdomen/Pelvis:  IMPRESSION:   Left lower lobe pneumonia.  Bulky confluent upper abdominal and retroperitoneal adenopathy as   described, consistent with lymphoma.     CT Head;  IMPRESSION: Unremarkable noncontrast CT of the brain.    V/Q Scan   IMPRESSION: Very low probability of pulmonary embolism      US LE  IMPRESSION: Acute, above the knee free floating DVT in the left common   femoral vein. LINK Tavera was informed of the findings at 1:20 PM on   9/22/2017.      Pathology:  Surgical Pathology Final Report - :   ACCESSION No:  [id number]    Final Diagnosis  Lymph node, periportal, biopsy  Diffuse large B-cell lymphoma, non-germinal center type

## 2017-10-02 NOTE — PROGRESS NOTE ADULT - SUBJECTIVE AND OBJECTIVE BOX
Follow-up Pulm Progress Note  Mathew Glass MD  [phone]    Notes reviewed  Breathing comfortably supine; without significant cough  On full dose lovenox for LE common femoral DVT   Afebrile  off antibiotics - s/p full course for aspiration pneumonia  No respiratory complaints    Vital Signs Last 24 Hrs  T(C): 36.6 (02 Oct 2017 07:41), Max: 36.6 (02 Oct 2017 07:41)  T(F): 97.8 (02 Oct 2017 07:41), Max: 97.8 (02 Oct 2017 07:41)  HR: 99 (02 Oct 2017 07:41) (99 - 108)  BP: 111/72 (02 Oct 2017 07:41) (111/72 - 124/73)  BP(mean): --  RR: 20 (02 Oct 2017 07:41) (18 - 20)  SpO2: 97% (02 Oct 2017 07:41) (96% - 97%)                          11.9   6.31  )-----------( x        ( 02 Oct 2017 09:13 )             36.8       10-02    148<H>  |  117<H>  |  35<H>  ----------------------------<  134<H>  4.3   |  25  |  0.85    Ca    8.8      02 Oct 2017 09:09  Phos  2.0     10-02  Mg     2.2     10-02        Physical Examination:  PULM: Decr BS L base; without wheeze: no change  CVS: Regular rate and rhythm, no murmurs, rubs, or gallops  ABD: Soft, non-tender  EXT:  Decr in LE edema  RADIOLOGY REVIEWED  CXR:    CT chest:    TTE:

## 2017-10-03 LAB
ALBUMIN SERPL ELPH-MCNC: 2.3 G/DL — LOW (ref 3.3–5)
ALP SERPL-CCNC: 58 U/L — SIGNIFICANT CHANGE UP (ref 40–120)
ALT FLD-CCNC: 31 U/L RC — SIGNIFICANT CHANGE UP (ref 10–45)
ANION GAP SERPL CALC-SCNC: 8 MMOL/L — SIGNIFICANT CHANGE UP (ref 5–17)
AST SERPL-CCNC: 28 U/L — SIGNIFICANT CHANGE UP (ref 10–40)
BILIRUB SERPL-MCNC: 1.1 MG/DL — SIGNIFICANT CHANGE UP (ref 0.2–1.2)
BUN SERPL-MCNC: 30 MG/DL — HIGH (ref 7–23)
CALCIUM SERPL-MCNC: 8.7 MG/DL — SIGNIFICANT CHANGE UP (ref 8.4–10.5)
CHLORIDE SERPL-SCNC: 119 MMOL/L — HIGH (ref 96–108)
CO2 SERPL-SCNC: 25 MMOL/L — SIGNIFICANT CHANGE UP (ref 22–31)
CREAT SERPL-MCNC: 0.85 MG/DL — SIGNIFICANT CHANGE UP (ref 0.5–1.3)
GLUCOSE SERPL-MCNC: 107 MG/DL — HIGH (ref 70–99)
HCT VFR BLD CALC: 38.3 % — LOW (ref 39–50)
HGB BLD-MCNC: 12.4 G/DL — LOW (ref 13–17)
MCHC RBC-ENTMCNC: 32.5 GM/DL — SIGNIFICANT CHANGE UP (ref 32–36)
MCHC RBC-ENTMCNC: 34.9 PG — HIGH (ref 27–34)
MCV RBC AUTO: 108 FL — HIGH (ref 80–100)
PLATELET # BLD AUTO: 82 K/UL — LOW (ref 150–400)
POTASSIUM SERPL-MCNC: 3.9 MMOL/L — SIGNIFICANT CHANGE UP (ref 3.5–5.3)
POTASSIUM SERPL-SCNC: 3.9 MMOL/L — SIGNIFICANT CHANGE UP (ref 3.5–5.3)
PROT SERPL-MCNC: 4.3 G/DL — LOW (ref 6–8.3)
RBC # BLD: 3.56 M/UL — LOW (ref 4.2–5.8)
RBC # FLD: 13.7 % — SIGNIFICANT CHANGE UP (ref 10.3–14.5)
SODIUM SERPL-SCNC: 152 MMOL/L — HIGH (ref 135–145)
WBC # BLD: 6.1 K/UL — SIGNIFICANT CHANGE UP (ref 3.8–10.5)
WBC # FLD AUTO: 6.1 K/UL — SIGNIFICANT CHANGE UP (ref 3.8–10.5)

## 2017-10-03 PROCEDURE — 99233 SBSQ HOSP IP/OBS HIGH 50: CPT

## 2017-10-03 RX ORDER — SODIUM CHLORIDE 9 MG/ML
1000 INJECTION, SOLUTION INTRAVENOUS
Qty: 0 | Refills: 0 | Status: DISCONTINUED | OUTPATIENT
Start: 2017-10-03 | End: 2017-10-06

## 2017-10-03 RX ADMIN — NYSTATIN CREAM 1 APPLICATION(S): 100000 CREAM TOPICAL at 21:05

## 2017-10-03 RX ADMIN — ENOXAPARIN SODIUM 90 MILLIGRAM(S): 100 INJECTION SUBCUTANEOUS at 07:15

## 2017-10-03 RX ADMIN — Medication 300 MILLIGRAM(S): at 13:13

## 2017-10-03 RX ADMIN — NYSTATIN CREAM 1 APPLICATION(S): 100000 CREAM TOPICAL at 13:13

## 2017-10-03 RX ADMIN — NYSTATIN CREAM 1 APPLICATION(S): 100000 CREAM TOPICAL at 05:13

## 2017-10-03 RX ADMIN — ENOXAPARIN SODIUM 90 MILLIGRAM(S): 100 INJECTION SUBCUTANEOUS at 17:21

## 2017-10-03 RX ADMIN — SENNA PLUS 2 TABLET(S): 8.6 TABLET ORAL at 21:05

## 2017-10-03 RX ADMIN — FINASTERIDE 5 MILLIGRAM(S): 5 TABLET, FILM COATED ORAL at 13:13

## 2017-10-03 NOTE — PROGRESS NOTE ADULT - ASSESSMENT
84 year old man with DLBCL which is yet untreated admitted with confusion and weakness. Found to have hypercalcemia of malignancy and pneumonia. Currently on ABx. S/p Pamidronate. Calcium normalizing. Confusion appears to be diminishing

## 2017-10-03 NOTE — PROGRESS NOTE ADULT - PROBLEM SELECTOR PLAN 2
Newly diagnosed DLBCL. Not yet to start treatment  -Discussed case with Dr. Miller and also spoke with wife. Will plan to give R-CHOP next week to the patient instead of just rituximab this week  - Needs Echocardiogram.  - Will plan for R-CHOP

## 2017-10-03 NOTE — PROGRESS NOTE ADULT - SUBJECTIVE AND OBJECTIVE BOX
No acute SOB or CP    Vital Signs Last 24 Hrs  T(C): 36.5 (03 Oct 2017 07:45), Max: 36.5 (02 Oct 2017 21:29)  T(F): 97.7 (03 Oct 2017 07:45), Max: 97.7 (02 Oct 2017 21:29)  HR: 104 (03 Oct 2017 07:45) (103 - 104)  BP: 104/65 (03 Oct 2017 07:45) (104/65 - 120/68)  BP(mean): --  RR: 20 (03 Oct 2017 07:45) (20 - 20)  SpO2: 95% (03 Oct 2017 07:45) (95% - 96%)    GENERAL: NAD, AAOx2  HEAD:  Atraumatic, Normocephalic  EYES: EOMI  BUCCAL: dry mucosa  CHEST/LUNG: decreased BS, coarse  HEART: Regular rate and rhythm; + murmur  ABDOMEN: Soft, Nontender, Nondistended; Bowel sounds present  : chronic tavarez  EXTREMITIES:  + 2 pitting edema both LE, hard to palpate Dps 2' edema  SKIN: No rashes or lesions  NEURO: No focal deficits, confused at times    LABS:                        12.4   6.1   )-----------( 82       ( 03 Oct 2017 09:52 )             38.3     10-03    152<H>  |  119<H>  |  30<H>  ----------------------------<  107<H>  3.9   |  25  |  0.85    Ca    8.7      03 Oct 2017 09:52  Phos  2.0     10-02  Mg     2.2     10-02    TPro  4.3<L>  /  Alb  2.3<L>  /  TBili  1.1  /  DBili  x   /  AST  28  /  ALT  31  /  AlkPhos  58  10-03      CAPILLARY BLOOD GLUCOSE  96 (03 Oct 2017 11:55)  93 (03 Oct 2017 08:23)  108 (02 Oct 2017 21:29)  103 (02 Oct 2017 17:36)

## 2017-10-03 NOTE — PROGRESS NOTE ADULT - PROBLEM SELECTOR PLAN 3
Secondary to malignancy and stasis.   - Continue full dose anticoagulation  - Will follow    Mike Claudio MD  [facility] - [facility] Division of [facility]   [address] [place] [address] [address]  [place] [address]  (T) [phone]  (F) [phone]

## 2017-10-03 NOTE — PROGRESS NOTE ADULT - SUBJECTIVE AND OBJECTIVE BOX
**********              CARDIOLOGY CONSULT NOTE              ************  ============================================================  CHIEF COMPLAINT/REASON FOR CONSULT:  Patient is a 84y old  Male who presents with a chief complaint of hypotension (21 Sep 2017 12:22)      HISTORY OF PRESENT ILLNESS:  84yMale with a history of DLBCL,biliary stenosis s/p CBD stent, mejias's esophagus, BPH w/ urinary retention s/p chronic tavarez placement; consult for abn EKG - on further examination demonstrated to be normal; no tropinin elevation, no ischemia; active CV includes DVT and LE edema, which may be related to hypoalbuminemia    ============  24 Hour Events  - no sig changes  - stable hemodynamically   - still w LE edema and hypernatremia  ============    Allergies    latex (Rash; Blisters)  penicillins (Swelling)  sulfa drugs (Unknown)    Intolerances    	    MEDICATIONS:  MEDICATIONS  (STANDING):  allopurinol 300 milliGRAM(s) Oral daily  cyclophosphamide IVPB 1344 milliGRAM(s) IV Intermittent once  dextrose 5%. 1000 milliLiter(s) (55 mL/Hr) IV Continuous <Continuous>  enoxaparin Injectable 90 milliGRAM(s) SubCutaneous every 12 hours  finasteride 5 milliGRAM(s) Oral daily  influenza   Vaccine 0.5 milliLiter(s) IntraMuscular once  nystatin Powder 1 Application(s) Topical three times a day  pantoprazole    Tablet 40 milliGRAM(s) Oral before breakfast  senna 2 Tablet(s) Oral at bedtime  sodium chloride 0.9%. 1000 milliLiter(s) (250 mL/Hr) IV Continuous <Continuous>  sodium chloride 0.9%. 1000 milliLiter(s) (75 mL/Hr) IV Continuous <Continuous>  vinCRIStine IVPB 2 milliGRAM(s) IV Intermittent once          PAST MEDICAL & SURGICAL HISTORY:  Diffuse large B-cell lymphoma of intra-abdominal lymph nodes  Mejias esophagus  Intestinal infection: 2012  Bladder polyp  H/O inguinal hernia repair  S/P TURP (transurethral resection of prostate)  Bladder polyp: s/p TURBT  History of cholecystectomy: 1987      FAMILY HISTORY:  No pertinent family history in first degree relatives      SOCIAL HISTORY:    [ x] Non-smoker  [x] No Illicit Drug Use  [ x] No Excess EtOH Use    Vital Signs Last 24 Hrs  T(C): 36.5 (03 Oct 2017 07:45), Max: 36.5 (02 Oct 2017 21:29)  T(F): 97.7 (03 Oct 2017 07:45), Max: 97.7 (02 Oct 2017 21:29)  HR: 104 (03 Oct 2017 07:45) (103 - 104)  BP: 104/65 (03 Oct 2017 07:45) (104/65 - 120/68)  BP(mean): --  RR: 20 (03 Oct 2017 07:45) (20 - 20)  SpO2: 95% (03 Oct 2017 07:45) (95% - 96%)Vital Signs Last 24 Hrs  T(C): 36.6 (29 Sep 2017 07:30), Max: 36.6 (28 Sep 2017 16:03)  T(F): 97.9 (29 Sep 2017 07:30), Max: 97.9 (28 Sep 2017 16:03)  HR: 103 (29 Sep 2017 07:30) (103 - 109)  BP: 112/70 (29 Sep 2017 07:30) (105/73 - 112/70)  BP(mean): --  RR: 16 (29 Sep 2017 07:30) (16 - 18)  SpO2: 98% (29 Sep 2017 07:30) (97% - 100%)  Appearance: Frail; NAD	  HEENT:   NCAT, EOMI	  Cardiovascular: Normal S1 S2, No JVD, No murmurs, No edema  Respiratory: Lungs clear to auscultation, no use of accessory muscles	  Psychiatry: A & O x 3, Mood & affect appropriate  Gastrointestinal:  Soft, Non-tender	  Skin: No rashes, No ecchymoses, No cyanosis	  Neurologic: Non-focal, no speech abnormalities  Extremities: Normal range of motion, No clubbing, cyanosis, 2+ edema b/l  Vascular: Peripheral pulses palpable 2+ bilaterally, no prominent varicosities        LABS:	   Labs Reviewed 10-    CBC Full  -  ( 29 Sep 2017 09:02 )  WBC Count : 13.52 K/uL  Hemoglobin : 12.9 g/dL  Hematocrit : 39.2 %  Platelet Count - Automated : 138 K/uL  Mean Cell Volume : 101.8 fl  Mean Cell Hemoglobin : 33.5 pg  Mean Cell Hemoglobin Concentration : 32.9 gm/dL  Auto Neutrophil # : x  Auto Lymphocyte # : x  Auto Monocyte # : x  Auto Eosinophil # : x  Auto Basophil # : x  Auto Neutrophil % : x  Auto Lymphocyte % : x  Auto Monocyte % : x  Auto Eosinophil % : x  Auto Basophil % : x    09-29    148<H>  |  114<H>  |  52<H>  ----------------------------<  110<H>  5.4<H>   |  24  |  1.16  09-28    145  |  110<H>  |  53<H>  ----------------------------<  109<H>  4.7   |  21<L>  |  1.25    Ca    9.2      29 Sep 2017 09:01  Ca    9.3      28 Sep 2017 09:34  Phos  2.8     09-29  Mg     2.5     09-29      =========================================================================  ASSESSMENT:  84yMale with a history of DLBCL,biliary stenosis s/p CBD stent, mejias's esophagus, BPH w/ urinary retention s/p chronic tavarez placement; consult for abn EKG - on further examination demonstrated to be normal; no tropinin elevation, no ischemia; active CV includes DVT; volume overload       Recommendations  - Given hypernatremia and hypoalbuminemia, would discuss with renal whether diuretics needed for LE edema  - Monitor Plt Count with Lovenox as it continues to drop   - If c/f ischemia, obtain ecg,echo,enzymes      Please call with questions.     Paramjit Flower MD, [facility]  [phone]

## 2017-10-03 NOTE — PROGRESS NOTE ADULT - PROBLEM SELECTOR PLAN 5
appreciate oncology recs  cont allopurinol  check Mg, PO4, uric acid daily  first dose vincristine and cyclophosphamide 9/27/17  next rituximab next week likely appreciate oncology recs  cont allopurinol  check Mg, PO4, uric acid daily  first dose vincristine and cyclophosphamide 9/27/17  R-CHOP next week likely

## 2017-10-04 LAB
ANION GAP SERPL CALC-SCNC: 8 MMOL/L — SIGNIFICANT CHANGE UP (ref 5–17)
BUN SERPL-MCNC: 22 MG/DL — SIGNIFICANT CHANGE UP (ref 7–23)
CALCIUM SERPL-MCNC: 8.2 MG/DL — LOW (ref 8.4–10.5)
CHLORIDE SERPL-SCNC: 118 MMOL/L — HIGH (ref 96–108)
CO2 SERPL-SCNC: 24 MMOL/L — SIGNIFICANT CHANGE UP (ref 22–31)
CREAT SERPL-MCNC: 0.84 MG/DL — SIGNIFICANT CHANGE UP (ref 0.5–1.3)
GLUCOSE SERPL-MCNC: 124 MG/DL — HIGH (ref 70–99)
HCT VFR BLD CALC: 34.1 % — LOW (ref 39–50)
HCT VFR BLD CALC: 35 % — LOW (ref 39–50)
HGB BLD-MCNC: 11.2 G/DL — LOW (ref 13–17)
HGB BLD-MCNC: 11.7 G/DL — LOW (ref 13–17)
MAGNESIUM SERPL-MCNC: 1.9 MG/DL — SIGNIFICANT CHANGE UP (ref 1.6–2.6)
MCHC RBC-ENTMCNC: 32.8 GM/DL — SIGNIFICANT CHANGE UP (ref 32–36)
MCHC RBC-ENTMCNC: 33.3 GM/DL — SIGNIFICANT CHANGE UP (ref 32–36)
MCHC RBC-ENTMCNC: 33.3 PG — SIGNIFICANT CHANGE UP (ref 27–34)
MCHC RBC-ENTMCNC: 35.7 PG — HIGH (ref 27–34)
MCV RBC AUTO: 101.5 FL — HIGH (ref 80–100)
MCV RBC AUTO: 107 FL — HIGH (ref 80–100)
PHOSPHATE SERPL-MCNC: 1.3 MG/DL — LOW (ref 2.5–4.5)
PLATELET # BLD AUTO: 70 K/UL — LOW (ref 150–400)
PLATELET # BLD AUTO: 73 K/UL — LOW (ref 150–400)
POTASSIUM SERPL-MCNC: 4 MMOL/L — SIGNIFICANT CHANGE UP (ref 3.5–5.3)
POTASSIUM SERPL-SCNC: 4 MMOL/L — SIGNIFICANT CHANGE UP (ref 3.5–5.3)
RBC # BLD: 3.27 M/UL — LOW (ref 4.2–5.8)
RBC # BLD: 3.36 M/UL — LOW (ref 4.2–5.8)
RBC # FLD: 13.5 % — SIGNIFICANT CHANGE UP (ref 10.3–14.5)
RBC # FLD: 15.5 % — HIGH (ref 10.3–14.5)
SODIUM SERPL-SCNC: 150 MMOL/L — HIGH (ref 135–145)
URATE SERPL-MCNC: 4.1 MG/DL — SIGNIFICANT CHANGE UP (ref 3.4–8.8)
WBC # BLD: 2.4 K/UL — LOW (ref 3.8–10.5)
WBC # BLD: 2.51 K/UL — LOW (ref 3.8–10.5)
WBC # FLD AUTO: 2.4 K/UL — LOW (ref 3.8–10.5)
WBC # FLD AUTO: 2.51 K/UL — LOW (ref 3.8–10.5)

## 2017-10-04 PROCEDURE — 99233 SBSQ HOSP IP/OBS HIGH 50: CPT

## 2017-10-04 PROCEDURE — 93306 TTE W/DOPPLER COMPLETE: CPT | Mod: 26

## 2017-10-04 RX ADMIN — NYSTATIN CREAM 1 APPLICATION(S): 100000 CREAM TOPICAL at 12:14

## 2017-10-04 RX ADMIN — Medication 300 MILLIGRAM(S): at 12:15

## 2017-10-04 RX ADMIN — FINASTERIDE 5 MILLIGRAM(S): 5 TABLET, FILM COATED ORAL at 12:15

## 2017-10-04 RX ADMIN — ENOXAPARIN SODIUM 90 MILLIGRAM(S): 100 INJECTION SUBCUTANEOUS at 17:34

## 2017-10-04 RX ADMIN — ENOXAPARIN SODIUM 90 MILLIGRAM(S): 100 INJECTION SUBCUTANEOUS at 05:07

## 2017-10-04 RX ADMIN — NYSTATIN CREAM 1 APPLICATION(S): 100000 CREAM TOPICAL at 05:07

## 2017-10-04 RX ADMIN — SODIUM CHLORIDE 65 MILLILITER(S): 9 INJECTION, SOLUTION INTRAVENOUS at 09:27

## 2017-10-04 RX ADMIN — SENNA PLUS 2 TABLET(S): 8.6 TABLET ORAL at 21:30

## 2017-10-04 RX ADMIN — NYSTATIN CREAM 1 APPLICATION(S): 100000 CREAM TOPICAL at 21:30

## 2017-10-04 RX ADMIN — PANTOPRAZOLE SODIUM 40 MILLIGRAM(S): 20 TABLET, DELAYED RELEASE ORAL at 05:08

## 2017-10-04 NOTE — PROGRESS NOTE ADULT - SUBJECTIVE AND OBJECTIVE BOX
Seems a bit less confused this week.    Vital Signs Last 24 Hrs  T(C): 36.5 (04 Oct 2017 08:29), Max: 36.8 (03 Oct 2017 22:13)  T(F): 97.7 (04 Oct 2017 08:29), Max: 98.3 (03 Oct 2017 22:13)  HR: 98 (04 Oct 2017 08:29) (98 - 107)  BP: 102/62 (04 Oct 2017 08:29) (102/62 - 121/69)  BP(mean): --  RR: 18 (04 Oct 2017 08:29) (16 - 18)  SpO2: 98% (04 Oct 2017 08:29) (95% - 99%)    GENERAL: NAD, AAOx2  HEAD:  NCAT  EYES: EOMI  CHEST/LUNG: decreased BS at bases  HEART: Regular rate and rhythm; + murmur  ABDOMEN: Soft, Nontender, Nondistended; Bowel sounds present  : chronic tavarez  EXTREMITIES:  + 2 pitting edema both LE, hard to palpate Dps 2' edema  SKIN: No rashes or lesions  NEURO: No focal deficits    LABS:                        11.2   x     )-----------( 73       ( 04 Oct 2017 09:00 )             34.1     10-04    150<H>  |  118<H>  |  22  ----------------------------<  124<H>  4.0   |  24  |  0.84    Ca    8.2<L>      04 Oct 2017 09:00  Phos  1.3     10-04  Mg     1.9     10-04    TPro  4.3<L>  /  Alb  2.3<L>  /  TBili  1.1  /  DBili  x   /  AST  28  /  ALT  31  /  AlkPhos  58  10-03      CAPILLARY BLOOD GLUCOSE  96 (03 Oct 2017 11:55)

## 2017-10-04 NOTE — PROGRESS NOTE ADULT - SUBJECTIVE AND OBJECTIVE BOX
**********              CARDIOLOGY CONSULT NOTE              ************  ============================================================  CHIEF COMPLAINT/REASON FOR CONSULT:  Patient is a 84y old  Male who presents with a chief complaint of hypotension (21 Sep 2017 12:22)      HISTORY OF PRESENT ILLNESS:  84yMale with a history of DLBCL,biliary stenosis s/p CBD stent, mejias's esophagus, BPH w/ urinary retention s/p chronic tavarez placement; consult for abn EKG - on further examination demonstrated to be normal; no tropinin elevation, no ischemia; active CV includes DVT and LE edema, which may be related to hypoalbuminemia    ============  24 Hour Events  - no sig changes  - stable hemodynamically   - still w LE edema and hypernatremia  - L pleural effusion on TTE otherwise unremarkable   ============    Allergies    latex (Rash; Blisters)  penicillins (Swelling)  sulfa drugs (Unknown)    Intolerances    	    MEDICATIONS:  MEDICATIONS  (STANDING):  allopurinol 300 milliGRAM(s) Oral daily  cyclophosphamide IVPB 1344 milliGRAM(s) IV Intermittent once  dextrose 5%. 1000 milliLiter(s) (65 mL/Hr) IV Continuous <Continuous>  enoxaparin Injectable 90 milliGRAM(s) SubCutaneous every 12 hours  finasteride 5 milliGRAM(s) Oral daily  influenza   Vaccine 0.5 milliLiter(s) IntraMuscular once  nystatin Powder 1 Application(s) Topical three times a day  pantoprazole    Tablet 40 milliGRAM(s) Oral before breakfast  senna 2 Tablet(s) Oral at bedtime  vinCRIStine IVPB 2 milliGRAM(s) IV Intermittent once            PAST MEDICAL & SURGICAL HISTORY:  Diffuse large B-cell lymphoma of intra-abdominal lymph nodes  Mejias esophagus  Intestinal infection: 2012  Bladder polyp  H/O inguinal hernia repair  S/P TURP (transurethral resection of prostate)  Bladder polyp: s/p TURBT  History of cholecystectomy: 1987      FAMILY HISTORY:  No pertinent family history in first degree relatives      SOCIAL HISTORY:    [ x] Non-smoker  [x] No Illicit Drug Use  [ x] No Excess EtOH Use    Vital Signs Last 24 Hrs  Vital Signs Last 24 Hrs  T(C): 36.6 (04 Oct 2017 15:35), Max: 36.8 (03 Oct 2017 22:13)  T(F): 97.8 (04 Oct 2017 15:35), Max: 98.3 (03 Oct 2017 22:13)  HR: 118 (04 Oct 2017 15:35) (98 - 118)  BP: 107/61 (04 Oct 2017 15:35) (102/62 - 121/69)  BP(mean): --  RR: 18 (04 Oct 2017 15:35) (16 - 18)  SpO2: 94% (04 Oct 2017 15:35) (94% - 99%)  Appearance: Frail; NAD	  HEENT:   NCAT, EOMI	  Cardiovascular: Normal S1 S2, No JVD, No murmurs, No edema  Respiratory: Lungs clear to auscultation, no use of accessory muscles	  Psychiatry: A & O x 3, Mood & affect appropriate  Gastrointestinal:  Soft, Non-tender	  Skin: No rashes, No ecchymoses, No cyanosis	  Neurologic: Non-focal, no speech abnormalities  Extremities: Normal range of motion, No clubbing, cyanosis, 2+ edema b/l  Vascular: Peripheral pulses palpable 2+ bilaterally, no prominent varicosities        LABS:	   Labs Reviewed 10-    CBC Full  -  ( 29 Sep 2017 09:02 )  WBC Count : 13.52 K/uL  Hemoglobin : 12.9 g/dL  Hematocrit : 39.2 %  Platelet Count - Automated : 138 K/uL  Mean Cell Volume : 101.8 fl  Mean Cell Hemoglobin : 33.5 pg  Mean Cell Hemoglobin Concentration : 32.9 gm/dL  Auto Neutrophil # : x  Auto Lymphocyte # : x  Auto Monocyte # : x  Auto Eosinophil # : x  Auto Basophil # : x  Auto Neutrophil % : x  Auto Lymphocyte % : x  Auto Monocyte % : x  Auto Eosinophil % : x  Auto Basophil % : x    09-29    148<H>  |  114<H>  |  52<H>  ----------------------------<  110<H>  5.4<H>   |  24  |  1.16  09-28    145  |  110<H>  |  53<H>  ----------------------------<  109<H>  4.7   |  21<L>  |  1.25    Ca    9.2      29 Sep 2017 09:01  Ca    9.3      28 Sep 2017 09:34  Phos  2.8     09-29  Mg     2.5     09-29      =========================================================================  ASSESSMENT:  84yMale with a history of DLBCL,biliary stenosis s/p CBD stent, mejias's esophagus, BPH w/ urinary retention s/p chronic tavarez placement; consult for abn EKG - on further examination demonstrated to be normal; no tropinin elevation, no ischemia; active CV includes DVT; volume overload       Recommendations  - Given hypernatremia and hypoalbuminemia, would discuss with renal whether diuretics needed for LE edema  - Monitor Plt Count with Lovenox as it continues to drop   - If c/f ischemia, obtain ecg,echo,enzymes      Please call with questions.     Paramjit Flower MD, FACC  [phone]

## 2017-10-04 NOTE — CHART NOTE - NSCHARTNOTEFT_GEN_A_CORE
Malnutrition follow up. Pt with newly diagnosed DLBCL S/P chemotherapy 9/27 now admitted with confusion and weakness found to have hypercalcemia related to malignancy-now improved and pneumonia (S/P antibiotics). Pt S/P swallow eval 9/21 and recommended NPO with non-oral nutrition/hydration, pt receiving dysphagia 2 diet with nectar thickened liquids.  pt c confusion- some improvement per team.     Source: Patient [x]    Family [x]-wife at bedside          Diet : dysphagia 2 c nectar consistency       Patient and spouse report [x] other: he is mostly picking at his meals, likes yogurt and applesauce, had been taking Nepro shakes, takes Prosource at times if he remembers or when spouse mixes it for him. Spouse reports pt is a "picky eater." Willing to increase Nepro shakes to 2 daily. Noted last BM 10/3.       Current Weight: admit: 216 pounds and 205 pounds-> 9/23: 206.3-> 10/4: 190 pounds, noted some change, would monitor       Pertinent Medications: MEDICATIONS  (STANDING):  allopurinol 300 milliGRAM(s) Oral daily  cyclophosphamide IVPB 1344 milliGRAM(s) IV Intermittent once  dextrose 5%. 1000 milliLiter(s) (65 mL/Hr) IV Continuous <Continuous>  enoxaparin Injectable 90 milliGRAM(s) SubCutaneous every 12 hours  finasteride 5 milliGRAM(s) Oral daily  influenza   Vaccine 0.5 milliLiter(s) IntraMuscular once  nystatin Powder 1 Application(s) Topical three times a day  pantoprazole    Tablet 40 milliGRAM(s) Oral before breakfast  senna 2 Tablet(s) Oral at bedtime  vinCRIStine IVPB 2 milliGRAM(s) IV Intermittent once    MEDICATIONS  (PRN):  ondansetron Injectable 8 milliGRAM(s) IV Push every 8 hours PRN Nausea and/or Vomiting    Pertinent Labs:  10-04 Na150 mmol/L<H> Glu 124 mg/dL<H> K+ 4.0 mmol/L Cr  0.84 mg/dL BUN 22 mg/dL Phos 1.3 mg/dL<L> Alb n/a   PAB n/a         Skin: intact, + 3 right ankle and leg, +4 left ankle and leg edema     Estimated Needs:   [x] no change since previous assessment    Previous Nutrition Diagnosis:      [x] Malnutrition     Nutrition Diagnosis is [x] ongoing      New Nutrition Diagnosis: [x] not applicable    Recommend    [x] Continue c current diet.     [x] Nutrition Supplement: Recommend continue c Prosource and increase Nepro to 2 daily     [x] RD to provide food preferences. Discussed nutrient dense foods available.            Monitoring and Evaluation:     [x] PO intake [x] Tolerance to diet prescription [x] weights [x] follow up per protocol    RD remains available.   Lily Tavarez, MS, RD, , [facility] #[phone] Malnutrition follow up. Pt with newly diagnosed DLBCL S/P chemotherapy 9/27 now admitted with confusion and weakness found to have hypercalcemia related to malignancy-now improved and pneumonia (S/P antibiotics). Pt S/P swallow eval 9/21 and recommended NPO with non-oral nutrition/hydration, pt receiving dysphagia 2 diet with nectar thickened liquids.  pt c confusion- some improvement per team.     Source: Patient [x]    Family [x]-wife at bedside          Diet : dysphagia 2 c nectar consistency       Patient and spouse report [x] other: he is mostly picking at his meals, likes yogurt and applesauce, had been taking Nepro shakes, takes Prosource at times if he remembers or when spouse mixes it for him. Spouse reports pt is a "picky eater." Willing to increase Nepro shakes to 2 daily. Noted last BM 10/3.       Current Weight: admit: 216 pounds and 205 pounds-> 9/23: 206.3-> 10/4: 190 pounds, noted some change, would monitor       Pertinent Medications: MEDICATIONS  (STANDING):  allopurinol 300 milliGRAM(s) Oral daily  cyclophosphamide IVPB 1344 milliGRAM(s) IV Intermittent once  dextrose 5%. 1000 milliLiter(s) (65 mL/Hr) IV Continuous <Continuous>  enoxaparin Injectable 90 milliGRAM(s) SubCutaneous every 12 hours  finasteride 5 milliGRAM(s) Oral daily  influenza   Vaccine 0.5 milliLiter(s) IntraMuscular once  nystatin Powder 1 Application(s) Topical three times a day  pantoprazole    Tablet 40 milliGRAM(s) Oral before breakfast  senna 2 Tablet(s) Oral at bedtime  vinCRIStine IVPB 2 milliGRAM(s) IV Intermittent once    MEDICATIONS  (PRN):  ondansetron Injectable 8 milliGRAM(s) IV Push every 8 hours PRN Nausea and/or Vomiting    Pertinent Labs:  10-04 Na150 mmol/L<H> Glu 124 mg/dL<H> K+ 4.0 mmol/L Cr  0.84 mg/dL BUN 22 mg/dL Phos 1.3 mg/dL<L> Alb n/a   PAB n/a     Finger sticks 10/1-3:     Skin: intact, + 3 right ankle and leg, +4 left ankle and leg edema     Estimated Needs:   [x] no change since previous assessment    Previous Nutrition Diagnosis:      [x] Malnutrition     Nutrition Diagnosis is [x] ongoing      New Nutrition Diagnosis: [x] not applicable    Recommend    [x] Continue c current diet.     [x] Nutrition Supplement: Recommend continue c Prosource and increase Nepro to 2 daily     [x] RD to provide food preferences. Discussed nutrient dense foods available.            Monitoring and Evaluation:     [x] PO intake [x] Tolerance to diet prescription [x] weights [x] follow up per protocol    RD remains available.   Lily Tavarez, MS, RD, , [facility] #[phone]

## 2017-10-04 NOTE — PROGRESS NOTE ADULT - SUBJECTIVE AND OBJECTIVE BOX
Follow-up Pulm Progress Note  Mathew Glass MD  [phone]    Notes reviewed  Breathing comfortably supine; without significant cough  On full dose lovenox for LE common femoral DVT   Afebrile  off antibiotics - s/p full course for aspiration pneumonia  No respiratory complaints - OOB, awake and alert    Vital Signs Last 24 Hrs  T(C): 36.5 (04 Oct 2017 08:29), Max: 36.8 (03 Oct 2017 22:13)  T(F): 97.7 (04 Oct 2017 08:29), Max: 98.3 (03 Oct 2017 22:13)  HR: 98 (04 Oct 2017 08:29) (98 - 107)  BP: 102/62 (04 Oct 2017 08:29) (102/62 - 121/69)  BP(mean): --  RR: 18 (04 Oct 2017 08:29) (16 - 18)  SpO2: 98% (04 Oct 2017 08:29) (95% - 99%)                         11.2   x     )-----------( 73       ( 04 Oct 2017 09:00 )             34.1       10-04    150<H>  |  118<H>  |  22  ----------------------------<  124<H>  4.0   |  24  |  0.84    Ca    8.2<L>      04 Oct 2017 09:00  Phos  1.3     10-04  Mg     1.9     10-04    TPro  4.3<L>  /  Alb  2.3<L>  /  TBili  1.1  /  DBili  x   /  AST  28  /  ALT  31  /  AlkPhos  58  10-03      Physical Examination:  PULM: Decr BS L base; without wheeze: no change  CVS: Regular rate and rhythm, no murmurs, rubs, or gallops  ABD: Soft, non-tender  EXT:  Decr in LE edema - 3+  RADIOLOGY REVIEWED  CXR:    CT chest:    TTE:

## 2017-10-04 NOTE — PROGRESS NOTE ADULT - PROBLEM SELECTOR PLAN 5
appreciate oncology recs  cont allopurinol  check Mg, PO4, uric acid daily  first dose vincristine and cyclophosphamide 9/27/17  R-CHOP next week likely

## 2017-10-05 DIAGNOSIS — D69.59 OTHER SECONDARY THROMBOCYTOPENIA: ICD-10-CM

## 2017-10-05 LAB
ANION GAP SERPL CALC-SCNC: 6 MMOL/L — SIGNIFICANT CHANGE UP (ref 5–17)
BUN SERPL-MCNC: 20 MG/DL — SIGNIFICANT CHANGE UP (ref 7–23)
CALCIUM SERPL-MCNC: 7.9 MG/DL — LOW (ref 8.4–10.5)
CHLORIDE SERPL-SCNC: 119 MMOL/L — HIGH (ref 96–108)
CO2 SERPL-SCNC: 24 MMOL/L — SIGNIFICANT CHANGE UP (ref 22–31)
CREAT SERPL-MCNC: 0.77 MG/DL — SIGNIFICANT CHANGE UP (ref 0.5–1.3)
GLUCOSE SERPL-MCNC: 113 MG/DL — HIGH (ref 70–99)
HCT VFR BLD CALC: 32 % — LOW (ref 39–50)
HGB BLD-MCNC: 10.4 G/DL — LOW (ref 13–17)
MCHC RBC-ENTMCNC: 32.4 PG — SIGNIFICANT CHANGE UP (ref 27–34)
MCHC RBC-ENTMCNC: 32.5 GM/DL — SIGNIFICANT CHANGE UP (ref 32–36)
MCV RBC AUTO: 99.7 FL — SIGNIFICANT CHANGE UP (ref 80–100)
PF4 HEPARIN CMPLX AB SER-ACNC: NEGATIVE — SIGNIFICANT CHANGE UP
PF4 HEPARIN CMPLX AB SERPL QL IA: 0.25 ABS — SIGNIFICANT CHANGE UP
PLATELET # BLD AUTO: 63 K/UL — LOW (ref 150–400)
POTASSIUM SERPL-MCNC: 3.6 MMOL/L — SIGNIFICANT CHANGE UP (ref 3.5–5.3)
POTASSIUM SERPL-SCNC: 3.6 MMOL/L — SIGNIFICANT CHANGE UP (ref 3.5–5.3)
RBC # BLD: 3.21 M/UL — LOW (ref 4.2–5.8)
RBC # FLD: 15 % — HIGH (ref 10.3–14.5)
SODIUM SERPL-SCNC: 149 MMOL/L — HIGH (ref 135–145)
WBC # BLD: 1.05 K/UL — CRITICAL LOW (ref 3.8–10.5)
WBC # FLD AUTO: 1.05 K/UL — CRITICAL LOW (ref 3.8–10.5)

## 2017-10-05 PROCEDURE — 99233 SBSQ HOSP IP/OBS HIGH 50: CPT

## 2017-10-05 RX ORDER — LOPERAMIDE HCL 2 MG
2 TABLET ORAL ONCE
Qty: 0 | Refills: 0 | Status: DISCONTINUED | OUTPATIENT
Start: 2017-10-05 | End: 2017-10-07

## 2017-10-05 RX ORDER — ETHACRYNIC ACID 25 MG/1
25 TABLET ORAL ONCE
Qty: 0 | Refills: 0 | Status: COMPLETED | OUTPATIENT
Start: 2017-10-05 | End: 2017-10-05

## 2017-10-05 RX ADMIN — FINASTERIDE 5 MILLIGRAM(S): 5 TABLET, FILM COATED ORAL at 12:00

## 2017-10-05 RX ADMIN — Medication 300 MILLIGRAM(S): at 12:00

## 2017-10-05 RX ADMIN — ENOXAPARIN SODIUM 90 MILLIGRAM(S): 100 INJECTION SUBCUTANEOUS at 17:01

## 2017-10-05 RX ADMIN — NYSTATIN CREAM 1 APPLICATION(S): 100000 CREAM TOPICAL at 05:34

## 2017-10-05 RX ADMIN — PANTOPRAZOLE SODIUM 40 MILLIGRAM(S): 20 TABLET, DELAYED RELEASE ORAL at 05:34

## 2017-10-05 RX ADMIN — ETHACRYNIC ACID 25 MILLIGRAM(S): 25 TABLET ORAL at 12:56

## 2017-10-05 RX ADMIN — ENOXAPARIN SODIUM 90 MILLIGRAM(S): 100 INJECTION SUBCUTANEOUS at 05:35

## 2017-10-05 RX ADMIN — NYSTATIN CREAM 1 APPLICATION(S): 100000 CREAM TOPICAL at 21:09

## 2017-10-05 RX ADMIN — NYSTATIN CREAM 1 APPLICATION(S): 100000 CREAM TOPICAL at 12:01

## 2017-10-05 NOTE — PROGRESS NOTE ADULT - PROBLEM SELECTOR PLAN 5
Secondary to chemotherapy  - Platelet count today is 70k. No abnormal bleeding or bruising currently. Can be monitored.   - Will follow    Mike Claudio MD  [facility] - A Division of [facility]   [address] [place] [id number]  (T) [phone]  (F) [phone]

## 2017-10-05 NOTE — PROGRESS NOTE ADULT - SUBJECTIVE AND OBJECTIVE BOX
Sleeping in bed, no acute SOB or CP    Vital Signs Last 24 Hrs  T(C): 36.4 (05 Oct 2017 08:55), Max: 36.6 (04 Oct 2017 15:35)  T(F): 97.5 (05 Oct 2017 08:55), Max: 97.8 (04 Oct 2017 15:35)  HR: 101 (05 Oct 2017 08:55) (101 - 118)  BP: 111/64 (05 Oct 2017 08:55) (107/61 - 111/64)  BP(mean): --  RR: 18 (05 Oct 2017 08:55) (18 - 18)  SpO2: 96% (05 Oct 2017 08:55) (94% - 96%)    GENERAL: NAD, seems less confused  HEAD:  NCAT  EYES: EOMI  CHEST/LUNG: decreased BS at bases  HEART: Regular rate and rhythm; + murmur  ABDOMEN: Soft, Nontender, Nondistended; Bowel sounds present  : chronic tavarez  EXTREMITIES:  + 2 pitting edema both LE, hard to palpate Dps 2' edema  SKIN: No rashes or lesions  NEURO: No focal deficits    LABS:                        10.4   1.05  )-----------( 63       ( 05 Oct 2017 07:34 )             32.0     10-05    149<H>  |  119<H>  |  20  ----------------------------<  113<H>  3.6   |  24  |  0.77    Ca    7.9<L>      05 Oct 2017 08:18  Phos  1.3     10-04  Mg     1.9     10-04        CAPILLARY BLOOD GLUCOSE Sleeping in bed, no acute SOB or CP  Stools loose, possibly from eating outside ice cream with lactose    Vital Signs Last 24 Hrs  T(C): 36.4 (05 Oct 2017 08:55), Max: 36.6 (04 Oct 2017 15:35)  T(F): 97.5 (05 Oct 2017 08:55), Max: 97.8 (04 Oct 2017 15:35)  HR: 101 (05 Oct 2017 08:55) (101 - 118)  BP: 111/64 (05 Oct 2017 08:55) (107/61 - 111/64)  BP(mean): --  RR: 18 (05 Oct 2017 08:55) (18 - 18)  SpO2: 96% (05 Oct 2017 08:55) (94% - 96%)    GENERAL: NAD, seems less confused  HEAD:  NCAT  EYES: EOMI  CHEST/LUNG: decreased BS at bases  HEART: Regular rate and rhythm; + murmur  ABDOMEN: Soft, Nontender, Nondistended; Bowel sounds present  : chronic tavarez  EXTREMITIES:  + 2 pitting edema both LE, hard to palpate Dps 2' edema  SKIN: No rashes or lesions  NEURO: No focal deficits    LABS:                        10.4   1.05  )-----------( 63       ( 05 Oct 2017 07:34 )             32.0     10-05    149<H>  |  119<H>  |  20  ----------------------------<  113<H>  3.6   |  24  |  0.77    Ca    7.9<L>      05 Oct 2017 08:18  Phos  1.3     10-04  Mg     1.9     10-04        CAPILLARY BLOOD GLUCOSE

## 2017-10-05 NOTE — CHART NOTE - NSCHARTNOTEFT_GEN_A_CORE
Interim Note    pt seen after request received regarding food preferences. Spouse now reports pt has been lactose intolerant in the past and currently does not want Magic Cup frozen dessert. States pt also had a milkshake from outside brought in by family yesterday, pt did experience some loose movements. Requesting Lactaid milk thickened to nectar consistency c breakfast- will honor. Will also provide trial of Ensure Enlive thickened to appropriate consistency as pt reports he does not like the consistency of Nepro shakes when he receives it in the morning.     Will honor requests.     RD remains available.   Lily Tavarez, MS, RD, , [facility] #[phone]

## 2017-10-05 NOTE — PROGRESS NOTE ADULT - PROBLEM SELECTOR PLAN 2
Newly diagnosed DLBCL. Not yet to start treatment  -Will plan to give R-CHOP next week if platelets recover  -Echo cardiogram reviewed

## 2017-10-05 NOTE — PROGRESS NOTE ADULT - SUBJECTIVE AND OBJECTIVE BOX
Subjective  Patient seen in the morning. Doing well.  Resting. Did not want to be disturbed.    Admission History:  84 year old man with recent diagnosis of DLBCL, biliary stenosis s/p CBD stent, mejias's esophagus, BPH w/ urinary retention s/p chronic tavarez placement admitted with weakness and confusion. Patient reports having felt weak since admission to [facility] facility. Patient been unable to stand and at times is dizzy. No chest pain. No nausea or vomiting. Patient has been more confused than usual. No fevers or chills.      ROS:  General:  (-)Pain, (-)Decrease appetite, (-)Fevers, (-)Chills, (-)Weight Loss  Eyes: (-)Blurry Vision, (-)Double Vision, (-)Vision Loss  ENT: (-)Sinus Congestion, (-)Decreased Hearing, (-)Nosebleeds, (-)Sore Throat  Cardiac: (-)Chest Pain, (-)Palpitations, (+)Shortness of breathe on exertion  Respiratory: (-)Cough, (-)hemoptysis, (-)Shortness of breathe  GI: (-)Nausea, (-)Vomiting, (-)Diarrhea, (-)Constipation, (-)Melena, (-)BRBPR  : (-)Hematuria, (-)Dysuria, (-)Polyuia  MSK: (-)Back pain, (-)Joint pain, (-)Stiffness  Dermatology: (-)Rash, (-)Itching  Neurology:  (-)Numbness, (-)Tingling, (-)Difficulty Walking, (-)Tremors, (+)Weakness (-)Confusion  Psych: (-)Anxiety, (-)Depression, (-)Memory Loss  Hematology:  (-)Easy Bruising, (-)Easy Bleeding, (-)Night Sweats.     Vital Signs Last 24 Hrs  Vital Signs Last 24 Hrs  T(C): 36.6 (04 Oct 2017 21:07), Max: 36.6 (04 Oct 2017 15:35)  T(F): 97.8 (04 Oct 2017 21:07), Max: 97.8 (04 Oct 2017 15:35)  HR: 102 (04 Oct 2017 21:07) (102 - 118)  BP: 108/66 (04 Oct 2017 21:07) (107/61 - 108/66)  RR: 18 (04 Oct 2017 21:07) (18 - 18)  SpO2: 95% (04 Oct 2017 21:07) (94% - 95%)    Objective  Physical Exam:  General: AAO x 2. NAD. Lying in bed comfortably. Still confused  HEENT: clear oropharynx, anicteric sclera, pink conjunctivae, EOMi. PERRLA  Cardiac: S1, S2 present. No audible murmurs. RRR  Lungs: Decreased breathe sounds.   Abdomen: Soft, Non-Tender, Non-Distended. No palpable hepatosplenomegaly.  Extremities: 2+ pulses. Edema to the knees  Skin: No Rashes. No petechiae  Neuro: No focal motor or sensory deficits.     Labs:                        11.7   2.4   )-----------( 70       ( 04 Oct 2017 12:09 )             35.0   10-04    150<H>  |  118<H>  |  22  ----------------------------<  124<H>  4.0   |  24  |  0.84    Ca    8.2<L>      04 Oct 2017 09:00  Phos  1.3     10-04  Mg     1.9     10-04    TPro  4.3<L>  /  Alb  2.3<L>  /  TBili  1.1  /  DBili  x   /  AST  28  /  ALT  31  /  AlkPhos  58  10-03    Radiology:  CT Chest/Abdomen/Pelvis:  IMPRESSION:   Left lower lobe pneumonia.  Bulky confluent upper abdominal and retroperitoneal adenopathy as   described, consistent with lymphoma.     CT Head;  IMPRESSION: Unremarkable noncontrast CT of the brain.    V/Q Scan   IMPRESSION: Very low probability of pulmonary embolism      US LE  IMPRESSION: Acute, above the knee free floating DVT in the left common   femoral vein. LINK Tavera was informed of the findings at 1:20 PM on   9/22/2017.      Pathology:  Surgical Pathology Final Report - :   ACCESSION No:  [id number]    Final Diagnosis  Lymph node, periportal, biopsy  Diffuse large B-cell lymphoma, non-germinal center type

## 2017-10-05 NOTE — PROGRESS NOTE ADULT - SUBJECTIVE AND OBJECTIVE BOX
Follow-up Pulm Progress Note  Mathew Glass MD  [phone]    Notes reviewed  Breathing comfortably supine; without significant cough  On full dose lovenox for LE common femoral DVT   Afebrile  off antibiotics - s/p full course for aspiration pneumonia  No respiratory complaints - OOB, awake and alert    Vital Signs Last 24 Hrs  T(C): 36.4 (05 Oct 2017 08:55), Max: 36.6 (04 Oct 2017 15:35)  T(F): 97.5 (05 Oct 2017 08:55), Max: 97.8 (04 Oct 2017 15:35)  HR: 101 (05 Oct 2017 08:55) (101 - 118)  BP: 111/64 (05 Oct 2017 08:55) (107/61 - 111/64)  BP(mean): --  RR: 18 (05 Oct 2017 08:55) (18 - 18)  SpO2: 96% (05 Oct 2017 08:55) (94% - 96%)                        10.4   1.05  )-----------( 63       ( 05 Oct 2017 07:34 )             32.0     10-05    149<H>  |  119<H>  |  20  ----------------------------<  113<H>  3.6   |  24  |  0.77    Ca    7.9<L>      05 Oct 2017 08:18  Phos  1.3     10-04  Mg     1.9     10-04      Physical Examination:  PULM: Decr BS L base; without wheeze: no change  CVS: Regular rate and rhythm, no murmurs, rubs, or gallops  ABD: Soft, non-tender  EXT:  Decr in LE edema - 3+  RADIOLOGY REVIEWED  CXR:    CT chest:    TTE:

## 2017-10-05 NOTE — PROGRESS NOTE ADULT - ASSESSMENT
84 year old man with DLBCL which is yet untreated admitted with confusion and weakness. Found to have hypercalcemia of malignancy and pneumonia. Currently on ABx. S/p Pamidronate. Calcium normalizing. Confusion appears to be diminishing. Now with thrombocytopenia and hypernatremia

## 2017-10-05 NOTE — PROGRESS NOTE ADULT - SUBJECTIVE AND OBJECTIVE BOX
**********              CARDIOLOGY CONSULT NOTE              ************  ============================================================  CHIEF COMPLAINT/REASON FOR CONSULT:  Patient is a 84y old  Male who presents with a chief complaint of hypotension (21 Sep 2017 12:22)      HISTORY OF PRESENT ILLNESS:  84yMale with a history of DLBCL,biliary stenosis s/p CBD stent, mejias's esophagus, BPH w/ urinary retention s/p chronic tavarez placement; consult for abn EKG - on further examination demonstrated to be normal; no tropinin elevation, no ischemia; active CV includes DVT and LE edema, which may be related to hypoalbuminemia    ============  24 Hour Events  - no sig changes  - stable hemodynamically   - all cell lines down   ============    Allergies    latex (Rash; Blisters)  penicillins (Swelling)  sulfa drugs (Unknown)    Intolerances    	    MEDICATIONS:  MEDICATIONS  (STANDING):  allopurinol 300 milliGRAM(s) Oral daily  cyclophosphamide IVPB 1344 milliGRAM(s) IV Intermittent once  dextrose 5%. 1000 milliLiter(s) (65 mL/Hr) IV Continuous <Continuous>  enoxaparin Injectable 90 milliGRAM(s) SubCutaneous every 12 hours  finasteride 5 milliGRAM(s) Oral daily  influenza   Vaccine 0.5 milliLiter(s) IntraMuscular once  loperamide 2 milliGRAM(s) Oral once  nystatin Powder 1 Application(s) Topical three times a day  pantoprazole    Tablet 40 milliGRAM(s) Oral before breakfast  senna 2 Tablet(s) Oral at bedtime  vinCRIStine IVPB 2 milliGRAM(s) IV Intermittent once            PAST MEDICAL & SURGICAL HISTORY:  Diffuse large B-cell lymphoma of intra-abdominal lymph nodes  Mejias esophagus  Intestinal infection: 2012  Bladder polyp  H/O inguinal hernia repair  S/P TURP (transurethral resection of prostate)  Bladder polyp: s/p TURBT  History of cholecystectomy: 1987      FAMILY HISTORY:  No pertinent family history in first degree relatives      SOCIAL HISTORY:    [ x] Non-smoker  [x] No Illicit Drug Use  [ x] No Excess EtOH Use    Vital Signs Last 24 Hrs  T(C): 36.4 (05 Oct 2017 08:55), Max: 36.6 (04 Oct 2017 15:35)  T(F): 97.5 (05 Oct 2017 08:55), Max: 97.8 (04 Oct 2017 15:35)  HR: 101 (05 Oct 2017 08:55) (101 - 118)  BP: 111/64 (05 Oct 2017 08:55) (107/61 - 111/64)  BP(mean): --  RR: 18 (05 Oct 2017 08:55) (18 - 18)  SpO2: 96% (05 Oct 2017 08:55) (94% - 96%)  Appearance: Frail; NAD	  HEENT:   NCAT, EOMI	  Cardiovascular: Normal S1 S2, No JVD, No murmurs, No edema  Respiratory: Lungs clear to auscultation, no use of accessory muscles	  Psychiatry: A & O x 3, Mood & affect appropriate  Gastrointestinal:  Soft, Non-tender	  Skin: No rashes, No ecchymoses, No cyanosis	  Neurologic: Non-focal, no speech abnormalities  Extremities: Normal range of motion, No clubbing, cyanosis, 2+ edema b/l  Vascular: Peripheral pulses palpable 2+ bilaterally, no prominent varicosities        LABS:	   Labs Reviewed 10-    CBC Full  -  ( 29 Sep 2017 09:02 )  WBC Count : 13.52 K/uL  Hemoglobin : 12.9 g/dL  Hematocrit : 39.2 %  Platelet Count - Automated : 138 K/uL  Mean Cell Volume : 101.8 fl  Mean Cell Hemoglobin : 33.5 pg  Mean Cell Hemoglobin Concentration : 32.9 gm/dL  Auto Neutrophil # : x  Auto Lymphocyte # : x  Auto Monocyte # : x  Auto Eosinophil # : x  Auto Basophil # : x  Auto Neutrophil % : x  Auto Lymphocyte % : x  Auto Monocyte % : x  Auto Eosinophil % : x  Auto Basophil % : x    09-29    148<H>  |  114<H>  |  52<H>  ----------------------------<  110<H>  5.4<H>   |  24  |  1.16  09-28    145  |  110<H>  |  53<H>  ----------------------------<  109<H>  4.7   |  21<L>  |  1.25    Ca    9.2      29 Sep 2017 09:01  Ca    9.3      28 Sep 2017 09:34  Phos  2.8     09-29  Mg     2.5     09-29      =========================================================================  ASSESSMENT:  84yMale with a history of DLBCL,biliary stenosis s/p CBD stent, mejias's esophagus, BPH w/ urinary retention s/p chronic tavarez placement; consult for abn EKG - on further examination demonstrated to be normal; no tropinin elevation, no ischemia; active CV includes DVT; volume overload       Recommendations  - Given hypernatremia and hypoalbuminemia, would discuss with renal whether diuretics needed for LE edema  - Monitor Plt Count with Lovenox as it continues to drop   - If c/f ischemia, obtain ecg,echo,enzymes      Please call with questions.     Paramjit Flower MD, FACC  [phone]

## 2017-10-05 NOTE — PROGRESS NOTE ADULT - PROBLEM SELECTOR PLAN 5
monitor CBC daily  cont allopurinol  check Mg, PO4, uric acid daily  first dose vincristine and cyclophosphamide 9/27/17  R-CHOP next week likely

## 2017-10-06 DIAGNOSIS — D70.2 OTHER DRUG-INDUCED AGRANULOCYTOSIS: ICD-10-CM

## 2017-10-06 DIAGNOSIS — D70.1 AGRANULOCYTOSIS SECONDARY TO CANCER CHEMOTHERAPY: ICD-10-CM

## 2017-10-06 LAB
ANION GAP SERPL CALC-SCNC: 9 MMOL/L — SIGNIFICANT CHANGE UP (ref 5–17)
BASOPHILS # BLD AUTO: 0 K/UL — SIGNIFICANT CHANGE UP (ref 0–0.2)
BASOPHILS NFR BLD AUTO: 0 % — SIGNIFICANT CHANGE UP (ref 0–2)
BUN SERPL-MCNC: 16 MG/DL — SIGNIFICANT CHANGE UP (ref 7–23)
CALCIUM SERPL-MCNC: 7.6 MG/DL — LOW (ref 8.4–10.5)
CHLORIDE SERPL-SCNC: 114 MMOL/L — HIGH (ref 96–108)
CO2 SERPL-SCNC: 23 MMOL/L — SIGNIFICANT CHANGE UP (ref 22–31)
CREAT SERPL-MCNC: 0.82 MG/DL — SIGNIFICANT CHANGE UP (ref 0.5–1.3)
EOSINOPHIL # BLD AUTO: 0.03 K/UL — SIGNIFICANT CHANGE UP (ref 0–0.5)
EOSINOPHIL NFR BLD AUTO: 4 % — SIGNIFICANT CHANGE UP (ref 0–6)
GLUCOSE SERPL-MCNC: 129 MG/DL — HIGH (ref 70–99)
HCT VFR BLD CALC: 31.2 % — LOW (ref 39–50)
HGB BLD-MCNC: 10.4 G/DL — LOW (ref 13–17)
INR BLD: 1.47 RATIO — HIGH (ref 0.88–1.16)
LDH SERPL L TO P-CCNC: 319 U/L — HIGH (ref 50–242)
LYMPHOCYTES # BLD AUTO: 0.16 K/UL — LOW (ref 1–3.3)
LYMPHOCYTES # BLD AUTO: 22 % — SIGNIFICANT CHANGE UP (ref 13–44)
MANUAL SMEAR VERIFICATION: SIGNIFICANT CHANGE UP
MCHC RBC-ENTMCNC: 33.1 PG — SIGNIFICANT CHANGE UP (ref 27–34)
MCHC RBC-ENTMCNC: 33.3 GM/DL — SIGNIFICANT CHANGE UP (ref 32–36)
MCV RBC AUTO: 99.4 FL — SIGNIFICANT CHANGE UP (ref 80–100)
MONOCYTES # BLD AUTO: 0.04 K/UL — SIGNIFICANT CHANGE UP (ref 0–0.9)
MONOCYTES NFR BLD AUTO: 6 % — SIGNIFICANT CHANGE UP (ref 2–14)
NEUTROPHILS # BLD AUTO: 0.49 K/UL — LOW (ref 1.8–7.4)
NEUTROPHILS NFR BLD AUTO: 68 % — SIGNIFICANT CHANGE UP (ref 43–77)
PLAT MORPH BLD: NORMAL — SIGNIFICANT CHANGE UP
PLATELET # BLD AUTO: 60 K/UL — LOW (ref 150–400)
POTASSIUM SERPL-MCNC: 4.7 MMOL/L — SIGNIFICANT CHANGE UP (ref 3.5–5.3)
POTASSIUM SERPL-SCNC: 4.7 MMOL/L — SIGNIFICANT CHANGE UP (ref 3.5–5.3)
PROTHROM AB SERPL-ACNC: 16.1 SEC — HIGH (ref 9.8–12.7)
RBC # BLD: 3.14 M/UL — LOW (ref 4.2–5.8)
RBC # FLD: 14.8 % — HIGH (ref 10.3–14.5)
RBC BLD AUTO: NORMAL — SIGNIFICANT CHANGE UP
SODIUM SERPL-SCNC: 146 MMOL/L — HIGH (ref 135–145)
URATE SERPL-MCNC: 3.5 MG/DL — SIGNIFICANT CHANGE UP (ref 3.4–8.8)
WBC # BLD: 0.72 K/UL — CRITICAL LOW (ref 3.8–10.5)
WBC # FLD AUTO: 0.72 K/UL — CRITICAL LOW (ref 3.8–10.5)

## 2017-10-06 RX ORDER — ETHACRYNIC ACID 25 MG/1
25 TABLET ORAL ONCE
Qty: 0 | Refills: 0 | Status: COMPLETED | OUTPATIENT
Start: 2017-10-06 | End: 2017-10-06

## 2017-10-06 RX ORDER — FILGRASTIM 480MCG/1.6
300 VIAL (ML) INJECTION DAILY
Qty: 0 | Refills: 0 | Status: DISCONTINUED | OUTPATIENT
Start: 2017-10-06 | End: 2017-10-07

## 2017-10-06 RX ORDER — WARFARIN SODIUM 2.5 MG/1
5 TABLET ORAL ONCE
Qty: 0 | Refills: 0 | Status: COMPLETED | OUTPATIENT
Start: 2017-10-06 | End: 2017-10-06

## 2017-10-06 RX ORDER — SODIUM CHLORIDE 9 MG/ML
1000 INJECTION, SOLUTION INTRAVENOUS
Qty: 0 | Refills: 0 | Status: DISCONTINUED | OUTPATIENT
Start: 2017-10-06 | End: 2017-10-07

## 2017-10-06 RX ORDER — FILGRASTIM 480MCG/1.6
480 VIAL (ML) INJECTION DAILY
Qty: 0 | Refills: 0 | Status: DISCONTINUED | OUTPATIENT
Start: 2017-10-06 | End: 2017-10-08

## 2017-10-06 RX ADMIN — PANTOPRAZOLE SODIUM 40 MILLIGRAM(S): 20 TABLET, DELAYED RELEASE ORAL at 05:22

## 2017-10-06 RX ADMIN — NYSTATIN CREAM 1 APPLICATION(S): 100000 CREAM TOPICAL at 14:04

## 2017-10-06 RX ADMIN — ENOXAPARIN SODIUM 90 MILLIGRAM(S): 100 INJECTION SUBCUTANEOUS at 16:28

## 2017-10-06 RX ADMIN — ETHACRYNIC ACID 25 MILLIGRAM(S): 25 TABLET ORAL at 16:26

## 2017-10-06 RX ADMIN — NYSTATIN CREAM 1 APPLICATION(S): 100000 CREAM TOPICAL at 05:22

## 2017-10-06 RX ADMIN — ENOXAPARIN SODIUM 90 MILLIGRAM(S): 100 INJECTION SUBCUTANEOUS at 05:22

## 2017-10-06 RX ADMIN — Medication 300 MILLIGRAM(S): at 11:50

## 2017-10-06 RX ADMIN — Medication 300 MICROGRAM(S): at 17:14

## 2017-10-06 RX ADMIN — SODIUM CHLORIDE 65 MILLILITER(S): 9 INJECTION, SOLUTION INTRAVENOUS at 12:02

## 2017-10-06 RX ADMIN — NYSTATIN CREAM 1 APPLICATION(S): 100000 CREAM TOPICAL at 22:21

## 2017-10-06 RX ADMIN — WARFARIN SODIUM 5 MILLIGRAM(S): 2.5 TABLET ORAL at 22:21

## 2017-10-06 RX ADMIN — FINASTERIDE 5 MILLIGRAM(S): 5 TABLET, FILM COATED ORAL at 11:50

## 2017-10-06 RX ADMIN — SENNA PLUS 2 TABLET(S): 8.6 TABLET ORAL at 22:22

## 2017-10-06 NOTE — PROGRESS NOTE ADULT - SUBJECTIVE AND OBJECTIVE BOX
Subjective  Patient seen in the morning. Doing well.  Resting. Did not want to be disturbed again    Admission History:  84 year old man with recent diagnosis of DLBCL, biliary stenosis s/p CBD stent, mejias's esophagus, BPH w/ urinary retention s/p chronic tavarez placement admitted with weakness and confusion. Patient reports having felt weak since admission to [facility]. Patient been unable to stand and at times is dizzy. No chest pain. No nausea or vomiting. Patient has been more confused than usual. No fevers or chills.      ROS:  General:  (-)Pain, (-)Decrease appetite, (-)Fevers, (-)Chills, (-)Weight Loss  Eyes: (-)Blurry Vision, (-)Double Vision, (-)Vision Loss  ENT: (-)Sinus Congestion, (-)Decreased Hearing, (-)Nosebleeds, (-)Sore Throat  Cardiac: (-)Chest Pain, (-)Palpitations, (+)Shortness of breathe on exertion  Respiratory: (-)Cough, (-)hemoptysis, (-)Shortness of breathe  GI: (-)Nausea, (-)Vomiting, (-)Diarrhea, (-)Constipation, (-)Melena, (-)BRBPR  : (-)Hematuria, (-)Dysuria, (-)Polyuia  MSK: (-)Back pain, (-)Joint pain, (-)Stiffness  Dermatology: (-)Rash, (-)Itching  Neurology:  (-)Numbness, (-)Tingling, (-)Difficulty Walking, (-)Tremors, (+)Weakness (-)Confusion  Psych: (-)Anxiety, (-)Depression, (-)Memory Loss  Hematology:  (-)Easy Bruising, (-)Easy Bleeding, (-)Night Sweats.     Vitals  Vital Signs Last 24 Hrs  T(C): 36.3 (06 Oct 2017 07:37), Max: 36.4 (05 Oct 2017 08:55)  T(F): 97.4 (06 Oct 2017 07:37), Max: 97.5 (05 Oct 2017 08:55)  HR: 102 (06 Oct 2017 07:37) (101 - 105)  BP: 107/64 (06 Oct 2017 07:37) (107/64 - 113/69)  RR: 20 (06 Oct 2017 07:37) (18 - 20)  SpO2: 95% (06 Oct 2017 07:37) (94% - 96%)    Objective  Physical Exam:  General: AAO x 2. NAD. Lying in bed comfortably. Resting  HEENT: clear oropharynx, anicteric sclera, pink conjunctivae, EOMi. PERRLA  Cardiac: S1, S2 present. No audible murmurs. RRR  Lungs: Decreased breathe sounds.   Abdomen: Soft, Non-Tender, Non-Distended. No palpable hepatosplenomegaly.  Extremities: 2+ pulses. Edema to the knees  Skin: No Rashes. No petechiae  Neuro: No focal motor or sensory deficits.     Labs:                        10.4   1.05  )-----------( 63       ( 05 Oct 2017 07:34 )             32.0   10-05    149<H>  |  119<H>  |  20  ----------------------------<  113<H>  3.6   |  24  |  0.77    Ca    7.9<L>      05 Oct 2017 08:18  Phos  1.3     10-04  Mg     1.9     10-04    Radiology:  CT Chest/Abdomen/Pelvis:  IMPRESSION:   Left lower lobe pneumonia.  Bulky confluent upper abdominal and retroperitoneal adenopathy as   described, consistent with lymphoma.     CT Head;  IMPRESSION: Unremarkable noncontrast CT of the brain.    V/Q Scan   IMPRESSION: Very low probability of pulmonary embolism      US LE  IMPRESSION: Acute, above the knee free floating DVT in the left common   femoral vein. LINK Tavera was informed of the findings at 1:20 PM on   9/22/2017.      Pathology:  Surgical Pathology Final Report - :   ACCESSION No:  [id number]    Final Diagnosis  Lymph node, periportal, biopsy  Diffuse large B-cell lymphoma, non-germinal center type

## 2017-10-06 NOTE — PROGRESS NOTE ADULT - PROBLEM SELECTOR PLAN 2
appreciate ID input  finished 7 days  of aztreonam/flagyl on 9/27 for aspiration and HCAP  BC NTD, urine cx neg Na 146  D5W 65 cc/hr to be continued another day  cont ethacrynic acid

## 2017-10-06 NOTE — PROGRESS NOTE ADULT - PROBLEM SELECTOR PLAN 3
resolved  monitor cbc for now appreciate ID input  finished 7 days  of aztreonam/flagyl on 9/27 for aspiration and HCAP  BC NTD, urine cx neg

## 2017-10-06 NOTE — PROGRESS NOTE ADULT - SUBJECTIVE AND OBJECTIVE BOX
Follow-up Pulm Progress Note  Mathew Glass MD  [phone]    Notes reviewed  Neutropenic today - WBC 1.0  Breathing comfortably supine; without significant cough  On full dose lovenox for LE common femoral DVT   Afebrile  off antibiotics - s/p full course for aspiration pneumonia  No respiratory complaints - breathing comfortably supine    Vital Signs Last 24 Hrs  T(C): 36.3 (06 Oct 2017 07:37), Max: 36.3 (05 Oct 2017 15:34)  T(F): 97.4 (06 Oct 2017 07:37), Max: 97.4 (05 Oct 2017 15:34)  HR: 102 (06 Oct 2017 07:37) (102 - 105)  BP: 107/64 (06 Oct 2017 07:37) (107/64 - 113/69)  BP(mean): --  RR: 20 (06 Oct 2017 07:37) (18 - 20)  SpO2: 95% (06 Oct 2017 07:37) (94% - 95%)                        10.4   1.05  )-----------( 63       ( 05 Oct 2017 07:34 )             32.0       10-06    146<H>  |  114<H>  |  16  ----------------------------<  129<H>  4.7   |  23  |  0.82    Ca    7.6<L>      06 Oct 2017 09:56      Physical Examination:  PULM: Decr BS L base; without wheeze: no change  CVS: Regular rate and rhythm, no murmurs, rubs, or gallops  ABD: Soft, non-tender  EXT:  Decr in LE edema - 3+  RADIOLOGY REVIEWED  CXR:    CT chest:    TTE:

## 2017-10-06 NOTE — PROGRESS NOTE ADULT - PROBLEM SELECTOR PLAN 5
monitor CBC daily, may need neupogen soon  cont allopurinol  check Mg, PO4, uric acid daily  first dose vincristine and cyclophosphamide 9/27/17  R-CHOP next week likely cont tavarez (chronic)  no hydro on CT  likely 2/2 hypercalemia  downtrending

## 2017-10-06 NOTE — PROGRESS NOTE ADULT - PROBLEM SELECTOR PLAN 5
Leukopenia secondary to chemotherapy  - Currently afebrile. Check daily CBC w/ Diff  - If febrile, start broad spectrum Abx and Filgastrim 300mcg subcut daily  - If ANC < 500, can start filgastrim 300mcg subcut daily.

## 2017-10-06 NOTE — PROGRESS NOTE ADULT - ASSESSMENT
84-yo Male w/PMHx of DLBCL started chemo this admission, biliary stenosis s/p cbd stent, mejias's esophagus, BPH c/b urinary retention s/p chronic tavarez (started 9/5/17), CKD (baseline Cr 1.2), recent discharge (9/15) sp biopsy, presents with sepsis 2/2 PNA.

## 2017-10-06 NOTE — PROGRESS NOTE ADULT - ASSESSMENT
84 year old man with DLBCL which is yet untreated admitted with confusion and weakness. Found to have hypercalcemia of malignancy and pneumonia. Currently on ABx. S/p Pamidronate. Calcium normalizing. Confusion appears to be diminishing. Now with thrombocytopenia and hypernatremia and leukopenia

## 2017-10-06 NOTE — PROGRESS NOTE ADULT - PROBLEM SELECTOR PLAN 6
s/p pamidronate and calcimar  cont to monitor daily monitor CBC daily, may need neupogen soon  cont allopurinol  check Mg, PO4, uric acid daily  first dose vincristine and cyclophosphamide 9/27/17  R-CHOP next week likely

## 2017-10-06 NOTE — CHART NOTE - NSCHARTNOTEFT_GEN_A_CORE
Called by RN for WBC 0.72. ANC is 0.49. Hematology following pt , Dr. Claudio, will ordered can start filgastrim 300mcg subcut daily. F/u cbc with diff.   NAnny NP

## 2017-10-06 NOTE — PROGRESS NOTE ADULT - SUBJECTIVE AND OBJECTIVE BOX
Less diarrhea, thinks it is from nepro    Vital Signs Last 24 Hrs  T(C): 36.3 (06 Oct 2017 07:37), Max: 36.3 (05 Oct 2017 15:34)  T(F): 97.4 (06 Oct 2017 07:37), Max: 97.4 (05 Oct 2017 15:34)  HR: 102 (06 Oct 2017 07:37) (102 - 105)  BP: 107/64 (06 Oct 2017 07:37) (107/64 - 113/69)  BP(mean): --  RR: 20 (06 Oct 2017 07:37) (18 - 20)  SpO2: 95% (06 Oct 2017 07:37) (94% - 95%)    GENERAL: NAD, seems less confused  HEAD:  NCAT  EYES: EOMI  CHEST/LUNG: decreased BS at bases  HEART: Regular rate and rhythm; + murmur  ABDOMEN: Soft, Nontender, Nondistended; Bowel sounds present  : chronic tavarez  EXTREMITIES:  + 2 pitting edema both LE, hard to palpate Dps 2' edema  SKIN: No rashes or lesions  NEURO: No focal deficits    LABS:                        10.4   1.05  )-----------( 63       ( 05 Oct 2017 07:34 )             32.0     10-05    149<H>  |  119<H>  |  20  ----------------------------<  113<H>  3.6   |  24  |  0.77    Ca    7.9<L>      05 Oct 2017 08:18        CAPILLARY BLOOD GLUCOSE

## 2017-10-06 NOTE — PROGRESS NOTE ADULT - PROBLEM SELECTOR PLAN 1
Na pending  D5W 65 cc/hr to be continued  cont ethacrynic acid Zarxio initiated  daily CBC with diff

## 2017-10-06 NOTE — PROGRESS NOTE ADULT - PROBLEM SELECTOR PLAN 2
Newly diagnosed DLBCL. Not yet to start treatment  -Will plan to give R-CHOP next week if platelets and WBC recover (Hopefully dose given early in the week.   -Echocardiogram reviewed

## 2017-10-06 NOTE — PROGRESS NOTE ADULT - PROBLEM SELECTOR PLAN 4
cont tavarez (chronic)  no hydro on CT  likely 2/2 hypercalemia  downtrending resolved  monitor cbc for now

## 2017-10-06 NOTE — PROGRESS NOTE ADULT - PROBLEM SELECTOR PLAN 6
Secondary to chemotherapy  - Platelet count today is 63k. No abnormal bleeding or bruising currently. Still dropping.  - Continue to monitor. Transfuse if < 10k or <20k w/ bleeding or fever.  - Will follow    Mike Claudio MD  [facility] - A Division of [facility]   [address] [place] [id number]  (T) [phone]  (F) [phone]

## 2017-10-07 LAB
ALBUMIN SERPL ELPH-MCNC: 1.5 G/DL — LOW (ref 3.3–5)
ALP SERPL-CCNC: 59 U/L — SIGNIFICANT CHANGE UP (ref 40–120)
ALT FLD-CCNC: 23 U/L RC — SIGNIFICANT CHANGE UP (ref 10–45)
ANION GAP SERPL CALC-SCNC: 10 MMOL/L — SIGNIFICANT CHANGE UP (ref 5–17)
ANION GAP SERPL CALC-SCNC: 13 MMOL/L — SIGNIFICANT CHANGE UP (ref 5–17)
APPEARANCE UR: ABNORMAL
APTT BLD: 61.1 SEC — HIGH (ref 27.5–37.4)
AST SERPL-CCNC: 18 U/L — SIGNIFICANT CHANGE UP (ref 10–40)
BASOPHILS # BLD AUTO: 0.01 K/UL — SIGNIFICANT CHANGE UP (ref 0–0.2)
BASOPHILS NFR BLD AUTO: 1.5 % — SIGNIFICANT CHANGE UP (ref 0–2)
BILIRUB SERPL-MCNC: 1.1 MG/DL — SIGNIFICANT CHANGE UP (ref 0.2–1.2)
BILIRUB UR-MCNC: NEGATIVE — SIGNIFICANT CHANGE UP
BLD GP AB SCN SERPL QL: NEGATIVE — SIGNIFICANT CHANGE UP
BUN SERPL-MCNC: 17 MG/DL — SIGNIFICANT CHANGE UP (ref 7–23)
BUN SERPL-MCNC: 21 MG/DL — SIGNIFICANT CHANGE UP (ref 7–23)
CALCIUM SERPL-MCNC: 7.3 MG/DL — LOW (ref 8.4–10.5)
CALCIUM SERPL-MCNC: 8.3 MG/DL — LOW (ref 8.4–10.5)
CHLORIDE SERPL-SCNC: 109 MMOL/L — HIGH (ref 96–108)
CHLORIDE SERPL-SCNC: 112 MMOL/L — HIGH (ref 96–108)
CO2 SERPL-SCNC: 24 MMOL/L — SIGNIFICANT CHANGE UP (ref 22–31)
CO2 SERPL-SCNC: 24 MMOL/L — SIGNIFICANT CHANGE UP (ref 22–31)
COLOR SPEC: YELLOW — SIGNIFICANT CHANGE UP
COMMENT - URINE: SIGNIFICANT CHANGE UP
CREAT SERPL-MCNC: 0.95 MG/DL — SIGNIFICANT CHANGE UP (ref 0.5–1.3)
CREAT SERPL-MCNC: 1.22 MG/DL — SIGNIFICANT CHANGE UP (ref 0.5–1.3)
DIFF PNL FLD: ABNORMAL
EOSINOPHIL # BLD AUTO: 0.02 K/UL — SIGNIFICANT CHANGE UP (ref 0–0.5)
EOSINOPHIL NFR BLD AUTO: 3.1 % — SIGNIFICANT CHANGE UP (ref 0–6)
GAS PNL BLDV: SIGNIFICANT CHANGE UP
GLUCOSE SERPL-MCNC: 108 MG/DL — HIGH (ref 70–99)
GLUCOSE SERPL-MCNC: 135 MG/DL — HIGH (ref 70–99)
GLUCOSE UR QL: NEGATIVE — SIGNIFICANT CHANGE UP
HCT VFR BLD CALC: 25.7 % — LOW (ref 39–50)
HCT VFR BLD CALC: 34.9 % — LOW (ref 39–50)
HGB BLD-MCNC: 11.5 G/DL — LOW (ref 13–17)
HGB BLD-MCNC: 9.1 G/DL — LOW (ref 13–17)
HYALINE CASTS # UR AUTO: ABNORMAL
IMM GRANULOCYTES NFR BLD AUTO: 4.6 % — HIGH (ref 0–1.5)
INR BLD: 2.06 RATIO — HIGH (ref 0.88–1.16)
INR BLD: 3.61 RATIO — HIGH (ref 0.88–1.16)
KETONES UR-MCNC: NEGATIVE — SIGNIFICANT CHANGE UP
LDH SERPL L TO P-CCNC: 303 U/L — HIGH (ref 50–242)
LEUKOCYTE ESTERASE UR-ACNC: NEGATIVE — SIGNIFICANT CHANGE UP
LYMPHOCYTES # BLD AUTO: 0.13 K/UL — LOW (ref 1–3.3)
LYMPHOCYTES # BLD AUTO: 20 % — SIGNIFICANT CHANGE UP (ref 13–44)
MAGNESIUM SERPL-MCNC: 1.2 MG/DL — LOW (ref 1.6–2.6)
MCHC RBC-ENTMCNC: 32.7 PG — SIGNIFICANT CHANGE UP (ref 27–34)
MCHC RBC-ENTMCNC: 33 GM/DL — SIGNIFICANT CHANGE UP (ref 32–36)
MCHC RBC-ENTMCNC: 35.2 GM/DL — SIGNIFICANT CHANGE UP (ref 32–36)
MCHC RBC-ENTMCNC: 36.3 PG — HIGH (ref 27–34)
MCV RBC AUTO: 103 FL — HIGH (ref 80–100)
MCV RBC AUTO: 99.1 FL — SIGNIFICANT CHANGE UP (ref 80–100)
MONOCYTES # BLD AUTO: 0.16 K/UL — SIGNIFICANT CHANGE UP (ref 0–0.9)
MONOCYTES NFR BLD AUTO: 24.6 % — HIGH (ref 2–14)
NEUTROPHILS # BLD AUTO: 0.3 K/UL — LOW (ref 1.8–7.4)
NEUTROPHILS NFR BLD AUTO: 46.2 % — SIGNIFICANT CHANGE UP (ref 43–77)
NITRITE UR-MCNC: NEGATIVE — SIGNIFICANT CHANGE UP
PH UR: 5.5 — SIGNIFICANT CHANGE UP (ref 5–8)
PHOSPHATE SERPL-MCNC: 1 MG/DL — CRITICAL LOW (ref 2.5–4.5)
PLATELET # BLD AUTO: 41 K/UL — LOW (ref 150–400)
PLATELET # BLD AUTO: 68 K/UL — LOW (ref 150–400)
POTASSIUM SERPL-MCNC: 3 MMOL/L — LOW (ref 3.5–5.3)
POTASSIUM SERPL-MCNC: 4.3 MMOL/L — SIGNIFICANT CHANGE UP (ref 3.5–5.3)
POTASSIUM SERPL-SCNC: 3 MMOL/L — LOW (ref 3.5–5.3)
POTASSIUM SERPL-SCNC: 4.3 MMOL/L — SIGNIFICANT CHANGE UP (ref 3.5–5.3)
PROT SERPL-MCNC: 3.4 G/DL — LOW (ref 6–8.3)
PROT UR-MCNC: SIGNIFICANT CHANGE UP
PROTHROM AB SERPL-ACNC: 23.6 SEC — HIGH (ref 10–13.1)
PROTHROM AB SERPL-ACNC: 40 SEC — HIGH (ref 9.8–12.7)
RBC # BLD: 2.5 M/UL — LOW (ref 4.2–5.8)
RBC # BLD: 3.52 M/UL — LOW (ref 4.2–5.8)
RBC # FLD: 13 % — SIGNIFICANT CHANGE UP (ref 10.3–14.5)
RBC # FLD: 14.6 % — HIGH (ref 10.3–14.5)
RBC CASTS # UR COMP ASSIST: ABNORMAL /HPF (ref 0–2)
RH IG SCN BLD-IMP: POSITIVE — SIGNIFICANT CHANGE UP
SODIUM SERPL-SCNC: 146 MMOL/L — HIGH (ref 135–145)
SODIUM SERPL-SCNC: 146 MMOL/L — HIGH (ref 135–145)
SP GR SPEC: 1.01 — SIGNIFICANT CHANGE UP (ref 1.01–1.02)
URATE SERPL-MCNC: 3.6 MG/DL — SIGNIFICANT CHANGE UP (ref 3.4–8.8)
UROBILINOGEN FLD QL: NEGATIVE — SIGNIFICANT CHANGE UP
WBC # BLD: 0.5 K/UL — CRITICAL LOW (ref 3.8–10.5)
WBC # BLD: 0.65 K/UL — CRITICAL LOW (ref 3.8–10.5)
WBC # FLD AUTO: 0.5 K/UL — CRITICAL LOW (ref 3.8–10.5)
WBC # FLD AUTO: 0.65 K/UL — CRITICAL LOW (ref 3.8–10.5)
WBC UR QL: SIGNIFICANT CHANGE UP /HPF (ref 0–5)

## 2017-10-07 PROCEDURE — 99291 CRITICAL CARE FIRST HOUR: CPT | Mod: 25

## 2017-10-07 PROCEDURE — 93010 ELECTROCARDIOGRAM REPORT: CPT

## 2017-10-07 PROCEDURE — 36556 INSERT NON-TUNNEL CV CATH: CPT

## 2017-10-07 RX ORDER — HYDROCORTISONE 20 MG
100 TABLET ORAL ONCE
Qty: 0 | Refills: 0 | Status: DISCONTINUED | OUTPATIENT
Start: 2017-10-07 | End: 2017-10-07

## 2017-10-07 RX ORDER — SODIUM CHLORIDE 9 MG/ML
500 INJECTION INTRAMUSCULAR; INTRAVENOUS; SUBCUTANEOUS ONCE
Qty: 0 | Refills: 0 | Status: DISCONTINUED | OUTPATIENT
Start: 2017-10-07 | End: 2017-10-07

## 2017-10-07 RX ORDER — ETHACRYNIC ACID 25 MG/1
25 TABLET ORAL DAILY
Qty: 0 | Refills: 0 | Status: DISCONTINUED | OUTPATIENT
Start: 2017-10-07 | End: 2017-10-07

## 2017-10-07 RX ORDER — ALBUMIN HUMAN 25 %
100 VIAL (ML) INTRAVENOUS ONCE
Qty: 0 | Refills: 0 | Status: DISCONTINUED | OUTPATIENT
Start: 2017-10-07 | End: 2017-10-07

## 2017-10-07 RX ORDER — CALCIUM GLUCONATE 100 MG/ML
1 VIAL (ML) INTRAVENOUS ONCE
Qty: 0 | Refills: 0 | Status: COMPLETED | OUTPATIENT
Start: 2017-10-07 | End: 2017-10-07

## 2017-10-07 RX ORDER — MEROPENEM 1 G/30ML
1000 INJECTION INTRAVENOUS ONCE
Qty: 0 | Refills: 0 | Status: COMPLETED | OUTPATIENT
Start: 2017-10-07 | End: 2017-10-07

## 2017-10-07 RX ORDER — PANTOPRAZOLE SODIUM 20 MG/1
40 TABLET, DELAYED RELEASE ORAL
Qty: 0 | Refills: 0 | Status: DISCONTINUED | OUTPATIENT
Start: 2017-10-07 | End: 2017-10-08

## 2017-10-07 RX ORDER — MEROPENEM 1 G/30ML
INJECTION INTRAVENOUS
Qty: 0 | Refills: 0 | Status: DISCONTINUED | OUTPATIENT
Start: 2017-10-07 | End: 2017-10-08

## 2017-10-07 RX ORDER — POTASSIUM CHLORIDE 20 MEQ
10 PACKET (EA) ORAL
Qty: 0 | Refills: 0 | Status: DISCONTINUED | OUTPATIENT
Start: 2017-10-07 | End: 2017-10-07

## 2017-10-07 RX ORDER — SODIUM CHLORIDE 9 MG/ML
1000 INJECTION, SOLUTION INTRAVENOUS
Qty: 0 | Refills: 0 | Status: DISCONTINUED | OUTPATIENT
Start: 2017-10-07 | End: 2017-10-08

## 2017-10-07 RX ORDER — VANCOMYCIN HCL 1 G
1000 VIAL (EA) INTRAVENOUS EVERY 12 HOURS
Qty: 0 | Refills: 0 | Status: DISCONTINUED | OUTPATIENT
Start: 2017-10-07 | End: 2017-10-08

## 2017-10-07 RX ORDER — SENNA PLUS 8.6 MG/1
2 TABLET ORAL AT BEDTIME
Qty: 0 | Refills: 0 | Status: DISCONTINUED | OUTPATIENT
Start: 2017-10-07 | End: 2017-10-08

## 2017-10-07 RX ORDER — VANCOMYCIN HCL 1 G
1000 VIAL (EA) INTRAVENOUS ONCE
Qty: 0 | Refills: 0 | Status: DISCONTINUED | OUTPATIENT
Start: 2017-10-07 | End: 2017-10-07

## 2017-10-07 RX ORDER — WARFARIN SODIUM 2.5 MG/1
2 TABLET ORAL ONCE
Qty: 0 | Refills: 0 | Status: DISCONTINUED | OUTPATIENT
Start: 2017-10-07 | End: 2017-10-07

## 2017-10-07 RX ORDER — MEROPENEM 1 G/30ML
1000 INJECTION INTRAVENOUS EVERY 8 HOURS
Qty: 0 | Refills: 0 | Status: DISCONTINUED | OUTPATIENT
Start: 2017-10-08 | End: 2017-10-08

## 2017-10-07 RX ORDER — ALBUMIN HUMAN 25 %
250 VIAL (ML) INTRAVENOUS ONCE
Qty: 0 | Refills: 0 | Status: DISCONTINUED | OUTPATIENT
Start: 2017-10-07 | End: 2017-10-07

## 2017-10-07 RX ORDER — ALLOPURINOL 300 MG
300 TABLET ORAL DAILY
Qty: 0 | Refills: 0 | Status: DISCONTINUED | OUTPATIENT
Start: 2017-10-07 | End: 2017-10-08

## 2017-10-07 RX ORDER — POTASSIUM PHOSPHATE, MONOBASIC POTASSIUM PHOSPHATE, DIBASIC 236; 224 MG/ML; MG/ML
15 INJECTION, SOLUTION INTRAVENOUS ONCE
Qty: 0 | Refills: 0 | Status: DISCONTINUED | OUTPATIENT
Start: 2017-10-07 | End: 2017-10-08

## 2017-10-07 RX ORDER — IBUPROFEN 200 MG
200 TABLET ORAL ONCE
Qty: 0 | Refills: 0 | Status: COMPLETED | OUTPATIENT
Start: 2017-10-07 | End: 2017-10-07

## 2017-10-07 RX ORDER — AZTREONAM 2 G
1000 VIAL (EA) INJECTION ONCE
Qty: 0 | Refills: 0 | Status: DISCONTINUED | OUTPATIENT
Start: 2017-10-07 | End: 2017-10-07

## 2017-10-07 RX ORDER — ONDANSETRON 8 MG/1
8 TABLET, FILM COATED ORAL EVERY 8 HOURS
Qty: 0 | Refills: 0 | Status: DISCONTINUED | OUTPATIENT
Start: 2017-10-07 | End: 2017-10-08

## 2017-10-07 RX ORDER — AZTREONAM 2 G
VIAL (EA) INJECTION
Qty: 0 | Refills: 0 | Status: DISCONTINUED | OUTPATIENT
Start: 2017-10-07 | End: 2017-10-07

## 2017-10-07 RX ORDER — MAGNESIUM SULFATE 500 MG/ML
2 VIAL (ML) INJECTION ONCE
Qty: 0 | Refills: 0 | Status: COMPLETED | OUTPATIENT
Start: 2017-10-07 | End: 2017-10-07

## 2017-10-07 RX ORDER — POTASSIUM CHLORIDE 20 MEQ
10 PACKET (EA) ORAL
Qty: 0 | Refills: 0 | Status: COMPLETED | OUTPATIENT
Start: 2017-10-07 | End: 2017-10-08

## 2017-10-07 RX ORDER — WARFARIN SODIUM 2.5 MG/1
5 TABLET ORAL ONCE
Qty: 0 | Refills: 0 | Status: DISCONTINUED | OUTPATIENT
Start: 2017-10-07 | End: 2017-10-07

## 2017-10-07 RX ORDER — METRONIDAZOLE 500 MG
TABLET ORAL
Qty: 0 | Refills: 0 | Status: DISCONTINUED | OUTPATIENT
Start: 2017-10-07 | End: 2017-10-07

## 2017-10-07 RX ORDER — METRONIDAZOLE 500 MG
500 TABLET ORAL ONCE
Qty: 0 | Refills: 0 | Status: DISCONTINUED | OUTPATIENT
Start: 2017-10-07 | End: 2017-10-07

## 2017-10-07 RX ADMIN — FINASTERIDE 5 MILLIGRAM(S): 5 TABLET, FILM COATED ORAL at 12:08

## 2017-10-07 RX ADMIN — Medication 480 MICROGRAM(S): at 12:15

## 2017-10-07 RX ADMIN — ENOXAPARIN SODIUM 90 MILLIGRAM(S): 100 INJECTION SUBCUTANEOUS at 06:29

## 2017-10-07 RX ADMIN — NYSTATIN CREAM 1 APPLICATION(S): 100000 CREAM TOPICAL at 12:17

## 2017-10-07 RX ADMIN — ENOXAPARIN SODIUM 90 MILLIGRAM(S): 100 INJECTION SUBCUTANEOUS at 17:06

## 2017-10-07 RX ADMIN — PANTOPRAZOLE SODIUM 40 MILLIGRAM(S): 20 TABLET, DELAYED RELEASE ORAL at 06:29

## 2017-10-07 RX ADMIN — Medication 300 MILLIGRAM(S): at 12:07

## 2017-10-07 RX ADMIN — Medication 200 GRAM(S): at 22:57

## 2017-10-07 RX ADMIN — Medication 200 MILLIGRAM(S): at 17:36

## 2017-10-07 RX ADMIN — Medication 200 MILLIGRAM(S): at 17:06

## 2017-10-07 RX ADMIN — Medication 100 MILLIEQUIVALENT(S): at 23:42

## 2017-10-07 RX ADMIN — NYSTATIN CREAM 1 APPLICATION(S): 100000 CREAM TOPICAL at 06:29

## 2017-10-07 RX ADMIN — ETHACRYNIC ACID 25 MILLIGRAM(S): 25 TABLET ORAL at 12:11

## 2017-10-07 RX ADMIN — Medication 50 GRAM(S): at 22:57

## 2017-10-07 RX ADMIN — MEROPENEM 200 MILLIGRAM(S): 1 INJECTION INTRAVENOUS at 23:26

## 2017-10-07 NOTE — CHART NOTE - NSCHARTNOTEFT_GEN_A_CORE
EVENT NOTE  Event: Called to patient's bedside for patient with change in mental status. This is a 84M c hx DLBCL not yet on chemo, biliary stenosis s/p cbd stent, mejias's esophagus, BPH c/b urinary retention s/p chronic tavarez (started 9/5/17), CKD (baseline Cr 1.2), recent discharge (9/15) p/w hypotension at rehab. Dx: HCAP/Asp PNA abd wound, s/p recent robotic lymph node biopsy new B cell lymphoma L femoral DVT Hypercalcemia  MARIA C Dysphagia. Bedside assessment completed. Patient has new onset confusion and not aware of his present location but able to respond to verbal cues. Patient has decrease movement to left upper extremity but able to move bilateral lower extremities. Vital sign BP hypotensive with tachycardia.        Vitals:T(F): 98 (10-07-17 @ 15:53)  HR: 120 (10-07-17 @ 15:53)  BP: 102/63 (10-07-17 @ 15:53)  RR: 18 (10-07-17 @ 15:53)  SpO2: 93% (10-07-17 @ 15:53)  Wt(kg): --    WBC Count: 0.5 K/uL (10-07 @ 20:08)  Hemoglobin: 9.1 g/dL (10-07 @ 20:08)  Hematocrit: 25.7 % (10-07 @ 20:08)  Platelet Count - Automated: 41 K/uL (10-07 @ 20:08)  Potassium, Serum: 3.0 mmol/L (10-07 @ 20:08)  Chloride, Serum: 112 mmol/L (10-07 @ 20:08)  Carbon Dioxide, Serum: 24 mmol/L (10-07 @ 20:08)  Glucose, Serum: 108 mg/dL (10-07 @ 20:08)  Blood Urea Nitrogen, Serum: 21 mg/dL (10-07 @ 20:08)  WBC Count: 0.65 K/uL (10-07 @ 15:24)  Hemoglobin: 11.5 g/dL (10-07 @ 15:24)  Hematocrit: 34.9 % (10-07 @ 15:24)  Platelet Count - Automated: 68 K/uL (10-07 @ 15:24)  Potassium, Serum: 4.3 mmol/L (10-07 @ 15:24)  Chloride, Serum: 109 mmol/L (10-07 @ 15:24)  Carbon Dioxide, Serum: 24 mmol/L (10-07 @ 15:24)  Glucose, Serum: 135 mg/dL (10-07 @ 15:24)  Blood Urea Nitrogen, Serum: 17 mg/dL (10-07 @ 15:24)    Creatinine, Serum: 1.22 10-07 @ 20:08  Creatinine, Serum: 0.95 10-07 @ 15:24          PHYSICAL EXAM:      General: A&OX1-2 no acute distress    Cardiovascular: S1S2 tachycardia RRR    Respiratory: Lungs clear     Gastrointestinal: NT, ND, Positive bowel sound    Extremities: Bilateral Lower extremity edema with positive pulses         Assessment/Plan: 84M c hx DLBCL not yet on chemo, biliary stenosis s/p cbd stent, mejias's esophagus, BPH c/b urinary retention s/p chronic tavarez (started 9/5/17), CKD (baseline Cr 1.2), recent discharge (9/15) p/w hypotension at rehab. dx: HCAP/Asp PNA abd wound, s/p recent robotic lymph node biopsy new B cell lymphoma L femoral DVT Hypercalcemia MARIA C Dysphagia. Now with new onset confusion in the setting of sepsis with decreased movement to left upper extremity. Patient hypotensive but with good mental response.       Interventions performed:  RRT/code stroke  Gluco check sugar 97  MICU consult  Blood pressure monitoring  NS administration  Albumin   solucortef  potassium  magnesium  Calcium  levophed  Transfer patient to MICU        Oleg Small NP Medicine Team # [id number]

## 2017-10-07 NOTE — PROGRESS NOTE ADULT - PROBLEM SELECTOR PLAN 9
+ left common femoral DVT likely 2/2 increased risk from sedentary and malignancy  on therapeutic lovenox, cont coumadin 5 mg today  may benefit from lasix, will touch base with renal
+ left common femoral DVT likely 2/2 increased risk from sedentary and malignancy  on therapeutic lovenox
+ left common femoral DVT likely 2/2 increased risk from sedentary and malignancy  on therapeutic lovenox, start coumadin 5 mg today  may benefit from lasix, will touch base with renal
+ left common femoral DVT likely 2/2 increased risk from sedentary and malignancy  on therapeutic lovenox
+ left common femoral DVT likely 2/2 increased risk from sedentary and malignancy  on therapeutic lovenox
+ left common femoral DVT likely 2/2 increased risk from sedentary and malignancy  on therapeutic lovenox  may benefit from lasix, will touch base with renal
PT in progress

## 2017-10-07 NOTE — PROGRESS NOTE ADULT - PROBLEM SELECTOR PLAN 2
if NA, improved, will hold off D5W today  cont ethacrynic acid stopped IVF 2' leg weeping  cont ethacrynic acid for leg edema

## 2017-10-07 NOTE — PROGRESS NOTE ADULT - PROBLEM SELECTOR PLAN 6
monitor CBC with diff daily, on zarxio  cont allopurinol    first dose vincristine and cyclophosphamide 9/27/17  R-CHOP next week likely

## 2017-10-07 NOTE — PROGRESS NOTE ADULT - PROBLEM SELECTOR PROBLEM 8
Leg swelling
Urinary retention
Leg swelling
Urinary retention
Leg swelling
Urinary retention

## 2017-10-07 NOTE — PROGRESS NOTE ADULT - PROBLEM SELECTOR PLAN 10
PT in progress

## 2017-10-07 NOTE — CHART NOTE - NSCHARTNOTEFT_GEN_A_CORE
CHIEF COMPLAINT:    HPI:  84-yo Male w/ recent dx of Diffuse large B-cell lymphoma, non-germinal center ;s/p Prednisone 60 mg PO x 5, one dose of Vincristine and Cyclophosphamide on 9/27/17, biliary stenosis s/p CBD stent 9/1/17, mejias's esophagus, BPH c/b urinary retention s/p chronic tavarez (started 9/5/17), CKD (baseline Cr 1.2),  p/w weakness and AMS was found to have hypercalcemia 2/2 malignancy s/p pamidronate , sepsis 2/2 aspiration  PNA completed course of Flagyl and Azactam 9/20-9/28, and b/l LE edema with L common femoral DVT on 9/22/17 was started on AC. Now, s/p RRT for      PAST MEDICAL & SURGICAL HISTORY:  Diffuse large B-cell lymphoma of intra-abdominal lymph nodes  Mejias esophagus  Intestinal infection: 2012  Bladder polyp  H/O inguinal hernia repair  S/P TURP (transurethral resection of prostate)  Bladder polyp: s/p TURBT  History of cholecystectomy: 1987      FAMILY HISTORY:  No pertinent family history in first degree relatives      SOCIAL HISTORY:  Smoking: [ ] Never Smoked [ ] Former Smoker (__ packs x ___ years) [ ] Current Smoker  (__ packs x ___ years)  Substance Use: [ ] Never Used [ ] Used ____  EtOH Use:  Marital Status: [ ] Single [ ]  [ ]  [ ]   Sexual History:   Occupation:  Recent Travel:  Country of Birth:  Advance Directives:    Allergies    latex (Rash; Blisters)  penicillins (Swelling)  sulfa drugs (Unknown)    Intolerances        HOME MEDICATIONS:    REVIEW OF SYSTEMS:  Constitutional: [ ] negative [ ] fevers [ ] chills [ ] weight loss [ ] weight gain  HEENT: [ ] negative [ ] dry eyes [ ] eye irritation [ ] postnasal drip [ ] nasal congestion  CV: [ ] negative  [ ] chest pain [ ] orthopnea [ ] palpitations [ ] murmur  Resp: [ ] negative [ ] cough [ ] shortness of breath [ ] dyspnea [ ] wheezing [ ] sputum [ ] hemoptysis  GI: [ ] negative [ ] nausea [ ] vomiting [ ] diarrhea [ ] constipation [ ] abd pain [ ] dysphagia   : [ ] negative [ ] dysuria [ ] nocturia [ ] hematuria [ ] increased urinary frequency  Musculoskeletal: [ ] negative [ ] back pain [ ] myalgias [ ] arthralgias [ ] fracture  Skin: [ ] negative [ ] rash [ ] itch  Neurological: [ ] negative [ ] headache [ ] dizziness [ ] syncope [ ] weakness [ ] numbness  Psychiatric: [ ] negative [ ] anxiety [ ] depression  Endocrine: [ ] negative [ ] diabetes [ ] thyroid problem  Hematologic/Lymphatic: [ ] negative [ ] anemia [ ] bleeding problem  Allergic/Immunologic: [ ] negative [ ] itchy eyes [ ] nasal discharge [ ] hives [ ] angioedema  [ ] All other systems negative  [ ] Unable to assess ROS because ________    OBJECTIVE:  ICU Vital Signs Last 24 Hrs  T(C): 36.7 (07 Oct 2017 15:53), Max: 36.7 (07 Oct 2017 15:53)  T(F): 98 (07 Oct 2017 15:53), Max: 98 (07 Oct 2017 15:53)  HR: 120 (07 Oct 2017 15:53) (99 - 120)  BP: 102/63 (07 Oct 2017 15:53) (100/66 - 107/61)  BP(mean): --  ABP: --  ABP(mean): --  RR: 18 (07 Oct 2017 15:53) (18 - 18)  SpO2: 93% (07 Oct 2017 15:53) (93% - 96%)        10-06 @ 07:01  -  10-07 @ 07:00  --------------------------------------------------------  IN: 180 mL / OUT: 990 mL / NET: -810 mL    10-07 @ 07:01  -  10-07 @ 21:36  --------------------------------------------------------  IN: 300 mL / OUT: 900 mL / NET: -600 mL      CAPILLARY BLOOD GLUCOSE          PHYSICAL EXAM:  General:   HEENT:   Lymph Nodes:  Neck:   Respiratory:   Cardiovascular:   Abdomen:   Extremities:   Skin:   Neurological:  Psychiatry:    LINES:     HOSPITAL MEDICATIONS:    aztreonam  IVPB      aztreonam  IVPB 1000 milliGRAM(s) IV Intermittent once  metroNIDAZOLE  IVPB 500 milliGRAM(s) IV Intermittent once  metroNIDAZOLE  IVPB      vancomycin  IVPB 1000 milliGRAM(s) IV Intermittent once    ethacrynic acid 25 milliGRAM(s) Oral daily    allopurinol 300 milliGRAM(s) Oral daily  finasteride 5 milliGRAM(s) Oral daily  hydrocortisone sodium succinate Injectable 100 milliGRAM(s) IV Push once      ondansetron Injectable 8 milliGRAM(s) IV Push every 8 hours PRN    loperamide 2 milliGRAM(s) Oral once  pantoprazole    Tablet 40 milliGRAM(s) Oral before breakfast  senna 2 Tablet(s) Oral at bedtime    cyclophosphamide IVPB 1344 milliGRAM(s) IV Intermittent once  vinCRIStine IVPB 2 milliGRAM(s) IV Intermittent once      albumin human 25% IVPB 100 milliLiter(s) IV Intermittent once  calcium gluconate IVPB 1 Gram(s) IV Intermittent once  magnesium sulfate  IVPB 2 Gram(s) IV Intermittent once  potassium chloride  10 mEq/100 mL IVPB 10 milliEquivalent(s) IV Intermittent every 2 hours  potassium chloride  10 mEq/100 mL IVPB 10 milliEquivalent(s) IV Intermittent every 1 hour  potassium chloride  10 mEq/100 mL IVPB 10 milliEquivalent(s) IV Intermittent every 1 hour  potassium phosphate IVPB 15 milliMole(s) IV Intermittent once  sodium chloride 0.9% Bolus 500 milliLiter(s) IV Bolus once    filgrastim-sndz Injectable 480 MICROGram(s) SubCutaneous daily  influenza   Vaccine 0.5 milliLiter(s) IntraMuscular once    nystatin Powder 1 Application(s) Topical three times a day        LABS:                        9.1    0.5   )-----------( 41       ( 07 Oct 2017 20:08 )             25.7     Hgb Trend: 9.1<--, 11.5<--, 10.4<--, 10.4<--, 11.7<--  10-07    146<H>  |  112<H>  |  21  ----------------------------<  108<H>  3.0<L>   |  24  |  1.22    Ca    7.3<L>      07 Oct 2017 20:08  Phos  1.0     10-07  Mg     1.2     10-07    TPro  3.4<L>  /  Alb  1.5<L>  /  TBili  1.1  /  DBili  x   /  AST  18  /  ALT  23  /  AlkPhos  59  10-07    Creatinine Trend: 1.22<--, 0.95<--, 0.82<--, 0.77<--, 0.84<--, 0.85<--  PT/INR - ( 07 Oct 2017 15:24 )   PT: 23.6 sec;   INR: 2.06 ratio               Venous Blood Gas:  10-07 @ 20:07  7.49/33/24/25/41  VBG Lactate: 2.5      MICROBIOLOGY:     RADIOLOGY:  [ ] Reviewed and interpreted by me    EKG: CHIEF COMPLAINT: weakness     HPI:  84-yo Male w/ recent dx of Diffuse large B-cell lymphoma, non-germinal center ;s/p Prednisone 60 mg PO x 5, one dose of Vincristine and Cyclophosphamide on 9/27/17, biliary stenosis s/p CBD stent 9/1/17, mejias's esophagus, BPH c/b urinary retention s/p chronic Askew (started 9/5/17), CKD (baseline Cr 1.2),  p/ on 9/20/17 from [facility] with weakness and AMS was found to have hypercalcemia 2/2 malignancy s/p pamidronate , sepsis 2/2 aspiration  PNA completed course of Flagyl and Azactam 9/20-9/28, and b/l LE edema with L common femoral DVT on 9/22/17 was started on AC. Now, s/p RRT for hypotension SBP 50's  did not respond to IVF was started on levophed gtt ; obtained MAP >65 and pt was safely transferred to MICU for monitoring and management.     ICU Vital Signs Last 24 Hrs  T(C): 36.5 - 36.7   HR: 99 - 120  BP: 100/66 - 114/59  BP(mean): 80   RR: 18 - 22  SpO2: 93% - 96%    PAST MEDICAL & SURGICAL HISTORY:  Diffuse large B-cell lymphoma of intra-abdominal lymph nodes  Mejias esophagus  Intestinal infection: 2012  Bladder polyp  H/O inguinal hernia repair  S/P TURP (transurethral resection of prostate)  Bladder polyp: s/p TURBT  History of cholecystectomy: 1987      FAMILY HISTORY:  No pertinent family history in first degree relatives      SOCIAL HISTORY:  Smoking: [ ] Never Smoked [ ] Former Smoker (__ packs x ___ years) [ ] Current Smoker  (__ packs x ___ years)  Substance Use: [ ] Never Used [ ] Used ____  EtOH Use: denies  Marital Status: [ ] Single [ x]  [ ]  [ ]   Occupation: retired  Recent Travel: NO  Country of Birth: [place]  Advance Directives: full code     Allergies    latex (Rash; Blisters)  penicillins (Swelling)  sulfa drugs (Unknown)    HOME MEDICATIONS:    REVIEW OF SYSTEMS:  Constitutional: [ ] negative [ ] fevers [ ] chills [ ] weight loss [ ] weight gain  HEENT: [ ] negative [ ] dry eyes [ ] eye irritation [ ] postnasal drip [ ] nasal congestion  CV: [ ] negative  [ ] chest pain [ ] orthopnea [ ] palpitations [ ] murmur  Resp: [ ] negative [ ] cough [ ] shortness of breath [ ] dyspnea [ ] wheezing [ ] sputum [ ] hemoptysis  GI: [ ] negative [ ] nausea [ ] vomiting [ ] diarrhea [ ] constipation [ ] abd pain [ ] dysphagia   : [ ] negative [ ] dysuria [ ] nocturia [ ] hematuria [ ] increased urinary frequency  Musculoskeletal: [ ] negative [ ] back pain [ ] myalgias [ ] arthralgias [ ] fracture  Skin: [ ] negative [ ] rash [ ] itch  Neurological: [ ] negative [ ] headache [ ] dizziness [ ] syncope [ ] weakness [ ] numbness  Psychiatric: [ ] negative [ ] anxiety [ ] depression  Endocrine: [ ] negative [ ] diabetes [ ] thyroid problem  Hematologic/Lymphatic: [ ] negative [ ] anemia [ ] bleeding problem  Allergic/Immunologic: [ ] negative [ ] itchy eyes [ ] nasal discharge [ ] hives [ ] angioedema  [ ] All other systems negative  [ ] Unable to assess ROS because ________    I&O's     10-06 @ 07:01  -  10-07 @ 07:00  --------------------------------------------------------  IN: 180 mL / OUT: 990 mL / NET: -810 mL    10-07 @ 07:01  -  10-07 @ 21:36  --------------------------------------------------------  IN: 300 mL / OUT: 900 mL / NET: -600 mL      CAPILLARY BLOOD GLUCOSE          PHYSICAL EXAM:  General:   HEENT:   Lymph Nodes:  Neck:   Respiratory:   Cardiovascular:   Abdomen:   Extremities:   Skin:   Neurological:  Psychiatry:    LINES:     HOSPITAL MEDICATIONS:    aztreonam  IVPB      aztreonam  IVPB 1000 milliGRAM(s) IV Intermittent once  metroNIDAZOLE  IVPB 500 milliGRAM(s) IV Intermittent once  metroNIDAZOLE  IVPB      vancomycin  IVPB 1000 milliGRAM(s) IV Intermittent once    ethacrynic acid 25 milliGRAM(s) Oral daily    allopurinol 300 milliGRAM(s) Oral daily  finasteride 5 milliGRAM(s) Oral daily  hydrocortisone sodium succinate Injectable 100 milliGRAM(s) IV Push once      ondansetron Injectable 8 milliGRAM(s) IV Push every 8 hours PRN    loperamide 2 milliGRAM(s) Oral once  pantoprazole    Tablet 40 milliGRAM(s) Oral before breakfast  senna 2 Tablet(s) Oral at bedtime    cyclophosphamide IVPB 1344 milliGRAM(s) IV Intermittent once  vinCRIStine IVPB 2 milliGRAM(s) IV Intermittent once      albumin human 25% IVPB 100 milliLiter(s) IV Intermittent once  calcium gluconate IVPB 1 Gram(s) IV Intermittent once  magnesium sulfate  IVPB 2 Gram(s) IV Intermittent once  potassium chloride  10 mEq/100 mL IVPB 10 milliEquivalent(s) IV Intermittent every 2 hours  potassium chloride  10 mEq/100 mL IVPB 10 milliEquivalent(s) IV Intermittent every 1 hour  potassium chloride  10 mEq/100 mL IVPB 10 milliEquivalent(s) IV Intermittent every 1 hour  potassium phosphate IVPB 15 milliMole(s) IV Intermittent once  sodium chloride 0.9% Bolus 500 milliLiter(s) IV Bolus once    filgrastim-sndz Injectable 480 MICROGram(s) SubCutaneous daily  influenza   Vaccine 0.5 milliLiter(s) IntraMuscular once    nystatin Powder 1 Application(s) Topical three times a day        LABS:                        9.1    0.5   )-----------( 41       ( 07 Oct 2017 20:08 )             25.7     Hgb Trend: 9.1<--, 11.5<--, 10.4<--, 10.4<--, 11.7<--  10-07    146<H>  |  112<H>  |  21  ----------------------------<  108<H>  3.0<L>   |  24  |  1.22    Ca    7.3<L>      07 Oct 2017 20:08  Phos  1.0     10-07  Mg     1.2     10-07    TPro  3.4<L>  /  Alb  1.5<L>  /  TBili  1.1  /  DBili  x   /  AST  18  /  ALT  23  /  AlkPhos  59  10-07    Creatinine Trend: 1.22<--, 0.95<--, 0.82<--, 0.77<--, 0.84<--, 0.85<--  PT/INR - ( 07 Oct 2017 15:24 )   PT: 23.6 sec;   INR: 2.06 ratio               Venous Blood Gas:  10-07 @ 20:07  7.49/33/24/25/41  VBG Lactate: 2.5      MICROBIOLOGY:     RADIOLOGY:  [ ] Reviewed and interpreted by me    EKG: CHIEF COMPLAINT: weakness     HPI:  84-yo Male w/ recent dx of Diffuse large B-cell lymphoma, non-germinal center ;s/p Prednisone 60 mg PO x 5, one dose of Vincristine and Cyclophosphamide on 9/27/17, biliary stenosis s/p CBD stent 9/1/17, mejias's esophagus, BPH c/b urinary retention s/p chronic Askew (started 9/5/17), CKD (baseline Cr 1.2),  p/ on 9/20/17 from [facility] with weakness and AMS was found to have hypercalcemia 2/2 malignancy s/p pamidronate , sepsis 2/2 aspiration  PNA completed course of Flagyl and Azactam 9/20-9/28, and b/l LE edema with L common femoral DVT on 9/22/17 was started on AC. Now, s/p RRT for hypotension SBP 50's  did not respond to IVF was started on levophed gtt ; obtained MAP >65 and pt was safely transferred to MICU for monitoring and management.     ICU Vital Signs Last 24 Hrs  T(C): 36.5 - 36.7   HR: 99 - 120  BP: 100/66 - 114/59  BP(mean): 80   RR: 18 - 22  SpO2: 93% - 96%    PAST MEDICAL & SURGICAL HISTORY:  Diffuse large B-cell lymphoma of intra-abdominal lymph nodes  Mejias esophagus  Intestinal infection: 2012  Bladder polyp  H/O inguinal hernia repair  S/P TURP (transurethral resection of prostate)  Bladder polyp: s/p TURBT  History of cholecystectomy: 1987      FAMILY HISTORY:  No pertinent family history in first degree relatives      SOCIAL HISTORY:  Smoking: [ ] Never Smoked [ ] Former Smoker (__ packs x ___ years) [ ] Current Smoker  (__ packs x ___ years)  Substance Use: [ ] Never Used [ ] Used ____  EtOH Use: denies  Marital Status: [ ] Single [ x]  [ ]  [ ]   Occupation: retired  Recent Travel: NO  Country of Birth: [place]  Advance Directives: full code     Allergies    latex (Rash; Blisters)  penicillins (Swelling)  sulfa drugs (Unknown)    HOME MEDICATIONS:    REVIEW OF SYSTEMS:  Constitutional: [ ] negative [ ] fevers [ x] chills [ ] weight loss [ ] weight gain  HEENT: [ ] negative [ ] dry eyes [ ] eye irritation [ ] postnasal drip [ ] nasal congestion  CV: [ ] negative  [ ] chest pain [ ] orthopnea [ ] palpitations [ ] murmur  Resp: [ ] negative [ ] cough [ ] shortness of breath [ ] dyspnea [ ] wheezing [ ] sputum [ ] hemoptysis  GI: [ ] negative [ ] nausea [ ] vomiting [ ] diarrhea [ ] constipation [ ] abd pain [ ] dysphagia   : [ ] negative [ ] dysuria [ ] nocturia [ ] hematuria [ ] increased urinary frequency  Musculoskeletal: [ ] negative [ ] back pain [ ] myalgias [ ] arthralgias [ ] fracture  Skin: [ ] negative [ ] rash [ ] itch  Neurological: [ ] negative [ ] headache [ ] dizziness [ ] syncope [ x] weakness [ ] numbness  Psychiatric: [ ] negative [ ] anxiety [ ] depression  Endocrine: [ ] negative [ ] diabetes [ ] thyroid problem  Hematologic/Lymphatic: [ ] negative [ x] anemia [ ] bleeding problem  Allergic/Immunologic: [ ] negative [ ] itchy eyes [ ] nasal discharge [ ] hives [ ] angioedema  [ ] All other systems negative    I&O's     10-06 @ 07:01  -  10-07 @ 07:00  --------------------------------------------------------  IN: 180 mL / OUT: 990 mL / NET: -810 mL    10-07 @ 07:01  -  10-07 @ 21:36  --------------------------------------------------------  IN: 300 mL / OUT: 900 mL / NET: -600 mL      CAPILLARY BLOOD GLUCOSE          PHYSICAL EXAM:  GENERAL: awake, responds appropriately,   HEAD:  NCAT, dry mucosa membranes   EYES: EOMI  CHEST/LUNG: decreased BS at bases  HEART: tachycardic , irregular; Telemetry A fib 110's   ABDOMEN: Soft, Nontender, Nondistended; Bowel sounds present  : chronic Askew with dark yellow urine   EXTREMITIES:  + 2 pitting edema both LE with some weeping  SKIN: No rashes or lesions  NEURO: No focal deficits      LINES: PIV 20 g x2     HOSPITAL MEDICATIONS:    aztreonam  IVPB      aztreonam  IVPB 1000 milliGRAM(s) IV Intermittent once  metroNIDAZOLE  IVPB 500 milliGRAM(s) IV Intermittent once  metroNIDAZOLE  IVPB      vancomycin  IVPB 1000 milliGRAM(s) IV Intermittent once    ethacrynic acid 25 milliGRAM(s) Oral daily    allopurinol 300 milliGRAM(s) Oral daily  finasteride 5 milliGRAM(s) Oral daily  hydrocortisone sodium succinate Injectable 100 milliGRAM(s) IV Push once      ondansetron Injectable 8 milliGRAM(s) IV Push every 8 hours PRN    loperamide 2 milliGRAM(s) Oral once  pantoprazole    Tablet 40 milliGRAM(s) Oral before breakfast  senna 2 Tablet(s) Oral at bedtime    cyclophosphamide IVPB 1344 milliGRAM(s) IV Intermittent once  vinCRIStine IVPB 2 milliGRAM(s) IV Intermittent once      albumin human 25% IVPB 100 milliLiter(s) IV Intermittent once  calcium gluconate IVPB 1 Gram(s) IV Intermittent once  magnesium sulfate  IVPB 2 Gram(s) IV Intermittent once  potassium chloride  10 mEq/100 mL IVPB 10 milliEquivalent(s) IV Intermittent every 2 hours  potassium chloride  10 mEq/100 mL IVPB 10 milliEquivalent(s) IV Intermittent every 1 hour  potassium chloride  10 mEq/100 mL IVPB 10 milliEquivalent(s) IV Intermittent every 1 hour  potassium phosphate IVPB 15 milliMole(s) IV Intermittent once  sodium chloride 0.9% Bolus 500 milliLiter(s) IV Bolus once    filgrastim-sndz Injectable 480 MICROGram(s) SubCutaneous daily  influenza   Vaccine 0.5 milliLiter(s) IntraMuscular once    nystatin Powder 1 Application(s) Topical three times a day        LABS:                        9.1    0.5   )-----------( 41       ( 07 Oct 2017 20:08 )             25.7     Hgb Trend: 9.1<--, 11.5<--, 10.4<--, 10.4<--, 11.7<--  10-07    146<H>  |  112<H>  |  21  ----------------------------<  108<H>  3.0<L>   |  24  |  1.22    Ca    7.3<L>      07 Oct 2017 20:08  Phos  1.0     10-07  Mg     1.2     10-07    TPro  3.4<L>  /  Alb  1.5<L>  /  TBili  1.1  /  DBili  x   /  AST  18  /  ALT  23  /  AlkPhos  59  10-07    Creatinine Trend: 1.22<--, 0.95<--, 0.82<--, 0.77<--, 0.84<--, 0.85<--  PT/INR - ( 07 Oct 2017 15:24 )   PT: 23.6 sec;   INR: 2.06 ratio      Venous Blood Gas:  10-07 @ 20:07  7.49/33/24/25/41  VBG Lactate: 2.5    MICROBIOLOGY:     RADIOLOGY:  < from: Xray Chest 1 View AP -PORTABLE-Routine (09.26.17 @ 08:50) >    Impression: No pneumothorax. Stable small left pleural effusion and left   basilar opacity.    < end of copied text >    EKG:  < from: 12 Lead ECG (09.20.17 @ 13:39) >    Diagnosis Line ATRIAL FLUTTER WITH VARIABLE A-V BLOCK  ST ELEVATION CONSIDER ANTERIOR INJURY OR ACUTE INFARCT  *** ** ** ** * ACUTE MI  **     < end of copied text > CHIEF COMPLAINT: weakness     HPI:  84-yo Male w/ recent dx of Diffuse large B-cell lymphoma, non-germinal center ;s/p Prednisone 60 mg PO x 5, one dose of Vincristine and Cyclophosphamide on 9/27/17, biliary stenosis s/p CBD stent 9/1/17, mejias's esophagus, BPH c/b urinary retention s/p chronic Tavarez (started 9/5/17), CKD (baseline Cr 1.2),  p/ on 9/20/17 from [facility] with weakness and AMS was found to have hypercalcemia 2/2 malignancy s/p pamidronate , sepsis 2/2 aspiration  PNA completed course of Flagyl and Azactam 9/20-9/28, b/l LE edema with L common femoral DVT on 9/22/17 was started on AC, and neutropenia drug induced now on  Zarxio. Now, s/p RRT for hypotension SBP 50's  did not respond to IVF was started on levophed gtt ; obtained MAP >65 and pt was safely transferred to MICU for monitoring and management.     ICU Vital Signs Last 24 Hrs  T(C): 36.5 - 36.7   HR: 99 - 120  BP: 100/66 - 114/59  BP(mean): 80   RR: 18 - 22  SpO2: 93% - 96%    PAST MEDICAL & SURGICAL HISTORY:  Diffuse large B-cell lymphoma of intra-abdominal lymph nodes  Mejias esophagus  Intestinal infection: 2012  Bladder polyp  H/O inguinal hernia repair  S/P TURP (transurethral resection of prostate)  Bladder polyp: s/p TURBT  History of cholecystectomy: 1987      FAMILY HISTORY:  No pertinent family history in first degree relatives      SOCIAL HISTORY:  Smoking: [ ] Never Smoked [ ] Former Smoker (__ packs x ___ years) [ ] Current Smoker  (__ packs x ___ years)  Substance Use: [ ] Never Used [ ] Used ____  EtOH Use: denies  Marital Status: [ ] Single [ x]  [ ]  [ ]   Occupation: retired  Recent Travel: NO  Country of Birth: [place]  Advance Directives: full code     Allergies    latex (Rash; Blisters)  penicillins (Swelling)  sulfa drugs (Unknown)    HOME MEDICATIONS:    REVIEW OF SYSTEMS:  Constitutional: [ ] negative [ ] fevers [ x] chills [ ] weight loss [ ] weight gain  HEENT: [ ] negative [ ] dry eyes [ ] eye irritation [ ] postnasal drip [ ] nasal congestion  CV: [ ] negative  [ ] chest pain [ ] orthopnea [ ] palpitations [ ] murmur  Resp: [ ] negative [ ] cough [ ] shortness of breath [ ] dyspnea [ ] wheezing [ ] sputum [ ] hemoptysis  GI: [ ] negative [ ] nausea [ ] vomiting [ ] diarrhea [ ] constipation [ ] abd pain [ ] dysphagia   : [ ] negative [ ] dysuria [ ] nocturia [ ] hematuria [ ] increased urinary frequency  Musculoskeletal: [ ] negative [ ] back pain [ ] myalgias [ ] arthralgias [ ] fracture  Skin: [ ] negative [ ] rash [ ] itch  Neurological: [ ] negative [ ] headache [ ] dizziness [ ] syncope [ x] weakness [ ] numbness  Psychiatric: [ ] negative [ ] anxiety [ ] depression  Endocrine: [ ] negative [ ] diabetes [ ] thyroid problem  Hematologic/Lymphatic: [ ] negative [ x] anemia [ ] bleeding problem  Allergic/Immunologic: [ ] negative [ ] itchy eyes [ ] nasal discharge [ ] hives [ ] angioedema  [ ] All other systems negative    I&O's     10-06 @ 07:01  -  10-07 @ 07:00  --------------------------------------------------------  IN: 180 mL / OUT: 990 mL / NET: -810 mL    10-07 @ 07:01  -  10-07 @ 21:36  --------------------------------------------------------  IN: 300 mL / OUT: 900 mL / NET: -600 mL      CAPILLARY BLOOD GLUCOSE          PHYSICAL EXAM:  GENERAL: awake, responds appropriately,   HEAD:  NCAT, dry mucosa membranes   EYES: EOMI  CHEST/LUNG: decreased BS at bases  HEART: tachycardic , irregular; Telemetry A fib 110's   ABDOMEN: Soft, Nontender, Nondistended; Bowel sounds present  : chronic Tavarez with dark yellow urine   EXTREMITIES:  + 2 pitting edema both LE with some weeping  SKIN: No rashes or lesions  NEURO: No focal deficits      LINES: PIV 20 g x2     HOSPITAL MEDICATIONS:    aztreonam  IVPB      aztreonam  IVPB 1000 milliGRAM(s) IV Intermittent once  metroNIDAZOLE  IVPB 500 milliGRAM(s) IV Intermittent once  metroNIDAZOLE  IVPB      vancomycin  IVPB 1000 milliGRAM(s) IV Intermittent once    ethacrynic acid 25 milliGRAM(s) Oral daily    allopurinol 300 milliGRAM(s) Oral daily  finasteride 5 milliGRAM(s) Oral daily  hydrocortisone sodium succinate Injectable 100 milliGRAM(s) IV Push once      ondansetron Injectable 8 milliGRAM(s) IV Push every 8 hours PRN    loperamide 2 milliGRAM(s) Oral once  pantoprazole    Tablet 40 milliGRAM(s) Oral before breakfast  senna 2 Tablet(s) Oral at bedtime    cyclophosphamide IVPB 1344 milliGRAM(s) IV Intermittent once  vinCRIStine IVPB 2 milliGRAM(s) IV Intermittent once      albumin human 25% IVPB 100 milliLiter(s) IV Intermittent once  calcium gluconate IVPB 1 Gram(s) IV Intermittent once  magnesium sulfate  IVPB 2 Gram(s) IV Intermittent once  potassium chloride  10 mEq/100 mL IVPB 10 milliEquivalent(s) IV Intermittent every 2 hours  potassium chloride  10 mEq/100 mL IVPB 10 milliEquivalent(s) IV Intermittent every 1 hour  potassium chloride  10 mEq/100 mL IVPB 10 milliEquivalent(s) IV Intermittent every 1 hour  potassium phosphate IVPB 15 milliMole(s) IV Intermittent once  sodium chloride 0.9% Bolus 500 milliLiter(s) IV Bolus once    filgrastim-sndz Injectable 480 MICROGram(s) SubCutaneous daily  influenza   Vaccine 0.5 milliLiter(s) IntraMuscular once    nystatin Powder 1 Application(s) Topical three times a day        LABS:                        9.1    0.5   )-----------( 41       ( 07 Oct 2017 20:08 )             25.7     Hgb Trend: 9.1<--, 11.5<--, 10.4<--, 10.4<--, 11.7<--  10-07    146<H>  |  112<H>  |  21  ----------------------------<  108<H>  3.0<L>   |  24  |  1.22    Ca    7.3<L>      07 Oct 2017 20:08  Phos  1.0     10-07  Mg     1.2     10-07    TPro  3.4<L>  /  Alb  1.5<L>  /  TBili  1.1  /  DBili  x   /  AST  18  /  ALT  23  /  AlkPhos  59  10-07    Creatinine Trend: 1.22<--, 0.95<--, 0.82<--, 0.77<--, 0.84<--, 0.85<--  PT/INR - ( 07 Oct 2017 15:24 )   PT: 23.6 sec;   INR: 2.06 ratio      Venous Blood Gas:  10-07 @ 20:07  7.49/33/24/25/41  VBG Lactate: 2.5    MICROBIOLOGY:     RADIOLOGY:  < from: Xray Chest 1 View AP -PORTABLE-Routine (09.26.17 @ 08:50) >    Impression: No pneumothorax. Stable small left pleural effusion and left   basilar opacity.    < end of copied text >    84-yo Male with above mentioned PMHx who p/ to hospital with weakness and AMS was found to have hypercalcemia 2/2 malignancy s/p pamidronate , sepsis 2/2 aspiration  PNA completed course of Flagyl and Azactam 9/20-9/28, b/l LE edema with L common femoral DVT on 9/22/17 was started on AC, and and neutropenia drug induced now on  Zarxio. . Now, s/p RRT for hypotension SBP 50's  did not respond to IVF was started on levophed gtt ; obtained MAP >65 and pt was safely transferred to MICU for monitoring and management.        Problem/Plan   Septic shock of unknown etiology   Admit to MICU   PAN cx, UA, CXR, lactate, CBC w diff, CMP, Mg, Phos, ABG, LFT's, LDH, uric acid. lipase, amylase , PT/INR, type and screen, I Ca   c/w pressor support to maintain MAP >65 ; obtain central access   c/w stress dose steroids   c/w broad spectrum abx ; awaiting culture results   Vital signs monitoring  neuro checks       Problem/Plan:   Neutropenia, drug-induced on Zarxio   daily CBC with diff ordered           Problem/Plan - 3:  ·  Problem: Pneumonia, bacterial.  Plan: appreciate ID input  finished 7 days  of aztreonam/flagyl on 9/27 for aspiration and HCAP  BC NTD, urine cx neg.      Problem/Plan - 4:  ·  Problem: Sepsis, due to unspecified organism.  Plan: resolved  monitor cbc for now.      Problem/Plan - 5:  ·  Problem: MARIA C (acute kidney injury).  Plan: cont tavarez (chronic)  no hydro on CT  likely 2/2 hypercalemia  downtrending.      Problem/Plan - 6:  Problem: Diffuse large B-cell lymphoma of intra-abdominal lymph nodes. Plan: monitor CBC with diff daily, on zarxio  cont allopurinol    first dose vincristine and cyclophosphamide 9/27/17  R-[facility] next week likely.     Problem/Plan - 7:  ·  Problem: Hypercalcemia.  Plan: s/p pamidronate and calcimar  cont to monitor daily.      Problem/Plan - 8:  ·  Problem: Urinary retention.  Plan: cont chronic tavarez.      Problem/Plan - 9:  ·  Problem: Leg swelling.  Plan: + left common femoral DVT likely 2/2 increased risk from sedentary and malignancy  on therapeutic lovenox, cont coumadin 5 mg today  may benefit from lasix, will touch base with renal.      Problem/Plan - 10:  Problem: Discharge planning issues. Plan; PT in progress.              EKG: vv  < from: 12 Lead ECG (09.20.17 @ 13:39) >    Diagnosis Line ATRIAL FLUTTER WITH VARIABLE A-V BLOCK  ST ELEVATION CONSIDER ANTERIOR INJURY OR ACUTE INFARCT  *** ** ** ** * ACUTE MI  **     < end of copied text > CHIEF COMPLAINT: weakness     HPI:  84-yo Male w/ recent dx of Diffuse large B-cell lymphoma, non-germinal center ;s/p Prednisone 60 mg PO x 5, one dose of Vincristine and Cyclophosphamide on 9/27/17, biliary stenosis s/p CBD stent 9/1/17, mejias's esophagus, BPH c/b urinary retention s/p chronic Tavarez (started 9/5/17), CKD (baseline Cr 1.2),  p/ on 9/20/17 from [facility] with weakness and AMS was found to have hypercalcemia 2/2 malignancy s/p pamidronate , sepsis 2/2 aspiration  PNA completed course of Flagyl and Azactam 9/20-9/28, b/l LE edema with L common femoral DVT on 9/22/17 was started on AC, and neutropenia drug induced now on  Zarxio. Now, s/p RRT for hypotension SBP 50's  did not respond to IVF was started on levophed gtt ; obtained MAP >65 and pt was safely transferred to MICU for monitoring and management.     ICU Vital Signs Last 24 Hrs  T(C): 36.5 - 36.7   HR: 99 - 120  BP: 100/66 - 114/59  BP(mean): 80   RR: 18 - 22  SpO2: 93% - 96%    PAST MEDICAL & SURGICAL HISTORY:  Diffuse large B-cell lymphoma of intra-abdominal lymph nodes  Mejias esophagus  Intestinal infection: 2012  Bladder polyp  H/O inguinal hernia repair  S/P TURP (transurethral resection of prostate)  Bladder polyp: s/p TURBT  History of cholecystectomy: 1987      FAMILY HISTORY:  No pertinent family history in first degree relatives      SOCIAL HISTORY:  Smoking: [ ] Never Smoked [ ] Former Smoker (__ packs x ___ years) [ ] Current Smoker  (__ packs x ___ years)  Substance Use: [ ] Never Used [ ] Used ____  EtOH Use: denies  Marital Status: [ ] Single [ x]  [ ]  [ ]   Occupation: retired  Recent Travel: NO  Country of Birth: [place]  Advance Directives: full code     Allergies    latex (Rash; Blisters)  penicillins (Swelling)  sulfa drugs (Unknown)    HOME MEDICATIONS:    REVIEW OF SYSTEMS:  Constitutional: [ ] negative [ ] fevers [ x] chills [ ] weight loss [ ] weight gain  HEENT: [ ] negative [ ] dry eyes [ ] eye irritation [ ] postnasal drip [ ] nasal congestion  CV: [ ] negative  [ ] chest pain [ ] orthopnea [ ] palpitations [ ] murmur  Resp: [ ] negative [ ] cough [ ] shortness of breath [ ] dyspnea [ ] wheezing [ ] sputum [ ] hemoptysis  GI: [ ] negative [ ] nausea [ ] vomiting [ ] diarrhea [ ] constipation [ ] abd pain [ ] dysphagia   : [ ] negative [ ] dysuria [ ] nocturia [ ] hematuria [ ] increased urinary frequency  Musculoskeletal: [ ] negative [ ] back pain [ ] myalgias [ ] arthralgias [ ] fracture  Skin: [ ] negative [ ] rash [ ] itch  Neurological: [ ] negative [ ] headache [ ] dizziness [ ] syncope [ x] weakness [ ] numbness  Psychiatric: [ ] negative [ ] anxiety [ ] depression  Endocrine: [ ] negative [ ] diabetes [ ] thyroid problem  Hematologic/Lymphatic: [ ] negative [ x] anemia [ ] bleeding problem  Allergic/Immunologic: [ ] negative [ ] itchy eyes [ ] nasal discharge [ ] hives [ ] angioedema  [ ] All other systems negative    I&O's     10-06 @ 07:01  -  10-07 @ 07:00  --------------------------------------------------------  IN: 180 mL / OUT: 990 mL / NET: -810 mL    10-07 @ 07:01  -  10-07 @ 21:36  --------------------------------------------------------  IN: 300 mL / OUT: 900 mL / NET: -600 mL      CAPILLARY BLOOD GLUCOSE          PHYSICAL EXAM:  GENERAL: awake, responds appropriately,   HEAD:  NCAT, dry mucosa membranes   EYES: EOMI  CHEST/LUNG: decreased BS at bases  HEART: tachycardic , irregular; Telemetry A fib 110's   ABDOMEN: Soft, Nontender, Nondistended; Bowel sounds present  : chronic Tavarez with dark yellow urine   EXTREMITIES:  + 2 pitting edema both LE with some weeping  SKIN: No rashes or lesions  NEURO: No focal deficits      LINES: PIV 20 g x2     HOSPITAL MEDICATIONS:    aztreonam  IVPB      aztreonam  IVPB 1000 milliGRAM(s) IV Intermittent once  metroNIDAZOLE  IVPB 500 milliGRAM(s) IV Intermittent once  metroNIDAZOLE  IVPB      vancomycin  IVPB 1000 milliGRAM(s) IV Intermittent once    ethacrynic acid 25 milliGRAM(s) Oral daily    allopurinol 300 milliGRAM(s) Oral daily  finasteride 5 milliGRAM(s) Oral daily  hydrocortisone sodium succinate Injectable 100 milliGRAM(s) IV Push once      ondansetron Injectable 8 milliGRAM(s) IV Push every 8 hours PRN    loperamide 2 milliGRAM(s) Oral once  pantoprazole    Tablet 40 milliGRAM(s) Oral before breakfast  senna 2 Tablet(s) Oral at bedtime    cyclophosphamide IVPB 1344 milliGRAM(s) IV Intermittent once  vinCRIStine IVPB 2 milliGRAM(s) IV Intermittent once      albumin human 25% IVPB 100 milliLiter(s) IV Intermittent once  calcium gluconate IVPB 1 Gram(s) IV Intermittent once  magnesium sulfate  IVPB 2 Gram(s) IV Intermittent once  potassium chloride  10 mEq/100 mL IVPB 10 milliEquivalent(s) IV Intermittent every 2 hours  potassium chloride  10 mEq/100 mL IVPB 10 milliEquivalent(s) IV Intermittent every 1 hour  potassium chloride  10 mEq/100 mL IVPB 10 milliEquivalent(s) IV Intermittent every 1 hour  potassium phosphate IVPB 15 milliMole(s) IV Intermittent once  sodium chloride 0.9% Bolus 500 milliLiter(s) IV Bolus once    filgrastim-sndz Injectable 480 MICROGram(s) SubCutaneous daily  influenza   Vaccine 0.5 milliLiter(s) IntraMuscular once    nystatin Powder 1 Application(s) Topical three times a day        LABS:                        9.1    0.5   )-----------( 41       ( 07 Oct 2017 20:08 )             25.7     Hgb Trend: 9.1<--, 11.5<--, 10.4<--, 10.4<--, 11.7<--  10-07    146<H>  |  112<H>  |  21  ----------------------------<  108<H>  3.0<L>   |  24  |  1.22    Ca    7.3<L>      07 Oct 2017 20:08  Phos  1.0     10-07  Mg     1.2     10-07    TPro  3.4<L>  /  Alb  1.5<L>  /  TBili  1.1  /  DBili  x   /  AST  18  /  ALT  23  /  AlkPhos  59  10-07    Creatinine Trend: 1.22<--, 0.95<--, 0.82<--, 0.77<--, 0.84<--, 0.85<--  PT/INR - ( 07 Oct 2017 15:24 )   PT: 23.6 sec;   INR: 2.06 ratio      Venous Blood Gas:  10-07 @ 20:07  7.49/33/24/25/41  VBG Lactate: 2.5    MICROBIOLOGY:     RADIOLOGY:  < from: Xray Chest 1 View AP -PORTABLE-Routine (09.26.17 @ 08:50) >    Impression: No pneumothorax. Stable small left pleural effusion and left   basilar opacity.    < end of copied text     EKG: v  < from: 12 Lead ECG (09.20.17 @ 13:39) >  Diagnosis Line ATRIAL FLUTTER WITH VARIABLE A-V BLOCK  ST ELEVATION CONSIDER ANTERIOR INJURY OR ACUTE INFARCT  *** ** ** ** * ACUTE MI  **       84-yo Male with above mentioned PMHx who p/ to hospital with weakness and AMS was found to have hypercalcemia 2/2 malignancy s/p pamidronate , sepsis 2/2 aspiration  PNA completed course of Flagyl and Azactam 9/20-9/28, b/l LE edema with L common femoral DVT on 9/22/17 was started on AC, and and neutropenia drug induced now on  Zarxio. . Now, s/p RRT for hypotension SBP 50's  did not respond to IVF was started on levophed gtt ; obtained MAP >65 and pt was safely transferred to MICU for monitoring and management.        Problem/Plan   Septic shock of unknown etiology   Admit to MICU   PAN cx, UA, CXR, lactate, CBC w diff, CMP, Mg, Phos, ABG, LFT's, LDH, uric acid. lipase, amylase , PT/INR, type and screen, I Ca   c/w pressor support to maintain MAP >65 ; obtain central access   c/w stress dose steroids   c/w broad spectrum abx ; awaiting culture results   hold diuresis (received dose of ethacrynic acid @ 12pm)   Vital signs monitoring  neuro checks       Problem/Plan:   Neutropenia, drug-induced on Zarxio and Diffuse large B-cell lymphoma  daily CBC with diff   c/w  allopurinol  Heme/oncology on board      Problem/Plan   Acute on chronic  renal failure ; urinary retention   daily BUn/ cr level , monitor electrolyte   c/w chronic tavarez for strict I&O's  daily weight     Problem/Plan :   Hypercalcemia resolved now hypocalcemia   -will get EKG   -consider light Ca supplement     Problem/Plan   Leg swelling multifactorial; +DVT, hypoalbuminemia / malnutrition,   keep b/l LE elevated to improve perfusion and blood return   vacular checks   improve nutritional status  / nutational consult / PT    .  Plan: + left common femoral DVT likely 2/2 increased risk from sedentary and malignancy  on therapeutic lovenox, cont coumadin 5 mg today  may benefit from lasix, will touch base with renal.      Problem/Plan  DVT left common femoral on coumadin   get PT/INR  active type and screen   vascular checks   bleeding precaution                   < end of copied text > CHIEF COMPLAINT: weakness     HPI:  84-yo Male w/ recent dx of Diffuse large B-cell lymphoma, non-germinal center ;s/p Prednisone 60 mg PO x 5, one dose of Vincristine and Cyclophosphamide on 9/27/17, biliary stenosis s/p CBD stent 9/1/17, mejias's esophagus, BPH c/b urinary retention s/p chronic Tavarez (started 9/5/17), CKD (baseline Cr 1.2),  p/ on 9/20/17 from [facility] with weakness and AMS was found to have hypercalcemia 2/2 malignancy s/p pamidronate , sepsis 2/2 aspiration  PNA completed course of Flagyl and Azactam 9/20-9/28, b/l LE edema with L common femoral DVT on 9/22/17 was started on AC, and neutropenia drug induced now on  Zarxio. Now, s/p RRT for hypotension SBP 50's  did not respond to IVF was started on levophed gtt ; obtained MAP >65 and pt was safely transferred to MICU for monitoring and management.     ICU Vital Signs Last 24 Hrs  T(C): 36.5 - 36.7   HR: 99 - 120  BP: 100/66 - 114/59  BP(mean): 80   RR: 18 - 22  SpO2: 93% - 96%    PAST MEDICAL & SURGICAL HISTORY:  Diffuse large B-cell lymphoma of intra-abdominal lymph nodes  Mejias esophagus  Intestinal infection: 2012  Bladder polyp  H/O inguinal hernia repair  S/P TURP (transurethral resection of prostate)  Bladder polyp: s/p TURBT  History of cholecystectomy: 1987      FAMILY HISTORY:  No pertinent family history in first degree relatives    SOCIAL HISTORY:  EtOH Use: denies  Marital Status: [ ] Single [ x]  [ ]  [ ]   Occupation: retired  Recent Travel: NO  Country of Birth: [place]  Advance Directives: full code     Allergies:  latex (Rash; Blisters)  penicillins (Swelling)  sulfa drugs (Unknown)    HOME MEDICATIONS:    REVIEW OF SYSTEMS:  Constitutional: [ ] negative [ ] fevers [ x] chills [ ] weight loss [ ] weight gain  HEENT: [ ] negative [ ] dry eyes [ ] eye irritation [ ] postnasal drip [ ] nasal congestion  CV: [ ] negative  [ ] chest pain [ ] orthopnea [ ] palpitations [ ] murmur  Resp: [ ] negative [ ] cough [ ] shortness of breath [ ] dyspnea [ ] wheezing [ ] sputum [ ] hemoptysis  GI: [ ] negative [ ] nausea [ ] vomiting [ ] diarrhea [ ] constipation [ ] abd pain [ ] dysphagia   : [ ] negative [ ] dysuria [ ] nocturia [ ] hematuria [ ] increased urinary frequency  Musculoskeletal: [ ] negative [ ] back pain [ ] myalgias [ ] arthralgias [ ] fracture  Skin: [ ] negative [ ] rash [ ] itch  Neurological: [ ] negative [ ] headache [ ] dizziness [ ] syncope [ x] weakness [ ] numbness  Psychiatric: [ ] negative [ ] anxiety [ ] depression  Endocrine: [ ] negative [ ] diabetes [ ] thyroid problem  Hematologic/Lymphatic: [ ] negative [ x] anemia [ ] bleeding problem  Allergic/Immunologic: [ ] negative [ ] itchy eyes [ ] nasal discharge [ ] hives [ ] angioedema  [ x] All other systems negative    I&O's     10-06 @ 07:01  -  10-07 @ 07:00  --------------------------------------------------------  IN: 180 mL / OUT: 990 mL / NET: -810 mL    10-07 @ 07:01  -  10-07 @ 21:36  --------------------------------------------------------  IN: 300 mL / OUT: 900 mL / NET: -600 mL      PHYSICAL EXAM:  GENERAL: awake, responds appropriately,   HEAD:  NCAT, dry mucosa membranes   EYES: EOMI  CHEST/LUNG: decreased BS at bases  HEART: tachycardic , irregular; Telemetry A fib 110's   ABDOMEN: Soft, Nontender, Nondistended; Bowel sounds present  : chronic Tavarez with dark yellow urine   EXTREMITIES:  + 2 pitting edema both LE with some weeping  SKIN: No rashes or lesions  NEURO: No focal deficits      LINES: PIV 20 g x2     HOSPITAL MEDICATIONS:    aztreonam  IVPB      aztreonam  IVPB 1000 milliGRAM(s) IV Intermittent once  metroNIDAZOLE  IVPB 500 milliGRAM(s) IV Intermittent once  metroNIDAZOLE  IVPB      vancomycin  IVPB 1000 milliGRAM(s) IV Intermittent once  ethacrynic acid 25 milliGRAM(s) Oral daily  allopurinol 300 milliGRAM(s) Oral daily  finasteride 5 milliGRAM(s) Oral daily  hydrocortisone sodium succinate Injectable 100 milliGRAM(s) IV Push once  ondansetron Injectable 8 milliGRAM(s) IV Push every 8 hours PRN  loperamide 2 milliGRAM(s) Oral once  pantoprazole    Tablet 40 milliGRAM(s) Oral before breakfast  senna 2 Tablet(s) Oral at bedtime  cyclophosphamide IVPB 1344 milliGRAM(s) IV Intermittent once  vinCRIStine IVPB 2 milliGRAM(s) IV Intermittent once  albumin human 25% IVPB 100 milliLiter(s) IV Intermittent once  calcium gluconate IVPB 1 Gram(s) IV Intermittent once  magnesium sulfate  IVPB 2 Gram(s) IV Intermittent once  potassium chloride  10 mEq/100 mL IVPB 10 milliEquivalent(s) IV Intermittent every 2 hours  potassium chloride  10 mEq/100 mL IVPB 10 milliEquivalent(s) IV Intermittent every 1 hour  potassium chloride  10 mEq/100 mL IVPB 10 milliEquivalent(s) IV Intermittent every 1 hour  potassium phosphate IVPB 15 milliMole(s) IV Intermittent once  sodium chloride 0.9% Bolus 500 milliLiter(s) IV Bolus once  filgrastim-sndz Injectable 480 MICROGram(s) SubCutaneous daily  influenza   Vaccine 0.5 milliLiter(s) IntraMuscular once  nystatin Powder 1 Application(s) Topical three times a day        LABS:                        9.1    0.5   )-----------( 41       ( 07 Oct 2017 20:08 )             25.7     Hgb Trend: 9.1<--, 11.5<--, 10.4<--, 10.4<--, 11.7<--  10-07    146<H>  |  112<H>  |  21  ----------------------------<  108<H>  3.0<L>   |  24  |  1.22    Ca    7.3<L>      07 Oct 2017 20:08  Phos  1.0     10-07  Mg     1.2     10-07    TPro  3.4<L>  /  Alb  1.5<L>  /  TBili  1.1  /  DBili  x   /  AST  18  /  ALT  23  /  AlkPhos  59  10-07    Creatinine Trend: 1.22<--, 0.95<--, 0.82<--, 0.77<--, 0.84<--, 0.85<--  PT/INR - ( 07 Oct 2017 15:24 )   PT: 23.6 sec;   INR: 2.06 ratio      Venous Blood Gas:  10-07 @ 20:07  7.49/33/24/25/41  VBG Lactate: 2.5    MICROBIOLOGY:     RADIOLOGY:  < from: Xray Chest 1 View AP -PORTABLE-Routine (09.26.17 @ 08:50) >  Impression: No pneumothorax. Stable small left pleural effusion and left   basilar opacity.    EKG: v  < from: 12 Lead ECG (09.20.17 @ 13:39) >  Diagnosis Line ATRIAL FLUTTER WITH VARIABLE A-V BLOCK  ST ELEVATION CONSIDER ANTERIOR INJURY OR ACUTE INFARCT  *** ** ** ** * ACUTE MI  **       84-yo Male with above mentioned PMHx who p/ to hospital with weakness and AMS was found to have hypercalcemia 2/2 malignancy s/p pamidronate , sepsis 2/2 aspiration  PNA completed course of Flagyl and Azactam 9/20-9/28, b/l LE edema with L common femoral DVT on 9/22/17 was started on AC, and and neutropenia drug induced now on  Zarxio. . Now, s/p RRT for hypotension SBP 50's  did not respond to IVF was started on levophed gtt ; obtained MAP >65 and pt was safely transferred to MICU for monitoring and management.        Problem/Plan   Septic shock of unknown etiology   Admit to MICU   PAN cx, UA, CXR, lactate, CBC w diff, CMP, Mg, Phos, ABG, LFT's, LDH, uric acid. lipase, amylase , PT/INR, type and screen, I Ca   c/w pressor support to maintain MAP >65 ; obtain central access   c/w stress dose steroids   c/w broad spectrum abx ; awaiting culture results   hold diuresis (received dose of ethacrynic acid @ 12pm)   Vital signs monitoring  neuro checks       Problem/Plan:   Neutropenia, drug-induced on Zarxio and Diffuse large B-cell lymphoma  daily CBC with diff   c/w  allopurinol  Heme/oncology on board      Problem/Plan   Acute on chronic  renal failure ; urinary retention   daily BUn/ cr level , monitor electrolyte   c/w chronic tavarez for strict I&O's  daily weight     Problem/Plan :   Hypercalcemia resolved now hypocalcemia   -will get EKG   -consider light Ca supplement     Problem/Plan   Leg swelling multifactorial; +DVT, hypoalbuminemia / malnutrition,   keep b/l LE elevated to improve perfusion and blood return   vacular checks   improve nutritional status  / nutational consult / PT    .  Plan: + left common femoral DVT likely 2/2 increased risk from sedentary and malignancy  on therapeutic lovenox, cont coumadin 5 mg today  may benefit from lasix, will touch base with renal.      Problem/Plan  DVT left common femoral on coumadin   get PT/INR  active type and screen   vascular checks   bleeding precaution                   < end of copied text > CHIEF COMPLAINT: weakness     HPI:  84-yo Male w/ recent dx of Diffuse large B-cell lymphoma, non-germinal center ;s/p Prednisone 60 mg PO x 5, one dose of Vincristine and Cyclophosphamide on 9/27/17, biliary stenosis s/p CBD stent 9/1/17, mejias's esophagus, BPH c/b urinary retention s/p chronic Tavarez (started 9/5/17), CKD (baseline Cr 1.2),  p/ on 9/20/17 from [facility] with weakness and AMS was found to have hypercalcemia 2/2 malignancy s/p pamidronate , sepsis 2/2 aspiration  PNA completed course of Flagyl and Azactam 9/20-9/28, b/l LE edema with L common femoral DVT on 9/22/17 was started on AC, and neutropenia drug induced now on  Zarxio. Now, s/p RRT for hypotension SBP 50's  did not respond to IVF was started on levophed gtt ; obtained MAP >65 and pt was safely transferred to MICU for monitoring and management.     ICU Vital Signs Last 24 Hrs  T(C): 36.5 - 36.7   HR: 99 - 120  BP: 100/66 - 114/59  BP(mean): 80   RR: 18 - 22  SpO2: 93% - 96%    PAST MEDICAL & SURGICAL HISTORY:  Diffuse large B-cell lymphoma of intra-abdominal lymph nodes  Mejias esophagus  Intestinal infection: 2012  Bladder polyp  H/O inguinal hernia repair  S/P TURP (transurethral resection of prostate)  Bladder polyp: s/p TURBT  History of cholecystectomy: 1987      FAMILY HISTORY:  No pertinent family history in first degree relatives    SOCIAL HISTORY:  EtOH Use: denies  Marital Status: [ ] Single [ x]  [ ]  [ ]   Occupation: retired  Recent Travel: NO  Country of Birth: [place]  Advance Directives: full code     Allergies:  latex (Rash; Blisters)  penicillins (Swelling)  sulfa drugs (Unknown)    HOME MEDICATIONS:    REVIEW OF SYSTEMS:  Constitutional: [ ] negative [ ] fevers [ x] chills [ ] weight loss [ ] weight gain  HEENT: [ ] negative [ ] dry eyes [ ] eye irritation [ ] postnasal drip [ ] nasal congestion  CV: [ ] negative  [ ] chest pain [ ] orthopnea [ ] palpitations [ ] murmur  Resp: [ ] negative [ ] cough [ ] shortness of breath [ ] dyspnea [ ] wheezing [ ] sputum [ ] hemoptysis  GI: [ ] negative [ ] nausea [ ] vomiting [ ] diarrhea [ ] constipation [ ] abd pain [ ] dysphagia   : [ ] negative [ ] dysuria [ ] nocturia [ ] hematuria [ ] increased urinary frequency  Musculoskeletal: [ ] negative [ ] back pain [ ] myalgias [ ] arthralgias [ ] fracture  Skin: [ ] negative [ ] rash [ ] itch  Neurological: [ ] negative [ ] headache [ ] dizziness [ ] syncope [ x] weakness [ ] numbness  Psychiatric: [ ] negative [ ] anxiety [ ] depression  Endocrine: [ ] negative [ ] diabetes [ ] thyroid problem  Hematologic/Lymphatic: [ ] negative [ x] anemia [ ] bleeding problem  Allergic/Immunologic: [ ] negative [ ] itchy eyes [ ] nasal discharge [ ] hives [ ] angioedema  [ x] All other systems negative    I&O's     10-06 @ 07:01  -  10-07 @ 07:00  --------------------------------------------------------  IN: 180 mL / OUT: 990 mL / NET: -810 mL    10-07 @ 07:01  -  10-07 @ 21:36  --------------------------------------------------------  IN: 300 mL / OUT: 900 mL / NET: -600 mL      PHYSICAL EXAM:  GENERAL: awake, responds appropriately,   HEAD:  NCAT, dry mucosa membranes   EYES: EOMI  CHEST/LUNG: decreased BS at bases  HEART: tachycardic , irregular; Telemetry A fib 110's   ABDOMEN: Soft, Nontender, Nondistended; Bowel sounds present  : chronic Tavarez with dark yellow urine   EXTREMITIES:  + 2 pitting edema both LE with some weeping  SKIN: No rashes or lesions  NEURO: No focal deficits      LINES: PIV 20 g x2     HOSPITAL MEDICATIONS:    aztreonam  IVPB      aztreonam  IVPB 1000 milliGRAM(s) IV Intermittent once  metroNIDAZOLE  IVPB 500 milliGRAM(s) IV Intermittent once  metroNIDAZOLE  IVPB      vancomycin  IVPB 1000 milliGRAM(s) IV Intermittent once  ethacrynic acid 25 milliGRAM(s) Oral daily  allopurinol 300 milliGRAM(s) Oral daily  finasteride 5 milliGRAM(s) Oral daily  hydrocortisone sodium succinate Injectable 100 milliGRAM(s) IV Push once  ondansetron Injectable 8 milliGRAM(s) IV Push every 8 hours PRN  loperamide 2 milliGRAM(s) Oral once  pantoprazole    Tablet 40 milliGRAM(s) Oral before breakfast  senna 2 Tablet(s) Oral at bedtime  cyclophosphamide IVPB 1344 milliGRAM(s) IV Intermittent once  vinCRIStine IVPB 2 milliGRAM(s) IV Intermittent once  albumin human 25% IVPB 100 milliLiter(s) IV Intermittent once  calcium gluconate IVPB 1 Gram(s) IV Intermittent once  magnesium sulfate  IVPB 2 Gram(s) IV Intermittent once  potassium chloride  10 mEq/100 mL IVPB 10 milliEquivalent(s) IV Intermittent every 2 hours  potassium chloride  10 mEq/100 mL IVPB 10 milliEquivalent(s) IV Intermittent every 1 hour  potassium chloride  10 mEq/100 mL IVPB 10 milliEquivalent(s) IV Intermittent every 1 hour  potassium phosphate IVPB 15 milliMole(s) IV Intermittent once  sodium chloride 0.9% Bolus 500 milliLiter(s) IV Bolus once  filgrastim-sndz Injectable 480 MICROGram(s) SubCutaneous daily  influenza   Vaccine 0.5 milliLiter(s) IntraMuscular once  nystatin Powder 1 Application(s) Topical three times a day        LABS:                        9.1    0.5   )-----------( 41       ( 07 Oct 2017 20:08 )             25.7     Hgb Trend: 9.1<--, 11.5<--, 10.4<--, 10.4<--, 11.7<--  10-07    146<H>  |  112<H>  |  21  ----------------------------<  108<H>  3.0<L>   |  24  |  1.22    Ca    7.3<L>      07 Oct 2017 20:08  Phos  1.0     10-07  Mg     1.2     10-07    TPro  3.4<L>  /  Alb  1.5<L>  /  TBili  1.1  /  DBili  x   /  AST  18  /  ALT  23  /  AlkPhos  59  10-07    Creatinine Trend: 1.22<--, 0.95<--, 0.82<--, 0.77<--, 0.84<--, 0.85<--  PT/INR - ( 07 Oct 2017 15:24 )   PT: 23.6 sec;   INR: 2.06 ratio      Venous Blood Gas:  10-07 @ 20:07  7.49/33/24/25/41  VBG Lactate: 2.5    MICROBIOLOGY:     RADIOLOGY:  < from: Xray Chest 1 View AP -PORTABLE-Routine (09.26.17 @ 08:50) >  Impression: No pneumothorax. Stable small left pleural effusion and left   basilar opacity.    EKG: v  < from: 12 Lead ECG (09.20.17 @ 13:39) >  Diagnosis Line ATRIAL FLUTTER WITH VARIABLE A-V BLOCK  ST ELEVATION CONSIDER ANTERIOR INJURY OR ACUTE INFARCT  *** ** ** ** * ACUTE MI  **       84-yo Male with above mentioned PMHx who p/ to hospital with weakness and AMS was found to have hypercalcemia 2/2 malignancy s/p pamidronate , sepsis 2/2 aspiration  PNA completed course of Flagyl and Azactam 9/20-9/28, b/l LE edema with L common femoral DVT on 9/22/17 was started on AC, and and neutropenia drug induced now on  Zarxio. . Now, s/p RRT for hypotension SBP 50's  did not respond to IVF was started on levophed gtt ; obtained MAP >65 and pt was safely transferred to MICU for monitoring and management.        Problem/Plan   Septic shock of unknown etiology   Admit to MICU   PAN cx, UA, CXR, lactate, CBC w diff, CMP, Mg, Phos, ABG, LFT's, LDH, uric acid. lipase, amylase , PT/INR, type and screen, I Ca   c/w pressor support to maintain MAP >65 ; obtain central access   check TSH, cortisol  c/w broad spectrum abx ; awaiting culture results   consider sampling pleural fluid to ruleout empyema  hold diuresis (received dose of ethacrynic acid @ 12pm)   Vital signs monitoring  neuro checks     Problem/Plan:   Neutropenia, drug-induced on Zarxio and Diffuse large B-cell lymphoma  daily CBC with diff   c/w  allopurinol  Heme/oncology on board      Problem/Plan   Acute on chronic  renal failure ; urinary retention   daily BUn/ cr level , monitor electrolyte   c/w chronic tavarez for strict I&O's  daily weight     Problem/Plan :   Hypercalcemia resolved now hypocalcemia   -will get EKG   -consider light Ca supplement     Problem/Plan   Leg swelling multifactorial; +DVT, hypoalbuminemia / malnutrition,   keep b/l LE elevated to improve perfusion and blood return   vacular checks   improve nutritional status  / nutational consult / PT    Problem/ Plan: + left common femoral DVT likely 2/2 increased risk from sedentary and malignancy  on therapeutic lovenox, cont coumadin 5 mg today  may benefit from lasix, will touch base with renal.      Problem/Plan  DVT left common femoral on coumadin   get PT/INR  active type and screen   vascular checks   bleeding precaution

## 2017-10-07 NOTE — PROGRESS NOTE ADULT - PROBLEM SELECTOR PROBLEM 9
Leg swelling
Discharge planning issues
Leg swelling

## 2017-10-07 NOTE — PROGRESS NOTE ADULT - PROBLEM SELECTOR PROBLEM 10
Discharge planning issues

## 2017-10-07 NOTE — PROGRESS NOTE ADULT - SUBJECTIVE AND OBJECTIVE BOX
No fevers or chills last night    Vital Signs Last 24 Hrs  T(C): 36.4 (07 Oct 2017 09:05), Max: 36.7 (06 Oct 2017 15:27)  T(F): 97.6 (07 Oct 2017 09:05), Max: 98 (06 Oct 2017 15:27)  HR: 99 (07 Oct 2017 09:05) (99 - 110)  BP: 100/66 (07 Oct 2017 09:05) (100/66 - 113/63)  BP(mean): --  RR: 18 (07 Oct 2017 09:05) (18 - 18)  SpO2: 96% (07 Oct 2017 09:05) (94% - 97%)    GENERAL: NAD  HEAD:  NCAT  EYES: EOMI  CHEST/LUNG: decreased BS at bases  HEART: Regular rate and rhythm; + murmur  ABDOMEN: Soft, Nontender, Nondistended; Bowel sounds present  : chronic tavarez  EXTREMITIES:  + 2 pitting edema both LE, hard to palpate Dps 2' edema  SKIN: No rashes or lesions  NEURO: No focal deficits    LABS:                        10.4   0.72  )-----------( 60       ( 06 Oct 2017 09:56 )             31.2     10-06    146<H>  |  114<H>  |  16  ----------------------------<  129<H>  4.7   |  23  |  0.82    Ca    7.6<L>      06 Oct 2017 09:56      PT/INR - ( 06 Oct 2017 12:57 )   PT: 16.1 sec;   INR: 1.47 ratio           CAPILLARY BLOOD GLUCOSE No fevers or chills last night; some serous weepage from his LE edema    Vital Signs Last 24 Hrs  T(C): 36.4 (07 Oct 2017 09:05), Max: 36.7 (06 Oct 2017 15:27)  T(F): 97.6 (07 Oct 2017 09:05), Max: 98 (06 Oct 2017 15:27)  HR: 99 (07 Oct 2017 09:05) (99 - 110)  BP: 100/66 (07 Oct 2017 09:05) (100/66 - 113/63)  BP(mean): --  RR: 18 (07 Oct 2017 09:05) (18 - 18)  SpO2: 96% (07 Oct 2017 09:05) (94% - 97%)    GENERAL: NAD  HEAD:  NCAT  EYES: EOMI  CHEST/LUNG: decreased BS at bases  HEART: Regular rate and rhythm; + murmur  ABDOMEN: Soft, Nontender, Nondistended; Bowel sounds present  : chronic tavarez  EXTREMITIES:  + 2 pitting edema both LE with some weeping, hard to palpate Dps 2' edema  SKIN: No rashes or lesions  NEURO: No focal deficits    LABS:                        10.4   0.72  )-----------( 60       ( 06 Oct 2017 09:56 )             31.2     10-06    146<H>  |  114<H>  |  16  ----------------------------<  129<H>  4.7   |  23  |  0.82    Ca    7.6<L>      06 Oct 2017 09:56      PT/INR - ( 06 Oct 2017 12:57 )   PT: 16.1 sec;   INR: 1.47 ratio           CAPILLARY BLOOD GLUCOSE

## 2017-10-07 NOTE — PROGRESS NOTE ADULT - PROBLEM SELECTOR PLAN 3
appreciate ID input  finished 7 days  of aztreonam/flagyl on 9/27 for aspiration and HCAP  BC NTD, urine cx neg

## 2017-10-07 NOTE — PROGRESS NOTE ADULT - SUBJECTIVE AND OBJECTIVE BOX
Follow-up Pulm Progress Note  Mathew Glass MD  [phone]    Notes reviewed  Neutropenic - WBC .7  Afebrile  off antibiotics - s/p full course for aspiration pneumonia  No respiratory complaints - breathing comfortably supine    Vital Signs Last 24 Hrs  T(C): 36.4 (07 Oct 2017 09:05), Max: 36.7 (06 Oct 2017 15:27)  T(F): 97.6 (07 Oct 2017 09:05), Max: 98 (06 Oct 2017 15:27)  HR: 99 (07 Oct 2017 09:05) (99 - 110)  BP: 100/66 (07 Oct 2017 09:05) (100/66 - 113/63)  BP(mean): --  RR: 18 (07 Oct 2017 09:05) (18 - 18)  SpO2: 96% (07 Oct 2017 09:05) (94% - 97%)                         10.4   0.72  )-----------( 60       ( 06 Oct 2017 09:56 )             31.2       10-06    146<H>  |  114<H>  |  16  ----------------------------<  129<H>  4.7   |  23  |  0.82    Ca    7.6<L>      06 Oct 2017 09:56        Physical Examination:  PULM: Clear A/L  CVS: Regular rate and rhythm, no murmurs, rubs, or gallops  ABD: Soft, non-tender  EXT:  4+ :E edema    RADIOLOGY REVIEWED  CXR:    CT chest:    TTE:

## 2017-10-08 LAB
ALBUMIN SERPL ELPH-MCNC: 2.3 G/DL — LOW (ref 3.3–5)
ALBUMIN SERPL ELPH-MCNC: 2.3 G/DL — LOW (ref 3.3–5)
ALP SERPL-CCNC: 73 U/L — SIGNIFICANT CHANGE UP (ref 40–120)
ALP SERPL-CCNC: 84 U/L — SIGNIFICANT CHANGE UP (ref 40–120)
ALT FLD-CCNC: 27 U/L RC — SIGNIFICANT CHANGE UP (ref 10–45)
ALT FLD-CCNC: 69 U/L RC — HIGH (ref 10–45)
ANION GAP SERPL CALC-SCNC: 14 MMOL/L — SIGNIFICANT CHANGE UP (ref 5–17)
ANION GAP SERPL CALC-SCNC: 23 MMOL/L — HIGH (ref 5–17)
ANION GAP SERPL CALC-SCNC: 26 MMOL/L — HIGH (ref 5–17)
APTT BLD: 49.5 SEC — HIGH (ref 27.5–37.4)
APTT BLD: 54.6 SEC — HIGH (ref 27.5–37.4)
AST SERPL-CCNC: 22 U/L — SIGNIFICANT CHANGE UP (ref 10–40)
AST SERPL-CCNC: 87 U/L — HIGH (ref 10–40)
BASE EXCESS BLDV CALC-SCNC: -3.6 MMOL/L — LOW (ref -2–2)
BILIRUB SERPL-MCNC: 1.8 MG/DL — HIGH (ref 0.2–1.2)
BILIRUB SERPL-MCNC: 1.8 MG/DL — HIGH (ref 0.2–1.2)
BUN SERPL-MCNC: 22 MG/DL — SIGNIFICANT CHANGE UP (ref 7–23)
BUN SERPL-MCNC: 24 MG/DL — HIGH (ref 7–23)
BUN SERPL-MCNC: 26 MG/DL — HIGH (ref 7–23)
CA-I SERPL-SCNC: 1.09 MMOL/L — LOW (ref 1.12–1.3)
CALCIUM SERPL-MCNC: 7.4 MG/DL — LOW (ref 8.4–10.5)
CALCIUM SERPL-MCNC: 7.5 MG/DL — LOW (ref 8.4–10.5)
CALCIUM SERPL-MCNC: 7.6 MG/DL — LOW (ref 8.4–10.5)
CHLORIDE BLDV-SCNC: 112 MMOL/L — HIGH (ref 96–108)
CHLORIDE SERPL-SCNC: 110 MMOL/L — HIGH (ref 96–108)
CHLORIDE SERPL-SCNC: 111 MMOL/L — HIGH (ref 96–108)
CHLORIDE SERPL-SCNC: 111 MMOL/L — HIGH (ref 96–108)
CK MB CFR SERPL CALC: 3.4 NG/ML — SIGNIFICANT CHANGE UP (ref 0–6.7)
CK SERPL-CCNC: 47 U/L — SIGNIFICANT CHANGE UP (ref 30–200)
CO2 BLDV-SCNC: 21 MMOL/L — LOW (ref 22–30)
CO2 SERPL-SCNC: 11 MMOL/L — LOW (ref 22–31)
CO2 SERPL-SCNC: 19 MMOL/L — LOW (ref 22–31)
CO2 SERPL-SCNC: 9 MMOL/L — CRITICAL LOW (ref 22–31)
CORTIS AM PEAK SERPL-MCNC: 95.7 UG/DL — HIGH (ref 6–18.4)
CREAT SERPL-MCNC: 1.17 MG/DL — SIGNIFICANT CHANGE UP (ref 0.5–1.3)
CREAT SERPL-MCNC: 1.49 MG/DL — HIGH (ref 0.5–1.3)
CREAT SERPL-MCNC: 1.66 MG/DL — HIGH (ref 0.5–1.3)
FIBRINOGEN PPP-MCNC: 503 MG/DL — SIGNIFICANT CHANGE UP (ref 310–510)
FSP PPP-MCNC: < 5 — SIGNIFICANT CHANGE UP
GAS PNL BLDA: SIGNIFICANT CHANGE UP
GAS PNL BLDV: 140 MMOL/L — SIGNIFICANT CHANGE UP (ref 136–145)
GAS PNL BLDV: SIGNIFICANT CHANGE UP
GAS PNL BLDV: SIGNIFICANT CHANGE UP
GLUCOSE BLDV-MCNC: 129 MG/DL — HIGH (ref 70–99)
GLUCOSE SERPL-MCNC: 109 MG/DL — HIGH (ref 70–99)
GLUCOSE SERPL-MCNC: 111 MG/DL — HIGH (ref 70–99)
GLUCOSE SERPL-MCNC: 144 MG/DL — HIGH (ref 70–99)
HCO3 BLDV-SCNC: 20 MMOL/L — LOW (ref 21–29)
HCT VFR BLD CALC: 29.9 % — LOW (ref 39–50)
HCT VFR BLD CALC: 31.6 % — LOW (ref 39–50)
HCT VFR BLDA CALC: 30 % — LOW (ref 39–50)
HGB BLD CALC-MCNC: 9.7 G/DL — LOW (ref 13–17)
HGB BLD-MCNC: 10.2 G/DL — LOW (ref 13–17)
HGB BLD-MCNC: 10.6 G/DL — LOW (ref 13–17)
INR BLD: 2.57 RATIO — HIGH (ref 0.88–1.16)
INR BLD: 3.52 RATIO — HIGH (ref 0.88–1.16)
LACTATE BLDV-MCNC: 3.5 MMOL/L — HIGH (ref 0.7–2)
LDH SERPL L TO P-CCNC: 220 U/L — SIGNIFICANT CHANGE UP (ref 50–242)
LDH SERPL L TO P-CCNC: 294 U/L — HIGH (ref 50–242)
MAGNESIUM SERPL-MCNC: 1.6 MG/DL — SIGNIFICANT CHANGE UP (ref 1.6–2.6)
MAGNESIUM SERPL-MCNC: 2 MG/DL — SIGNIFICANT CHANGE UP (ref 1.6–2.6)
MCHC RBC-ENTMCNC: 33.7 GM/DL — SIGNIFICANT CHANGE UP (ref 32–36)
MCHC RBC-ENTMCNC: 34.1 GM/DL — SIGNIFICANT CHANGE UP (ref 32–36)
MCHC RBC-ENTMCNC: 35.5 PG — HIGH (ref 27–34)
MCHC RBC-ENTMCNC: 35.6 PG — HIGH (ref 27–34)
MCV RBC AUTO: 104 FL — HIGH (ref 80–100)
MCV RBC AUTO: 106 FL — HIGH (ref 80–100)
NT-PROBNP SERPL-SCNC: HIGH PG/ML (ref 0–300)
OTHER CELLS CSF MANUAL: 9 ML/DL — LOW (ref 18–22)
PCO2 BLDV: 35 MMHG — SIGNIFICANT CHANGE UP (ref 35–50)
PH BLDV: 7.38 — SIGNIFICANT CHANGE UP (ref 7.35–7.45)
PHOSPHATE SERPL-MCNC: 2.2 MG/DL — LOW (ref 2.5–4.5)
PHOSPHATE SERPL-MCNC: 3 MG/DL — SIGNIFICANT CHANGE UP (ref 2.5–4.5)
PLATELET # BLD AUTO: 62 K/UL — LOW (ref 150–400)
PLATELET # BLD AUTO: 69 K/UL — LOW (ref 150–400)
PO2 BLDV: 37 MMHG — SIGNIFICANT CHANGE UP (ref 25–45)
POTASSIUM BLDV-SCNC: 2.9 MMOL/L — CRITICAL LOW (ref 3.5–5)
POTASSIUM SERPL-MCNC: 3.6 MMOL/L — SIGNIFICANT CHANGE UP (ref 3.5–5.3)
POTASSIUM SERPL-MCNC: 4 MMOL/L — SIGNIFICANT CHANGE UP (ref 3.5–5.3)
POTASSIUM SERPL-MCNC: 4.3 MMOL/L — SIGNIFICANT CHANGE UP (ref 3.5–5.3)
POTASSIUM SERPL-SCNC: 3.6 MMOL/L — SIGNIFICANT CHANGE UP (ref 3.5–5.3)
POTASSIUM SERPL-SCNC: 4 MMOL/L — SIGNIFICANT CHANGE UP (ref 3.5–5.3)
POTASSIUM SERPL-SCNC: 4.3 MMOL/L — SIGNIFICANT CHANGE UP (ref 3.5–5.3)
PROT SERPL-MCNC: 4.5 G/DL — LOW (ref 6–8.3)
PROT SERPL-MCNC: 4.6 G/DL — LOW (ref 6–8.3)
PROTHROM AB SERPL-ACNC: 28.3 SEC — HIGH (ref 9.8–12.7)
PROTHROM AB SERPL-ACNC: 39.4 SEC — HIGH (ref 9.8–12.7)
RAPID RVP RESULT: SIGNIFICANT CHANGE UP
RBC # BLD: 2.88 M/UL — LOW (ref 4.2–5.8)
RBC # BLD: 2.99 M/UL — LOW (ref 4.2–5.8)
RBC # FLD: 13.1 % — SIGNIFICANT CHANGE UP (ref 10.3–14.5)
RBC # FLD: 13.3 % — SIGNIFICANT CHANGE UP (ref 10.3–14.5)
SAO2 % BLDV: 66 % — LOW (ref 67–88)
SODIUM SERPL-SCNC: 144 MMOL/L — SIGNIFICANT CHANGE UP (ref 135–145)
SODIUM SERPL-SCNC: 145 MMOL/L — SIGNIFICANT CHANGE UP (ref 135–145)
SODIUM SERPL-SCNC: 145 MMOL/L — SIGNIFICANT CHANGE UP (ref 135–145)
TROPONIN T SERPL-MCNC: 0.13 NG/ML — HIGH (ref 0–0.06)
TSH SERPL-MCNC: 1.34 UIU/ML — SIGNIFICANT CHANGE UP (ref 0.27–4.2)
URATE SERPL-MCNC: 3.1 MG/DL — LOW (ref 3.4–8.8)
URATE SERPL-MCNC: 3.2 MG/DL — LOW (ref 3.4–8.8)
WBC # BLD: 0.4 K/UL — CRITICAL LOW (ref 3.8–10.5)
WBC # BLD: 0.8 K/UL — CRITICAL LOW (ref 3.8–10.5)
WBC # FLD AUTO: 0.4 K/UL — CRITICAL LOW (ref 3.8–10.5)
WBC # FLD AUTO: 0.8 K/UL — CRITICAL LOW (ref 3.8–10.5)

## 2017-10-08 PROCEDURE — 99291 CRITICAL CARE FIRST HOUR: CPT | Mod: 25

## 2017-10-08 PROCEDURE — 71010: CPT | Mod: 26

## 2017-10-08 PROCEDURE — 93010 ELECTROCARDIOGRAM REPORT: CPT

## 2017-10-08 PROCEDURE — 93971 EXTREMITY STUDY: CPT | Mod: 26

## 2017-10-08 PROCEDURE — 31500 INSERT EMERGENCY AIRWAY: CPT | Mod: GC

## 2017-10-08 PROCEDURE — 71010: CPT | Mod: 26,77

## 2017-10-08 RX ORDER — SODIUM CHLORIDE 9 MG/ML
1000 INJECTION, SOLUTION INTRAVENOUS
Qty: 0 | Refills: 0 | Status: DISCONTINUED | OUTPATIENT
Start: 2017-10-08 | End: 2017-10-08

## 2017-10-08 RX ORDER — SODIUM CHLORIDE 9 MG/ML
1000 INJECTION INTRAMUSCULAR; INTRAVENOUS; SUBCUTANEOUS ONCE
Qty: 0 | Refills: 0 | Status: COMPLETED | OUTPATIENT
Start: 2017-10-08 | End: 2017-10-08

## 2017-10-08 RX ORDER — MAGNESIUM SULFATE 500 MG/ML
2 VIAL (ML) INJECTION ONCE
Qty: 0 | Refills: 0 | Status: COMPLETED | OUTPATIENT
Start: 2017-10-08 | End: 2017-10-08

## 2017-10-08 RX ORDER — PHENYLEPHRINE HYDROCHLORIDE 10 MG/ML
1 INJECTION INTRAVENOUS
Qty: 80 | Refills: 0 | Status: DISCONTINUED | OUTPATIENT
Start: 2017-10-08 | End: 2017-10-08

## 2017-10-08 RX ORDER — PHENYLEPHRINE HYDROCHLORIDE 10 MG/ML
0.06 INJECTION INTRAVENOUS
Qty: 160 | Refills: 0 | Status: DISCONTINUED | OUTPATIENT
Start: 2017-10-08 | End: 2017-10-08

## 2017-10-08 RX ORDER — SODIUM BICARBONATE 1 MEQ/ML
50 SYRINGE (ML) INTRAVENOUS ONCE
Qty: 0 | Refills: 0 | Status: COMPLETED | OUTPATIENT
Start: 2017-10-08 | End: 2017-10-08

## 2017-10-08 RX ORDER — MIDAZOLAM HYDROCHLORIDE 1 MG/ML
2 INJECTION, SOLUTION INTRAMUSCULAR; INTRAVENOUS ONCE
Qty: 0 | Refills: 0 | Status: DISCONTINUED | OUTPATIENT
Start: 2017-10-08 | End: 2017-10-08

## 2017-10-08 RX ORDER — PROPOFOL 10 MG/ML
3.88 INJECTION, EMULSION INTRAVENOUS
Qty: 500 | Refills: 0 | Status: DISCONTINUED | OUTPATIENT
Start: 2017-10-08 | End: 2017-10-08

## 2017-10-08 RX ORDER — FENTANYL CITRATE 50 UG/ML
0.47 INJECTION INTRAVENOUS
Qty: 5000 | Refills: 0 | Status: DISCONTINUED | OUTPATIENT
Start: 2017-10-08 | End: 2017-10-08

## 2017-10-08 RX ORDER — FENTANYL CITRATE 50 UG/ML
50 INJECTION INTRAVENOUS ONCE
Qty: 0 | Refills: 0 | Status: DISCONTINUED | OUTPATIENT
Start: 2017-10-08 | End: 2017-10-08

## 2017-10-08 RX ORDER — POTASSIUM CHLORIDE 20 MEQ
20 PACKET (EA) ORAL
Qty: 0 | Refills: 0 | Status: COMPLETED | OUTPATIENT
Start: 2017-10-08 | End: 2017-10-08

## 2017-10-08 RX ORDER — VANCOMYCIN HCL 1 G
1000 VIAL (EA) INTRAVENOUS EVERY 24 HOURS
Qty: 0 | Refills: 0 | Status: DISCONTINUED | OUTPATIENT
Start: 2017-10-08 | End: 2017-10-08

## 2017-10-08 RX ORDER — HYDROCORTISONE 20 MG
100 TABLET ORAL EVERY 8 HOURS
Qty: 0 | Refills: 0 | Status: DISCONTINUED | OUTPATIENT
Start: 2017-10-08 | End: 2017-10-08

## 2017-10-08 RX ORDER — CALCIUM GLUCONATE 100 MG/ML
1 VIAL (ML) INTRAVENOUS ONCE
Qty: 0 | Refills: 0 | Status: COMPLETED | OUTPATIENT
Start: 2017-10-08 | End: 2017-10-08

## 2017-10-08 RX ORDER — NOREPINEPHRINE BITARTRATE/D5W 8 MG/250ML
0.01 PLASTIC BAG, INJECTION (ML) INTRAVENOUS
Qty: 16 | Refills: 0 | Status: DISCONTINUED | OUTPATIENT
Start: 2017-10-08 | End: 2017-10-08

## 2017-10-08 RX ORDER — INSULIN LISPRO 100/ML
VIAL (ML) SUBCUTANEOUS EVERY 6 HOURS
Qty: 0 | Refills: 0 | Status: DISCONTINUED | OUTPATIENT
Start: 2017-10-08 | End: 2017-10-08

## 2017-10-08 RX ORDER — POTASSIUM PHOSPHATE, MONOBASIC POTASSIUM PHOSPHATE, DIBASIC 236; 224 MG/ML; MG/ML
15 INJECTION, SOLUTION INTRAVENOUS ONCE
Qty: 0 | Refills: 0 | Status: COMPLETED | OUTPATIENT
Start: 2017-10-08 | End: 2017-10-08

## 2017-10-08 RX ORDER — VASOPRESSIN 20 [USP'U]/ML
0.04 INJECTION INTRAVENOUS
Qty: 100 | Refills: 0 | Status: DISCONTINUED | OUTPATIENT
Start: 2017-10-08 | End: 2017-10-08

## 2017-10-08 RX ORDER — FENTANYL CITRATE 50 UG/ML
100 INJECTION INTRAVENOUS ONCE
Qty: 0 | Refills: 0 | Status: DISCONTINUED | OUTPATIENT
Start: 2017-10-08 | End: 2017-10-08

## 2017-10-08 RX ORDER — NOREPINEPHRINE BITARTRATE/D5W 8 MG/250ML
0.1 PLASTIC BAG, INJECTION (ML) INTRAVENOUS
Qty: 8 | Refills: 0 | Status: DISCONTINUED | OUTPATIENT
Start: 2017-10-08 | End: 2017-10-08

## 2017-10-08 RX ADMIN — Medication 50 MILLIEQUIVALENT(S): at 06:37

## 2017-10-08 RX ADMIN — SODIUM CHLORIDE 75 MILLILITER(S): 9 INJECTION, SOLUTION INTRAVENOUS at 09:59

## 2017-10-08 RX ADMIN — Medication 250 MILLIGRAM(S): at 06:37

## 2017-10-08 RX ADMIN — Medication 50 GRAM(S): at 04:47

## 2017-10-08 RX ADMIN — PHENYLEPHRINE HYDROCHLORIDE 32.25 MICROGRAM(S)/KG/MIN: 10 INJECTION INTRAVENOUS at 09:59

## 2017-10-08 RX ADMIN — Medication 100 MILLIEQUIVALENT(S): at 01:06

## 2017-10-08 RX ADMIN — MIDAZOLAM HYDROCHLORIDE 2 MILLIGRAM(S): 1 INJECTION, SOLUTION INTRAMUSCULAR; INTRAVENOUS at 13:50

## 2017-10-08 RX ADMIN — Medication 200 GRAM(S): at 04:48

## 2017-10-08 RX ADMIN — SODIUM CHLORIDE 2000 MILLILITER(S): 9 INJECTION INTRAMUSCULAR; INTRAVENOUS; SUBCUTANEOUS at 14:00

## 2017-10-08 RX ADMIN — POTASSIUM PHOSPHATE, MONOBASIC POTASSIUM PHOSPHATE, DIBASIC 62.5 MILLIMOLE(S): 236; 224 INJECTION, SOLUTION INTRAVENOUS at 04:47

## 2017-10-08 RX ADMIN — Medication 100 MILLIEQUIVALENT(S): at 03:43

## 2017-10-08 RX ADMIN — Medication 100 MILLIGRAM(S): at 09:59

## 2017-10-08 RX ADMIN — Medication 1 MILLIGRAM(S): at 01:00

## 2017-10-08 RX ADMIN — Medication 1 MICROGRAM(S)/KG/MIN: at 16:32

## 2017-10-08 RX ADMIN — Medication 50 MILLIEQUIVALENT(S): at 16:09

## 2017-10-08 RX ADMIN — Medication 50 MILLIEQUIVALENT(S): at 11:15

## 2017-10-08 RX ADMIN — MEROPENEM 200 MILLIGRAM(S): 1 INJECTION INTRAVENOUS at 16:32

## 2017-10-08 RX ADMIN — Medication 50 MILLIEQUIVALENT(S): at 11:17

## 2017-10-08 RX ADMIN — SODIUM CHLORIDE 125 MILLILITER(S): 9 INJECTION, SOLUTION INTRAVENOUS at 11:12

## 2017-10-08 RX ADMIN — Medication 50 MILLIEQUIVALENT(S): at 04:47

## 2017-10-08 RX ADMIN — MIDAZOLAM HYDROCHLORIDE 2 MILLIGRAM(S): 1 INJECTION, SOLUTION INTRAMUSCULAR; INTRAVENOUS at 16:25

## 2017-10-08 RX ADMIN — FENTANYL CITRATE 100 MICROGRAM(S): 50 INJECTION INTRAVENOUS at 13:50

## 2017-10-08 RX ADMIN — FENTANYL CITRATE 50 MICROGRAM(S): 50 INJECTION INTRAVENOUS at 17:16

## 2017-10-08 RX ADMIN — Medication 16.12 MICROGRAM(S)/KG/MIN: at 09:59

## 2017-10-08 RX ADMIN — MEROPENEM 200 MILLIGRAM(S): 1 INJECTION INTRAVENOUS at 06:37

## 2017-10-08 NOTE — DISCHARGE NOTE FOR THE EXPIRED PATIENT - HOSPITAL COURSE
Pt is an 84yoM with PMH of DLBCL, biliary stenosis s/p CBD stent, Romero's esophagus, BPH c/b urinary retention requiring chronic tavarez (replaced on 2017), CKD (baseline SCr 1.2), recently readmitted to [facility] for hypotension, found to have sepsis 2/2 PNA. He was evaluated by cardiology for T wave changes in setting of absent CP and negative cardiac enzymes. VQ scan performed on 2017 for dyspnea and hypotension, which found small, nonsegmental matched defects, with very low probability of PE. Pt was treated with vancomycin (1 dose), 7- day course of aztreonam and flagyl for suspected aspiration pneumonia. Speech and swallow evaluation on 2017 recommended NPO status.     He was also treated for hypercalcemia of malignancy (calcium 12.5- corrected 13.6) with pamidronate x1, IVF. For DLBCL, he was started on vincristine and cyclophosphamide, then developed leukopenia for which he was started on filgastrim qd. Pt was continued on allopurinol. TTE was performed prior to initiating chemotherapy which found EF 50-55%, with normal LV, L pleural effusion. On 2017 he was identified to have a L common femoral vein DVT, for which he was started on lovenox    He was also found to have focal LUE weakness thus code stroke was called. RRT was called overnight on for patient for acute hypotension to 45/23. Pt was given 3L IVNS without improvement in BP thus was started on norepinephrine. Pt was given hydrocortisone 100mg IV x1, albumin x1, calcium gluconate 1g x1, magnesium sulfate 2g IVx1, K-phos. Pt had L IJ central line placed under US guidance. CT head deferred until patient is more clinically stable.   Patient was transferred to the MICU for further management and started on pressors. Stress dose steroids started, as well as vanco, meropenem. Patient was placed on Bipap for increased work of breathing. A VBG was initially taken then an ABG in the afternoon which showed an increased lactate from 3.5 to 12.2 in approx 10 hours. Patient appeared more lethargic and the decision was made to intubate him. His pressor requirement increased and patient was started on 3 pressors (levo, stephani and vaso). A CT c/a/p was ordered, however canceled since patient was too unstable to go down. Goals of care were initiated with family at bedside. Patient's wife and children after a long discussion, decided to make him DNR. When patient did not show improvement, they opted for comfort measures. However, a few minutes later, patient's heart stopped despite the 3 pressors and patient was determined to have  on the vent. Family denied an autopsy.

## 2017-10-08 NOTE — PROGRESS NOTE ADULT - ASSESSMENT
Pt is an 84yoM with PMH of DLBCL, biliary stenosis s/p CBD stent, Romero's esophagus, BPH c/b urinary retention requiring chronic tavarez (replaced on 9/5/2017), CKD (baseline SCr 1.2), recently readmitted to [facility] for hypotension, found to have sepsis 2/2 suspected aspiration PNA and hypercalcemia of malignancy, on vincristine and cytarabine, course c/b L common femoral DVT, MARIA C on CKD, and new hypotension 2/2 suspected septic shock, requiring pressor support.     Neuro:   - currently with 5/5 BUE and BLE strength, but notable dysarthria  - unclear if pt had transient hypoperfusion in setting of hypotension or thromboembolic event, will obtain CTH when clinically stable to evaluate for CVA  - c/w neuro checks q4h    Pulmonary:   - currently on     Cardiac:   - atrial fibrillation with HR    GI:  - NPO for now  - will require formal speech and swallow assessment    Renal:    ID:    Endo:    Heme:  - leukopenic in setting of recent chemotherapy  - s/p 3 units FFP for reversal of coumadin  - maintain active T&S  - c/w filgastim     VTE: hold pharmacologic ppx in setting of elevated INR  - c/w compression    Kanchan Monson MD, pager [phone] Pt is an 84yoM with PMH of DLBCL, biliary stenosis s/p CBD stent, Romero's esophagus, BPH c/b urinary retention requiring chronic tavarez (replaced on 9/5/2017), CKD (baseline SCr 1.2), recently readmitted to [facility] for hypotension, found to have sepsis 2/2 suspected aspiration PNA and hypercalcemia of malignancy, on vincristine and cytarabine, course c/b L common femoral DVT, MARIA C on CKD, and new hypotension 2/2 suspected septic shock, requiring pressor support.     Neuro:   - currently with 5/5 BUE and BLE strength, but notable dysarthria  - unclear if pt had transient hypoperfusion in setting of hypotension or thromboembolic event, will obtain CTH when clinically stable to evaluate for CVA  - c/w neuro checks q4h    Pulmonary:   - currently on     Cardiac:   - EKG found sinus rhythm with HR 90s-100s with RBBB and TWI in V1-V3  - pt currently requires maximum dose of phenylephrine and norepinephrine at 0.5ug/kg/min, likely in septic shock       GI:  - NPO for now  - will require formal speech and swallow assessment    Renal:  - SCr of 1.17 is elevated compared to baseline, likely due to hypoperfusion in setting of hypotension  - monitor UO, trend renal function and avoid nephrotoxic agents    ID:  - continued on vancomycin and meropenem for presumed sepsis  - check RVP  - blood cultures, UA sent    Endo:  - pt had been given hydrocortisone 100mg IV x1, will discontinue stress dose steroid, given that pt had only been on prednisone x5 days  - monitor fingerstick BS while NPO    Heme:  - leukopenic and thrombocytopenic in setting of recent chemotherapy  - s/p 3 units FFP for reversal of coumadin, with current INR of 2.57. Will dose vitamin K   - maintain active T&S  - c/w filgrastim injectable    - uric acid, phosphorous, potassium  and LDH do not suggest tumor lysis sydrome    VTE: hold pharmacologic ppx in setting of elevated INR  - c/w compression    Kanchan Monson MD, pager [phone] Pt is an 84yoM with PMH of DLBCL, biliary stenosis s/p CBD stent, Romero's esophagus, BPH c/b urinary retention requiring chronic tavarez (replaced on 9/5/2017), CKD (baseline SCr 1.2), recently readmitted to [facility] for hypotension, found to have sepsis 2/2 suspected aspiration PNA and hypercalcemia of malignancy, on vincristine and cytarabine, course c/b L common femoral DVT, MARIA C on CKD, and new hypotension 2/2 suspected septic shock, requiring pressor support.     Neuro:   - currently with 5/5 BUE and BLE strength, but notable dysarthria  - unclear if pt had transient hypoperfusion in setting of hypotension or thromboembolic event, will obtain CTH when clinically stable to evaluate for CVA  - c/w neuro checks q4h    Pulmonary:  pulmonary distress likely respiratory compensation to metabolic acidosis   - currently on 4-5L O2 via NC, did not tolerate BiPAP  - has persistent increased work of breathing with accessory muscle use  - appears to have RLL air bronchograms and consolidation on POCUS, increasing suspicion for potential developing PNA    Cardiac:   - EKG found sinus rhythm with HR 90s-100s with RBBB and TWI in V1-V3  - pt currently requires maximum dose of phenylephrine and norepinephrine at 0.5ug/kg/min, likely in septic shock   - will continue stress dose steroids [id number] IV q8h  - no pericardial effusion or RV strain noted on POCUS- lower suspicion for potential PE, especially given that pt has had supratherapeutic INR  - will monitor INR and start heparin if INR< 22  - check cardiac enzymes, proBNP,    GI:  - NPO for now  - will require formal speech and swallow assessment  - will start D51/2NS at 75cc/hr while NPO    Renal:  - SCr of 1.17 is elevated compared to baseline, likely due to hypoperfusion in setting of hypotension  - monitor UO, trend renal function and avoid nephrotoxic agents  - currently anuric, will trend BMP q6h  - monitor for risk of development of tumor lysis syndrome, will trend uric acid, LDH q6h  - renally dose allopurinol    ID:  - continued on vancomycin and meropenem for presumed sepsis  - check RVP  - blood cultures, UA sent    Endo:  - pt had been given hydrocortisone 100mg IV x1, continue stress dose steroids  - monitor fingerstick BS while NPO  - check TSH and cortisol    Heme:  - leukopenic and thrombocytopenic in setting of recent chemotherapy  - s/p 3 units FFP for reversal of coumadin, with current INR of 2.57. Will dose vitamin K   - maintain active T&S  - c/w filgrastim injectable    - uric acid, phosphorous, potassium  and LDH do not suggest tumor lysis sydrome  - check split products and fibrinogen for potential DIC    VTE: hold pharmacologic ppx in setting of elevated INR  - c/w compression    Kanchan Monson MD, pager [phone]

## 2017-10-08 NOTE — PROCEDURE NOTE - NSPROCDETAILS_GEN_ALL_CORE
guidewire recovered/lumen(s) aspirated and flushed/sterile dressing applied ultrasound guidance/sterile dressing applied/lumen(s) aspirated and flushed/guidewire recovered/sterile technique, catheter placed

## 2017-10-08 NOTE — CONSULT NOTE ADULT - ASSESSMENT
Discussed with Critical Care Attending and Team that family felt they had had extensive discussions with other physicians and felt no additional evaluations were needed. Thus, patient was not seen.    1) As per Critical Care

## 2017-10-08 NOTE — CHART NOTE - NSCHARTNOTEFT_GEN_A_CORE
GOC:   Patient has living will documentation in chart. Discussed with patient at bedside his preferences if her were to have worsening respiratory function and require intubation. Pt noted that the documentation "is all theory, but lying in bed now is reality." He did not state a preference to be DNR/DNI at this time. He did request that we continue the discussion when his wife arrives later today.       Kanchan Monson MD, pager [phone]

## 2017-10-08 NOTE — PROGRESS NOTE ADULT - ATTENDING COMMENTS
Jessica Merritt M.D, F.A.C.C  [facility]   [phone]
Alan Fortune MD  [phone]
Alan Fortune MD  [phone]
Please call ID with any questions.  Thank you
Pt seen and examined, agree with above A+P. 84 M with multiple medical problems including DLBCL recently received chemo, LLE DVT on AC, now admitted to ICU with acute resp failure with hypoxia 2/2 HCAP vs aspiration, neutropenic sepsis with shock state requiring multiple vasopressors, MARIA C (pre-renal ATN vs TLS). Cont broad spectrum antibiotic therapy with Vanc and Meropenem. Initially on BIPAP, but then went on to require emergent intubation due to worsening resp status.  FUP Cxs. LA elevated, will check Ct C/A/P once his resp status and hemodynamics stabilize enough to move him to Ct scan. INR within therapeutic range, no RV dilatation on bedside point of care echo. Doubt PE as a cause of decompensation. Overall remains gravely ill, poor prognosis. wife and daughter updated at bedside.   Critical Care time Spent: 60 mins not including procedures.
Dahiana Amos M.D

## 2017-10-08 NOTE — CONSULT NOTE ADULT - CONSULT REASON
Aspiration pneumonia; pleural effusions
DLBCL
Please see neurology resident note which was reviewed.
abd pain, SOB s/p robotic lymph node biopsy on 9/11
abn EKG
jasmin
pneumonia

## 2017-10-08 NOTE — PROGRESS NOTE ADULT - PROVIDER SPECIALTY LIST ADULT
Cardiology
Heme/Onc
Infectious Disease
Internal Medicine
MICU
Nephrology
Nephrology
Pulmonology
Surgery
Surgery
Cardiology
Infectious Disease
Heme/Onc
Infectious Disease
Heme/Onc
Internal Medicine
Internal Medicine
Heme/Onc
Heme/Onc
Internal Medicine
Pulmonology
Pulmonology
Internal Medicine
Pulmonology
Internal Medicine
Internal Medicine
Heme/Onc

## 2017-10-08 NOTE — GOALS OF CARE CONVERSATION - PERSONAL ADVANCE DIRECTIVE - CONVERSATION DETAILS
Based on the change in labs and vital signs, a conversation was held with the wife and children at bedside. Patient's pressor requirement increased rapidly to the point of being maxed on 3 pressors. Lactate also increased and bicarb was started to try to correct it. Despite maximum effort, patient did not have enough room for improvement. Family decided to make him DNR this afternoon.

## 2017-10-08 NOTE — CONSULT NOTE ADULT - SUBJECTIVE AND OBJECTIVE BOX
Family was at bedside and requested no evaluation as had had recent extensive discussions with other physicians and felt no additional evaluations were needed. Thus, I did not see the patient.

## 2017-10-08 NOTE — PROGRESS NOTE ADULT - SUBJECTIVE AND OBJECTIVE BOX
CHIEF COMPLAINT: hypotension    Interval Events:  Pt is an 84yoM with PMH of DLBCL, biliary stenosis s/p CBD stent, Romero's esophagus, BPH c/b urinary retention requiring chronic tavarez (replaced on 2017), CKD (baseline SCr 1.2), recently readmitted to [facility] for hypotension, found to have sepsis 2/2 PNA. Pt was treated with vancomycin (1 dose), 7- day course of aztreonam and flagyl for suspected aspiration pneumonia. Speech and swallow evaluation on 2017 recommended NPO status. Cultures reveaeld.   Pt developed MARIA C on CKD. He was evaluated by cardiology for T wave changes in setting of absent CP and negative cardiac enzymes. VQ scan performed on 2017 for dyspnea and hypotension, which found small, nonsegmental matched defects, with very low probability of PE.   He was treated for hypercalcemia of malignancy (calcium 12.5- corrected 13.6) with pamidronate x1, IVF. For DLBCL, he was treated with prednisone 60mg x5 days. Pt was continued on allopurinol. TTE was performed prior to initiating chemotherapy which found EF 50-55%, with normal LV, L pleural effusion. On 2017 he was identified to have a L common femoral vein DVT, for which he was started on lovenox  For DLBCL, he was started on vincristine and cyclophosphamide (2017), then developed leukopenia for which he was started on filgastrim 300mcg subQ qd.   RRT was called overnight on for patient for acute hypotension to 68/30. Pt was given 3L IVNS without improvement in BP thus was started on norepinephrine. Pt was given hydrocortisone 100mg IV x1, albumin x1, calcium gluconate 1g x1, magnesium sulfate 2g IVx1, K-phos. Pt had L IJ central line placed under US guidance.     He found to have 84-90% 5L NC.         REVIEW OF SYSTEMS:  Constitutional: [ ] negative [ ] fevers [ ] chills [ ] weight loss [ ] weight gain  HEENT: [ ] negative [ ] dry eyes [ ] eye irritation [ ] postnasal drip [ ] nasal congestion  CV: [ ] negative  [ ] chest pain [ ] orthopnea [ ] palpitations [ ] murmur  Resp: [ ] negative [ ] cough [ ] shortness of breath [ ] dyspnea [ ] wheezing [ ] sputum [ ] hemoptysis  GI: [ ] negative [ ] nausea [ ] vomiting [ ] diarrhea [ ] constipation [ ] abd pain [ ] dysphagia   : [ ] negative [ ] dysuria [ ] nocturia [ ] hematuria [ ] increased urinary frequency  Musculoskeletal: [ ] negative [ ] back pain [ ] myalgias [ ] arthralgias [ ] fracture  Skin: [ ] negative [ ] rash [ ] itch  Neurological: [ ] negative [ ] headache [ ] dizziness [ ] syncope [ ] weakness [ ] numbness  Psychiatric: [ ] negative [ ] anxiety [ ] depression  Endocrine: [ ] negative [ ] diabetes [ ] thyroid problem  Hematologic/Lymphatic: [ ] negative [ ] anemia [ ] bleeding problem  Allergic/Immunologic: [ ] negative [ ] itchy eyes [ ] nasal discharge [ ] hives [ ] angioedema  [ ] All other systems negative  [ ] Unable to assess ROS because ________    OBJECTIVE:  ICU Vital Signs Last 24 Hrs  T(C): 36.7 (08 Oct 2017 04:00), Max: 36.7 (07 Oct 2017 15:53)  T(F): 98 (08 Oct 2017 04:00), Max: 98 (07 Oct 2017 15:53)  HR: 106 (08 Oct 2017 05:30) (99 - 125)  BP: 92/68 (08 Oct 2017 05:30) (78/47 - 132/49)  BP(mean): 76 (08 Oct 2017 05:30) (57 - 82)  ABP: --  ABP(mean): --  RR: 28 (08 Oct 2017 05:30) (17 - 42)  SpO2: 100% (08 Oct 2017 05:30) (93% - 100%)        10-07 @ 07:01  -  10-08 @ 07:00  --------------------------------------------------------  IN: 1364.4 mL / OUT: 1430 mL / NET: -65.6 mL      CAPILLARY BLOOD GLUCOSE  144 (08 Oct 2017 05:59)          PHYSICAL EXAM:  General:   HEENT:   Lymph Nodes:  Neck:   Respiratory:   Cardiovascular:   Abdomen:   Extremities:   Skin:   Neurological:  Psychiatry:    LINES:    HOSPITAL MEDICATIONS:    meropenem IVPB      meropenem IVPB 1000 milliGRAM(s) IV Intermittent every 8 hours  vancomycin  IVPB 1000 milliGRAM(s) IV Intermittent every 12 hours    norepinephrine Infusion 0.1 MICROgram(s)/kG/Min IV Continuous <Continuous>  phenylephrine    Infusion 1 MICROgram(s)/kG/Min IV Continuous <Continuous>    allopurinol 300 milliGRAM(s) Oral daily      ondansetron Injectable 8 milliGRAM(s) IV Push every 8 hours PRN    senna 2 Tablet(s) Oral at bedtime    cyclophosphamide IVPB 1344 milliGRAM(s) IV Intermittent once  vinCRIStine IVPB 2 milliGRAM(s) IV Intermittent once        filgrastim-sndz Injectable 480 MICROGram(s) SubCutaneous daily  influenza   Vaccine 0.5 milliLiter(s) IntraMuscular once          LABS:                        10.2   0.4   )-----------( 62       ( 08 Oct 2017 03:25 )             29.9     Hgb Trend: 10.2<--, 9.1<--, 11.5<--, 10.4<--, 10.4<--  10    144  |  111<H>  |  22  ----------------------------<  144<H>  3.6   |  19<L>  |  1.17    Ca    7.6<L>      08 Oct 2017 03:25  Phos  2.2     10-08  Mg     1.6     10    TPro  4.5<L>  /  Alb  2.3<L>  /  TBili  1.8<H>  /  DBili  x   /  AST  22  /  ALT  27  /  AlkPhos  73  10-08    Creatinine Trend: 1.17<--, 1.22<--, 0.95<--, 0.82<--, 0.77<--, 0.84<--  PT/INR - ( 08 Oct 2017 03:25 )   PT: 28.3 sec;   INR: 2.57 ratio         PTT - ( 08 Oct 2017 03:25 )  PTT:49.5 sec  Urinalysis Basic - ( 07 Oct 2017 22:37 )    Color: Yellow / Appearance: SL Turbid / S.010 / pH: x  Gluc: x / Ketone: Negative  / Bili: Negative / Urobili: Negative   Blood: x / Protein: Trace / Nitrite: Negative   Leuk Esterase: Negative / RBC: 2-5 /HPF / WBC 2-5 /HPF   Sq Epi: x / Non Sq Epi: x / Bacteria: x        Venous Blood Gas:  10-08 @ 02:58  7.38/35/37/20/66  VBG Lactate: 3.5  Venous Blood Gas:  10-07 @ 20:07  7.49/33/24/25/41  VBG Lactate: 2.5      MICROBIOLOGY:     RADIOLOGY:  [ ] Reviewed and interpreted by me    EKG: CHIEF COMPLAINT: hypotension    Interval Events:  Pt is an 84yoM with PMH of DLBCL, biliary stenosis s/p CBD stent, Romero's esophagus, BPH c/b urinary retention requiring chronic tavarez (replaced on 2017), CKD (baseline SCr 1.2), recently readmitted to [facility] for hypotension, found to have sepsis 2/2 PNA. Pt was treated with vancomycin (1 dose), 7- day course of aztreonam and flagyl for suspected aspiration pneumonia. Speech and swallow evaluation on 2017 recommended NPO status. Cultures reveaeld.   Pt developed MARIA C on CKD. He was evaluated by cardiology for T wave changes in setting of absent CP and negative cardiac enzymes. VQ scan performed on 2017 for dyspnea and hypotension, which found small, nonsegmental matched defects, with very low probability of PE.   He was treated for hypercalcemia of malignancy (calcium 12.5- corrected 13.6) with pamidronate x1, IVF. For DLBCL, he was treated with prednisone 60mg x5 days. Pt was continued on allopurinol. TTE was performed prior to initiating chemotherapy which found EF 50-55%, with normal LV, L pleural effusion. On 2017 he was identified to have a L common femoral vein DVT, for which he was started on lovenox  For DLBCL, he was started on vincristine and cyclophosphamide (2017), then developed leukopenia for which he was started on filgastrim 300mcg subQ qd.   RRT was called overnight on for patient for acute hypotension to 68/30. Pt was given 3L IVNS without improvement in BP thus was started on norepinephrine. Pt was given hydrocortisone 100mg IV x1, albumin x1, calcium gluconate 1g x1, magnesium sulfate 2g IVx1, K-phos. Pt had L IJ central line placed under US guidance.     He found to have 84-90% 5L NC.     REVIEW OF SYSTEMS:  Constitutional: [ ] negative [ ] fevers [ ] chills [ ] weight loss [ ] weight gain  HEENT: [ ] negative [ ] dry eyes [ ] eye irritation [ ] postnasal drip [ ] nasal congestion  CV: [ ] negative  [ ] chest pain [ ] orthopnea [ ] palpitations [ ] murmur  Resp: [ ] negative [ ] cough [ ] shortness of breath [ ] dyspnea [ ] wheezing [ ] sputum [ ] hemoptysis  GI: [ ] negative [ ] nausea [ ] vomiting [ ] diarrhea [ ] constipation [ ] abd pain [ ] dysphagia   : [ ] negative [ ] dysuria [ ] nocturia [ ] hematuria [ ] increased urinary frequency  Musculoskeletal: [ ] negative [ ] back pain [ ] myalgias [ ] arthralgias [ ] fracture  Skin: [ ] negative [ ] rash [ ] itch  Neurological: [ ] negative [ ] headache [ ] dizziness [ ] syncope [ ] weakness [ ] numbness  Psychiatric: [ ] negative [ ] anxiety [ ] depression  Endocrine: [ ] negative [ ] diabetes [ ] thyroid problem  Hematologic/Lymphatic: [ ] negative [ ] anemia [ ] bleeding problem  Allergic/Immunologic: [ ] negative [ ] itchy eyes [ ] nasal discharge [ ] hives [ ] angioedema  [ ] All other systems negative  [ ] Unable to assess ROS because ________    OBJECTIVE:  ICU Vital Signs Last 24 Hrs  T(C): 36.7 (08 Oct 2017 04:00), Max: 36.7 (07 Oct 2017 15:53)  T(F): 98 (08 Oct 2017 04:00), Max: 98 (07 Oct 2017 15:53)  HR: 106 (08 Oct 2017 05:30) (99 - 125)  BP: 92/68 (08 Oct 2017 05:30) (78/47 - 132/49)  BP(mean): 76 (08 Oct 2017 05:30) (57 - 82)  ABP: --  ABP(mean): --  RR: 28 (08 Oct 2017 05:30) (17 - 42)  SpO2: 100% (08 Oct 2017 05:30) (93% - 100%)        10-07 @ 07:01  -  10-08 @ 07:00  --------------------------------------------------------  IN: 1364.4 mL / OUT: 1430 mL / NET: -65.6 mL      CAPILLARY BLOOD GLUCOSE  144 (08 Oct 2017 05:59)          PHYSICAL EXAM:  General:   HEENT:   Lymph Nodes:  Neck:   Respiratory:   Cardiovascular:   Abdomen:   Extremities:   Skin:   Neurological:  Psychiatry:    LINES:    HOSPITAL MEDICATIONS:    meropenem IVPB      meropenem IVPB 1000 milliGRAM(s) IV Intermittent every 8 hours  vancomycin  IVPB 1000 milliGRAM(s) IV Intermittent every 12 hours    norepinephrine Infusion 0.1 MICROgram(s)/kG/Min IV Continuous <Continuous>  phenylephrine    Infusion 1 MICROgram(s)/kG/Min IV Continuous <Continuous>    allopurinol 300 milliGRAM(s) Oral daily      ondansetron Injectable 8 milliGRAM(s) IV Push every 8 hours PRN    senna 2 Tablet(s) Oral at bedtime    cyclophosphamide IVPB 1344 milliGRAM(s) IV Intermittent once  vinCRIStine IVPB 2 milliGRAM(s) IV Intermittent once        filgrastim-sndz Injectable 480 MICROGram(s) SubCutaneous daily  influenza   Vaccine 0.5 milliLiter(s) IntraMuscular once          LABS:                        10.2   0.4   )-----------( 62       ( 08 Oct 2017 03:25 )             29.9     Hgb Trend: 10.2<--, 9.1<--, 11.5<--, 10.4<--, 10.4<--  10    144  |  111<H>  |  22  ----------------------------<  144<H>  3.6   |  19<L>  |  1.17    Ca    7.6<L>      08 Oct 2017 03:25  Phos  2.2     10-08  Mg     1.6     10    TPro  4.5<L>  /  Alb  2.3<L>  /  TBili  1.8<H>  /  DBili  x   /  AST  22  /  ALT  27  /  AlkPhos  73  10-08    Creatinine Trend: 1.17<--, 1.22<--, 0.95<--, 0.82<--, 0.77<--, 0.84<--  PT/INR - ( 08 Oct 2017 03:25 )   PT: 28.3 sec;   INR: 2.57 ratio         PTT - ( 08 Oct 2017 03:25 )  PTT:49.5 sec  Urinalysis Basic - ( 07 Oct 2017 22:37 )    Color: Yellow / Appearance: SL Turbid / S.010 / pH: x  Gluc: x / Ketone: Negative  / Bili: Negative / Urobili: Negative   Blood: x / Protein: Trace / Nitrite: Negative   Leuk Esterase: Negative / RBC: 2-5 /HPF / WBC 2-5 /HPF   Sq Epi: x / Non Sq Epi: x / Bacteria: x        Venous Blood Gas:  10-08 @ 02:58  7.38/35/37/20/66  VBG Lactate: 3.5  Venous Blood Gas:  10-07 @ 20:07  7.49/33/24/25/41  VBG Lactate: 2.5      MICROBIOLOGY:   Culture - Blood (17 @ 16:36)    Specimen Source: .Blood Blood-Peripheral    Culture Results:   No growth at 5 days.    RADIOLOGY:  < from: Xray Chest 1 View AP -PORTABLE-Routine (17 @ 08:50) >  PROCEDURE DATE:  2017      INTERPRETATION:  Clinical information: Follow-up pleural effusion.  Findings: Portable frontal view of the chest dated 2017 is compared   to 2017. Small left pleural effusion is stable. Left basilar opacity   is also stable. There is no pneumothorax. Cardiomediastinal silhouette   cannot be evaluated.    Impression: No pneumothorax. Stable small left pleural effusion and left   basilar opacity. CHIEF COMPLAINT: hypotension    Interval Events:  Pt is an 84yoM with PMH of DLBCL, biliary stenosis s/p CBD stent, Romero's esophagus, BPH c/b urinary retention requiring chronic tavarez (replaced on 2017), CKD (baseline SCr 1.2), recently readmitted to [facility] for hypotension, found to have sepsis 2/2 PNA. Pt was treated with vancomycin (1 dose), 7- day course of aztreonam and flagyl for suspected aspiration pneumonia. Speech and swallow evaluation on 2017 recommended NPO status. Cultures reveaeld.   Pt developed MARIA C on CKD. He was evaluated by cardiology for T wave changes in setting of absent CP and negative cardiac enzymes. VQ scan performed on 2017 for dyspnea and hypotension, which found small, nonsegmental matched defects, with very low probability of PE.   He was treated for hypercalcemia of malignancy (calcium 12.5- corrected 13.6) with pamidronate x1, IVF. For DLBCL, he was treated with prednisone 60mg x5 days. Pt was continued on allopurinol. TTE was performed prior to initiating chemotherapy which found EF 50-55%, with normal LV, L pleural effusion. On 2017 he was identified to have a L common femoral vein DVT, for which he was started on lovenox  For DLBCL, he was started on vincristine and cyclophosphamide (2017), then developed leukopenia for which he was started on filgastrim 300mcg subQ qd.   He was found to have focal LUE weakness thus code stroke was called. RRT was called overnight on for patient for acute hypotension to 68/30. Pt was given 3L IVNS without improvement in BP thus was started on norepinephrine. Pt was given hydrocortisone 100mg IV x1, albumin x1, calcium gluconate 1g x1, magnesium sulfate 2g IVx1, K-phos. Pt had L IJ central line placed under US guidance. CT head deferred until patient is more clinically stable.     REVIEW OF SYSTEMS:  Constitutional: [x ] negative [ ] fevers [ ] chills [ ] weight loss [ ] weight gain  HEENT: [ ] negative [ ] dry eyes [ ] eye irritation [ ] postnasal drip [ ] nasal congestion [x] dry mouth  CV: [x ] negative  [ ] chest pain [ ] orthopnea [ ] palpitations [ ] murmur  Resp: [ ] negative [ ] cough [x] shortness of breath [ ] dyspnea [ ] wheezing [ ] sputum [ ] hemoptysis [x ]tachypnea  GI: [ x ] negative [ ] nausea [ ] vomiting [ ] diarrhea [ ] constipation [ ] abd pain [ ] dysphagia   : [x ] negative [ ] dysuria [ ] nocturia [ ] hematuria [ ] increased urinary frequency  Musculoskeletal: [ ] negative [ ] back pain [x ] myalgias- RLE [ ] arthralgias [ ] fracture  Skin: [ ] negative [ ] rash [ ] itch  Neurological: [ x ] negative [ ] headache [ ] dizziness [ ] syncope [ ] weakness [ ] numbness  Psychiatric: [ x ] negative [ ] anxiety [ ] depression  Endocrine: [ x ] negative [ ] diabetes [ ] thyroid problem  Hematologic/Lymphatic: [ x ] negative [ ] anemia [ ] bleeding problem  Allergic/Immunologic: [ x ] negative [ ] itchy eyes [ ] nasal discharge [ ] hives [ ] angioedema  [ ] All other systems negative  [ ] Unable to assess ROS because ________    OBJECTIVE:  ICU Vital Signs Last 24 Hrs  T(C): 36.7 (08 Oct 2017 04:00), Max: 36.7 (07 Oct 2017 15:53)  T(F): 98 (08 Oct 2017 04:00), Max: 98 (07 Oct 2017 15:53)  HR: 106 (08 Oct 2017 05:30) (99 - 125)  BP: 92/68 (08 Oct 2017 05:30) (78/47 - 132/49)  BP(mean): 76 (08 Oct 2017 05:30) (57 - 82)  ABP: --  ABP(mean): --  RR: 28 (08 Oct 2017 05:30) (17 - 42)  SpO2: 100% (08 Oct 2017 05:30) (93% - 100%)    10-07 @ 07:01  -  10-08 @ 07:00  --------------------------------------------------------  IN: 1364.4 mL / OUT: 1430 mL / NET: -65.6 mL      CAPILLARY BLOOD GLUCOSE  144 (08 Oct 2017 05:59)    PHYSICAL EXAM:  Gen: NAD, alert, awake, lying in bed conversant, responding to questions appropriately   HEENT: PERRL, EOMI, mucous membranes dry, no oropharyngeal exudates  Neck:  no JVD, IJ TLC intact  Pulmonary: CTAB, no rhonci, wheezes or rales  Cardiac: irregularly irregular, normal S1S2, no r/m/g  GI:  soft, nontender, nondistended   Extremities: warm, well perfused, no peripheral edema  Skin: skin intact, no skin tears or rashes or other lesions  Neuro: A&O x3, CN II-XII intact, 5/5 BUE and BLE strength, full sensation to light touch    LINES: [facility] MEDICATIONS:    meropenem IVPB      meropenem IVPB 1000 milliGRAM(s) IV Intermittent every 8 hours  vancomycin  IVPB 1000 milliGRAM(s) IV Intermittent every 12 hours    norepinephrine Infusion 0.1 MICROgram(s)/kG/Min IV Continuous <Continuous>  phenylephrine    Infusion 1 MICROgram(s)/kG/Min IV Continuous <Continuous>    allopurinol 300 milliGRAM(s) Oral daily      ondansetron Injectable 8 milliGRAM(s) IV Push every 8 hours PRN    senna 2 Tablet(s) Oral at bedtime    cyclophosphamide IVPB 1344 milliGRAM(s) IV Intermittent once  vinCRIStine IVPB 2 milliGRAM(s) IV Intermittent once        filgrastim-sndz Injectable 480 MICROGram(s) SubCutaneous daily  influenza   Vaccine 0.5 milliLiter(s) IntraMuscular once          LABS:                        10.2   0.4   )-----------( 62       ( 08 Oct 2017 03:25 )             29.9     Hgb Trend: 10.2<--, 9.1<--, 11.5<--, 10.4<--, 10.4<--  10-    144  |  111<H>  |  22  ----------------------------<  144<H>  3.6   |  19<L>  |  1.17    Ca    7.6<L>      08 Oct 2017 03:25  Phos  2.2     10-08  Mg     1.6     10-08    TPro  4.5<L>  /  Alb  2.3<L>  /  TBili  1.8<H>  /  DBili  x   /  AST  22  /  ALT  27  /  AlkPhos  73  10-08    Creatinine Trend: 1.17<--, 1.22<--, 0.95<--, 0.82<--, 0.77<--, 0.84<--  PT/INR - ( 08 Oct 2017 03:25 )   PT: 28.3 sec;   INR: 2.57 ratio         PTT - ( 08 Oct 2017 03:25 )  PTT:49.5 sec  Urinalysis Basic - ( 07 Oct 2017 22:37 )    Color: Yellow / Appearance: SL Turbid / S.010 / pH: x  Gluc: x / Ketone: Negative  / Bili: Negative / Urobili: Negative   Blood: x / Protein: Trace / Nitrite: Negative   Leuk Esterase: Negative / RBC: 2-5 /HPF / WBC 2-5 /HPF   Sq Epi: x / Non Sq Epi: x / Bacteria: x        Venous Blood Gas:  10-08 @ 02:58  7.38/35/37/20/66  VBG Lactate: 3.5  Venous Blood Gas:  10-07 @ 20:07  7.49/33/24/25/41  VBG Lactate: 2.5      MICROBIOLOGY:   Culture - Blood (17 @ 16:36)    Specimen Source: .Blood Blood-Peripheral    Culture Results:   No growth at 5 days.    RADIOLOGY:  < from: Xray Chest 1 View AP -PORTABLE-Routine (17 @ 08:50) >  PROCEDURE DATE:  2017      INTERPRETATION:  Clinical information: Follow-up pleural effusion.  Findings: Portable frontal view of the chest dated 2017 is compared   to 2017. Small left pleural effusion is stable. Left basilar opacity   is also stable. There is no pneumothorax. Cardiomediastinal silhouette   cannot be evaluated.    Impression: No pneumothorax. Stable small left pleural effusion and left   basilar opacity. CHIEF COMPLAINT: hypotension    Interval Events:  Pt is an 84yoM with PMH of DLBCL, biliary stenosis s/p CBD stent, Romero's esophagus, BPH c/b urinary retention requiring chronic tavarez (replaced on 2017), CKD (baseline SCr 1.2), recently readmitted to [facility] for hypotension, found to have sepsis 2/2 PNA. Pt was treated with vancomycin (1 dose), 7- day course of aztreonam and flagyl for suspected aspiration pneumonia. Speech and swallow evaluation on 2017 recommended NPO status. Cultures reveaeld.   Pt developed MARIA C on CKD. He was evaluated by cardiology for T wave changes in setting of absent CP and negative cardiac enzymes. VQ scan performed on 2017 for dyspnea and hypotension, which found small, nonsegmental matched defects, with very low probability of PE.   He was treated for hypercalcemia of malignancy (calcium 12.5- corrected 13.6) with pamidronate x1, IVF. For DLBCL, he was treated with prednisone 60mg x5 days. Pt was continued on allopurinol. TTE was performed prior to initiating chemotherapy which found EF 50-55%, with normal LV, L pleural effusion. On 2017 he was identified to have a L common femoral vein DVT, for which he was started on lovenox  For DLBCL, he was started on vincristine and cyclophosphamide (2017), then developed leukopenia for which he was started on filgastrim 300mcg subQ qd.   He was found to have focal LUE weakness thus code stroke was called. RRT was called overnight on for patient for acute hypotension to 45/23. Pt was given 3L IVNS without improvement in BP thus was started on norepinephrine. Pt was given hydrocortisone 100mg IV x1, albumin x1, calcium gluconate 1g x1, magnesium sulfate 2g IVx1, K-phos. Pt had L IJ central line placed under US guidance. CT head deferred until patient is more clinically stable.     REVIEW OF SYSTEMS:  Constitutional: [x ] negative [ ] fevers [ ] chills [ ] weight loss [ ] weight gain  HEENT: [ ] negative [ ] dry eyes [ ] eye irritation [ ] postnasal drip [ ] nasal congestion [x] dry mouth  CV: [x ] negative  [ ] chest pain [ ] orthopnea [ ] palpitations [ ] murmur  Resp: [ ] negative [ ] cough [x] shortness of breath [ ] dyspnea [ ] wheezing [ ] sputum [ ] hemoptysis [x ]tachypnea  GI: [ x ] negative [ ] nausea [ ] vomiting [ ] diarrhea [ ] constipation [ ] abd pain [ ] dysphagia   : [x ] negative [ ] dysuria [ ] nocturia [ ] hematuria [ ] increased urinary frequency  Musculoskeletal: [ ] negative [ ] back pain [x ] myalgias- RLE [ ] arthralgias [ ] fracture  Skin: [ ] negative [ ] rash [ ] itch  Neurological: [ x ] negative [ ] headache [ ] dizziness [ ] syncope [ ] weakness [ ] numbness  Psychiatric: [ x ] negative [ ] anxiety [ ] depression  Endocrine: [ x ] negative [ ] diabetes [ ] thyroid problem  Hematologic/Lymphatic: [ x ] negative [ ] anemia [ ] bleeding problem  Allergic/Immunologic: [ x ] negative [ ] itchy eyes [ ] nasal discharge [ ] hives [ ] angioedema  [ ] All other systems negative  [ ] Unable to assess ROS because ________    OBJECTIVE:  ICU Vital Signs Last 24 Hrs  T(C): 36.7 (08 Oct 2017 04:00), Max: 36.7 (07 Oct 2017 15:53)  T(F): 98 (08 Oct 2017 04:00), Max: 98 (07 Oct 2017 15:53)  HR: 106 (08 Oct 2017 05:30) (99 - 125)  BP: 92/68 (08 Oct 2017 05:30) (78/47 - 132/49)  BP(mean): 76 (08 Oct 2017 05:30) (57 - 82)  ABP: --  ABP(mean): --  RR: 28 (08 Oct 2017 05:30) (17 - 42)  SpO2: 100% (08 Oct 2017 05:30) (93% - 100%)    10-07 @ 07:01  -  10-08 @ 07:00  --------------------------------------------------------  IN: 1364.4 mL / OUT: 1430 mL / NET: -65.6 mL      CAPILLARY BLOOD GLUCOSE  144 (08 Oct 2017 05:59)    PHYSICAL EXAM:  Gen: NAD, alert, awake, lying in bed conversant, responding to questions appropriately   HEENT: PERRL, EOMI, mucous membranes dry, no oropharyngeal exudates  Neck:  no JVD, IJ TLC intact  Pulmonary: CTAB, no rhonci, wheezes or rales  Cardiac: irregularly irregular, normal S1S2, no r/m/g  GI:  soft, nontender, nondistended   Extremities: warm, well perfused, no peripheral edema  Skin: skin intact, no skin tears or rashes or other lesions  Neuro: A&O x3, CN II-XII intact, 5/5 BUE and BLE strength, full sensation to light touch    LINES: [facility] MEDICATIONS:    meropenem IVPB      meropenem IVPB 1000 milliGRAM(s) IV Intermittent every 8 hours  vancomycin  IVPB 1000 milliGRAM(s) IV Intermittent every 12 hours    norepinephrine Infusion 0.1 MICROgram(s)/kG/Min IV Continuous <Continuous>  phenylephrine    Infusion 1 MICROgram(s)/kG/Min IV Continuous <Continuous>    allopurinol 300 milliGRAM(s) Oral daily      ondansetron Injectable 8 milliGRAM(s) IV Push every 8 hours PRN    senna 2 Tablet(s) Oral at bedtime    cyclophosphamide IVPB 1344 milliGRAM(s) IV Intermittent once  vinCRIStine IVPB 2 milliGRAM(s) IV Intermittent once        filgrastim-sndz Injectable 480 MICROGram(s) SubCutaneous daily  influenza   Vaccine 0.5 milliLiter(s) IntraMuscular once          LABS:                        10.2   0.4   )-----------( 62       ( 08 Oct 2017 03:25 )             29.9     Hgb Trend: 10.2<--, 9.1<--, 11.5<--, 10.4<--, 10.4<--  10-    144  |  111<H>  |  22  ----------------------------<  144<H>  3.6   |  19<L>  |  1.17    Ca    7.6<L>      08 Oct 2017 03:25  Phos  2.2     10-08  Mg     1.6     10-08    TPro  4.5<L>  /  Alb  2.3<L>  /  TBili  1.8<H>  /  DBili  x   /  AST  22  /  ALT  27  /  AlkPhos  73  10-08    Creatinine Trend: 1.17<--, 1.22<--, 0.95<--, 0.82<--, 0.77<--, 0.84<--  PT/INR - ( 08 Oct 2017 03:25 )   PT: 28.3 sec;   INR: 2.57 ratio         PTT - ( 08 Oct 2017 03:25 )  PTT:49.5 sec  Urinalysis Basic - ( 07 Oct 2017 22:37 )    Color: Yellow / Appearance: SL Turbid / S.010 / pH: x  Gluc: x / Ketone: Negative  / Bili: Negative / Urobili: Negative   Blood: x / Protein: Trace / Nitrite: Negative   Leuk Esterase: Negative / RBC: 2-5 /HPF / WBC 2-5 /HPF   Sq Epi: x / Non Sq Epi: x / Bacteria: x        Venous Blood Gas:  10-08 @ 02:58  7.38/35/37/20/66  VBG Lactate: 3.5  Venous Blood Gas:  10-07 @ 20:07  7.49/33/24/25/41  VBG Lactate: 2.5      MICROBIOLOGY:   Culture - Blood (17 @ 16:36)    Specimen Source: .Blood Blood-Peripheral    Culture Results:   No growth at 5 days.    RADIOLOGY:  < from: Xray Chest 1 View AP -PORTABLE-Routine (17 @ 08:50) >  PROCEDURE DATE:  2017      INTERPRETATION:  Clinical information: Follow-up pleural effusion.  Findings: Portable frontal view of the chest dated 2017 is compared   to 2017. Small left pleural effusion is stable. Left basilar opacity   is also stable. There is no pneumothorax. Cardiomediastinal silhouette   cannot be evaluated.    Impression: No pneumothorax. Stable small left pleural effusion and left   basilar opacity. CHIEF COMPLAINT: hypotension    Interval Events:  Pt is an 84yoM with PMH of DLBCL, biliary stenosis s/p CBD stent, Romero's esophagus, BPH c/b urinary retention requiring chronic tavarez (replaced on 2017), CKD (baseline SCr 1.2), recently readmitted to [facility] for hypotension, found to have sepsis 2/2 PNA. Pt was treated with vancomycin (1 dose), 7- day course of aztreonam and flagyl for suspected aspiration pneumonia. Speech and swallow evaluation on 2017 recommended NPO status. Cultures reveaeld.   Pt developed MARIA C on CKD. He was evaluated by cardiology for T wave changes in setting of absent CP and negative cardiac enzymes. VQ scan performed on 2017 for dyspnea and hypotension, which found small, nonsegmental matched defects, with very low probability of PE.   He was treated for hypercalcemia of malignancy (calcium 12.5- corrected 13.6) with pamidronate x1, IVF. For DLBCL, he was treated with prednisone 60mg x5 days. Pt was continued on allopurinol. TTE was performed prior to initiating chemotherapy which found EF 50-55%, with normal LV, L pleural effusion. On 2017 he was identified to have a L common femoral vein DVT, for which he was started on lovenox  For DLBCL, he was started on vincristine and cyclophosphamide (2017), then developed leukopenia for which he was started on filgastrim 300mcg subQ qd.   He was found to have focal LUE weakness thus code stroke was called. RRT was called overnight on for patient for acute hypotension to 45/23. Pt was given 3L IVNS without improvement in BP thus was started on norepinephrine. Pt was given hydrocortisone 100mg IV x1, albumin x1, calcium gluconate 1g x1, magnesium sulfate 2g IVx1, K-phos. Pt had L IJ central line placed under US guidance. CT head deferred until patient is more clinically stable.     REVIEW OF SYSTEMS:  Constitutional: [x ] negative [ ] fevers [ ] chills [ ] weight loss [ ] weight gain  HEENT: [ ] negative [ ] dry eyes [ ] eye irritation [ ] postnasal drip [ ] nasal congestion [x] dry mouth  CV: [x ] negative  [ ] chest pain [ ] orthopnea [ ] palpitations [ ] murmur  Resp: [ ] negative [ ] cough [x] shortness of breath [ ] dyspnea [ ] wheezing [ ] sputum [ ] hemoptysis [x ]tachypnea  GI: [ x ] negative [ ] nausea [ ] vomiting [ ] diarrhea [ ] constipation [ ] abd pain [ ] dysphagia   : [x ] negative [ ] dysuria [ ] nocturia [ ] hematuria [ ] increased urinary frequency  Musculoskeletal: [ ] negative [ ] back pain [x ] myalgias- RLE [ ] arthralgias [ ] fracture  Skin: [ ] negative [ ] rash [ ] itch  Neurological: [ x ] negative [ ] headache [ ] dizziness [ ] syncope [ ] weakness [ ] numbness  Psychiatric: [ x ] negative [ ] anxiety [ ] depression  Endocrine: [ x ] negative [ ] diabetes [ ] thyroid problem  Hematologic/Lymphatic: [ x ] negative [ ] anemia [ ] bleeding problem  Allergic/Immunologic: [ x ] negative [ ] itchy eyes [ ] nasal discharge [ ] hives [ ] angioedema  [ ] All other systems negative  [ ] Unable to assess ROS because ________    OBJECTIVE:  ICU Vital Signs Last 24 Hrs  T(C): 36.7 (08 Oct 2017 04:00), Max: 36.7 (07 Oct 2017 15:53)  T(F): 98 (08 Oct 2017 04:00), Max: 98 (07 Oct 2017 15:53)  HR: 106 (08 Oct 2017 05:30) (99 - 125)  BP: 92/68 (08 Oct 2017 05:30) (78/47 - 132/49)  BP(mean): 76 (08 Oct 2017 05:30) (57 - 82)  ABP: --  ABP(mean): --  RR: 28 (08 Oct 2017 05:30) (17 - 42)  SpO2: 100% (08 Oct 2017 05:30) (93% - 100%)    10-07 @ 07:01  -  10-08 @ 07:00  --------------------------------------------------------  IN: 1364.4 mL / OUT: 1430 mL / NET: -65.6 mL      CAPILLARY BLOOD GLUCOSE  144 (08 Oct 2017 05:59)    PHYSICAL EXAM:  Gen: NAD, alert, awake, lying in bed conversant, responding to questions appropriately   HEENT: PERRL, EOMI, mucous membranes dry, no oropharyngeal exudates  Neck:  no JVD, IJ TLC intact  Pulmonary: CTAB, no rhonci, wheezes or rales  Cardiac: irregularly irregular, normal S1S2, no r/m/g  GI:  soft, nontender, nondistended   Extremities: warm, well perfused, no peripheral edema  Skin: skin intact, no skin tears or rashes or other lesions  Neuro: A&O x3, CN II-XII intact, 5/5 BUE and BLE strength, full sensation to light touch    LINES: [facility] MEDICATIONS:    meropenem IVPB      meropenem IVPB 1000 milliGRAM(s) IV Intermittent every 8 hours  vancomycin  IVPB 1000 milliGRAM(s) IV Intermittent every 12 hours    norepinephrine Infusion 0.1 MICROgram(s)/kG/Min IV Continuous <Continuous>  phenylephrine    Infusion 1 MICROgram(s)/kG/Min IV Continuous <Continuous>    allopurinol 300 milliGRAM(s) Oral daily      ondansetron Injectable 8 milliGRAM(s) IV Push every 8 hours PRN    senna 2 Tablet(s) Oral at bedtime    cyclophosphamide IVPB 1344 milliGRAM(s) IV Intermittent once  vinCRIStine IVPB 2 milliGRAM(s) IV Intermittent once        filgrastim-sndz Injectable 480 MICROGram(s) SubCutaneous daily  influenza   Vaccine 0.5 milliLiter(s) IntraMuscular once          LABS:                        10.2   0.4   )-----------( 62       ( 08 Oct 2017 03:25 )             29.9     Hgb Trend: 10.2<--, 9.1<--, 11.5<--, 10.4<--, 10.4<--  10-    144  |  111<H>  |  22  ----------------------------<  144<H>  3.6   |  19<L>  |  1.17    Ca    7.6<L>      08 Oct 2017 03:25  Phos  2.2     10-08  Mg     1.6     10-08    TPro  4.5<L>  /  Alb  2.3<L>  /  TBili  1.8<H>  /  DBili  x   /  AST  22  /  ALT  27  /  AlkPhos  73  10-08    Creatinine Trend: 1.17<--, 1.22<--, 0.95<--, 0.82<--, 0.77<--, 0.84<--  PT/INR - ( 08 Oct 2017 03:25 )   PT: 28.3 sec;   INR: 2.57 ratio         PTT - ( 08 Oct 2017 03:25 )  PTT:49.5 sec  Urinalysis Basic - ( 07 Oct 2017 22:37 )    Color: Yellow / Appearance: SL Turbid / S.010 / pH: x  Gluc: x / Ketone: Negative  / Bili: Negative / Urobili: Negative   Blood: x / Protein: Trace / Nitrite: Negative   Leuk Esterase: Negative / RBC: 2-5 /HPF / WBC 2-5 /HPF   Sq Epi: x / Non Sq Epi: x / Bacteria: x        Venous Blood Gas:  10-08 @ 02:58  7.38/35/37/20/66  VBG Lactate: 3.5  Venous Blood Gas:  10-07 @ 20:07  7.49/33/24/25/41  VBG Lactate: 2.5      MICROBIOLOGY:   Culture - Blood (17 @ 16:36)    Specimen Source: .Blood Blood-Peripheral    Culture Results:   No growth at 5 days.    RADIOLOGY:  < from: Xray Chest 1 View AP -PORTABLE-Routine (17 @ 08:50) >  PROCEDURE DATE:  2017      INTERPRETATION:  Clinical information: Follow-up pleural effusion.  Findings: Portable frontal view of the chest dated 2017 is compared   to 2017. Small left pleural effusion is stable. Left basilar opacity   is also stable. There is no pneumothorax. Cardiomediastinal silhouette   cannot be evaluated.    Impression: No pneumothorax. Stable small left pleural effusion and left   basilar opacity.    POCUS: pleural effusions bilaterally, anterior A lines,  No pericardial effusino, No RV strain  RLL- air bronchograms and appears consolidated CHIEF COMPLAINT: hypotension    Interval Events:  Pt is an 84yoM with PMH of DLBCL, biliary stenosis s/p CBD stent, Romero's esophagus, BPH c/b urinary retention requiring chronic tavarez (replaced on 2017), CKD (baseline SCr 1.2), recently readmitted to [facility] for hypotension, found to have sepsis 2/2 PNA. Pt was treated with vancomycin (1 dose), 7- day course of aztreonam and flagyl for suspected aspiration pneumonia. Speech and swallow evaluation on 2017 recommended NPO status. Cultures reveaeld.   Pt developed MARIA C on CKD. He was evaluated by cardiology for T wave changes in setting of absent CP and negative cardiac enzymes. VQ scan performed on 2017 for dyspnea and hypotension, which found small, nonsegmental matched defects, with very low probability of PE.   He was treated for hypercalcemia of malignancy (calcium 12.5- corrected 13.6) with pamidronate x1, IVF. For DLBCL, he was treated with prednisone 60mg x5 days. Pt was continued on allopurinol. TTE was performed prior to initiating chemotherapy which found EF 50-55%, with normal LV, L pleural effusion. On 2017 he was identified to have a L common femoral vein DVT, for which he was started on lovenox  For DLBCL, he was started on vincristine and cyclophosphamide (2017), then developed leukopenia for which he was started on filgastrim 300mcg subQ qd.   He was found to have focal LUE weakness thus code stroke was called. RRT was called overnight on for patient for acute hypotension to 45/23. Pt was given 3L IVNS without improvement in BP thus was started on norepinephrine. Pt was given hydrocortisone 100mg IV x1, albumin x1, calcium gluconate 1g x1, magnesium sulfate 2g IVx1, K-phos. Pt had L IJ central line placed under US guidance. CT head deferred until patient is more clinically stable.     REVIEW OF SYSTEMS:  Constitutional: [x ] negative [ ] fevers [ ] chills [ ] weight loss [ ] weight gain  HEENT: [ ] negative [ ] dry eyes [ ] eye irritation [ ] postnasal drip [ ] nasal congestion [x] dry mouth  CV: [x ] negative  [ ] chest pain [ ] orthopnea [ ] palpitations [ ] murmur  Resp: [ ] negative [ ] cough [x] shortness of breath [ ] dyspnea [ ] wheezing [ ] sputum [ ] hemoptysis [x ]tachypnea  GI: [ x ] negative [ ] nausea [ ] vomiting [ ] diarrhea [ ] constipation [ ] abd pain [ ] dysphagia   : [x ] negative [ ] dysuria [ ] nocturia [ ] hematuria [ ] increased urinary frequency  Musculoskeletal: [ ] negative [ ] back pain [x ] myalgias- RLE [ ] arthralgias [ ] fracture  Skin: [ ] negative [ ] rash [ ] itch  Neurological: [ x ] negative [ ] headache [ ] dizziness [ ] syncope [ ] weakness [ ] numbness  Psychiatric: [ x ] negative [ ] anxiety [ ] depression  Endocrine: [ x ] negative [ ] diabetes [ ] thyroid problem  Hematologic/Lymphatic: [ x ] negative [ ] anemia [ ] bleeding problem  Allergic/Immunologic: [ x ] negative [ ] itchy eyes [ ] nasal discharge [ ] hives [ ] angioedema  [ ] All other systems negative  [ ] Unable to assess ROS because ________    OBJECTIVE:  ICU Vital Signs Last 24 Hrs  T(C): 36.7 (08 Oct 2017 04:00), Max: 36.7 (07 Oct 2017 15:53)  T(F): 98 (08 Oct 2017 04:00), Max: 98 (07 Oct 2017 15:53)  HR: 106 (08 Oct 2017 05:30) (99 - 125)  BP: 92/68 (08 Oct 2017 05:30) (78/47 - 132/49)  BP(mean): 76 (08 Oct 2017 05:30) (57 - 82)  ABP: --  ABP(mean): --  RR: 28 (08 Oct 2017 05:30) (17 - 42)  SpO2: 100% (08 Oct 2017 05:30) (93% - 100%)    10-07 @ 07:01  -  10-08 @ 07:00  --------------------------------------------------------  IN: 1364.4 mL / OUT: 1430 mL / NET: -65.6 mL      CAPILLARY BLOOD GLUCOSE  144 (08 Oct 2017 05:59)    PHYSICAL EXAM:  Gen: NAD, alert, awake, lying in bed conversant, responding to questions appropriately   HEENT: PERRL, EOMI, mucous membranes dry, no oropharyngeal exudates  Neck:  no JVD, IJ TLC intact  Pulmonary: CTAB, no rhonci, wheezes or rales  Cardiac: irregularly irregular, normal S1S2, no r/m/g  GI:  soft, nontender, nondistended   Extremities: cool extremities, 4+ pitting edema to thighs  Skin: skin intact, no skin tears or rashes or other lesions  Neuro: A&O x3, CN II-XII intact, 5/5 BUE and BLE strength, full sensation to light touch    LINES: [facility] MEDICATIONS:    meropenem IVPB      meropenem IVPB 1000 milliGRAM(s) IV Intermittent every 8 hours  vancomycin  IVPB 1000 milliGRAM(s) IV Intermittent every 12 hours    norepinephrine Infusion 0.1 MICROgram(s)/kG/Min IV Continuous <Continuous>  phenylephrine    Infusion 1 MICROgram(s)/kG/Min IV Continuous <Continuous>    allopurinol 300 milliGRAM(s) Oral daily      ondansetron Injectable 8 milliGRAM(s) IV Push every 8 hours PRN    senna 2 Tablet(s) Oral at bedtime    cyclophosphamide IVPB 1344 milliGRAM(s) IV Intermittent once  vinCRIStine IVPB 2 milliGRAM(s) IV Intermittent once        filgrastim-sndz Injectable 480 MICROGram(s) SubCutaneous daily  influenza   Vaccine 0.5 milliLiter(s) IntraMuscular once          LABS:                        10.2   0.4   )-----------( 62       ( 08 Oct 2017 03:25 )             29.9     Hgb Trend: 10.2<--, 9.1<--, 11.5<--, 10.4<--, 10.4<--  10-08    144  |  111<H>  |  22  ----------------------------<  144<H>  3.6   |  19<L>  |  1.17    Ca    7.6<L>      08 Oct 2017 03:25  Phos  2.2     10-08  Mg     1.6     10-08    TPro  4.5<L>  /  Alb  2.3<L>  /  TBili  1.8<H>  /  DBili  x   /  AST  22  /  ALT  27  /  AlkPhos  73  10-08    Creatinine Trend: 1.17<--, 1.22<--, 0.95<--, 0.82<--, 0.77<--, 0.84<--  PT/INR - ( 08 Oct 2017 03:25 )   PT: 28.3 sec;   INR: 2.57 ratio         PTT - ( 08 Oct 2017 03:25 )  PTT:49.5 sec  Urinalysis Basic - ( 07 Oct 2017 22:37 )    Color: Yellow / Appearance: SL Turbid / S.010 / pH: x  Gluc: x / Ketone: Negative  / Bili: Negative / Urobili: Negative   Blood: x / Protein: Trace / Nitrite: Negative   Leuk Esterase: Negative / RBC: 2-5 /HPF / WBC 2-5 /HPF   Sq Epi: x / Non Sq Epi: x / Bacteria: x        Venous Blood Gas:  10-08 @ 02:58  7.38/35/37/20/66  VBG Lactate: 3.5  Venous Blood Gas:  10-07 @ 20:07  7.49/33/24/25/41  VBG Lactate: 2.5      MICROBIOLOGY:   Culture - Blood (17 @ 16:36)    Specimen Source: .Blood Blood-Peripheral    Culture Results:   No growth at 5 days.    RADIOLOGY:  < from: Xray Chest 1 View AP -PORTABLE-Routine (17 @ 08:50) >  PROCEDURE DATE:  2017      INTERPRETATION:  Clinical information: Follow-up pleural effusion.  Findings: Portable frontal view of the chest dated 2017 is compared   to 2017. Small left pleural effusion is stable. Left basilar opacity   is also stable. There is no pneumothorax. Cardiomediastinal silhouette   cannot be evaluated.    Impression: No pneumothorax. Stable small left pleural effusion and left   basilar opacity.    POCUS: pleural effusions bilaterally, anterior A lines,  No pericardial effusino, No RV strain  RLL- air bronchograms and appears consolidated

## 2017-10-09 PROCEDURE — 86901 BLOOD TYPING SEROLOGIC RH(D): CPT

## 2017-10-09 PROCEDURE — 85362 FIBRIN DEGRADATION PRODUCTS: CPT

## 2017-10-09 PROCEDURE — 82330 ASSAY OF CALCIUM: CPT

## 2017-10-09 PROCEDURE — 74176 CT ABD & PELVIS W/O CONTRAST: CPT

## 2017-10-09 PROCEDURE — 84100 ASSAY OF PHOSPHORUS: CPT

## 2017-10-09 PROCEDURE — 93970 EXTREMITY STUDY: CPT

## 2017-10-09 PROCEDURE — 82550 ASSAY OF CK (CPK): CPT

## 2017-10-09 PROCEDURE — 93306 TTE W/DOPPLER COMPLETE: CPT

## 2017-10-09 PROCEDURE — 94799 UNLISTED PULMONARY SVC/PX: CPT

## 2017-10-09 PROCEDURE — 36430 TRANSFUSION BLD/BLD COMPNT: CPT

## 2017-10-09 PROCEDURE — 80048 BASIC METABOLIC PNL TOTAL CA: CPT

## 2017-10-09 PROCEDURE — 70450 CT HEAD/BRAIN W/O DYE: CPT

## 2017-10-09 PROCEDURE — 85014 HEMATOCRIT: CPT

## 2017-10-09 PROCEDURE — 82553 CREATINE MB FRACTION: CPT

## 2017-10-09 PROCEDURE — 96375 TX/PRO/DX INJ NEW DRUG ADDON: CPT

## 2017-10-09 PROCEDURE — 96374 THER/PROPH/DIAG INJ IV PUSH: CPT | Mod: XU

## 2017-10-09 PROCEDURE — 85027 COMPLETE CBC AUTOMATED: CPT

## 2017-10-09 PROCEDURE — 82607 VITAMIN B-12: CPT

## 2017-10-09 PROCEDURE — 94640 AIRWAY INHALATION TREATMENT: CPT

## 2017-10-09 PROCEDURE — 86900 BLOOD TYPING SEROLOGIC ABO: CPT

## 2017-10-09 PROCEDURE — A9567: CPT

## 2017-10-09 PROCEDURE — 83605 ASSAY OF LACTIC ACID: CPT

## 2017-10-09 PROCEDURE — 83615 LACTATE (LD) (LDH) ENZYME: CPT

## 2017-10-09 PROCEDURE — 87086 URINE CULTURE/COLONY COUNT: CPT

## 2017-10-09 PROCEDURE — 87633 RESP VIRUS 12-25 TARGETS: CPT

## 2017-10-09 PROCEDURE — 84443 ASSAY THYROID STIM HORMONE: CPT

## 2017-10-09 PROCEDURE — 85730 THROMBOPLASTIN TIME PARTIAL: CPT

## 2017-10-09 PROCEDURE — 84295 ASSAY OF SERUM SODIUM: CPT

## 2017-10-09 PROCEDURE — 82533 TOTAL CORTISOL: CPT

## 2017-10-09 PROCEDURE — 99285 EMERGENCY DEPT VISIT HI MDM: CPT | Mod: 25

## 2017-10-09 PROCEDURE — 85384 FIBRINOGEN ACTIVITY: CPT

## 2017-10-09 PROCEDURE — 94002 VENT MGMT INPAT INIT DAY: CPT

## 2017-10-09 PROCEDURE — 94660 CPAP INITIATION&MGMT: CPT

## 2017-10-09 PROCEDURE — P9017: CPT

## 2017-10-09 PROCEDURE — 80053 COMPREHEN METABOLIC PANEL: CPT

## 2017-10-09 PROCEDURE — 82435 ASSAY OF BLOOD CHLORIDE: CPT

## 2017-10-09 PROCEDURE — 71250 CT THORAX DX C-: CPT

## 2017-10-09 PROCEDURE — 78582 LUNG VENTILAT&PERFUS IMAGING: CPT

## 2017-10-09 PROCEDURE — 82746 ASSAY OF FOLIC ACID SERUM: CPT

## 2017-10-09 PROCEDURE — 85610 PROTHROMBIN TIME: CPT

## 2017-10-09 PROCEDURE — 82803 BLOOD GASES ANY COMBINATION: CPT

## 2017-10-09 PROCEDURE — 93971 EXTREMITY STUDY: CPT

## 2017-10-09 PROCEDURE — 87449 NOS EACH ORGANISM AG IA: CPT

## 2017-10-09 PROCEDURE — 87040 BLOOD CULTURE FOR BACTERIA: CPT

## 2017-10-09 PROCEDURE — 83880 ASSAY OF NATRIURETIC PEPTIDE: CPT

## 2017-10-09 PROCEDURE — 81001 URINALYSIS AUTO W/SCOPE: CPT

## 2017-10-09 PROCEDURE — 84484 ASSAY OF TROPONIN QUANT: CPT

## 2017-10-09 PROCEDURE — 97162 PT EVAL MOD COMPLEX 30 MIN: CPT

## 2017-10-09 PROCEDURE — A9540: CPT

## 2017-10-09 PROCEDURE — 92610 EVALUATE SWALLOWING FUNCTION: CPT

## 2017-10-09 PROCEDURE — 76705 ECHO EXAM OF ABDOMEN: CPT

## 2017-10-09 PROCEDURE — 83735 ASSAY OF MAGNESIUM: CPT

## 2017-10-09 PROCEDURE — 84132 ASSAY OF SERUM POTASSIUM: CPT

## 2017-10-09 PROCEDURE — 87581 M.PNEUMON DNA AMP PROBE: CPT

## 2017-10-09 PROCEDURE — 86850 RBC ANTIBODY SCREEN: CPT

## 2017-10-09 PROCEDURE — 87486 CHLMYD PNEUM DNA AMP PROBE: CPT

## 2017-10-09 PROCEDURE — 97110 THERAPEUTIC EXERCISES: CPT

## 2017-10-09 PROCEDURE — P9059: CPT

## 2017-10-09 PROCEDURE — 84550 ASSAY OF BLOOD/URIC ACID: CPT

## 2017-10-09 PROCEDURE — 97530 THERAPEUTIC ACTIVITIES: CPT

## 2017-10-09 PROCEDURE — 86780 TREPONEMA PALLIDUM: CPT

## 2017-10-09 PROCEDURE — 86022 PLATELET ANTIBODIES: CPT

## 2017-10-09 PROCEDURE — 71045 X-RAY EXAM CHEST 1 VIEW: CPT

## 2017-10-09 PROCEDURE — 93005 ELECTROCARDIOGRAM TRACING: CPT

## 2017-10-09 PROCEDURE — P9047: CPT

## 2017-10-09 PROCEDURE — 87798 DETECT AGENT NOS DNA AMP: CPT

## 2017-10-09 PROCEDURE — 82947 ASSAY GLUCOSE BLOOD QUANT: CPT

## 2017-10-10 LAB
CULTURE RESULTS: SIGNIFICANT CHANGE UP
SPECIMEN SOURCE: SIGNIFICANT CHANGE UP

## 2017-10-12 LAB
CULTURE RESULTS: SIGNIFICANT CHANGE UP
SPECIMEN SOURCE: SIGNIFICANT CHANGE UP

## 2017-10-13 LAB
CULTURE RESULTS: SIGNIFICANT CHANGE UP
SPECIMEN SOURCE: SIGNIFICANT CHANGE UP

## 2017-10-19 PROCEDURE — 80053 COMPREHEN METABOLIC PANEL: CPT

## 2017-10-19 PROCEDURE — 85730 THROMBOPLASTIN TIME PARTIAL: CPT

## 2017-10-19 PROCEDURE — 83735 ASSAY OF MAGNESIUM: CPT

## 2017-10-19 PROCEDURE — 88342 IMHCHEM/IMCYTCHM 1ST ANTB: CPT

## 2017-10-19 PROCEDURE — 85610 PROTHROMBIN TIME: CPT

## 2017-10-19 PROCEDURE — 99285 EMERGENCY DEPT VISIT HI MDM: CPT | Mod: 25

## 2017-10-19 PROCEDURE — 83880 ASSAY OF NATRIURETIC PEPTIDE: CPT

## 2017-10-19 PROCEDURE — C1887: CPT

## 2017-10-19 PROCEDURE — 88321 CONSLTJ&REPRT SLD PREP ELSWR: CPT

## 2017-10-19 PROCEDURE — 83690 ASSAY OF LIPASE: CPT

## 2017-10-19 PROCEDURE — 81001 URINALYSIS AUTO W/SCOPE: CPT

## 2017-10-19 PROCEDURE — 36415 COLL VENOUS BLD VENIPUNCTURE: CPT

## 2017-10-19 PROCEDURE — C2617: CPT

## 2017-10-19 PROCEDURE — 85027 COMPLETE CBC AUTOMATED: CPT

## 2017-10-19 PROCEDURE — 82150 ASSAY OF AMYLASE: CPT

## 2017-10-19 PROCEDURE — 93005 ELECTROCARDIOGRAM TRACING: CPT

## 2017-10-19 PROCEDURE — 97161 PT EVAL LOW COMPLEX 20 MIN: CPT

## 2017-10-19 PROCEDURE — 74330 X-RAY BILE/PANC ENDOSCOPY: CPT

## 2017-10-19 PROCEDURE — 86900 BLOOD TYPING SEROLOGIC ABO: CPT

## 2017-10-19 PROCEDURE — 71045 X-RAY EXAM CHEST 1 VIEW: CPT

## 2017-10-19 PROCEDURE — 88305 TISSUE EXAM BY PATHOLOGIST: CPT

## 2017-10-19 PROCEDURE — 86850 RBC ANTIBODY SCREEN: CPT

## 2017-10-19 PROCEDURE — 86901 BLOOD TYPING SEROLOGIC RH(D): CPT

## 2017-10-19 PROCEDURE — 88341 IMHCHEM/IMCYTCHM EA ADD ANTB: CPT

## 2017-10-19 PROCEDURE — C1889: CPT

## 2017-10-19 PROCEDURE — 87086 URINE CULTURE/COLONY COUNT: CPT

## 2017-10-19 PROCEDURE — 82570 ASSAY OF URINE CREATININE: CPT

## 2017-10-19 PROCEDURE — 97116 GAIT TRAINING THERAPY: CPT

## 2017-10-19 PROCEDURE — 80048 BASIC METABOLIC PNL TOTAL CA: CPT

## 2017-10-19 PROCEDURE — 84100 ASSAY OF PHOSPHORUS: CPT

## 2017-10-19 PROCEDURE — 80076 HEPATIC FUNCTION PANEL: CPT

## 2017-10-19 PROCEDURE — 97110 THERAPEUTIC EXERCISES: CPT

## 2017-10-19 PROCEDURE — 84132 ASSAY OF SERUM POTASSIUM: CPT

## 2017-10-19 PROCEDURE — 88333 PATH CONSLTJ SURG CYTO XM 1: CPT

## 2017-10-19 PROCEDURE — C1769: CPT

## 2017-10-19 PROCEDURE — 86920 COMPATIBILITY TEST SPIN: CPT

## 2017-10-19 PROCEDURE — S2900: CPT

## 2017-10-19 PROCEDURE — 76705 ECHO EXAM OF ABDOMEN: CPT

## 2017-10-19 PROCEDURE — 88365 INSITU HYBRIDIZATION (FISH): CPT

## 2017-10-19 PROCEDURE — 97163 PT EVAL HIGH COMPLEX 45 MIN: CPT

## 2018-01-02 NOTE — H&P PST ADULT - ENT GEN HX ROS MEA POS PC
Malgorzata Lan   1/2/2018   Anticoagulation Therapy Visit    Description:  78 year old female   Provider:  Clair Michelle DO   Department:  [facility]           INR as of 1/2/2018     Today's INR 2.8      Anticoagulation Summary as of 1/2/2018     INR goal 2.0-3.0   Today's INR 2.8   Full instructions 7.5 mg on Tue, Sat; 5 mg all other days   Next INR check 1/23/2018    Indications   Long-term (current) use of anticoagulants [Z79.01] [Z79.01]  Atrial fibrillation (H) [I48.91]         Your next Anticoagulation Clinic appointment(s)     Jan 23, 2018  2:00 PM CST   Anticoagulation Visit with  ANTICOAGULATION CLINIC   [facility]    [address]  [place] [address]   [phone]              January 2018 Details    Sun Mon Tue Wed Thu Fri Sat      1               2      7.5 mg   See details      3      5 mg         4      5 mg         5      5 mg         6      7.5 mg           7      5 mg         8      5 mg         9      7.5 mg         10      5 mg         11      5 mg         12      5 mg         13      7.5 mg           14      5 mg         15      5 mg         16      7.5 mg         17      5 mg         18      5 mg         19      5 mg         20      7.5 mg           21      5 mg         22      5 mg         23            24               25               26               27                 28               29               30               31                   Date Details   01/02 This INR check       Date of next INR:  1/23/2018         How to take your warfarin dose     To take:  5 mg Take 2 of the 2.5 mg tablets.    To take:  7.5 mg Take 3 of the 2.5 mg tablets.            dry mouth

## 2018-05-17 NOTE — DIETITIAN INITIAL EVALUATION ADULT. - NS FNS REASON FOR WEIGHT CHANG
fluid retention Repair Performed By Another Provider Text (Leave Blank If You Do Not Want): After obtaining clear surgical margins the defect was repaired by another provider.

## 2018-11-20 NOTE — PROGRESS NOTE ADULT - PROBLEM SELECTOR PLAN 2
[facility] Medication Refill Request Information:  * Please contact your pharmacy regarding ANY request for medication refills.  ** [facility] Prescription Fax = [phone]  * Please allow 3 business days for routine medication refills.  * Please allow 5 business days for controlled substance medication refills.     [facility] Test Result notification information:  *You will be notified with in 7-10 days of your appointment day regarding the results of your test.  If you are on MyChart you will be notified as soon as the provider has reviewed the results and signed off on them.    [facility]: [phone]     1.  Flu shot today  2.  Continue lisinopril 20 mg twice daily; HOLD lisinopril 20 mg in the morning or in the evening for blood pressure less than 100 systolic.    Natali was seen today for recheck medication.    Diagnoses and all orders for this visit:    Cough  -     montelukast (SINGULAIR) 10 MG tablet; Take 1 tablet (10 mg) by mouth At Bedtime    Acute CVA (cerebrovascular accident) (H)  Comments:  1/16/18  Orders:  -     clopidogrel (PLAVIX) 75 MG tablet; Take 1 tablet (75 mg) by mouth daily  -     NEUROLOGY ADULT REFERRAL  -     Lipid Profile FASTING; Future    Pain of finger of right hand  -     PHYSICAL THERAPY REFERRAL; Future    Migraine with aura and without status migrainosus, not intractable  -     NEUROLOGY ADULT REFERRAL    Rash  -     triamcinolone (KENALOG) 0.1 % cream; Apply topically 2 times daily    Screening for breast cancer  -     Mammogram, routine screening; Future         appreciate ID input  finished 7 days  of aztreonam/flagyl on 9/27 for aspiration and HCAP  BC NTD, urine cx neg

## 2019-03-13 NOTE — PRE-OP CHECKLIST - IDENTIFICATION BAND VERIFIED
rx for losartan was changed from 50 mg tablet to two 25 mg tablets due to availability. RX was sent to the pharmacy, patient notified. Patient wants to know if she can take an OTC B12 instead of getting the injections. Please advise. done

## 2019-05-13 NOTE — PHYSICAL THERAPY INITIAL EVALUATION ADULT - GENERAL OBSERVATIONS, REHAB EVAL
Due to patient being non-English speaking/uses sign language, an  was used for this visit. Only for face-to-face interpretation by an external agency, date and length of interpretation can be found on the scanned worksheet.     name: Teenay  Agency: [employer]  Language: Maryann   Telephone number: [phone]  Type of interpretation: Face-to-face, spoken      
semi-supine in bed with NAD, +IV, +Askew, wife at bedside

## 2019-07-22 NOTE — PROGRESS NOTE ADULT - SUBJECTIVE AND OBJECTIVE BOX
Subjective  Patient seen in the morning. Doing well. No complaints. Appears to be less confused.    Admission History:  84 year old man with recent diagnosis of DLBCL, biliary stenosis s/p CBD stent, mejias's esophagus, BPH w/ urinary retention s/p chronic tavarez placement admitted with weakness and confusion. Patient reports having felt weak since admission to [facility]. Patient been unable to stand and at times is dizzy. No chest pain. No nausea or vomiting. Patient has been more confused than usual. No fevers or chills.      ROS:  General:  (-)Pain, (-)Decrease appetite, (-)Fevers, (-)Chills, (-)Weight Loss  Eyes: (-)Blurry Vision, (-)Double Vision, (-)Vision Loss  ENT: (-)Sinus Congestion, (-)Decreased Hearing, (-)Nosebleeds, (-)Sore Throat  Cardiac: (-)Chest Pain, (-)Palpitations, (+)Shortness of breathe on exertion  Respiratory: (-)Cough, (-)hemoptysis, (-)Shortness of breathe  GI: (-)Nausea, (-)Vomiting, (-)Diarrhea, (-)Constipation, (-)Melena, (-)BRBPR  : (-)Hematuria, (-)Dysuria, (-)Polyuia  MSK: (-)Back pain, (-)Joint pain, (-)Stiffness  Dermatology: (-)Rash, (-)Itching  Neurology:  (-)Numbness, (-)Tingling, (-)Difficulty Walking, (-)Tremors, (+)Weakness (-)Confusion  Psych: (-)Anxiety, (-)Depression, (-)Memory Loss  Hematology:  (-)Easy Bruising, (-)Easy Bleeding, (-)Night Sweats.     Vital Signs Last 24 Hrs  T(C): 36.6 (02 Oct 2017 07:41), Max: 36.6 (02 Oct 2017 07:41)  T(F): 97.8 (02 Oct 2017 07:41), Max: 97.8 (02 Oct 2017 07:41)  HR: 99 (02 Oct 2017 07:41) (99 - 108)  BP: 111/72 (02 Oct 2017 07:41) (111/72 - 124/73)  RR: 20 (02 Oct 2017 07:41) (18 - 20)  SpO2: 97% (02 Oct 2017 07:41) (96% - 97%)    Objective  Physical Exam:  General: AAO x 2. NAD. Lying in bed comfortably. Still confused  HEENT: clear oropharynx, anicteric sclera, pink conjunctivae, EOMi. PERRLA  Cardiac: S1, S2 present. No audible murmurs. RRR  Lungs: Decreased breathe sounds.   Abdomen: Soft, Non-Tender, Non-Distended. No palpable hepatosplenomegaly.  Extremities: 2+ pulses. Edema to the knees  Skin: No Rashes. No petechiae  Neuro: No focal motor or sensory deficits.     Labs:                        11.9   6.31  )-----------( 99       ( 02 Oct 2017 09:13 )             36.8   10-03    152<H>  |  119<H>  |  30<H>  ----------------------------<  107<H>  3.9   |  25  |  0.85    Ca    8.7      03 Oct 2017 09:52  Phos  2.0     10-02  Mg     2.2     10-02    TPro  4.3<L>  /  Alb  2.3<L>  /  TBili  1.1  /  DBili  x   /  AST  28  /  ALT  31  /  AlkPhos  58  10-03    Radiology:  CT Chest/Abdomen/Pelvis:  IMPRESSION:   Left lower lobe pneumonia.  Bulky confluent upper abdominal and retroperitoneal adenopathy as   described, consistent with lymphoma.     CT Head;  IMPRESSION: Unremarkable noncontrast CT of the brain.    V/Q Scan   IMPRESSION: Very low probability of pulmonary embolism      US LE  IMPRESSION: Acute, above the knee free floating DVT in the left common   femoral vein. LINK Tavera was informed of the findings at 1:20 PM on   9/22/2017.      Pathology:  Surgical Pathology Final Report - :   ACCESSION No:  [id number]    Final Diagnosis  Lymph node, periportal, biopsy  Diffuse large B-cell lymphoma, non-germinal center type Alert and oriented, no focal deficits, no motor or sensory deficits.

## 2019-09-06 NOTE — DIETITIAN INITIAL EVALUATION ADULT. - PERTINENT LABORATORY DATA
Please inform pharmacy and patient should stop lisinopril    Recommend start lisinopril/hydrochlorothiazide and then repeat blood pressure 3-4 weeks    Office visit dictation corrected       Na 138 [135 - 145], K+ 5.2 [3.5 - 5.3], BUN 66 [7 - 23], Cr 1.63 [0.50 - 1.30],  [70 - 99], Ca 11.3 [8.4 - 10.5]

## 2019-10-08 NOTE — PROGRESS NOTE ADULT - PROBLEM SELECTOR PLAN 3
Include Location In Plan?: Yes Hide Include Location In Plan Question?: No Detail Level: Detailed resolved  monitor cbc for now

## 2019-11-25 NOTE — ED PROVIDER NOTE - PSH
LUZ Harris RN
Bladder polyp  s/p TURBT  H/O inguinal hernia repair    History of cholecystectomy  1987  S/P TURP (transurethral resection of prostate)

## 2019-12-08 NOTE — PROGRESS NOTE ADULT - PROBLEM SELECTOR PROBLEM 4
Abnormal left shoulder x-ray which shows mild arthritic changes.  There is no fracture or dislocation noted. Romero esophagus

## 2020-06-27 NOTE — DIETITIAN INITIAL EVALUATION ADULT. - PROBLEM SELECTOR PLAN 1
No pneumothorax. Stable chest findings. CT CHEST WO CONTRAST   Final Result   Probable malignant mass of the right lower lobe or right middle lobe near the   hilum, causing obstruction and collapse of the middle lobe and lower lobe. Adjacent metastatic adenopathy of the right hilum and mediastinum as well as   diffuse metastatic disease of the liver. Suspected metastatic destructive   osseous lesion of the right scapula, only partially imaged. Small right pleural effusion. Multifocal ground-glass opacity of both lungs. This may be due to edema,   atypical infection including viral pneumonia, or a combination. XR CHEST PORTABLE   Final Result   Vascular congestion. Moderate-sized right pleural effusion. XR CHEST STANDARD (2 VW)   Final Result   1. Worsening congestive heart failure. CT chest no contrast 6 /23  Probable malignant mass of the right lower lobe or right middle lobe near the  hilum, causing obstruction and collapse of the middle lobe and lower lobe. Adjacent metastatic adenopathy of the right hilum and mediastinum as well as  diffuse metastatic disease of the liver.  Suspected metastatic destructive  osseous lesion of the right scapula, only partially imaged. Small right pleural effusion. Multifocal ground-glass opacity of both lungs.  This may be due to edema,  atypical infection including viral pneumonia, or a combination.   Assessment/Plan:    Active Hospital Problems    Diagnosis    Acute on chronic respiratory failure with hypoxemia (HCC) [J96.21]    Lung mass [R91.8]    Pleural effusion [J90]    Acute on chronic respiratory failure (HCC) [J96.20]    Chest wall pain [R07.89]    Type 2 diabetes mellitus without complication, with long-term current use of insulin (HCC) [E11.9, Z79.4]         Acute on chr hypoxic resp failure   - uses 2-3L O2 at home--> now on 4L O2, - added IS, wean as tolerated       Acute on Chronic diastolic CHF  pulm HTN - on Revatio  Probable Metastatic Lung Cancer         Moderate R sided pleural effusion-->thoracentesis ordered with panel and serum LDH  Had a thoracentesis, when patient was at [facility] on 6/11 which was exudative, lymphocytic. Repeat thoracentesis 6/23 - 50 cc removed  CT chest no contrast - suspicious for primary lung Ca with metastatic process with liver and bone involvement. Fluid cytology - atypical cells   Plan for perc liver biopsy Monday  Plavix Has been held. Constipation  Senna  dulcolax       DM II   - fairly controlled, cont lantus/SSI/metformin       Hypothyroidism  - cont synthroid       H/o CAD s/p PCI  - cont ASA/statin  Hold plavix for possible biopsy      Mild ELMA   . LAsix held.  toradol d/c ed   Resolved          DVT Prophylaxis: lovenox  Diet: DIET CARB CONTROL; Daily Fluid Restriction: Dry Tray  Code Status: Full Code      PT/OT Eval Status: ordered    Dispo - cont siaac Floyd  6/27/2020  6:05 PM - speech swallow eval  - will cover for aspiration and HCAP with aztreonam and flagyl  - cont gentle IVF given b/l pleural effusion and evidence of anasarca  - blood culture

## 2020-12-08 NOTE — H&P PST ADULT - WEIGHT IN KG
Problem: Patient/Family Goals  Goal: Patient/Family Long Term Goal  Description: Patient's Long Term Goal: to go home    Interventions:  - -wound vac, antibiotics, increase activity, pain control    - See additional Care Plan goals for specific intervent injury  - Provide assistive devices as appropriate  - Consider OT/PT consult to assist with strengthening/mobility  - Encourage toileting schedule  Outcome: Progressing     Problem: PAIN - ADULT  Goal: Verbalizes/displays adequate comfort level or patient' 80.3

## 2021-06-06 NOTE — PROGRESS NOTE ADULT - PROBLEM SELECTOR PLAN 3
Patient called for weekly follow up on LTBI treatment.     Patient is prescribed (Isoniazid 300 mg 3 tabs and Rifapentine 150 mg 6 tabs) once weekly x 12 weeks. Treatment was started on 12/26. Pt took last dose last night. 0 missed doses during the course of treatment.     Reviewed with pt that no further testing needs to be done. Did advise that future tests may still test positive.        Plan:   1. Treatment completed. No further action needed.        Follow-Up  With PharmD as needed         Bob CamarilloD  Medication Therapy Management (MTM) Pharmacist  [facility]     On Prednisone. Next week, as his general state improves, I hope to Administer Cytoxan and Vincristine. Eventually, I hope to be able to give R-CHOP, or at least r-miniCHOP.    Needs Cardiological evaluation in order to determine if he is a candidate for an anthracycline.

## 2021-09-29 NOTE — PROGRESS NOTE ADULT - PROBLEM SELECTOR PLAN 3
Addended by: MARIBETH FREY on: 9/29/2021 05:21 PM     Modules accepted: Orders     resolved  monitor cbc for now

## 2022-04-06 NOTE — PROGRESS NOTE ADULT - PROBLEM SELECTOR PLAN 1
04/06/22 1403   Group Therapy Session   Group Attendance attended group session   Time Session Began 1100   Time Session Ended 1200   Total Time patient participated (minutes) 60   Total # Attendees 2   Group Type addiction;psychoeducation;psychotherapeutic   Group Topic Covered relationship;emotions/expression;co-occurring illness;coping skills/lifestyle management   Group Session Detail Processing group about how use impacts our relationships with our parents/family system   Patient Response/Contribution able to recall/repeat info presented;discussed personal experience with topic;disorganized;expressed reluctance to alter behaviors;listened actively;requested more information about topic   Patient Response Detail Elizabeth was engaged but struggled to form complete thoughts     Elizabeth was actively participating and expressed a significant amount of disappointment about how their parents reacted to their use. They hoped parents would be more upset/disappointed, but felt they were apathetic. They describe increasing their use systematically in hopes their parents will be more responsive. The writer helped Elizabeth process this implications of this behavior.    1/2 NS @ 75 cc/hr  repeat BMP testing today

## 2022-08-02 NOTE — PHYSICAL THERAPY INITIAL EVALUATION ADULT - GAIT DISTANCE, PT EVAL
[FreeTextEntry1] : A1c down 7.0 \par Continue on all current meds\par Continued weight loss encouraged
15 feet

## 2022-10-15 NOTE — DISCHARGE NOTE ADULT - NS MD DC FALL RISK RISK
For information on Fall & Injury Prevention, visit [email or web address] no chest pain and no edema.

## 2023-04-28 NOTE — PROGRESS NOTE ADULT - SUBJECTIVE AND OBJECTIVE BOX
[facility] Hematology/Oncology - Tahmina Miller / Dandre / González - [phone]  Primary Outpatient Hematologist/Oncologist: Dr. Noah Miller    Chief Complaint:  Elevated Bilirubin    HPI:  84 year old man with history of b-cell lymphoma admitted with hyperbilirubinemia. Patient was in his normal state ofh health when he experienced pain in his back whenever her laid down and he has persistent epigastric pain. Patient was seen GI who performed a EGD which was negative. Imaging scans were performed which showed dominant periportal and peripancreatis mass with splenomegaly and hepatic/splenic lesions. Retroperitoneal lesions were also present. EUS was performed on 8/17/2017 which confirmed diagnosis of b-cell lymphoma. More tissue was needed for further testing. PET/CT on 8/29/2017 showed markedly hypermetabolic patricia masses involving the periportal region and contiguous retroperitoneum bilaterally. Patient had a BM biopsy scheduled for 8/31/2017. Found to have a Bilirubin of 5.3 and sent to the hospital for evaluation. S/P stent (9/1/2017).Bili dropping.    ROS:  General:  (-)Pain, (+)Decrease appeite, (-)Fevers, (-)Chills, (+Weight Loss  Eyes: (-)Blurry Vision, (-)Double Vision, (-)Vision Loss  ENT: (-)Sinus Congestion, (-)Decreased Hearing, (-)Nosebleeds, (-)Sore Throat  Cardiac: (-)Chest Pain, (-)Palpitations, (-)Shortness of breathe on exertion  Respiratory: (-)Cough, (-)hemoptysis, (-)Shortness of breathe  GI: (-)Nausea, (-)Vomiting, (-)Diarrhea, (-)Constipation, (-)Melena, (-)BRBPR  : (-)Hematuria, (-)Dysuria, Polyuria  MSK: (-)Back pain, (-)Joint pain, (-)Stiffness  Dermatology: (-)Rash, (-)Itching  Neurology:  (-)Numbness, (-)Tingling, (-)Difficulty Walking, (-)Tremors, (+)Weakness  Psych: (-)Anxiety, (-)Depression, (-)Memory Loss  Hematology:  (-)Easy Bruising, (-)Easy Bleeding, (-)Night Sweats.     PAST MEDICAL & SURGICAL HISTORY:  Romero esophagus  Intestinal infection: 2012  Bladder polyp  H/O inguinal hernia repair  S/P TURP (transurethral resection of prostate)  Bladder polyp: s/p TURBT  History of cholecystectomy: 1987    Social History  Tobacco: Denies current use  Alcohol: Denies current use  Drugs:  Denies current use    FAMILY HISTORY:  Non-contibutory    MEDICATIONS  (STANDING):  sodium chloride 0.9%. 1000 milliLiter(s) (150 mL/Hr) IV Continuous <Continuous>  pantoprazole    Tablet 40 milliGRAM(s) Oral before breakfast  heparin  Injectable 5000 Unit(s) SubCutaneous every 8 hours    MEDICATIONS  (PRN):  diazepam    Tablet 2 milliGRAM(s) Oral three times a day PRN muscle spasm pain    Allergies  latex (Rash; Blisters)  penicillins (Swelling)  sulfa drugs (Unknown)    Vital Signs Last 24 Hrs  T(C): 36.9 (03 Sep 2017 06:31), Max: 36.9 (02 Sep 2017 13:50)  T(F): 98.5 (03 Sep 2017 06:31), Max: 98.5 (03 Sep 2017 06:31)  HR: 95 (03 Sep 2017 06:31) (91 - 99)  BP: 124/73 (03 Sep 2017 06:31) (116/73 - 147/79)  BP(mean): --  RR: 18 (03 Sep 2017 06:31) (18 - 18)  SpO2: 94% (03 Sep 2017 06:31) (93% - 99%)    Physical Exam:  General: AAO x 3. NAD. Reclining in chair.  HEENT: clear oropharynx, anicteric sclera, pink conjunctivae, EOMi. PERRLA  Cardiac: S1, S2 present. No audible murmurs. RRR  Lungs: CTA B/L.   Abdomen: Soft, Non-Tender, Non-Distended. Palpable splenomegaly  Extremities: 2+ pulses. No edema  Skin: No Rashes. No petechiae  Neuro: No focal motor or sensory deficits.                                              11.3   8.30  )-----------( 145      ( 02 Sep 2017 09:32 )             34.8       09-02    135  |  104  |  26<H>  ----------------------------<  88  5.8<H>   |  22  |  1.18    Ca    10.2      02 Sep 2017 13:27    TPro  5.4<L>  /  Alb  2.7<L>  /  TBili  4.4<H>  /  DBili  1.8<H>  /  AST  91<H>  /  ALT  127<H>  /  AlkPhos  350<H>  09-02 Home

## 2023-05-03 NOTE — PROGRESS NOTE ADULT - SUBJECTIVE AND OBJECTIVE BOX
Subjective  Patient awake, alert but remains confused. s/p 1st C of CVP last week  Admission History:  84 year old man with recent diagnosis of DLBCL, biliary stenosis s/p CBD stent, mejias's esophagus, BPH w/ urinary retention s/p chronic tavarez placement admitted with weakness and confusion. Patient reports having felt weak since admission to [facility]. Patient been unable to stand and at times is dizzy. No chest pain. No nausea or vomiting. Patient has been more confused than usual. No fevers or chills.      ROS:  General:  (-)Pain, (+)Decrease appetite, (-)Fevers, (-)Chills, (-)Weight Loss  Eyes: (-)Blurry Vision, (-)Double Vision, (-)Vision Loss  ENT: (-)Sinus Congestion, (-)Decreased Hearing, (-)Nosebleeds, (-)Sore Throat  Cardiac: (-)Chest Pain, (-)Palpitations, (+)Shortness of breathe on exertion  Respiratory: (+)Cough, (-)hemoptysis, (-)Shortness of breathe  GI: (-)Nausea, (-)Vomiting, (-)Diarrhea, (-)Constipation, (-)Melena, (-)BRBPR  : (-)Hematuria, (-)Dysuria, (-)Polyuia  MSK: (-)Back pain, (-)Joint pain, (-)Stiffness  Dermatology: (-)Rash, (-)Itching  Neurology:  (-)Numbness, (-)Tingling, (-)Difficulty Walking, (-)Tremors, (+)Weakness (+)Confusion  Psych: (-)Anxiety, (-)Depression, (-)Memory Loss  Hematology:  (-)Easy Bruising, (-)Easy Bleeding, (-)Night Sweats.   Vital Signs Last 24 Hrs  T(C): 36.6 (30 Sep 2017 15:57), Max: 36.9 (30 Sep 2017 08:33)  T(F): 97.8 (30 Sep 2017 15:57), Max: 98.5 (30 Sep 2017 08:33)  HR: 98 (30 Sep 2017 15:57) (98 - 116)  BP: 107/62 (30 Sep 2017 15:57) (107/62 - 121/82)  BP(mean): --  RR: 18 (30 Sep 2017 15:57) (18 - 18)  SpO2: 92% (30 Sep 2017 15:57) (92% - 94%)      Objective  Physical Exam:  General: AAO x 2. NAD. Lying in bed comfortably. Still confused  HEENT: clear oropharynx, anicteric sclera, pink conjunctivae, EOMi. PERRLA  Cardiac: S1, S2 present. No audible murmurs. RRR  Lungs: Decreased breathe sounds.   Abdomen: Soft, Non-Tender, Non-Distended. No palpable hepatosplenomegaly.  Extremities: 2+ pulses. Edema to the knees  Skin: No Rashes. No petechiae  Neuro: No focal motor or sensory deficits.     Labs:	                      13.0   10.1  )-----------( 102      ( 30 Sep 2017 06:53 )             39.3                           13.2   15.40 )-----------( 187      ( 28 Sep 2017 08:33 )             40.1   09-28    145  |  110<H>  |  53<H>  ----------------------------<  109<H>  4.7   |  21<L>  |  1.25    Ca    9.3      28 Sep 2017 09:34    Radiology:  CT Chest/Abdomen/Pelvis:  IMPRESSION:   Left lower lobe pneumonia.  Bulky confluent upper abdominal and retroperitoneal adenopathy as   described, consistent with lymphoma.     CT Head;  IMPRESSION: Unremarkable noncontrast CT of the brain.    V/Q Scan   IMPRESSION: Very low probability of pulmonary embolism      US LE  IMPRESSION: Acute, above the knee free floating DVT in the left common   femoral vein. LINK Tavera was informed of the findings at 1:20 PM on   9/22/2017.      Pathology:  Surgical Pathology Final Report - :   ACCESSION No:  [id number]    Final Diagnosis  Lymph node, periportal, biopsy  Diffuse large B-cell lymphoma, non-germinal center type 100.5

## 2023-07-02 NOTE — PROGRESS NOTE ADULT - SUBJECTIVE AND OBJECTIVE BOX
Patient is a 84y old  Male who presents with a chief complaint of hypotension (21 Sep 2017 12:22)      SUBJECTIVE / OVERNIGHT EVENTS:    Patient seen and examined.       Vital Signs Last 24 Hrs  T(C): 36.3 (23 Sep 2017 21:43), Max: 36.3 (23 Sep 2017 13:21)  T(F): 97.3 (23 Sep 2017 21:43), Max: 97.4 (23 Sep 2017 20:04)  HR: 97 (23 Sep 2017 21:43) (91 - 112)  BP: 103/60 (23 Sep 2017 21:43) (99/55 - 123/68)  BP(mean): --  RR: 18 (23 Sep 2017 20:04) (17 - 18)  SpO2: 91% (23 Sep 2017 21:43) (91% - 95%)  I&O's Summary    23 Sep 2017 07:01  -  24 Sep 2017 07:00  --------------------------------------------------------  IN: 1090 mL / OUT: 400 mL / NET: 690 mL    PE:  GENERAL: NAD, AAOx2, frail  HEAD:  Atraumatic, Normocephalic  EYES: EOMI, PERRLA, conjunctiva and sclera clear  NECK: Supple, No JVD  CHEST/LUNG: decreased BS, coarse  HEART: Regular rate and rhythm; + murmur  ABDOMEN: Soft, Nontender, Nondistended; Bowel sounds present, multiple erythematous areas, no pus  : chronic tavarez  EXTREMITIES:  2+ Peripheral Pulses, + 2 pitting edema, NTTP left leg, no echymosis  SKIN: No rashes or lesions  NEURO: No focal deficits    LABS:                        12.6   20.00 )-----------( 214      ( 24 Sep 2017 08:55 )             38.5     09-23    132<L>  |  102  |  67<H>  ----------------------------<  101<H>  5.2   |  19<L>  |  1.96<H>    Ca    11.8<H>      23 Sep 2017 08:32  Phos  3.6     09-23  Mg     2.5     09-23        CAPILLARY BLOOD GLUCOSE  111 (23 Sep 2017 21:43)  112 (23 Sep 2017 16:37)  127 (23 Sep 2017 11:52)                RADIOLOGY & ADDITIONAL TESTS:    Imaging Personally Reviewed:  [x] YES  [ ] NO    Consultant(s) Notes Reviewed:  [x] YES  [ ] NO    MEDICATIONS  (STANDING):  aztreonam  IVPB      aztreonam  IVPB 1000 milliGRAM(s) IV Intermittent every 8 hours  finasteride 5 milliGRAM(s) Oral daily  pantoprazole    Tablet 40 milliGRAM(s) Oral before breakfast  senna 2 Tablet(s) Oral at bedtime  polyethylene glycol 3350 17 Gram(s) Oral two times a day  docusate sodium 100 milliGRAM(s) Oral three times a day  influenza   Vaccine 0.5 milliLiter(s) IntraMuscular once  metroNIDAZOLE  IVPB 500 milliGRAM(s) IV Intermittent every 8 hours  metroNIDAZOLE  IVPB      ALBUTerol/ipratropium for Nebulization 3 milliLiter(s) Nebulizer every 6 hours  allopurinol 300 milliGRAM(s) Oral daily  predniSONE   Tablet 60 milliGRAM(s) Oral daily  enoxaparin Injectable 100 milliGRAM(s) SubCutaneous every 12 hours  sodium chloride 0.9%. 1000 milliLiter(s) (80 mL/Hr) IV Continuous <Continuous>  calcitonin Injectable 360 International Unit(s) SubCutaneous every 12 hours    MEDICATIONS  (PRN):      Care Discussed with Consultants/Other Providers [x] YES  [ ] NO    HEALTH ISSUES - PROBLEM Dx:  Romero esophagus: Romero esophagus  Pneumonia: Pneumonia  DVT femoral (deep venous thrombosis) with thrombophlebitis, left: DVT femoral (deep venous thrombosis) with thrombophlebitis, left  Rash: Rash  Hypotension: Hypotension  Hypercalcemia: Hypercalcemia  Leg swelling: Leg swelling  Advanced care planning/counseling discussion: Advanced care planning/counseling discussion  Urinary retention: Urinary retention  Debility: Debility  Diffuse large B-cell lymphoma of intra-abdominal lymph nodes: Diffuse large B-cell lymphoma of intra-abdominal lymph nodes  EKG abnormality: EKG abnormality  MARIA C (acute kidney injury): MARIA C (acute kidney injury)  Sepsis, due to unspecified organism: Sepsis, due to unspecified organism  Pneumonia, bacterial: Pneumonia, bacterial  Abnormal EKG: Abnormal EKG Patient is a 84y old  Male who presents with a chief complaint of hypotension (21 Sep 2017 12:22)      SUBJECTIVE / OVERNIGHT EVENTS:    Patient seen and examined. denies complaints. sp BM. no cp sob.      Vital Signs Last 24 Hrs  T(C): 36.3 (23 Sep 2017 21:43), Max: 36.3 (23 Sep 2017 13:21)  T(F): 97.3 (23 Sep 2017 21:43), Max: 97.4 (23 Sep 2017 20:04)  HR: 97 (23 Sep 2017 21:43) (91 - 112)  BP: 103/60 (23 Sep 2017 21:43) (99/55 - 123/68)  BP(mean): --  RR: 18 (23 Sep 2017 20:04) (17 - 18)  SpO2: 91% (23 Sep 2017 21:43) (91% - 95%)  I&O's Summary    23 Sep 2017 07:01  -  24 Sep 2017 07:00  --------------------------------------------------------  IN: 1090 mL / OUT: 400 mL / NET: 690 mL    PE:  GENERAL: NAD, AAOx2, frail  HEAD:  Atraumatic, Normocephalic  EYES: EOMI, PERRLA, conjunctiva and sclera clear  NECK: Supple, No JVD  CHEST/LUNG: decreased BS, coarse  HEART: Regular rate and rhythm; + murmur  ABDOMEN: Soft, Nontender, Nondistended; Bowel sounds present, multiple erythematous areas, no pus  : chronic tavarez  EXTREMITIES:  2+ Peripheral Pulses, + 2 pitting edema, NTTP left leg, no echymosis  SKIN: No rashes or lesions  NEURO: No focal deficits    LABS:                        12.6   20.00 )-----------( 214      ( 24 Sep 2017 08:55 )             38.5     09-23    132<L>  |  102  |  67<H>  ----------------------------<  101<H>  5.2   |  19<L>  |  1.96<H>    Ca    11.8<H>      23 Sep 2017 08:32  Phos  3.6     09-23  Mg     2.5     09-23        CAPILLARY BLOOD GLUCOSE  111 (23 Sep 2017 21:43)  112 (23 Sep 2017 16:37)  127 (23 Sep 2017 11:52)                RADIOLOGY & ADDITIONAL TESTS:    Imaging Personally Reviewed:  [x] YES  [ ] NO    Consultant(s) Notes Reviewed:  [x] YES  [ ] NO    MEDICATIONS  (STANDING):  aztreonam  IVPB      aztreonam  IVPB 1000 milliGRAM(s) IV Intermittent every 8 hours  finasteride 5 milliGRAM(s) Oral daily  pantoprazole    Tablet 40 milliGRAM(s) Oral before breakfast  senna 2 Tablet(s) Oral at bedtime  polyethylene glycol 3350 17 Gram(s) Oral two times a day  docusate sodium 100 milliGRAM(s) Oral three times a day  influenza   Vaccine 0.5 milliLiter(s) IntraMuscular once  metroNIDAZOLE  IVPB 500 milliGRAM(s) IV Intermittent every 8 hours  metroNIDAZOLE  IVPB      ALBUTerol/ipratropium for Nebulization 3 milliLiter(s) Nebulizer every 6 hours  allopurinol 300 milliGRAM(s) Oral daily  predniSONE   Tablet 60 milliGRAM(s) Oral daily  enoxaparin Injectable 100 milliGRAM(s) SubCutaneous every 12 hours  sodium chloride 0.9%. 1000 milliLiter(s) (80 mL/Hr) IV Continuous <Continuous>  calcitonin Injectable 360 International Unit(s) SubCutaneous every 12 hours    MEDICATIONS  (PRN):      Care Discussed with Consultants/Other Providers [x] YES  [ ] NO    HEALTH ISSUES - PROBLEM Dx:  Romero esophagus: Romero esophagus  Pneumonia: Pneumonia  DVT femoral (deep venous thrombosis) with thrombophlebitis, left: DVT femoral (deep venous thrombosis) with thrombophlebitis, left  Rash: Rash  Hypotension: Hypotension  Hypercalcemia: Hypercalcemia  Leg swelling: Leg swelling  Advanced care planning/counseling discussion: Advanced care planning/counseling discussion  Urinary retention: Urinary retention  Debility: Debility  Diffuse large B-cell lymphoma of intra-abdominal lymph nodes: Diffuse large B-cell lymphoma of intra-abdominal lymph nodes  EKG abnormality: EKG abnormality  MARIA C (acute kidney injury): MARIA C (acute kidney injury)  Sepsis, due to unspecified organism: Sepsis, due to unspecified organism  Pneumonia, bacterial: Pneumonia, bacterial  Abnormal EKG: Abnormal EKG Yes 11-Jun-2019 00:01

## 2023-07-31 NOTE — PROGRESS NOTE ADULT - PROBLEM SELECTOR PROBLEM 1
Last office visit date: 07/14/2023    Next appointment scheduled?: Yes     Number of refills given: 0     Pneumonia

## 2023-10-16 NOTE — PATIENT PROFILE ADULT. - NS PRO REGISTERED ORGAN DONOR
-------------------------------------------  [Contact Information]  -------------------------------------------  - Central Scheduling (for imaging/ other tests):   [place]: [phone].  -------------------------------------------  - Colonoscopy [phone].  -------------------------------------------  - Coronary Calcium Scan: [phone] or [facility].  -------------------------------------------  - Smoking Cessation Information: -QUIT NOW ([phone])  -------------------------------------------  - [place] Helpline for Opioids & Other Substances:   1-298-4ZPUUZPWH (1-638.295.4089)   -------------------------------------------  - Suicide Prevention Hotline #: If you or someone you know is having thoughts of suicide or experiencing a mental health or substance use crisis, 98 provides  connection to confidential support. There is hope. You are not alone. Just call or text 225 or chat [email or web address], 988 connects you with a trained crisis counselor who can help.  -------------------------------------------  [facility]  - [address] .  - Phone: [phone]  [[facility] in [place], [place]]:   - [address], [place] [address]  - Phone: [phone]  -------------------------------------------  [Instruction]    -   -   -   -    Yes

## 2024-04-17 NOTE — ED PROVIDER NOTE - TOBACCO USE
[Well Developed] : well developed [Well Nourished] : well nourished [No Acute Distress] : no acute distress [Normal Conjunctiva] : normal conjunctiva Unknown if ever smoked [Normal Venous Pressure] : normal venous pressure [No Carotid Bruit] : no carotid bruit [Normal S1, S2] : normal S1, S2 [No Murmur] : no murmur [No Rub] : no rub [No Gallop] : no gallop [Clear Lung Fields] : clear lung fields [Good Air Entry] : good air entry [No Respiratory Distress] : no respiratory distress  [Soft] : abdomen soft [Non Tender] : non-tender [No Masses/organomegaly] : no masses/organomegaly [Normal Bowel Sounds] : normal bowel sounds [Normal Gait] : normal gait Never smoker [No Edema] : no edema [No Cyanosis] : no cyanosis [No Clubbing] : no clubbing [No Varicosities] : no varicosities [No Rash] : no rash [No Skin Lesions] : no skin lesions [Moves all extremities] : moves all extremities [No Focal Deficits] : no focal deficits [Normal Speech] : normal speech [Alert and Oriented] : alert and oriented [Normal memory] : normal memory

## 2025-02-27 NOTE — PROGRESS NOTE ADULT - PROBLEM SELECTOR PLAN 8
Pt with nausea, vomiting and diarrhea that started today    + left common femoral DVT likely 2/2 increased risk from sedentary and malignancy  on therapeutic lovenox    Called house number to update wife but no answer. + left common femoral DVT likely 2/2 increased risk from sedentary and malignancy  on therapeutic lovenox

## 2025-04-14 NOTE — DISCHARGE NOTE ADULT - FUNCTIONAL STATUS DATE
Health Maintenance       Hepatitis B Vaccine (3 of 3 - 19+ 3-dose series)  Overdue since 11/15/2023    COVID-19 Vaccine (4 - 2024-25 season)  Overdue since 9/1/2024    Depression Screening (Yearly)  Due soon on 7/11/2025           Following review of the above:  Patient is not proceeding with: COVID-19 and Influenza    Note: Refer to final orders and clinician documentation.         09-Sep-2017